# Patient Record
Sex: FEMALE | Race: WHITE | Employment: FULL TIME | URBAN - METROPOLITAN AREA
[De-identification: names, ages, dates, MRNs, and addresses within clinical notes are randomized per-mention and may not be internally consistent; named-entity substitution may affect disease eponyms.]

---

## 2017-07-07 ENCOUNTER — HOSPITAL ENCOUNTER (OUTPATIENT)
Dept: MAMMOGRAPHY | Age: 57
Discharge: HOME | End: 2017-07-07
Attending: OBSTETRICS & GYNECOLOGY | Admitting: OBSTETRICS & GYNECOLOGY

## 2017-07-07 DIAGNOSIS — Z12.31 VISIT FOR SCREENING MAMMOGRAM: ICD-10-CM

## 2018-07-10 ENCOUNTER — HOSPITAL ENCOUNTER (OUTPATIENT)
Dept: MAMMOGRAPHY | Age: 58
Discharge: HOME | End: 2018-07-10
Admitting: OBSTETRICS & GYNECOLOGY

## 2018-07-10 DIAGNOSIS — Z12.31 VISIT FOR SCREENING MAMMOGRAM: ICD-10-CM

## 2018-12-11 ENCOUNTER — HOSPITAL ENCOUNTER (OUTPATIENT)
Age: 58
Discharge: HOME | End: 2018-12-11
Payer: COMMERCIAL

## 2018-12-11 LAB — URIC ACID, SERUM: 7.9 MG/DL (ref 2.6–6)

## 2018-12-11 PROCEDURE — 36415 COLL VENOUS BLD VENIPUNCTURE: CPT

## 2018-12-11 PROCEDURE — 84550 ASSAY OF BLOOD/URIC ACID: CPT

## 2019-02-07 ENCOUNTER — HOSPITAL ENCOUNTER (OUTPATIENT)
Dept: MAMMOGRAPHY | Age: 59
Discharge: HOME | End: 2019-02-11
Payer: COMMERCIAL

## 2019-02-07 DIAGNOSIS — Z12.31 VISIT FOR SCREENING MAMMOGRAM: ICD-10-CM

## 2019-02-07 PROCEDURE — 77063 BREAST TOMOSYNTHESIS BI: CPT

## 2019-02-28 ENCOUNTER — HOSPITAL ENCOUNTER (OUTPATIENT)
Dept: PHYSICAL THERAPY | Age: 59
Setting detail: THERAPIES SERIES
Discharge: HOME | End: 2019-02-28
Payer: COMMERCIAL

## 2019-02-28 PROCEDURE — 97530 THERAPEUTIC ACTIVITIES: CPT

## 2019-02-28 PROCEDURE — 97012 MECHANICAL TRACTION THERAPY: CPT

## 2019-02-28 PROCEDURE — 97161 PT EVAL LOW COMPLEX 20 MIN: CPT

## 2019-02-28 ASSESSMENT — PAIN SCALES - GENERAL: PAINLEVEL_OUTOF10: 5

## 2019-02-28 ASSESSMENT — PAIN DESCRIPTION - FREQUENCY: FREQUENCY: INTERMITTENT

## 2019-02-28 ASSESSMENT — PAIN - FUNCTIONAL ASSESSMENT: PAIN_FUNCTIONAL_ASSESSMENT: PREVENTS OR INTERFERES SOME ACTIVE ACTIVITIES AND ADLS

## 2019-03-05 ENCOUNTER — HOSPITAL ENCOUNTER (OUTPATIENT)
Dept: PHYSICAL THERAPY | Age: 59
Setting detail: THERAPIES SERIES
Discharge: HOME | End: 2019-03-05
Payer: COMMERCIAL

## 2019-03-05 PROCEDURE — 97110 THERAPEUTIC EXERCISES: CPT

## 2019-03-05 PROCEDURE — 97012 MECHANICAL TRACTION THERAPY: CPT

## 2019-03-06 ENCOUNTER — HOSPITAL ENCOUNTER (OUTPATIENT)
Dept: PHYSICAL THERAPY | Age: 59
Setting detail: THERAPIES SERIES
Discharge: HOME | End: 2019-03-06
Payer: COMMERCIAL

## 2019-03-06 PROCEDURE — 97012 MECHANICAL TRACTION THERAPY: CPT

## 2019-03-06 PROCEDURE — 97110 THERAPEUTIC EXERCISES: CPT

## 2019-03-08 ENCOUNTER — HOSPITAL ENCOUNTER (OUTPATIENT)
Dept: PHYSICAL THERAPY | Age: 59
Setting detail: THERAPIES SERIES
Discharge: HOME | End: 2019-03-08
Payer: COMMERCIAL

## 2019-03-08 PROCEDURE — 97110 THERAPEUTIC EXERCISES: CPT

## 2019-03-08 PROCEDURE — 97012 MECHANICAL TRACTION THERAPY: CPT

## 2019-03-11 ENCOUNTER — HOSPITAL ENCOUNTER (OUTPATIENT)
Dept: PHYSICAL THERAPY | Age: 59
Setting detail: THERAPIES SERIES
Discharge: HOME | End: 2019-03-11
Payer: COMMERCIAL

## 2019-03-11 PROCEDURE — 97012 MECHANICAL TRACTION THERAPY: CPT

## 2019-03-11 PROCEDURE — 97110 THERAPEUTIC EXERCISES: CPT

## 2019-03-13 ENCOUNTER — APPOINTMENT (OUTPATIENT)
Dept: PHYSICAL THERAPY | Age: 59
End: 2019-03-13
Payer: COMMERCIAL

## 2019-03-14 ENCOUNTER — HOSPITAL ENCOUNTER (OUTPATIENT)
Dept: PHYSICAL THERAPY | Age: 59
Setting detail: THERAPIES SERIES
Discharge: HOME | End: 2019-03-14
Payer: COMMERCIAL

## 2019-03-14 PROCEDURE — 97110 THERAPEUTIC EXERCISES: CPT

## 2019-03-14 PROCEDURE — 97140 MANUAL THERAPY 1/> REGIONS: CPT

## 2019-03-14 PROCEDURE — 97012 MECHANICAL TRACTION THERAPY: CPT

## 2019-03-15 ENCOUNTER — HOSPITAL ENCOUNTER (OUTPATIENT)
Dept: PHYSICAL THERAPY | Age: 59
Setting detail: THERAPIES SERIES
Discharge: HOME | End: 2019-03-15
Payer: COMMERCIAL

## 2019-03-15 PROCEDURE — 97012 MECHANICAL TRACTION THERAPY: CPT

## 2019-03-15 PROCEDURE — 97110 THERAPEUTIC EXERCISES: CPT

## 2019-03-15 PROCEDURE — 97140 MANUAL THERAPY 1/> REGIONS: CPT

## 2019-03-18 ENCOUNTER — HOSPITAL ENCOUNTER (OUTPATIENT)
Dept: PHYSICAL THERAPY | Age: 59
Setting detail: THERAPIES SERIES
Discharge: HOME | End: 2019-03-18
Payer: COMMERCIAL

## 2019-03-18 PROCEDURE — 97012 MECHANICAL TRACTION THERAPY: CPT

## 2019-03-18 PROCEDURE — 97110 THERAPEUTIC EXERCISES: CPT

## 2019-03-20 ENCOUNTER — HOSPITAL ENCOUNTER (OUTPATIENT)
Dept: PHYSICAL THERAPY | Age: 59
Setting detail: THERAPIES SERIES
Discharge: HOME | End: 2019-03-20
Payer: COMMERCIAL

## 2019-03-20 PROCEDURE — 97012 MECHANICAL TRACTION THERAPY: CPT

## 2019-03-20 PROCEDURE — 97110 THERAPEUTIC EXERCISES: CPT

## 2019-03-22 ENCOUNTER — HOSPITAL ENCOUNTER (OUTPATIENT)
Dept: PHYSICAL THERAPY | Age: 59
Setting detail: THERAPIES SERIES
Discharge: HOME | End: 2019-03-22
Payer: COMMERCIAL

## 2019-03-22 PROCEDURE — 97012 MECHANICAL TRACTION THERAPY: CPT

## 2019-03-22 PROCEDURE — 97110 THERAPEUTIC EXERCISES: CPT

## 2019-03-25 ENCOUNTER — HOSPITAL ENCOUNTER (OUTPATIENT)
Dept: PHYSICAL THERAPY | Age: 59
Setting detail: THERAPIES SERIES
Discharge: HOME | End: 2019-03-25
Payer: COMMERCIAL

## 2019-03-25 PROCEDURE — 97012 MECHANICAL TRACTION THERAPY: CPT

## 2019-03-25 PROCEDURE — 97110 THERAPEUTIC EXERCISES: CPT

## 2019-03-27 ENCOUNTER — HOSPITAL ENCOUNTER (OUTPATIENT)
Dept: PHYSICAL THERAPY | Age: 59
Setting detail: THERAPIES SERIES
Discharge: HOME | End: 2019-03-27
Payer: COMMERCIAL

## 2019-03-27 PROCEDURE — 97110 THERAPEUTIC EXERCISES: CPT

## 2019-03-27 PROCEDURE — 97012 MECHANICAL TRACTION THERAPY: CPT

## 2019-03-29 ENCOUNTER — HOSPITAL ENCOUNTER (OUTPATIENT)
Dept: PHYSICAL THERAPY | Age: 59
Setting detail: THERAPIES SERIES
Discharge: HOME | End: 2019-03-29
Payer: COMMERCIAL

## 2019-03-29 PROCEDURE — 97012 MECHANICAL TRACTION THERAPY: CPT

## 2019-03-29 PROCEDURE — 97110 THERAPEUTIC EXERCISES: CPT

## 2019-04-10 ENCOUNTER — HOSPITAL ENCOUNTER (OUTPATIENT)
Dept: PHYSICAL THERAPY | Age: 59
Setting detail: THERAPIES SERIES
Discharge: HOME | End: 2019-04-10
Payer: COMMERCIAL

## 2019-04-10 PROCEDURE — 97110 THERAPEUTIC EXERCISES: CPT

## 2019-04-10 PROCEDURE — 97012 MECHANICAL TRACTION THERAPY: CPT

## 2019-04-10 NOTE — FLOWSHEET NOTE
Physical Therapy Daily Treatment Note  Date:  4/10/2019    Patient Name:  Antonia Jewell    :  1960  MRN: [id number]  Restrictions/Precautions:    Medical/Treatment Diagnosis Information:   · Diagnosis: Cervical Radiculopathy  · Treatment Diagnosis: R wrist extensor MM inflammation,  pain at origin and along muscle belly,  + R Spurlings compression test with R UE radicular symptoms into hand. Tracking Information:  Physician Information Referring Practitioner: Molly Campbell MD [facility]     Plan of Care Sent Date:  18 Signed Received:    Visit Count / Total Visits  ,     Insurance Approved Visits  /  Approved Dates:     Insurance Information PT Insurance Information: [employer]     Progress Note/G-codes   []  Yes  [x]  No Next Due: visit 22     Pain level: 3/10     Subjective: 4/10  Pt states that last week she fell asleep w/thout a pillow and her head leaning to the side,  Has had some increased /neck pain since, has had to take OTC meds to control       3/29  Stated was in meeting yesterday for about 6 hours and chair armrests were too high and table for laptop was too high so had more neck and upper R arm pain    3/27  Pt states not having pain today,    3/25  Pt states that she is noticing much improvement,  States that symptoms had reduced about 80%. Still has hand numbness, but thinks it is more related to CTS. Sharp pain is rare,  Frequency and intensity of pain is much less. 3/22:  Pt reports she was a little more sore after the increase in traction weight; states she would like to decrease it today because she was sore. 3/18-- states had not noticed much pain/discomfort in R Ue  Over the weekend. 3/15,  Pt states only having about two episodes a day now, with less overall pain    3/14: Pt with no new complaints; states the frequency and intensity of her pain have both decreased.   Still with numbness in B hands however states that is carpal tunnel and will not change. Objective:      Observation:   3/22:  Pt with thorcic kyphosis,  Forward head,  IR shoulders B; able to correct posture some when cued. Test measurements:  3/22: Formal measurements not taken this date     Exercises:  Exercise/Equipment Resistance/Repetitions Other comments   UBE 2 min ea fwd/retro Scifit (new)   Wrist stretches, strengthening R  HEP   Cervical Stabs Ball behind head:  Chin tuck x 15  CTwith B rotation x 15  CT with flex/ext x 15  CT with B UE Yellow tband / ER x 15    Tband-  -blue TB  Alternating punch out  B x 15     Push up w /a + X 15 Man cues for scapula retraction   D-shrugs X 125    HEP        [employer] FM High row 20# x 15  LPD  20# x 15  Mid row 20# x 15 3/29  incr wt next visit                               Other Therapeutic Activities:  3/22:  None this date  3/8  Recommended pt wear wrist splint, if still has them;, at night to minimize wrist flexion       Home Exercise Program: 3/22:  No new additions this date   2/28  Ice massage to R forearm wrist extensors 7 min daily; Wrist flex/extensor MM stretches throughout day  20 sec each    Manual Treatments:   3/22:  None this date  3/15   X 12 min same as 3/14    3/14:  Supine for DTM to B UT's and cervical parapspinals, UT stretch B (pt developed symptoms in RUE when stretching LUT with head in SB to R, stretch was stopped and symptoms resolved immediately), SOR and cervical traction    Modalities:4/10  ICT 19/10 # 30/10 sec x 16min,   w bolster under knees  3/29 same except 17 min     3/27,  3/25/  ICT  19/10 # 30/10 sec,  X 16 min   Supine with bolsters under knees. 3/22:  Intermittent cervical traction 16#/13# x 10 min with bolster under knees; bell and shut off switch provided. 3/20,    Intermit, Cervical traction:  20/10#  30/10 sec  X 15 min w bolster under knees. Provided bell for needs  3/18  3/15,   3/14,  3/11,  Supine cervical tractions 19/16#  30 on/10 off  X 15 min   3/6  16/7 lbs.   30/10 sec, x 12 min      3/5,   Same except 12 min. 2/28  Supine cervical intermittent traction 14/7 lbs 30/10 sec x 12 mn  w bolsters under knees. Educated pt on expectations and progression with traction. PT stayed with pt. Timed Code Treatment Minutes: 24  Total Treatment Minutes:42    Treatment/Activity Tolerance:  xx- Patient tolerated treatment well - Patient limited by fatigue  - Patient limited by pain  - Patient limited by other medical complications  - Other:    No incr pain after increase after therapy    Prognosis: - Good - Fair  - Poor    Patient Requires Follow-up: -x Yes  - No    Plan:   -xx Continue per plan of care - Alter current plan (see comments)  - Plan of care initiated - Hold pending MD visit - Discharge    Plan for Next Session:  Continue modalities to R extensor MM as needed,  Cervical traction, cervical stabilization ex. MOC as needed for pain.     Electronically signed by:  Candice Lino PT DPT

## 2019-04-18 ENCOUNTER — HOSPITAL ENCOUNTER (OUTPATIENT)
Dept: PHYSICAL THERAPY | Age: 59
Setting detail: THERAPIES SERIES
Discharge: HOME | End: 2019-04-18
Payer: COMMERCIAL

## 2019-04-18 PROCEDURE — 97110 THERAPEUTIC EXERCISES: CPT

## 2019-04-18 PROCEDURE — 97012 MECHANICAL TRACTION THERAPY: CPT

## 2019-04-18 NOTE — FLOWSHEET NOTE
Physical Therapy Daily Treatment Note  Date:  2019    Patient Name:  Ashly Bender    :  1960  MRN: [id number]  Restrictions/Precautions:    Medical/Treatment Diagnosis Information:   · Diagnosis: Cervical Radiculopathy  · Treatment Diagnosis: R wrist extensor MM inflammation,  pain at origin and along muscle belly,  + R Spurlings compression test with R UE radicular symptoms into hand. Tracking Information:  Physician Information Referring Practitioner: Génesis Elias MD [facility]     Plan of Care Sent Date:  18 Signed Received:    Visit Count / Total Visits  ,     Insurance Approved Visits  /  Approved Dates:   PN approved for add'l visits   Insurance Information PT Insurance Information: [employer]     Progress Note/G-codes   []  Yes  [x]  No Next Due: visit 22     Pain level: 4/10     Subjective:     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 4/10  Pt states that last week she fell asleep w/thout a pillow and her head leaning to the side,  Has had some increased /neck pain since, has had to take OTC meds to control       3/29  Stated was in meeting yesterday for about 6 hours and chair armrests were too high and table for laptop was too high so had more neck and upper R arm pain    3/27  Pt states not having pain today,    3/25  Pt states that she is noticing much improvement,  States that symptoms had reduced about 80%. Still has hand numbness, but thinks it is more related to CTS. Sharp pain is rare,  Frequency and intensity of pain is much less. 3/22:  Pt reports she was a little more sore after the increase in traction weight; states she would like to decrease it today because she was sore. 3/18-- states had not noticed much pain/discomfort in R Ue  Over the weekend.     3/15,  Pt states only having about two episodes a day now, with less overall pain    3/14: Pt with no new complaints; states the frequency and intensity of her pain have both decreased. Still with numbness in B hands however states that is carpal tunnel and will not change. Objective:      Observation:   3/22:  Pt with thorcic kyphosis,  Forward head,  IR shoulders B; able to correct posture some when cued. Test measurements:  3/22: Formal measurements not taken this date     Exercises:  Exercise/Equipment Resistance/Repetitions Other comments   UBE 2 min ea fwd/retro Scifit (new)   Wrist stretches, strengthening R  HEP   Cervical Stabs Ball behind head:  Chin tuck x 20  CTwith B rotation x 15  CT with flex/ext x 15  CT with B UE Yellow tband / ER x 15    Tband-  -blue TB  Alternating punch out  B x 15     Push up w /a + X 15 Man cues for scapula retraction   D-shrugs X 20    HEP        [employer] FM High row 20# x 2 x 10  LPD  20# x  2 x 10  Mid row 20# x  2 x 10 3/29  incr wt next visit                               Other Therapeutic Activities:  3/22:  None this date  3/8  Recommended pt wear wrist splint, if still has them;, at night to minimize wrist flexion       Home Exercise Program: 3/22:  No new additions this date   2/28  Ice massage to R forearm wrist extensors 7 min daily; Wrist flex/extensor MM stretches throughout day  20 sec each    Manual Treatments:   3/22:  None this date  3/15   X 12 min same as 3/14    3/14:  Supine for DTM to B UT's and cervical parapspinals, UT stretch B (pt developed symptoms in RUE when stretching LUT with head in SB to R, stretch was stopped and symptoms resolved immediately), SOR and cervical traction    Modalities:4/18   4/10  ICT 19/10 # 30/10 sec x 16min,   w bolster under knees  3/29 same except 17 min     3/27,  3/25/  ICT  19/10 # 30/10 sec,  X 16 min   Supine with bolsters under knees. 3/22:  Intermittent cervical traction 16#/13# x 10 min with bolster under knees; bell and shut off switch provided.     3/20,    Intermit, Cervical traction:  20/10#  30/10 sec  X 15 min w bolster under knees. Provided bell for needs  3/18  3/15,   3/14,  3/11,  Supine cervical tractions 19/16#  30 on/10 off  X 15 min   3/6  16/7 lbs. 30/10 sec, x 12 min      3/5,   Same except 12 min. 2/28  Supine cervical intermittent traction 14/7 lbs 30/10 sec x 12 mn  w bolsters under knees. Educated pt on expectations and progression with traction. PT stayed with pt. Timed Code Treatment Minutes: 15  Total Treatment Minutes:42    Treatment/Activity Tolerance:  xx- Patient tolerated treatment well - Patient limited by fatigue  - Patient limited by pain  - Patient limited by other medical complications  - Other:    No incr pain after increase after therapy    Prognosis: - Good - Fair  - Poor    Patient Requires Follow-up: -x Yes  - No    Plan:   -xx Continue per plan of care - Alter current plan (see comments)  - Plan of care initiated - Hold pending MD visit - Discharge    Plan for Next Session:  Continue modalities to R extensor MM as needed,  Cervical traction, cervical stabilization ex. MOC as needed for pain.     Electronically signed by:  Dinesh Muse, PT DPT

## 2019-04-19 ENCOUNTER — HOSPITAL ENCOUNTER (OUTPATIENT)
Dept: PHYSICAL THERAPY | Age: 59
Setting detail: THERAPIES SERIES
Discharge: HOME | End: 2019-04-19
Payer: COMMERCIAL

## 2019-04-19 PROCEDURE — 97110 THERAPEUTIC EXERCISES: CPT

## 2019-04-19 PROCEDURE — 97012 MECHANICAL TRACTION THERAPY: CPT

## 2019-04-19 NOTE — FLOWSHEET NOTE
Physical Therapy Daily Treatment Note  Date:  2019    Patient Name:  Susan Llaens    :  1960  MRN: [id number]  Restrictions/Precautions:    Medical/Treatment Diagnosis Information:   · Diagnosis: Cervical Radiculopathy  · Treatment Diagnosis: R wrist extensor MM inflammation,  pain at origin and along muscle belly,  + R Spurlings compression test with R UE radicular symptoms into hand. Tracking Information:  Physician Information Referring Practitioner: Roseanne Centeno MD [facility]     Plan of Care Sent Date:  18 Signed Received:    Visit Count / Total Visits  ,  3/8    Insurance Approved Visits  /  Approved Dates:   PN approved for add'l visits   Insurance Information PT Insurance Information: [employer]     Progress Note/G-codes   []  Yes  [x]  No Next Due: visit 22     Pain level: 3/10     Subjective:     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 4/10  Pt states that last week she fell asleep w/thout a pillow and her head leaning to the side,  Has had some increased /neck pain since, has had to take OTC meds to control       3/29  Stated was in meeting yesterday for about 6 hours and chair armrests were too high and table for laptop was too high so had more neck and upper R arm pain    3/27  Pt states not having pain today,    3/25  Pt states that she is noticing much improvement,  States that symptoms had reduced about 80%. Still has hand numbness, but thinks it is more related to CTS. Sharp pain is rare,  Frequency and intensity of pain is much less. 3/22:  Pt reports she was a little more sore after the increase in traction weight; states she would like to decrease it today because she was sore. 3/18-- states had not noticed much pain/discomfort in R Ue  Over the weekend.     3/15,  Pt states only having about two episodes a day now, with less overall pain    3/14: Pt with no new complaints; states the frequency and intensity of her pain have both decreased. Still with numbness in B hands however states that is carpal tunnel and will not change. Objective:      Observation:   3/22:  Pt with thorcic kyphosis,  Forward head,  IR shoulders B; able to correct posture some when cued. Test measurements:  3/22: Formal measurements not taken this date     Exercises:  Exercise/Equipment Resistance/Repetitions Other comments   UBE 2 min ea fwd/retro Scifit (new)   Wrist stretches, strengthening R  HEP   Cervical Stabs Ball behind head:  Chin tuck x 20  CTwith B rotation x 15  CT with flex/ext x 15  CT with B UE Yellow tband / ER x 15  CT with R sh scaption x 10      Tband-  -blue TB  Alternating punch out  B x 15     Push up w /a + X 15 Man cues for scapula retraction   D-shrugs X 20    HEP        [employer] FM High row 20# x 2 x 10  LPD  20# x  2 x 10  Mid row 20# x  2 x 10 3/29  incr wt next visit                               Other Therapeutic Activities:  3/22:  None this date  3/8  Recommended pt wear wrist splint, if still has them;, at night to minimize wrist flexion       Home Exercise Program: 3/22:  No new additions this date   2/28  Ice massage to R forearm wrist extensors 7 min daily; Wrist flex/extensor MM stretches throughout day  20 sec each    Manual Treatments:   3/22:  None this date  3/15   X 12 min same as 3/14    3/14:  Supine for DTM to B UT's and cervical parapspinals, UT stretch B (pt developed symptoms in RUE when stretching LUT with head in SB to R, stretch was stopped and symptoms resolved immediately), SOR and cervical traction    Modalities:4/18   4/10  ICT 19/10 # 30/10 sec x 16min,   w bolster under knees  3/29 same except 17 min     3/27,  3/25/  ICT  19/10 # 30/10 sec,  X 16 min   Supine with bolsters under knees.      3/22:  Intermittent cervical traction 16#/13# x 10 min with bolster under knees; bell and shut off switch provided. 3/20,    Intermit, Cervical traction:  20/10#  30/10 sec  X 15 min w bolster under knees. Provided bell for needs  3/18  3/15,   3/14,  3/11,  Supine cervical tractions 19/16#  30 on/10 off  X 15 min   3/6  16/7 lbs. 30/10 sec, x 12 min      3/5,   Same except 12 min. 2/28  Supine cervical intermittent traction 14/7 lbs 30/10 sec x 12 mn  w bolsters under knees. Educated pt on expectations and progression with traction. PT stayed with pt. Timed Code Treatment Minutes: 15  Total Treatment Minutes:32    Treatment/Activity Tolerance:  xx- Patient tolerated treatment well - Patient limited by fatigue  - Patient limited by pain  - Patient limited by other medical complications  - Other:    No incr pain after increase after therapy    Prognosis: - Good - Fair  - Poor    Patient Requires Follow-up: -x Yes  - No    Plan:   -xx Continue per plan of care - Alter current plan (see comments)  - Plan of care initiated - Hold pending MD visit - Discharge    Plan for Next Session:  Continue modalities to R extensor MM as needed,  Cervical traction, cervical stabilization ex. MOC as needed for pain.     Electronically signed by:  Dinesh Muse, [address] [facility] DPT [id number]

## 2019-04-22 ENCOUNTER — HOSPITAL ENCOUNTER (OUTPATIENT)
Dept: PHYSICAL THERAPY | Age: 59
Setting detail: THERAPIES SERIES
Discharge: HOME | End: 2019-04-22
Payer: COMMERCIAL

## 2019-04-22 PROCEDURE — 97110 THERAPEUTIC EXERCISES: CPT

## 2019-04-22 PROCEDURE — 97012 MECHANICAL TRACTION THERAPY: CPT

## 2019-04-22 NOTE — FLOWSHEET NOTE
Physical Therapy Daily Treatment Note  Date:  2019    Patient Name:  Laith Cortez    :  1960  MRN: [id number]  Restrictions/Precautions:    Medical/Treatment Diagnosis Information:   · Diagnosis: Cervical Radiculopathy  · Treatment Diagnosis: R wrist extensor MM inflammation,  pain at origin and along muscle belly,  + R Spurlings compression test with R UE radicular symptoms into hand. Tracking Information:  Physician Information Referring Practitioner: Quitman Ahumada, MD [facility]     Plan of Care Sent Date:  18 Signed Received:    Visit Count / Total Visits  ,      Insurance Approved Visits  /  Approved Dates:   PN approved for add'l visits   Insurance Information PT Insurance Information: [employer]     Progress Note/G-codes   []  Yes  [x]  No Next Due: visit 22     Pain level: 2/10     Subjective:    Stated neck/shoulder a little painful today, but is still trying to get her new workstation set up for her          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 4/10  Pt states that last week she fell asleep w/thout a pillow and her head leaning to the side,  Has had some increased /neck pain since, has had to take OTC meds to control       3/29  Stated was in meeting yesterday for about 6 hours and chair armrests were too high and table for laptop was too high so had more neck and upper R arm pain    3/27  Pt states not having pain today,    3/25  Pt states that she is noticing much improvement,  States that symptoms had reduced about 80%. Still has hand numbness, but thinks it is more related to CTS. Sharp pain is rare,  Frequency and intensity of pain is much less. 3/22:  Pt reports she was a little more sore after the increase in traction weight; states she would like to decrease it today because she was sore.      3/18-- states had not noticed much pain/discomfort in R Ue  Over the weekend. 3/15,  Pt states only having about two episodes a day now, with less overall pain    3/14: Pt with no new complaints; states the frequency and intensity of her pain have both decreased. Still with numbness in B hands however states that is carpal tunnel and will not change. Objective:      Observation:   3/22:  Pt with thorcic kyphosis,  Forward head,  IR shoulders B; able to correct posture some when cued. Test measurements:  3/22: Formal measurements not taken this date     Exercises:  Exercise/Equipment Resistance/Repetitions Other comments   UBE 2 min ea fwd/retro Scifit (new)   Wrist stretches, strengthening R  HEP   Cervical Stabs Ball behind head:  Chin tuck x 20  CTwith B rotation x 15  CT with flex/ext x 15    Green TBand  CT with B / ER x 20  CT with R sh scaption x 10      Tband-  -blue TB  Alternating punch out  B x 15     Push up w /a + X 15 Man cues for scapula retraction   D-shrugs X 20    HEP        [employer] FM High row 20# x 2 x 10  LPD  20# x  2 x 10  Mid row 20# x  2 x 10 3/29  incr wt next visit                               Other Therapeutic Activities:  3/22:  None this date  3/8  Recommended pt wear wrist splint, if still has them;, at night to minimize wrist flexion       Home Exercise Program: 3/22:  No new additions this date   2/28  Ice massage to R forearm wrist extensors 7 min daily;   Wrist flex/extensor MM stretches throughout day  20 sec each    Manual Treatments:   3/22:  None this date  3/15   X 12 min same as 3/14    3/14:  Supine for DTM to B UT's and cervical parapspinals, UT stretch B (pt developed symptoms in RUE when stretching LUT with head in SB to R, stretch was stopped and symptoms resolved immediately), SOR and cervical traction    Modalities:   4/22,  Same  4/18   4/10  ICT 19/10 # 30/10 sec x 16min,   w bolster under knees  3/29 same except 17 min     3/27,  3/25/  ICT  19/10 # 30/10 sec,  X 16 min   Supine with bolsters under knees. 3/22:  Intermittent cervical traction 16#/13# x 10 min with bolster under knees; bell and shut off switch provided. 3/20,    Intermit, Cervical traction:  20/10#  30/10 sec  X 15 min w bolster under knees. Provided bell for needs  3/18  3/15,   3/14,  3/11,  Supine cervical tractions 19/16#  30 on/10 off  X 15 min   3/6  16/7 lbs. 30/10 sec, x 12 min      3/5,   Same except 12 min. 2/28  Supine cervical intermittent traction 14/7 lbs 30/10 sec x 12 mn  w bolsters under knees. Educated pt on expectations and progression with traction. PT stayed with pt. Timed Code Treatment Minutes: 18  Total Treatment Minutes:34    Treatment/Activity Tolerance:  xx- Patient tolerated treatment well - Patient limited by fatigue  - Patient limited by pain  - Patient limited by other medical complications  - Other:    No incr pain after increase after therapy    Prognosis: - Good - Fair  - Poor    Patient Requires Follow-up: -x Yes  - No    Plan:   -xx Continue per plan of care - Alter current plan (see comments)  - Plan of care initiated - Hold pending MD visit - Discharge    Plan for Next Session:  Continue modalities to R extensor MM as needed,  Cervical traction, cervical stabilization ex. MOC as needed for pain.     Electronically signed by:  Lalitha Talamantes DPT [id number]

## 2019-04-24 ENCOUNTER — HOSPITAL ENCOUNTER (OUTPATIENT)
Dept: PHYSICAL THERAPY | Age: 59
Setting detail: THERAPIES SERIES
Discharge: HOME | End: 2019-04-24
Payer: COMMERCIAL

## 2019-04-24 PROCEDURE — 97110 THERAPEUTIC EXERCISES: CPT

## 2019-04-24 PROCEDURE — 97012 MECHANICAL TRACTION THERAPY: CPT

## 2019-04-24 NOTE — FLOWSHEET NOTE
Physical Therapy Daily Treatment Note  Date:  2019    Patient Name:  Taty Manuel    :  1960  MRN: [id number]  Restrictions/Precautions:    Medical/Treatment Diagnosis Information:   · Diagnosis: Cervical Radiculopathy  · Treatment Diagnosis: R wrist extensor MM inflammation,  pain at origin and along muscle belly,  + R Spurlings compression test with R UE radicular symptoms into hand. Tracking Information:  Physician Information Referring Practitioner: Jose Ramon Clemente MD [facility]     Plan of Care Sent Date:  18 Signed Received:    Visit Count / Total Visits  ,      Insurance Approved Visits  /  Approved Dates:   PN approved for add'l visits   Insurance Information PT Insurance Information: [employer]     Progress Note/G-codes   []  Yes  [x]  No Next Due: visit 22     Pain level: 1/10     Subjective:   Only having a little neck pain today,        Stated neck/shoulder a little painful today, but is still trying to get her new workstation set up for her          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 4/10  Pt states that last week she fell asleep w/thout a pillow and her head leaning to the side,  Has had some increased /neck pain since, has had to take OTC meds to control       3/29  Stated was in meeting yesterday for about 6 hours and chair armrests were too high and table for laptop was too high so had more neck and upper R arm pain    3/27  Pt states not having pain today,    3/25  Pt states that she is noticing much improvement,  States that symptoms had reduced about 80%. Still has hand numbness, but thinks it is more related to CTS. Sharp pain is rare,  Frequency and intensity of pain is much less. 3/22:  Pt reports she was a little more sore after the increase in traction weight; states she would like to decrease it today because she was sore. 3/18-- states had not noticed much pain/discomfort in R Ue  Over the weekend. 3/15,  Pt states only having about two episodes a day now, with less overall pain    3/14: Pt with no new complaints; states the frequency and intensity of her pain have both decreased. Still with numbness in B hands however states that is carpal tunnel and will not change. Objective:      Observation:  4/24  Improved posture,  Able to maitnain correct head posture thrughout therapy session  2:  Pt with thorcic kyphosis,  Forward head,  IR shoulders B; able to correct posture some when cued. Test measurements:  3/22: Formal measurements not taken this date     Exercises:  Exercise/Equipment Resistance/Repetitions Other comments   UBE 2 min ea fwd/retro Scifit (new)   Back to wall with 1/2 foam roll along spine head UE   w Gr tband:     B ER  X 15  B Shoulder press x 15 ea   Horiz ABd x 15    HEP   Cervical Stabs Ball behind head:  Chin tuck x 20  CTwith B rotation x 15  CT with flex/ext x 15          Tband-  -blue TB  Alternating punch out  B x 15     Push up w /a + X 15 Man cues for scapula retraction   D-shrugs X 20    HEP        [employer] FM High row 20# x 2 x 10  LPD  20# x  2 x 10  Mid row 20# x  2 x 10 3/29  incr wt next visit                               Other Therapeutic Activities:  3/22:  None this date  3/8  Recommended pt wear wrist splint, if still has them;, at night to minimize wrist flexion       Home Exercise Program: 3/22:  No new additions this date   2/28  Ice massage to R forearm wrist extensors 7 min daily;   Wrist flex/extensor MM stretches throughout day  20 sec each    Manual Treatments:   3/22:  None this date  3/15   X 12 min same as 3/14    3/14:  Supine for DTM to B UT's and cervical parapspinals, UT stretch B (pt developed symptoms in RUE when stretching LUT with head in SB to R, stretch was stopped and symptoms resolved immediately), SOR and cervical traction    Modalities:  4/24 4/22,  Same

## 2019-04-30 ENCOUNTER — HOSPITAL ENCOUNTER (OUTPATIENT)
Dept: PHYSICAL THERAPY | Age: 59
Setting detail: THERAPIES SERIES
Discharge: HOME | End: 2019-04-30
Payer: COMMERCIAL

## 2019-04-30 PROCEDURE — 97110 THERAPEUTIC EXERCISES: CPT

## 2019-04-30 PROCEDURE — 97012 MECHANICAL TRACTION THERAPY: CPT

## 2019-04-30 NOTE — FLOWSHEET NOTE
to decrease it today because she was sore. 3/18-- states had not noticed much pain/discomfort in R Ue  Over the weekend. 3/15,  Pt states only having about two episodes a day now, with less overall pain    3/14: Pt with no new complaints; states the frequency and intensity of her pain have both decreased. Still with numbness in B hands however states that is carpal tunnel and will not change. Objective:      Observation:  4/24  Improved posture,  Able to maitnain correct head posture thrughout therapy session  2:  Pt with thorcic kyphosis,  Forward head,  IR shoulders B; able to correct posture some when cued. Test measurements:  3/22: Formal measurements not taken this date     Exercises:  Exercise/Equipment Resistance/Repetitions Other comments   UBE 2 min ea fwd/retro Scifit (new)   Back to wall with 1/2 foam roll along spine head UE   w Gr tband:     B ER  X 15  B Shoulder press x 15 ea   Horiz ABd x 15    HEP   Cervical Stabs Ball behind head:  Chin tuck x 20  CTwith B rotation x  CT with flex/ext x 15          Tband-  -blue TB  Alternating punch out  B x 15     Push up w /a + X 15 Man cues for scapula retraction   D-shrugs X 20    HEP        [employer] FM High row 25# x  X 15  LPD  25# x   X 15  Mid row 25# x   X 15  Punch out B 10# x 15 3/29  incr wt next visit                               Other Therapeutic Activities:  3/22:  None this date  3/8  Recommended pt wear wrist splint, if still has them;, at night to minimize wrist flexion       Home Exercise Program: 3/22:  No new additions this date   2/28  Ice massage to R forearm wrist extensors 7 min daily;   Wrist flex/extensor MM stretches throughout day  20 sec each    Manual Treatments:   3/22:  None this date  3/15   X 12 min same as 3/14    3/14:  Supine for DTM to B UT's and cervical parapspinals, UT stretch B (pt developed symptoms in RUE when stretching LUT with head in SB to R, stretch was stopped and symptoms resolved immediately), SOR and cervical traction    Modalities:  4/30 19/12# 30/10 sec  X 6 4/24 4/22,  Same  4/18   4/10  ICT 19/10 # 30/10 sec x 16min,   w bolster under knees  3/29 same except 17 min     3/27,  3/25/  ICT  19/10 # 30/10 sec,  X 16 min   Supine with bolsters under knees. 3/22:  Intermittent cervical traction 16#/13# x 10 min with bolster under knees; bell and shut off switch provided. 3/20,    Intermit, Cervical traction:  20/10#  30/10 sec  X 15 min w bolster under knees. Provided bell for needs  3/18  3/15,   3/14,  3/11,  Supine cervical tractions 19/16#  30 on/10 off  X 15 min   3/6  16/7 lbs. 30/10 sec, x 12 min      3/5,   Same except 12 min. 2/28  Supine cervical intermittent traction 14/7 lbs 30/10 sec x 12 mn  w bolsters under knees. Educated pt on expectations and progression with traction. PT stayed with pt. Timed Code Treatment Minutes: 23  Total Treatment Minutes:42  Treatment/Activity Tolerance:  xx- Patient tolerated treatment well - Patient limited by fatigue  - Patient limited by pain  - Patient limited by other medical complications  - Other:    No incr pain after increase after therapy    Prognosis: - Good - Fair  - Poor    Patient Requires Follow-up: -x Yes  - No    Plan:   -xx Continue per plan of care - Alter current plan (see comments)  - Plan of care initiated - Hold pending MD visit - Discharge    Plan for Next Session:  Continue modalities to R extensor MM as needed,  Cervical traction, cervical stabilization ex. MOC as needed for pain.     Electronically signed by:  Thomas David, [facility] DPT [id number]

## 2019-05-03 ENCOUNTER — HOSPITAL ENCOUNTER (OUTPATIENT)
Dept: PHYSICAL THERAPY | Age: 59
Setting detail: THERAPIES SERIES
Discharge: HOME | End: 2019-05-03
Payer: COMMERCIAL

## 2019-05-03 PROCEDURE — 97012 MECHANICAL TRACTION THERAPY: CPT

## 2019-05-03 PROCEDURE — 97110 THERAPEUTIC EXERCISES: CPT

## 2019-05-03 NOTE — FLOWSHEET NOTE
Physical Therapy Daily Treatment Note  Date:  5/3/2019    Patient Name:  Tami Sidhu    :  1960  MRN: [id number]  Restrictions/Precautions:    Medical/Treatment Diagnosis Information:   · Diagnosis: Cervical Radiculopathy  · Treatment Diagnosis: R wrist extensor MM inflammation,  pain at origin and along muscle belly,  + R Spurlings compression test with R UE radicular symptoms into hand. Tracking Information:  Physician Information Referring Practitioner: Glenny Estevez MD [facility]     Plan of Care Sent Date:  18 Signed Received:    Visit Count / Total Visits  ,      Insurance Approved Visits  /  Approved Dates:   PN approved for add'l visits   Insurance Information PT Insurance Information: [employer]     Progress Note/G-codes   []  Yes  [x]  No Next Due: visit 20     Pain level: 3/10     Subjective: 5/3 - pain a little worse today because of the way she was positioned at the monitors today    Neck a little painful,  No arm pain      Only having a little neck pain today,        Stated neck/shoulder a little painful today, but is still trying to get her new workstation set up for her          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 4/10  Pt states that last week she fell asleep w/thout a pillow and her head leaning to the side,  Has had some increased /neck pain since, has had to take OTC meds to control       3/29  Stated was in meeting yesterday for about 6 hours and chair armrests were too high and table for laptop was too high so had more neck and upper R arm pain    3/27  Pt states not having pain today,    3/25  Pt states that she is noticing much improvement,  States that symptoms had reduced about 80%. Still has hand numbness, but thinks it is more related to CTS. Sharp pain is rare,  Frequency and intensity of pain is much less.      3/22:  Pt reports she was a little more sore after the increase in traction weight; states she would like to decrease it today because she was sore. 3/18-- states had not noticed much pain/discomfort in R Ue  Over the weekend. 3/15,  Pt states only having about two episodes a day now, with less overall pain    3/14: Pt with no new complaints; states the frequency and intensity of her pain have both decreased. Still with numbness in B hands however states that is carpal tunnel and will not change. Objective:    Observation:  5/3-Maintain correct head posture throughout therapy session  4/24  Improved posture,  Able to maitnain correct head posture thrughout therapy session  2:  Pt with thorcic kyphosis,  Forward head,  IR shoulders B; able to correct posture some when cued. Test measurements:  3/22: Formal measurements not taken this date     Exercises:  Exercise/Equipment Resistance/Repetitions Other comments   UBE 2 min ea fwd/retro Scifit (new)   Back to wall with 1/2 foam roll along spine head UE   w Gr tband:   B ER  2 x 20  Horiz ABd 2 x 10    HEP   Cervical Stabs Ball behind head:  Chin tuck x 20  CTwith B rotation x 20  CT with flex/ext x 20          Tband-  -blue TB  Alternating punch out  B x 10    Push up w /a + X 15 Man cues for scapula retraction      HEP        3/29  incr wt next visit    [employer] FM High row 20# 2 x 10  LPD 20# 2 x 10  GH ext (on triceps machine) 15# 2 x 10  Row 10# 2 x 10                          Other Therapeutic Activities:  3/22:  None this date  3/8  Recommended pt wear wrist splint, if still has them;, at night to minimize wrist flexion       Home Exercise Program: 3/22:  No new additions this date   2/28  Ice massage to R forearm wrist extensors 7 min daily;   Wrist flex/extensor MM stretches throughout day  20 sec each    Manual Treatments:   3/22:  None this date  3/15   X 12 min same as 3/14  3/14:  Supine for DTM to B UT's and cervical parapspinals, UT stretch B (pt developed symptoms in RUE when stretching LUT with head in SB to R, stretch was stopped and symptoms resolved immediately), SOR and cervical traction    Modalities:  5/3, 4/30 19/12# 30/10 sec  X 16 minutes   4/24 4/22,  Same  4/18   4/10  ICT 19/10 # 30/10 sec x 16min,   w bolster under knees  3/29 same except 17 min   3/27,  3/25/  ICT  19/10 # 30/10 sec,  X 16 min   Supine with bolsters under knees. 3/22:  Intermittent cervical traction 16#/13# x 10 min with bolster under knees; bell and shut off switch provided. 3/20,    Intermit, Cervical traction:  20/10#  30/10 sec  X 15 min w bolster under knees. Provided bell for needs  3/18  3/15,   3/14,  3/11,  Supine cervical tractions 19/16#  30 on/10 off  X 15 min   3/6  16/7 lbs. 30/10 sec, x 12 min    3/5,   Same except 12 min. 2/28  Supine cervical intermittent traction 14/7 lbs 30/10 sec x 12 mn  w bolsters under knees. Educated pt on expectations and progression with traction. PT stayed with pt.      Timed Code Treatment Minutes:  30  Total Treatment Minutes:  48    Treatment/Activity Tolerance:  xx- Patient tolerated treatment well - Patient limited by fatigue  - Patient limited by pain  - Patient limited by other medical complications  - Other:      Prognosis: - Good - Fair  - Poor    Patient Requires Follow-up: -x Yes  - No    Plan:   -xx Continue per plan of care - Alter current plan (see comments)  - Plan of care initiated - Hold pending MD visit - Discharge    Plan for Next Session:  One visit left then discharge  Electronically signed by:  Lalitha Fernandez DPT [id number]

## 2019-05-06 ENCOUNTER — HOSPITAL ENCOUNTER (OUTPATIENT)
Dept: PHYSICAL THERAPY | Age: 59
Setting detail: THERAPIES SERIES
Discharge: HOME | End: 2019-05-06
Payer: COMMERCIAL

## 2019-05-06 PROCEDURE — 97110 THERAPEUTIC EXERCISES: CPT

## 2019-05-06 PROCEDURE — 97012 MECHANICAL TRACTION THERAPY: CPT

## 2019-05-06 NOTE — PROGRESS NOTES
Outpatient Physical Therapy    Phone: [phone] Fax: [phone]    Physical Therapy Discharge  Note  Date: 2019        Patient Name:  Tessa Mcgowan    :  1960  MRN: [id number]  Restrictions/Precautions:    Medical/Treatment Diagnosis Information:   · Diagnosis: Cervical Radiculopathy  · Treatment Diagnosis: R wrist extensor MM inflammation,  pain at origin and along muscle belly,  + R Spurlings compression test with R UE radicular symptoms into hand.       Tracking Information:       Physician Information Referring Practitioner: Bryn Conn MD [facility]     Plan of Care Sent Date:  18 Signed Received:    Visit Count / Total Visits  ,      Insurance Approved Visits  /  Approved Dates:     Insurance Information PT Insurance Information: [employer]     Progress Note/G-codes       Pain Max   2/10      Functional Assessment:  NDI  6         Time Period for Report:  3/25/19-   Cancels/No-shows to date:    0    Plan of Care/Treatment to date:  [x] Therapeutic Exercise      [] Modalities:  [x] Therapeutic Activity       [] Ultrasound    [] Gait Training        [x] Cervical Traction   [] Neuromuscular Re-education      [] Cold/hotpack    [x] Instruction in HEP        [] Lumbar Traction  [] Manual Therapy        [] Electrical Stimulation            [] Aquatic Therapy        [] Iontophoresis        ? [] Lymphedema management  [] Women's Health     Other:  [] Vestibular Rehab        []              ? Significant Findings At Last Visit/Comments:    Subjective:  subject feels that is much better since starting PT. She is having no R UE symptoms,  But occasional neck pain around a 2-4/10.           Objective:  Observation:  Test measurements:   Spurlings - on R ,  Some suboccipital discomfort on R  ;  Cervical AROM WNL   Assessment:  Summary:   Patient's response to treatment  Improvement in sleep, and use of R UE for all ADL,  Only noticing numbness in hand related  To CTS.  but significantly better since initiation  of PT    Progress towards goals: All goals met    Short term goals  Time Frame for Short term goals: 2 wks  Short term goal 1: Pt to report 50% decrease in frequency of symptoms into R hand. -  Goal met  Short term goal 2: Pt to report no epiodes of dropping or sense of being unable to  objects-  Goal met  Long term goals  Time Frame for Long term goals : pain in R forearm decreases to 2/10 max with all activities--goal met  Long term goal 1: Pt able to perform all work activies and home activities as PLOF--   Goal met       Long term goal 2: Pt to have negative spurlings compression test on R cervical region to allow return to PLOF-  Met          Current Frequency/Duration:  # Days per week: [] 1 day # Weeks: [] 1 week [x] 4 weeks      [] 2 days? [] 2 weeks [] 5 weeks      [x] 3 days   [] 3 weeks [] 6 weeks     Rehab Potential: [] Excellent [x] Good [] Fair  [] Poor     Goal Status:  [x] Achieved [] Partially Achieved  [] Not Achieved     Patient Status: [] Continue per initial plan of Care     [x] Patient now discharged     [] Additional visits requested, Please re-certify for additional visits:      Requested frequency/duration: 2 X/week for 4 weeks    Electronically signed by: Bhupendra Carter, PT [id number]      If you have any questions or concerns, please don't hesitate to call.   Thank you for your referral.    Physician Signature:________________________________Date:__________________  By signing above, therapists plan is approved by physician

## 2019-05-06 NOTE — FLOWSHEET NOTE
Physical Therapy Daily Treatment Note  Date:  2019    Patient Name:  Laith Cortez    :  1960  MRN: [id number]  Restrictions/Precautions:    Medical/Treatment Diagnosis Information:   · Diagnosis: Cervical Radiculopathy  · Treatment Diagnosis: R wrist extensor MM inflammation,  pain at origin and along muscle belly,  + R Spurlings compression test with R UE radicular symptoms into hand. Tracking Information:  Physician Information Referring Practitioner: Quitman Ahumada, MD [facility]     Plan of Care Sent Date:  18 Signed Received:    Visit Count / Total Visits  ,      Insurance Approved Visits  /  Approved Dates:   PN approved for add'l visits   Insurance Information PT Insurance Information: [employer]     Progress Note/G-codes   [x]  Yes  []  No Next Due: v     Pain level: 2/10  R suboccipal and lateral neck       Subjective: ,  Pt states her neck of more painful but has had to sit with head in awkward position for 3 days at work and her neck is store. Not having any further R UE symptoms. 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       4/10  Pt states that last week she fell asleep w/thout a pillow and her head leaning to the side,  Has had some increased /neck pain since, has had to take OTC meds to control       3/29  Stated was in meeting yesterday for about 6 hours and chair armrests were too high and table for laptop was too high so had more neck and upper R arm pain    3/27  Pt states not having pain today,    3/25  Pt states that she is noticing much improvement,  States that symptoms had reduced about 80%. Still has hand numbness, but thinks it is more related to CTS. Sharp pain is rare,  Frequency and intensity of pain is much less. 3/22:  Pt reports she was a little more sore after the increase in traction weight; states she would like to decrease it today because she was sore. 3/18-- states had not noticed much pain/discomfort in R Ue  Over the weekend. 3/15,  Pt states only having about two episodes a day now, with less overall pain    3/14: Pt with no new complaints; states the frequency and intensity of her pain have both decreased. Still with numbness in B hands however states that is carpal tunnel and will not change. Objective:    Observation:  5/3-Maintain correct head posture throughout therapy session  4/24  Improved posture,  Able to maitnain correct head posture thrughout therapy session  2:  Pt with thorcic kyphosis,  Forward head,  IR shoulders B; able to correct posture some when cued. Test measurements:  3/22: Formal measurements not taken this date     Exercises:  Exercise/Equipment Resistance/Repetitions Other comments   UBE 2 min ea fwd/retro Scifit (new)   Back to wall with 1/2 foam roll along spine head UE   w Gr tband:   B ER  2 x 20  Horiz ABd 2 x 10    HEP   Cervical Stabs         Tband-  -blue TB  Alternating punch out  B x 10    Push up w /a +  Man cues for scapula retraction      HEP        High row 25# x  X 15  LPD  25# x   X 15  Mid row 25# x   X 15  Punch out B 10# x 153/29  incr wt next visit                              Other Therapeutic Activities: 5/6  Discussed f/u home program,  Added new home stretches for sub occipital MM . Reviewed ergonomic for work station.   X 8 min     3/22:  None this date  3/8  Recommended pt wear wrist splint, if still has them;, at night to minimize wrist flexion       Home Exercise Program:5/6  Add cervcial nod to HEP and R levator Scap stretch.       3/22:  No new additions this date   2/28  Ice massage to R forearm wrist extensors 7 min daily; Wrist flex/extensor MM stretches throughout day  20 sec each    Manual Treatments:   3/22:  None this date  3/15   X 12 min same as 3/14  3/14:  Supine for DTM to B UT's and cervical parapspinals, UT stretch B (pt developed symptoms in RUE when stretching LUT with head in SB to R, stretch was stopped and symptoms resolved immediately), SOR and cervical traction    Modalities:  5/6,   same as 5/3,  5/3, 4/30 19/12# 30/10 sec  X 16 minutes   4/24 4/22,  Same  4/18   4/10  ICT 19/10 # 30/10 sec x 16min,   w bolster under knees  3/29 same except 17 min   3/27,  3/25/  ICT  19/10 # 30/10 sec,  X 16 min   Supine with bolsters under knees. 3/22:  Intermittent cervical traction 16#/13# x 10 min with bolster under knees; bell and shut off switch provided. 3/20,    Intermit, Cervical traction:  20/10#  30/10 sec  X 15 min w bolster under knees. Provided bell for needs  3/18  3/15,   3/14,  3/11,  Supine cervical tractions 19/16#  30 on/10 off  X 15 min   3/6  16/7 lbs. 30/10 sec, x 12 min    3/5,   Same except 12 min. 2/28  Supine cervical intermittent traction 14/7 lbs 30/10 sec x 12 mn  w bolsters under knees. Educated pt on expectations and progression with traction. PT stayed with pt.      Timed Code Treatment Minutes:  30  Total Treatment Minutes:  48    Treatment/Activity Tolerance:  xx- Patient tolerated treatment well - Patient limited by fatigue  - Patient limited by pain  - Patient limited by other medical complications  - Other:      Prognosis: - Good - Fair  - Poor    Patient Requires Follow-up: -x Yes  - No    Plan:    Continue per plan of care - Alter current plan (see comments)  - Plan of care initiated - Hold pending MD visit xx- Discharge    Plan for Next Session:    Electronically signed by:  Noah Brannon, [id number] [facility] DPT [id number]

## 2019-10-18 ENCOUNTER — HOSPITAL ENCOUNTER (OUTPATIENT)
Dept: PHYSICAL THERAPY | Age: 59
Setting detail: THERAPIES SERIES
Discharge: HOME | End: 2019-10-18
Payer: COMMERCIAL

## 2019-10-18 PROCEDURE — G0283 ELEC STIM OTHER THAN WOUND: HCPCS

## 2019-10-18 PROCEDURE — 97162 PT EVAL MOD COMPLEX 30 MIN: CPT

## 2019-10-18 PROCEDURE — 97530 THERAPEUTIC ACTIVITIES: CPT

## 2019-10-22 ENCOUNTER — HOSPITAL ENCOUNTER (OUTPATIENT)
Dept: PHYSICAL THERAPY | Age: 59
Setting detail: THERAPIES SERIES
Discharge: HOME | End: 2019-10-22
Payer: COMMERCIAL

## 2019-10-22 PROCEDURE — 97110 THERAPEUTIC EXERCISES: CPT

## 2019-10-22 PROCEDURE — 97140 MANUAL THERAPY 1/> REGIONS: CPT

## 2019-10-24 ENCOUNTER — HOSPITAL ENCOUNTER (OUTPATIENT)
Dept: PHYSICAL THERAPY | Age: 59
Setting detail: THERAPIES SERIES
Discharge: HOME | End: 2019-10-24
Payer: COMMERCIAL

## 2019-10-24 PROCEDURE — 97140 MANUAL THERAPY 1/> REGIONS: CPT

## 2019-10-24 PROCEDURE — 97110 THERAPEUTIC EXERCISES: CPT

## 2019-10-28 ENCOUNTER — HOSPITAL ENCOUNTER (OUTPATIENT)
Dept: PHYSICAL THERAPY | Age: 59
Setting detail: THERAPIES SERIES
Discharge: HOME | End: 2019-10-28
Payer: COMMERCIAL

## 2019-10-28 PROCEDURE — 97110 THERAPEUTIC EXERCISES: CPT

## 2019-10-28 PROCEDURE — 97140 MANUAL THERAPY 1/> REGIONS: CPT

## 2019-10-29 ENCOUNTER — HOSPITAL ENCOUNTER (OUTPATIENT)
Dept: PHYSICAL THERAPY | Age: 59
Setting detail: THERAPIES SERIES
Discharge: HOME | End: 2019-10-29
Payer: COMMERCIAL

## 2019-10-29 PROCEDURE — 97140 MANUAL THERAPY 1/> REGIONS: CPT

## 2019-10-29 PROCEDURE — 97110 THERAPEUTIC EXERCISES: CPT

## 2019-10-31 ENCOUNTER — APPOINTMENT (OUTPATIENT)
Dept: PHYSICAL THERAPY | Age: 59
End: 2019-10-31
Payer: COMMERCIAL

## 2019-11-01 ENCOUNTER — APPOINTMENT (OUTPATIENT)
Dept: PHYSICAL THERAPY | Age: 59
End: 2019-11-01
Payer: COMMERCIAL

## 2019-11-04 ENCOUNTER — HOSPITAL ENCOUNTER (OUTPATIENT)
Dept: PHYSICAL THERAPY | Age: 59
Setting detail: THERAPIES SERIES
Discharge: HOME | End: 2019-11-04
Payer: COMMERCIAL

## 2019-11-04 PROCEDURE — 97112 NEUROMUSCULAR REEDUCATION: CPT

## 2019-11-04 PROCEDURE — 97110 THERAPEUTIC EXERCISES: CPT

## 2019-11-04 PROCEDURE — 97140 MANUAL THERAPY 1/> REGIONS: CPT

## 2019-11-06 ENCOUNTER — HOSPITAL ENCOUNTER (OUTPATIENT)
Dept: PHYSICAL THERAPY | Age: 59
Setting detail: THERAPIES SERIES
Discharge: HOME | End: 2019-11-06
Payer: COMMERCIAL

## 2019-11-06 PROCEDURE — 97110 THERAPEUTIC EXERCISES: CPT

## 2019-11-06 PROCEDURE — 97140 MANUAL THERAPY 1/> REGIONS: CPT

## 2019-11-12 ENCOUNTER — HOSPITAL ENCOUNTER (OUTPATIENT)
Dept: PHYSICAL THERAPY | Age: 59
Setting detail: THERAPIES SERIES
Discharge: HOME | End: 2019-11-12
Payer: COMMERCIAL

## 2019-11-12 PROCEDURE — 97110 THERAPEUTIC EXERCISES: CPT

## 2019-11-12 PROCEDURE — 97140 MANUAL THERAPY 1/> REGIONS: CPT

## 2019-11-14 ENCOUNTER — HOSPITAL ENCOUNTER (OUTPATIENT)
Dept: PHYSICAL THERAPY | Age: 59
Setting detail: THERAPIES SERIES
Discharge: HOME | End: 2019-11-14
Payer: COMMERCIAL

## 2019-11-14 PROCEDURE — 97110 THERAPEUTIC EXERCISES: CPT

## 2019-11-14 PROCEDURE — 97140 MANUAL THERAPY 1/> REGIONS: CPT

## 2019-11-18 ENCOUNTER — APPOINTMENT (OUTPATIENT)
Dept: PHYSICAL THERAPY | Age: 59
End: 2019-11-18
Payer: COMMERCIAL

## 2019-11-19 ENCOUNTER — HOSPITAL ENCOUNTER (OUTPATIENT)
Dept: PHYSICAL THERAPY | Age: 59
Setting detail: THERAPIES SERIES
Discharge: HOME | End: 2019-11-19
Payer: COMMERCIAL

## 2019-11-19 PROCEDURE — 97140 MANUAL THERAPY 1/> REGIONS: CPT

## 2019-11-19 PROCEDURE — 97530 THERAPEUTIC ACTIVITIES: CPT

## 2019-11-21 ENCOUNTER — HOSPITAL ENCOUNTER (OUTPATIENT)
Dept: PHYSICAL THERAPY | Age: 59
Setting detail: THERAPIES SERIES
Discharge: HOME | End: 2019-11-21
Payer: COMMERCIAL

## 2019-11-21 PROCEDURE — 97110 THERAPEUTIC EXERCISES: CPT

## 2019-11-21 PROCEDURE — 97112 NEUROMUSCULAR REEDUCATION: CPT

## 2019-11-25 ENCOUNTER — HOSPITAL ENCOUNTER (OUTPATIENT)
Dept: PHYSICAL THERAPY | Age: 59
Setting detail: THERAPIES SERIES
Discharge: HOME | End: 2019-11-25
Payer: COMMERCIAL

## 2019-11-25 PROCEDURE — 97112 NEUROMUSCULAR REEDUCATION: CPT

## 2019-11-25 PROCEDURE — 97110 THERAPEUTIC EXERCISES: CPT

## 2019-11-27 ENCOUNTER — HOSPITAL ENCOUNTER (OUTPATIENT)
Dept: PHYSICAL THERAPY | Age: 59
Setting detail: THERAPIES SERIES
Discharge: HOME | End: 2019-11-27
Payer: COMMERCIAL

## 2019-11-27 PROCEDURE — 97110 THERAPEUTIC EXERCISES: CPT

## 2019-11-27 PROCEDURE — 97112 NEUROMUSCULAR REEDUCATION: CPT

## 2019-12-03 ENCOUNTER — HOSPITAL ENCOUNTER (OUTPATIENT)
Dept: PHYSICAL THERAPY | Age: 59
Setting detail: THERAPIES SERIES
Discharge: HOME | End: 2019-12-03
Payer: COMMERCIAL

## 2019-12-03 PROCEDURE — 97110 THERAPEUTIC EXERCISES: CPT

## 2021-06-04 ENCOUNTER — APPOINTMENT (OUTPATIENT)
Dept: GENERAL RADIOLOGY | Age: 61
DRG: 871 | End: 2021-06-04
Payer: COMMERCIAL

## 2021-06-04 ENCOUNTER — APPOINTMENT (OUTPATIENT)
Dept: CT IMAGING | Age: 61
DRG: 871 | End: 2021-06-04
Payer: COMMERCIAL

## 2021-06-04 ENCOUNTER — HOSPITAL ENCOUNTER (INPATIENT)
Age: 61
LOS: 4 days | Discharge: HOME | DRG: 871 | End: 2021-06-08
Attending: EMERGENCY MEDICINE | Admitting: INTERNAL MEDICINE
Payer: COMMERCIAL

## 2021-06-04 DIAGNOSIS — E87.1 HYPONATREMIA: ICD-10-CM

## 2021-06-04 DIAGNOSIS — I50.21 ACUTE SYSTOLIC CHF (CONGESTIVE HEART FAILURE), NYHA CLASS 3 (HCC): ICD-10-CM

## 2021-06-04 DIAGNOSIS — I50.43 CHF (CONGESTIVE HEART FAILURE), NYHA CLASS I, ACUTE ON CHRONIC, COMBINED (HCC): ICD-10-CM

## 2021-06-04 DIAGNOSIS — I50.9 ACUTE ON CHRONIC CONGESTIVE HEART FAILURE, UNSPECIFIED HEART FAILURE TYPE (HCC): Primary | ICD-10-CM

## 2021-06-04 DIAGNOSIS — A41.9 SEPSIS, DUE TO UNSPECIFIED ORGANISM, UNSPECIFIED WHETHER ACUTE ORGAN DYSFUNCTION PRESENT (HCC): ICD-10-CM

## 2021-06-04 DIAGNOSIS — R94.31 T WAVE INVERSION IN EKG: ICD-10-CM

## 2021-06-04 DIAGNOSIS — R77.8 ELEVATED TROPONIN: ICD-10-CM

## 2021-06-04 DIAGNOSIS — J18.9 PNEUMONIA DUE TO INFECTIOUS ORGANISM, UNSPECIFIED LATERALITY, UNSPECIFIED PART OF LUNG: ICD-10-CM

## 2021-06-04 LAB
ANION GAP SERPL CALCULATED.3IONS-SCNC: 10 MMOL/L (ref 3–16)
ANION GAP SERPL CALCULATED.3IONS-SCNC: 17 MMOL/L (ref 3–16)
BASOPHILS ABSOLUTE: 0 K/UL (ref 0–0.2)
BASOPHILS RELATIVE PERCENT: 0 %
BILIRUBIN URINE: NEGATIVE
BLOOD, URINE: NEGATIVE
BUN BLDV-MCNC: 10 MG/DL (ref 7–20)
BUN BLDV-MCNC: 9 MG/DL (ref 7–20)
CALCIUM SERPL-MCNC: 7.7 MG/DL (ref 8.3–10.6)
CALCIUM SERPL-MCNC: 8.1 MG/DL (ref 8.3–10.6)
CHLORIDE BLD-SCNC: 82 MMOL/L (ref 99–110)
CHLORIDE BLD-SCNC: 84 MMOL/L (ref 99–110)
CLARITY: CLEAR
CO2: 21 MMOL/L (ref 21–32)
CO2: 23 MMOL/L (ref 21–32)
COLOR: YELLOW
CREAT SERPL-MCNC: 0.6 MG/DL (ref 0.6–1.1)
CREAT SERPL-MCNC: 0.6 MG/DL (ref 0.6–1.1)
EKG ATRIAL RATE: 130 BPM
EKG DIAGNOSIS: NORMAL
EKG P AXIS: 31 DEGREES
EKG P-R INTERVAL: 130 MS
EKG Q-T INTERVAL: 390 MS
EKG QRS DURATION: 60 MS
EKG QTC CALCULATION (BAZETT): 573 MS
EKG R AXIS: 10 DEGREES
EKG T AXIS: 91 DEGREES
EKG VENTRICULAR RATE: 130 BPM
EOSINOPHILS ABSOLUTE: 0 K/UL (ref 0–0.6)
EOSINOPHILS RELATIVE PERCENT: 0 %
EPITHELIAL CELLS, UA: 1 /HPF (ref 0–5)
GFR AFRICAN AMERICAN: >60
GFR AFRICAN AMERICAN: >60
GFR NON-AFRICAN AMERICAN: >60
GFR NON-AFRICAN AMERICAN: >60
GLUCOSE BLD-MCNC: 144 MG/DL (ref 70–99)
GLUCOSE BLD-MCNC: 84 MG/DL (ref 70–99)
GLUCOSE URINE: NEGATIVE MG/DL
HCT VFR BLD CALC: 32.4 % (ref 36–48)
HEMOGLOBIN: 11.2 G/DL (ref 12–16)
HYALINE CASTS: 0 /LPF (ref 0–8)
KETONES, URINE: NEGATIVE MG/DL
LACTIC ACID, SEPSIS: 1.8 MMOL/L (ref 0.4–1.9)
LEUKOCYTE ESTERASE, URINE: NEGATIVE
LYMPHOCYTES ABSOLUTE: 1.5 K/UL (ref 1–5.1)
LYMPHOCYTES RELATIVE PERCENT: 9 %
MACROCYTES: ABNORMAL
MCH RBC QN AUTO: 34.8 PG (ref 26–34)
MCHC RBC AUTO-ENTMCNC: 34.7 G/DL (ref 31–36)
MCV RBC AUTO: 100.3 FL (ref 80–100)
MICROSCOPIC EXAMINATION: NORMAL
MONOCYTES ABSOLUTE: 1.6 K/UL (ref 0–1.3)
MONOCYTES RELATIVE PERCENT: 10 %
NEUTROPHILS ABSOLUTE: 13.1 K/UL (ref 1.7–7.7)
NEUTROPHILS RELATIVE PERCENT: 81 %
NITRITE, URINE: NEGATIVE
OSMOLALITY URINE: 253 MOSM/KG (ref 390–1070)
OSMOLALITY: 259 MOSM/KG (ref 275–295)
PDW BLD-RTO: 13 % (ref 12.4–15.4)
PH UA: 6 (ref 5–8)
PLATELET # BLD: 288 K/UL (ref 135–450)
PLATELET SLIDE REVIEW: ADEQUATE
PMV BLD AUTO: 7.9 FL (ref 5–10.5)
POTASSIUM REFLEX MAGNESIUM: 4.3 MMOL/L (ref 3.5–5.1)
POTASSIUM SERPL-SCNC: 3.9 MMOL/L (ref 3.5–5.1)
PRO-BNP: ABNORMAL PG/ML (ref 0–124)
PROCALCITONIN: 8.42 NG/ML (ref 0–0.15)
PROTEIN UA: NEGATIVE MG/DL
RBC # BLD: 3.23 M/UL (ref 4–5.2)
RBC UA: 1 /HPF (ref 0–4)
REASON FOR REJECTION: NORMAL
REJECTED TEST: NORMAL
SARS-COV-2, NAAT: NOT DETECTED
SLIDE REVIEW: ABNORMAL
SODIUM BLD-SCNC: 117 MMOL/L (ref 136–145)
SODIUM BLD-SCNC: 120 MMOL/L (ref 136–145)
SODIUM URINE: 85 MMOL/L
SPECIFIC GRAVITY UA: 1.01 (ref 1–1.03)
TROPONIN: 0.02 NG/ML
TSH SERPL DL<=0.05 MIU/L-ACNC: 1.01 UIU/ML (ref 0.27–4.2)
URINE TYPE: NORMAL
UROBILINOGEN, URINE: 1 E.U./DL
VACUOLATED NEUTROPHILS: PRESENT
WBC # BLD: 16.2 K/UL (ref 4–11)
WBC UA: 1 /HPF (ref 0–5)

## 2021-06-04 PROCEDURE — 71046 X-RAY EXAM CHEST 2 VIEWS: CPT

## 2021-06-04 PROCEDURE — 2060000000 HC ICU INTERMEDIATE R&B

## 2021-06-04 PROCEDURE — 2580000003 HC RX 258: Performed by: PHYSICIAN ASSISTANT

## 2021-06-04 PROCEDURE — 6370000000 HC RX 637 (ALT 250 FOR IP): Performed by: PHYSICIAN ASSISTANT

## 2021-06-04 PROCEDURE — 82607 VITAMIN B-12: CPT

## 2021-06-04 PROCEDURE — 94761 N-INVAS EAR/PLS OXIMETRY MLT: CPT

## 2021-06-04 PROCEDURE — 6360000004 HC RX CONTRAST MEDICATION: Performed by: PHYSICIAN ASSISTANT

## 2021-06-04 PROCEDURE — 85025 COMPLETE CBC W/AUTO DIFF WBC: CPT

## 2021-06-04 PROCEDURE — 36415 COLL VENOUS BLD VENIPUNCTURE: CPT

## 2021-06-04 PROCEDURE — 6360000002 HC RX W HCPCS: Performed by: INTERNAL MEDICINE

## 2021-06-04 PROCEDURE — 6370000000 HC RX 637 (ALT 250 FOR IP): Performed by: INTERNAL MEDICINE

## 2021-06-04 PROCEDURE — 96361 HYDRATE IV INFUSION ADD-ON: CPT

## 2021-06-04 PROCEDURE — 87635 SARS-COV-2 COVID-19 AMP PRB: CPT

## 2021-06-04 PROCEDURE — 93005 ELECTROCARDIOGRAM TRACING: CPT | Performed by: EMERGENCY MEDICINE

## 2021-06-04 PROCEDURE — 83605 ASSAY OF LACTIC ACID: CPT

## 2021-06-04 PROCEDURE — 84443 ASSAY THYROID STIM HORMONE: CPT

## 2021-06-04 PROCEDURE — 83880 ASSAY OF NATRIURETIC PEPTIDE: CPT

## 2021-06-04 PROCEDURE — 83935 ASSAY OF URINE OSMOLALITY: CPT

## 2021-06-04 PROCEDURE — 84145 PROCALCITONIN (PCT): CPT

## 2021-06-04 PROCEDURE — 84300 ASSAY OF URINE SODIUM: CPT

## 2021-06-04 PROCEDURE — 2580000003 HC RX 258: Performed by: INTERNAL MEDICINE

## 2021-06-04 PROCEDURE — 94640 AIRWAY INHALATION TREATMENT: CPT

## 2021-06-04 PROCEDURE — 80048 BASIC METABOLIC PNL TOTAL CA: CPT

## 2021-06-04 PROCEDURE — 87040 BLOOD CULTURE FOR BACTERIA: CPT

## 2021-06-04 PROCEDURE — 96365 THER/PROPH/DIAG IV INF INIT: CPT

## 2021-06-04 PROCEDURE — 2700000000 HC OXYGEN THERAPY PER DAY

## 2021-06-04 PROCEDURE — 83930 ASSAY OF BLOOD OSMOLALITY: CPT

## 2021-06-04 PROCEDURE — 81001 URINALYSIS AUTO W/SCOPE: CPT

## 2021-06-04 PROCEDURE — 84484 ASSAY OF TROPONIN QUANT: CPT

## 2021-06-04 PROCEDURE — 84295 ASSAY OF SERUM SODIUM: CPT

## 2021-06-04 PROCEDURE — 99285 EMERGENCY DEPT VISIT HI MDM: CPT

## 2021-06-04 PROCEDURE — 96375 TX/PRO/DX INJ NEW DRUG ADDON: CPT

## 2021-06-04 PROCEDURE — 87641 MR-STAPH DNA AMP PROBE: CPT

## 2021-06-04 PROCEDURE — 87449 NOS EACH ORGANISM AG IA: CPT

## 2021-06-04 PROCEDURE — 6360000002 HC RX W HCPCS: Performed by: PHYSICIAN ASSISTANT

## 2021-06-04 PROCEDURE — 71260 CT THORAX DX C+: CPT

## 2021-06-04 PROCEDURE — 93010 ELECTROCARDIOGRAM REPORT: CPT | Performed by: INTERNAL MEDICINE

## 2021-06-04 PROCEDURE — 82746 ASSAY OF FOLIC ACID SERUM: CPT

## 2021-06-04 RX ORDER — ACETAMINOPHEN 650 MG/1
650 SUPPOSITORY RECTAL EVERY 6 HOURS PRN
Status: DISCONTINUED | OUTPATIENT
Start: 2021-06-04 | End: 2021-06-08 | Stop reason: HOSPADM

## 2021-06-04 RX ORDER — ALBUTEROL SULFATE 90 UG/1
2 AEROSOL, METERED RESPIRATORY (INHALATION) ONCE
Status: COMPLETED | OUTPATIENT
Start: 2021-06-04 | End: 2021-06-04

## 2021-06-04 RX ORDER — IBUPROFEN 400 MG/1
400 TABLET ORAL EVERY 8 HOURS PRN
Status: DISCONTINUED | OUTPATIENT
Start: 2021-06-04 | End: 2021-06-08 | Stop reason: HOSPADM

## 2021-06-04 RX ORDER — ONDANSETRON 2 MG/ML
4 INJECTION INTRAMUSCULAR; INTRAVENOUS EVERY 6 HOURS PRN
Status: DISCONTINUED | OUTPATIENT
Start: 2021-06-04 | End: 2021-06-08 | Stop reason: HOSPADM

## 2021-06-04 RX ORDER — FUROSEMIDE 10 MG/ML
40 INJECTION INTRAMUSCULAR; INTRAVENOUS 2 TIMES DAILY
Status: DISCONTINUED | OUTPATIENT
Start: 2021-06-04 | End: 2021-06-06

## 2021-06-04 RX ORDER — ALLOPURINOL 300 MG/1
300 TABLET ORAL DAILY
Status: DISCONTINUED | OUTPATIENT
Start: 2021-06-04 | End: 2021-06-08 | Stop reason: HOSPADM

## 2021-06-04 RX ORDER — ACETAMINOPHEN 325 MG/1
650 TABLET ORAL EVERY 6 HOURS PRN
Status: DISCONTINUED | OUTPATIENT
Start: 2021-06-04 | End: 2021-06-07 | Stop reason: SDUPTHER

## 2021-06-04 RX ORDER — ASPIRIN 81 MG/1
81 TABLET, CHEWABLE ORAL DAILY
Status: DISCONTINUED | OUTPATIENT
Start: 2021-06-04 | End: 2021-06-04 | Stop reason: SDUPTHER

## 2021-06-04 RX ORDER — SODIUM CHLORIDE 9 MG/ML
25 INJECTION, SOLUTION INTRAVENOUS PRN
Status: DISCONTINUED | OUTPATIENT
Start: 2021-06-04 | End: 2021-06-06

## 2021-06-04 RX ORDER — DILTIAZEM HYDROCHLORIDE 120 MG/1
120 CAPSULE, COATED, EXTENDED RELEASE ORAL DAILY
Status: DISCONTINUED | OUTPATIENT
Start: 2021-06-04 | End: 2021-06-05

## 2021-06-04 RX ORDER — 0.9 % SODIUM CHLORIDE 0.9 %
1000 INTRAVENOUS SOLUTION INTRAVENOUS ONCE
Status: COMPLETED | OUTPATIENT
Start: 2021-06-04 | End: 2021-06-04

## 2021-06-04 RX ORDER — POLYETHYLENE GLYCOL 3350 17 G/17G
17 POWDER, FOR SOLUTION ORAL DAILY PRN
Status: DISCONTINUED | OUTPATIENT
Start: 2021-06-04 | End: 2021-06-08 | Stop reason: HOSPADM

## 2021-06-04 RX ORDER — ALLOPURINOL 300 MG/1
300 TABLET ORAL DAILY
COMMUNITY
End: 2022-01-13

## 2021-06-04 RX ORDER — FLUTICASONE PROPIONATE 50 MCG
2 SPRAY, SUSPENSION (ML) NASAL DAILY
COMMUNITY

## 2021-06-04 RX ORDER — ALPRAZOLAM 0.5 MG/1
0.5 TABLET, EXTENDED RELEASE ORAL DAILY PRN
COMMUNITY
End: 2022-02-16

## 2021-06-04 RX ORDER — DEXAMETHASONE SODIUM PHOSPHATE 10 MG/ML
10 INJECTION, SOLUTION INTRAMUSCULAR; INTRAVENOUS ONCE
Status: COMPLETED | OUTPATIENT
Start: 2021-06-04 | End: 2021-06-04

## 2021-06-04 RX ORDER — FLUTICASONE PROPIONATE 50 MCG
2 SPRAY, SUSPENSION (ML) NASAL DAILY
Status: DISCONTINUED | OUTPATIENT
Start: 2021-06-04 | End: 2021-06-08 | Stop reason: HOSPADM

## 2021-06-04 RX ORDER — FUROSEMIDE 10 MG/ML
40 INJECTION INTRAMUSCULAR; INTRAVENOUS ONCE
Status: COMPLETED | OUTPATIENT
Start: 2021-06-04 | End: 2021-06-04

## 2021-06-04 RX ORDER — PANTOPRAZOLE SODIUM 40 MG/1
40 TABLET, DELAYED RELEASE ORAL
Status: DISCONTINUED | OUTPATIENT
Start: 2021-06-05 | End: 2021-06-08 | Stop reason: HOSPADM

## 2021-06-04 RX ORDER — SODIUM CHLORIDE 0.9 % (FLUSH) 0.9 %
5-40 SYRINGE (ML) INJECTION EVERY 12 HOURS SCHEDULED
Status: DISCONTINUED | OUTPATIENT
Start: 2021-06-04 | End: 2021-06-06

## 2021-06-04 RX ORDER — ONDANSETRON 4 MG/1
4 TABLET, ORALLY DISINTEGRATING ORAL EVERY 8 HOURS PRN
Status: DISCONTINUED | OUTPATIENT
Start: 2021-06-04 | End: 2021-06-08 | Stop reason: HOSPADM

## 2021-06-04 RX ORDER — SODIUM CHLORIDE 0.9 % (FLUSH) 0.9 %
5-40 SYRINGE (ML) INJECTION PRN
Status: DISCONTINUED | OUTPATIENT
Start: 2021-06-04 | End: 2021-06-06

## 2021-06-04 RX ORDER — ASPIRIN 81 MG/1
81 TABLET ORAL DAILY
Status: DISCONTINUED | OUTPATIENT
Start: 2021-06-04 | End: 2021-06-08 | Stop reason: HOSPADM

## 2021-06-04 RX ORDER — ALPRAZOLAM 0.5 MG/1
0.5 TABLET ORAL 2 TIMES DAILY PRN
Status: DISCONTINUED | OUTPATIENT
Start: 2021-06-04 | End: 2021-06-08 | Stop reason: HOSPADM

## 2021-06-04 RX ORDER — M-VIT,TX,IRON,MINS/CALC/FOLIC 27MG-0.4MG
1 TABLET ORAL DAILY
COMMUNITY

## 2021-06-04 RX ADMIN — APIXABAN 2.5 MG: 2.5 TABLET, FILM COATED ORAL at 20:47

## 2021-06-04 RX ADMIN — SODIUM CHLORIDE 1000 ML: 9 INJECTION, SOLUTION INTRAVENOUS at 11:23

## 2021-06-04 RX ADMIN — Medication 10 ML: at 20:47

## 2021-06-04 RX ADMIN — DILTIAZEM HYDROCHLORIDE 120 MG: 120 CAPSULE, COATED, EXTENDED RELEASE ORAL at 18:00

## 2021-06-04 RX ADMIN — ALLOPURINOL 300 MG: 300 TABLET ORAL at 18:00

## 2021-06-04 RX ADMIN — DEXAMETHASONE SODIUM PHOSPHATE 10 MG: 10 INJECTION, SOLUTION INTRAMUSCULAR; INTRAVENOUS at 11:23

## 2021-06-04 RX ADMIN — FUROSEMIDE 40 MG: 10 INJECTION, SOLUTION INTRAMUSCULAR; INTRAVENOUS at 18:00

## 2021-06-04 RX ADMIN — AZITHROMYCIN MONOHYDRATE 500 MG: 500 INJECTION, POWDER, LYOPHILIZED, FOR SOLUTION INTRAVENOUS at 15:03

## 2021-06-04 RX ADMIN — FLUTICASONE PROPIONATE 2 SPRAY: 50 SPRAY, METERED NASAL at 18:00

## 2021-06-04 RX ADMIN — CEFTRIAXONE 1000 MG: 1 INJECTION, POWDER, FOR SOLUTION INTRAMUSCULAR; INTRAVENOUS at 13:56

## 2021-06-04 RX ADMIN — FUROSEMIDE 40 MG: 10 INJECTION, SOLUTION INTRAMUSCULAR; INTRAVENOUS at 13:20

## 2021-06-04 RX ADMIN — Medication 2 PUFF: at 11:55

## 2021-06-04 RX ADMIN — IOPAMIDOL 75 ML: 755 INJECTION, SOLUTION INTRAVENOUS at 12:16

## 2021-06-04 ASSESSMENT — ENCOUNTER SYMPTOMS
ABDOMINAL PAIN: 0
DIARRHEA: 0
VOMITING: 0
NAUSEA: 0
SHORTNESS OF BREATH: 1
WHEEZING: 1
COUGH: 1

## 2021-06-04 ASSESSMENT — PAIN SCALES - GENERAL: PAINLEVEL_OUTOF10: 0

## 2021-06-04 NOTE — ED TRIAGE NOTES
Patient admitted to ED via ambulance with complaints of chest tightness. Patient states that starting yesterday, she felt like her lungs were in a \"vise . \" She feels congested and has had a productive cough since earlier this week. She went to urgent care this morning who brought her here because her HR. Denies chest pain, just tightness and shortness of breath. History of COPD, anxiety, GERD, HLD, and HTN. HR is 127. RR is 26. SpO2 is 93%. Other VS are WNL. Patient is alert and oriented x4.

## 2021-06-04 NOTE — ED PROVIDER NOTES
[id number] [address]      Pt Name: Radha Ferreira  MRN: [id number]  [place] 1960  Date of evaluation: 6/4/2021  Provider: Jillyn Brunner, PA    This patient was seen and evaluated by the attending physician Dr. Harriet Marroquin. CHIEF COMPLAINT       Chief Complaint   Patient presents with    Shortness of Breath     started yesterday     Tachycardia     heart rate is 140         HISTORY OF PRESENT ILLNESS  (Location/Symptom, Timing/Onset, Context/Setting, Quality, Duration, Modifying Factors, Severity.)   Radha Ferreira is a 62 y.o. female who presents to the emergency department shortness of breath and tachycardia. Patient reports she been short of breath since yesterday. Shortness of breath woke her up from sleep at 4 am yesterday. also reports her watch notified her that her heart rate was over 120 a few times yesterday. Reports that would only normally happen about once a week. Heart rate is normally little on the higher side around 101. She has had a cough productive of clear sputum for the past few days. No hemoptysis. She does report some wheezing. Reports has been told she has COPD but has been years ago and has not had any issues. She smokes a pack a day for the past 41 years. Has not used an inhaler in years. She reports she does feel like she cannot get a deep breath. She denies any chest pressure or chest pain. She denies palpitations. She denies nausea vomiting abdominal pain or diarrhea. Denies fevers or chills. Has had some congestion. Denies rhinorrhea. Denies sick contacts. Has not had Covid yet. Received the second Covid vaccination 5 to 6 weeks ago. She does have a history of hypertension, hyperlipidemia. Denies history of diabetes. Denies family history of MI. Denies personal history of MI, VTE, CHF. Denies long car trips or plane rides past 4 weeks, surgery past 4 weeks, hormone use, hemoptysis, history of VTE. tablet, Refills: 1      diltiazem (CARTIA XT) 240 MG ER capsule Take 240 mg by mouth daily. levocetirizine (XYZAL) 5 MG tablet Take 5 mg by mouth daily. lisinopril-hydrochlorothiazide (PRINZIDE;ZESTORETIC) 20-25 MG per tablet Take 1 tablet by mouth daily. GuaiFENesin (MUCINEX PO) Take 400 mg by mouth daily. aspirin 81 MG EC tablet Take 81 mg by mouth daily. montelukast (SINGULAIR) 10 MG tablet Take 10 mg by mouth daily       esomeprazole (NEXIUM) 20 MG capsule Take 20 mg by mouth every morning (before breakfast). ALLERGIES     Augmentin [amoxicillin-pot clavulanate] and Pork-derived products    FAMILY HISTORY           Problem Relation Age of Onset    Diabetes Mother     High Blood Pressure Mother     Heart Disease Maternal Grandmother      Family Status   Relation Name Status    Mother  Alive    Father  Alive    [place]  (Not Specified)        SOCIAL HISTORY      reports that she has been smoking cigarettes. She has a 35.00 pack-year smoking history. She has never used smokeless tobacco. She reports current alcohol use of about 14.0 standard drinks of alcohol per week. She reports that she does not use drugs. PHYSICAL EXAM    (up to 7 for level 4, 8 or more for level 5)     ED Triage Vitals [06/04/21 1006]   BP Temp Temp Source Pulse Resp SpO2 Height Weight   122/83 97.6 °F (36.4 °C) Oral 131 27 93 % 5' 2\" (1.575 m) 128 lb (58.1 kg)       Physical Exam  Constitutional:       General: She is not in acute distress. Appearance: Normal appearance. She is well-developed. She is not ill-appearing, toxic-appearing or diaphoretic. HENT:      Head: Normocephalic and atraumatic. Cardiovascular:      Rate and Rhythm: Regular rhythm. Tachycardia present. Heart sounds: Normal heart sounds. Pulmonary:      Effort: Pulmonary effort is normal. No respiratory distress.       Comments: Rhonchi noted bilaterally, diminished breath sounds bilaterally, tachypneic with a rate 30s-40  Abdominal:      General: There is no distension. Palpations: Abdomen is soft. There is no mass. Tenderness: There is no abdominal tenderness. There is no guarding or rebound. Hernia: No hernia is present. Musculoskeletal:         General: Normal range of motion. Cervical back: Normal range of motion and neck supple. Right lower leg: No edema. Left lower leg: No edema. Comments: No calf tenderness bilaterally   Skin:     General: Skin is warm. Neurological:      Mental Status: She is alert. Psychiatric:         Mood and Affect: Mood normal.         Behavior: Behavior normal.         Thought Content: Thought content normal.         Judgment: Judgment normal.         DIFFERENTIAL DIAGNOSIS   COPD/Asthma Exacerbation, Pneumothorax, CHF, Pneumonia, Pulmonary Embolism, Anemia  Sepsis , other    DIAGNOSTICRESULTS     EKG: All EKG's are interpreted by LILLY Garcia in the absence of a cardiologist.    EKG obtained. See Dr. Samuel Brown note for interpretation. RADIOLOGY:   Non-plain film images such as CT, Ultrasound and MRI are read by the radiologist. Plain radiographic images are visualized and preliminarily interpreted by LILLY Garcia with the below findings:      Interpretation per the Radiologist below, if available at the time of this note:    CT CHEST PULMONARY EMBOLISM W CONTRAST   Final Result   No central pulmonary embolus identified      Bilateral pleural effusions, with adjacent consolidation, at the lung bases   either due to atelectasis or pneumonia. Scattered opacities are seen   throughout the right lung. .  There is a background of pulmonary emphysema. Subtle septal thickening at the right lung base, suggest a component of fluid   overload-pulmonary edema      Moderate to severe coronary artery calcification. XR CHEST (2 VW)   Final Result   Small right pleural effusion.                LABS:  Results for orders placed or performed during the hospital encounter of 06/04/21   COVID-19, Rapid   Result Value Ref Range    SARS-CoV-2, NAAT Not Detected Not Detected   CBC Auto Differential   Result Value Ref Range    WBC 16.2 (H) 4.0 - 11.0 K/uL    RBC 3.23 (L) 4.00 - 5.20 M/uL    Hemoglobin 11.2 (L) 12.0 - 16.0 g/dL    Hematocrit 32.4 (L) 36.0 - 48.0 %    .3 (H) 80.0 - 100.0 fL    MCH 34.8 (H) 26.0 - 34.0 pg    MCHC 34.7 31.0 - 36.0 g/dL    RDW 13.0 12.4 - 15.4 %    Platelets 120 173 - 726 K/uL    MPV 7.9 5.0 - 10.5 fL    PLATELET SLIDE REVIEW Adequate     SLIDE REVIEW see below     Neutrophils % 81.0 %    Lymphocytes % 9.0 %    Monocytes % 10.0 %    Eosinophils % 0.0 %    Basophils % 0.0 %    Neutrophils Absolute 13.1 (H) 1.7 - 7.7 K/uL    Lymphocytes Absolute 1.5 1.0 - 5.1 K/uL    Monocytes Absolute 1.6 (H) 0.0 - 1.3 K/uL    Eosinophils Absolute 0.0 0.0 - 0.6 K/uL    Basophils Absolute 0.0 0.0 - 0.2 K/uL    Vacuolated Neutrophils Present (A)     Macrocytes 1+ (A)    Brain Natriuretic Peptide   Result Value Ref Range    Pro-BNP 32,553 (H) 0 - 124 pg/mL   Troponin   Result Value Ref Range    Troponin 0.02 (H) <0.01 ng/mL   Lactate, Sepsis   Result Value Ref Range    Lactic Acid, Sepsis 1.8 0.4 - 1.9 mmol/L   SPECIMEN REJECTION   Result Value Ref Range    Rejected Test BMPX     Reason for Rejection see below    Basic Metabolic Panel w/ Reflex to MG   Result Value Ref Range    Sodium 117 (LL) 136 - 145 mmol/L    Potassium reflex Magnesium 4.3 3.5 - 5.1 mmol/L    Chloride 84 (L) 99 - 110 mmol/L    CO2 23 21 - 32 mmol/L    Anion Gap 10 3 - 16    Glucose 84 70 - 99 mg/dL    BUN 9 7 - 20 mg/dL    CREATININE 0.6 0.6 - 1.1 mg/dL    GFR Non-African American >60 >60    GFR African American >60 >60    Calcium 8.1 (L) 8.3 - 10.6 mg/dL   Urinalysis with Microscopic   Result Value Ref Range    Color, UA YELLOW Straw/Yellow    Clarity, UA Clear Clear    Glucose, Ur Negative Negative mg/dL    Bilirubin Urine Negative Negative    Ketones, Urine inhaler. labs reveal leukocytosis 16.2. lactic blood cultures ordered. . CMP has sodium 117 so IVF stopped. COVID negative. Lactic acid is normal.  BNP 32,553. Trop 0.02. CT chest has no central pulmonary embolus. Bilateral pleural effusions with adjacent consolidation at the lung bases either due to atelectasis or pneumonia. Scattered opacities are seen throughout the right lung. There is a background of pulmonary emphysema. Subtle septal thickening at the right lung base suggesting component of fluid overload-pulmonary edema. sepsis identified at 13:15. Antibiotics ordered. Was also given Lasix for the pulmonary edema and BNP of 32,000. Medicine was consulted. Patient was accepted for admission. On reevaluation, she is sitting in the stretcher in no distress    CRITICAL CARE TIME   Total Critical Care time was 35 minutes, excluding separately reportable procedures. There was a high probability of clinically significant/life threatening deterioration in the patient's condition which required my urgent intervention. Time was spent managing patient's sepsis, pneumonia, pulmonary edema with antibiotics, diuretics, consultation. PROCEDURES:  None    FINAL IMPRESSION      1. Acute on chronic congestive heart failure, unspecified heart failure type ([address]    2. Hyponatremia    3. T wave inversion in EKG    4. Sepsis, due to unspecified organism, unspecified whether acute organ dysfunction present ([address]    5. Pneumonia due to infectious organism, unspecified laterality, unspecified part of lung    6. Elevated troponin          DISPOSITION/PLAN   DISPOSITION        PATIENT REFERRED TO:  MD [id number]  [facility] [id number]  [phone]    Go on 6/8/2021  Your hospital follow up is at 2:15pm with Dr Dane Aguillon.        DISCHARGE MEDICATIONS:  Current Discharge Medication List          (Please note that portions of this note werecompleted with a voice recognition program.

## 2021-06-04 NOTE — H&P
Hospital Medicine History & Physical      PCP: Jaylene Brown MD    Date of Admission: 6/4/2021    Date of Service: Pt seen/examined on 6/4/2021 and Admitted to Inpatient     Chief Complaint:  SOB      History Of Present Illness: The patient is a 62 y.o. female w Hx of life long smoker, HTN, allergies, who presents to [facility] with SOB. She reports she felt congested for the past several days. Pt reports fairly rapidly progressing severe dyspnea worsening last night and persisting since then. Pt reports she has been working from home and mostly sedentary at her desk \"since COVID started\". SHe noticed worsening LE edema (bilateral) that she attributed to decreased level of activity. She did not appreciate any sign resp problems until yesterday. She reports severe exertional dyspnea. Not much cough. She denies chest pain per say. Denies palpitations, though reports her \"apple watch\" was giving her alerts about elevated HR frequently in the past 24hrs. Past Medical History:        Diagnosis Date    Anxiety     GERD (gastroesophageal reflux disease)     Hyperlipidemia     Hypertension        Past Surgical History:        Procedure Laterality Date    BLADDER SUSPENSION      CARPAL TUNNEL RELEASE      right    CARPAL TUNNEL RELEASE  7-12-10    Left    CARPAL TUNNEL RELEASE  Left wrist    FOOT SURGERY  3/2013    EXCISION NEUROMA 3RD NERVE LEFT FOOT    JOINT REPLACEMENT      right    KNEE ARTHROSCOPY      left x1, right x 1    SINUS SURGERY      TONSILLECTOMY         Medications Prior to Admission:    Prior to Admission medications    Medication Sig Start Date End Date Taking? Authorizing Provider   ALPRAZolam (ALPRAZOLAM XR) 0.5 MG extended release tablet Take 0.5 mg by mouth daily as needed (anxiety).     Yes Historical Provider, MD   allopurinol (ZYLOPRIM) 300 MG tablet Take 300 mg by mouth daily   Yes Historical Provider, MD   fluticasone (FLONASE) 50 MCG/ACT nasal spray 2 sprays by Each Nostril route daily   Yes Historical Provider, MD   Multiple Vitamins-Minerals (THERAPEUTIC MULTIVITAMIN-MINERALS) tablet Take 1 tablet by mouth daily   Yes Historical Provider, MD   ibuprofen (ADVIL;MOTRIN) 800 MG tablet Take 1 tablet by mouth every 8 hours as needed for Pain 3/27/18  Yes FRANCISCO J Meneses CNP   diltiazem (CARTIA XT) 240 MG ER capsule Take 240 mg by mouth daily. Yes Historical Provider, MD   levocetirizine (XYZAL) 5 MG tablet Take 5 mg by mouth daily. 6/28/10  Yes Historical Provider, MD   lisinopril-hydrochlorothiazide (PRINZIDE;ZESTORETIC) 20-25 MG per tablet Take 1 tablet by mouth daily. Yes Historical Provider, MD   GuaiFENesin (MUCINEX PO) Take 400 mg by mouth daily. 6/28/10  Yes Historical Provider, MD   aspirin 81 MG EC tablet Take 81 mg by mouth daily. Yes Historical Provider, MD   montelukast (SINGULAIR) 10 MG tablet Take 10 mg by mouth daily    Yes Historical Provider, MD   esomeprazole (NEXIUM) 20 MG capsule Take 20 mg by mouth every morning (before breakfast). Yes Historical Provider, MD       Allergies:  Amoxicillin-pot clavulanate and Pork-derived products    Social History:  The patient currently lives at home. TOBACCO:   reports that she has been smoking cigarettes. She has a 35.00 pack-year smoking history. She has never used smokeless tobacco.  ETOH:   reports current alcohol use of about 14.0 standard drinks of alcohol per week. Family History:  Reviewed in detail and negative for DM, Early CAD, Cancer, CVA. Positive as follows:        Problem Relation Age of Onset    Diabetes Mother     High Blood Pressure Mother     Heart Disease Maternal Grandmother        REVIEW OF SYSTEMS:   As noted in the HPI. All other systems reviewed and negative.     PHYSICAL EXAM:    /83   Pulse 117 Temp 98.1 °F (36.7 °C) (Oral)   Resp 25   Ht 5' 2\" (1.575 m)   Wt 128 lb (58.1 kg)   SpO2 100%   BMI 23.41 kg/m²     General appearance: No apparent distress appears stated age and cooperative. HEENT Normal cephalic, atraumatic without obvious deformity. Pupils equal, round, and reactive to light. Extra ocular muscles intact. Conjunctivae/corneas clear. Neck: Supple, No jugular venous distention/bruits. Trachea midline without thyromegaly or adenopathy with full range of motion. Lungs: diminished breath sounds at bases with rales. No wheezing or rhonchi. Heart: Regular rate and rhythm with Normal S1/S2 without murmurs, rubs or gallops, point of maximum impulse non-displaced  Abdomen: Soft, non-tender or non-distended without rigidity or guarding and positive bowel sounds all four quadrants. Extremities: 2+ pitting edema - Left worse then right. Neurologic: Alert and oriented X 3, neurovascularly intact with sensory/motor intact upper extremities/lower extremities, bilaterally. Cranial nerves: II-XII intact, grossly non-focal.  Mental status: Alert, oriented, thought content appropriate. Capillary Refill: Acceptable  < 3 seconds  Peripheral Pulses: +3 Easily felt, not easily obliterated with pressure      CBC   Recent Labs     06/04/21  1041   WBC 16.2*   HGB 11.2*   HCT 32.4*         RENAL  Recent Labs     06/04/21  1141   *   K 4.3   CL 84*   CO2 23   BUN 9   CREATININE 0.6     LFT'S  No results for input(s): AST, ALT, ALB, BILIDIR, BILITOT, ALKPHOS in the last 72 hours. COAG  No results for input(s): INR in the last 72 hours.   CARDIAC ENZYMES  Recent Labs     06/04/21  1041   TROPONINI 0.02*       U/A:    Lab Results   Component Value Date    COLORU YELLOW 06/04/2021    WBCUA 1 06/04/2021    RBCUA 1 06/04/2021    CLARITYU Clear 06/04/2021    SPECGRAV 1.012 06/04/2021    LEUKOCYTESUR Negative 06/04/2021    BLOODU Negative 06/04/2021    GLUCOSEU Negative 06/04/2021       ABG  No results found for: GNA6AMQ, BEART, W6MNQLSQ, PHART, THGBART, XFI6IVS, PO2ART, ARM7QPO        Active Hospital Problems    Diagnosis Date Noted    CHF (congestive heart failure), NYHA class I, acute on chronic, combined ([facility] [I50.43] 06/04/2021         PHYSICIANS CERTIFICATION:    I certify that Rosy Blue is expected to be hospitalized for more than 2 midnights based on the following assessment and plan:      ASSESSMENT/PLAN:      Acute CHF - EF to be determined. No prior Hx. Clinical exam, labs and imaging concerning for fluid overload, acute pulm edema, pleural effusions. Plan:    - Lasix IV BID.    - ASA   - eliquis ppx - she is allergic to pig derived products    - ECHO. - serial trop. - cardiology eval.    - tele monitor. Hyponatremia - 117 on presentation. Hypervolemic on exam.   Lasix IV. Stopped HCTZ. Nephrology eval.   TSH  Osm plasma and urine, urine Na. Na level q6. Acute Pulm Edema as above. Bilateral Pleural Effusions - suspect due to above. Pending response to diuresis, may need thoracentesis, though less likely. Possible CAP   Empiric on Zithro and Rocephin. Check procal.   Check Strep Pneumo. MRSA probe. DVT Prophylaxis: eliquis, she is allergic to pig derived products (incl heparin). Diet: Diet NPO Effective Now  Code Status: No Order      Dispo - inpatient. Piper Heard MD    Thank you Breanna Rubio MD for the opportunity to be involved in this patient's care. If you have any questions or concerns please feel free to contact me at [phone].

## 2021-06-04 NOTE — ED NOTES
Bed: E-45  Expected date: 6/4/21  Expected time: 9:58 AM  Means of arrival: [facility]  Comments:  KARYNA Corbett RN  06/04/21 4402

## 2021-06-04 NOTE — CARE COORDINATION
INITIAL CASE MANAGEMENT ASSESSMENT    Reviewed chart, met with patient to assess possible discharge needs. Explained Case Management role/services. Living Situation: Lives in a house with 3 SHERRI, with her disabled  and mentally challenged 25 yr old grand daughter( she is the caregiver)    ADLs: Patient is Independent- works full time for [employer]- currently works from home, will have to return to the office in July at least 50% of her work time. DME:  has a cane. PT/OT Recs: TBD     Active Services: None     Transportation: Active , family will transport at discharge. Medications: [facility] on [address]. PCP: Umair Hutchison MD      HD/PD: N/A    PLAN/COMMENTS: Patient plans to return home with no needs-  Home Care and SNF list provided. The Plan for Transition of Care is related to the following treatment goals: return home. The Patient  was provided with a choice of provider and agrees   with the discharge plan. [x] Yes [] No    Freedom of choice list was provided with basic dialogue that supports the patient's individualized plan of care/goals, treatment preferences and shares the quality data associated with the providers. [x] Yes [] No      Advanced Care Planning completed. SW/CM provided contact information for patient or family to call with any questions. SW/CM will follow and assist as needed.

## 2021-06-04 NOTE — PROGRESS NOTES
4 Eyes Skin Assessment     NAME:  Kimberlee Molina  YOB: 1960  MEDICAL RECORD NUMBER:  [id number]    The patient is being assess for  Admission    I agree that 2 RN's have performed a thorough Head to Toe Skin Assessment on the patient. ALL assessment sites listed below have been assessed. Areas assessed by both nurses:    Head, Face, Ears, Shoulders, Back, Chest, Arms, Elbows, Hands, Sacrum. Buttock, Coccyx, Ischium and Legs. Feet and Heels        Does the Patient have a Wound?  No noted wound(s)       Dylon Prevention initiated:  NA   Wound Care Orders initiated:  NA    Pressure Injury (Stage 3,4, Unstageable, DTI, NWPT, and Complex wounds) if present place consult order under [de-identified] NA    New and Established Ostomies if present place consult order under : NA      Nurse 1 eSignature: Electronically signed by Teddy Seaman RN on 6/4/21 at 6:29 PM EDT    **SHARE this note so that the co-signing nurse is able to place an eSignature**    Nurse 2 eSignature: Electronically signed by Ladarius Dolan RN on 6/4/21 at 6:33 PM EDT

## 2021-06-04 NOTE — PROGRESS NOTES
Medication Reconciliation    List of medications for Linda Hutchinson is currently taking is complete.      Source of information:   Epic records  Conversation with patient, without prompting     Allergies  Amoxicillin-pot clavulanate and Pork-derived products     Notes regarding home medications:   Patient  took her alprazolam prior to arrival to the emergency department  Takes all medications in the morning  Added allopurinol, Flonase, and multivitamin  If patient is to be discharged she will need a new albuterol inhaler as hers is       Marlene Mariano PharmD   2021 12:45 PM

## 2021-06-04 NOTE — ACP (ADVANCE CARE PLANNING)
Advance Care Planning     Advance Care Planning Activator (Inpatient)  Conversation Note      Date of ACP Conversation: 6/4/2021     Conversation Conducted with: Patient with Decision Making Capacity    ACP Activator: [address] Yimi:     Current Designated Health Care Decision Maker:     Primary Decision Maker: Yanna Tillar - Child - [phone]    Today we documented Decision Maker(s) consistent with Legal Next of Kin hierarchy. Care Preferences    Ventilation: \"If you were in your present state of health and suddenly became very ill and were unable to breathe on your own, what would your preference be about the use of a ventilator (breathing machine) if it were available to you? \"      Would the patient desire the use of ventilator (breathing machine)?: yes    \"If your health worsens and it becomes clear that your chance of recovery is unlikely, what would your preference be about the use of a ventilator (breathing machine) if it were available to you? \"     Would the patient desire the use of ventilator (breathing machine)?: No      Resuscitation  \"CPR works best to restart the heart when there is a sudden event, like a heart attack, in someone who is otherwise healthy. Unfortunately, CPR does not typically restart the heart for people who have serious health conditions or who are very sick. \"    \"In the event your heart stopped as a result of an underlying serious health condition, would you want attempts to be made to restart your heart (answer \"yes\" for attempt to resuscitate) or would you prefer a natural death (answer \"no\" for do not attempt to resuscitate)? \" yes       [x] Yes   [] No   Educated Patient / Sharon Haney regarding differences between Advance Directives and portable DNR orders.     Length of ACP Conversation in minutes:  10    Conversation Outcomes:  [x] ACP discussion completed  [] Existing advance directive reviewed with patient; no changes to patient's previously recorded wishes  [] New Advance Directive completed  [] Portable Do Not Rescitate prepared for Provider review and signature  [] POLST/POST/MOLST/MOST prepared for Provider review and signature      Follow-up plan:    [] Schedule follow-up conversation to continue planning  [] Referred individual to Provider for additional questions/concerns   [] Advised patient/agent/surrogate to review completed ACP document and update if needed with changes in condition, patient preferences or care setting    [x] This note routed to one or more involved healthcare providers     Made referral to Spiritual Care to complete Advanced Directives with patient.

## 2021-06-04 NOTE — ED NOTES
ED SBAR report provider to [facility], [facility]. Patient to be transported to Room 4272 via stretcher by transport tech. Patient transported with bedside cardiac monitor and with IV medications infusing. IV site clean, dry, and intact. MEWS score and pain assessed as 0 and documented. Updated patient on plan of care.        Janna Sarah RN  06/04/21 5988

## 2021-06-04 NOTE — ED PROVIDER NOTES
not meet severe sepsis criteria, she is not hypotensive so I do not feel that 30 mL/kg would be appropriate for this patient.            Burton Moritz, MD  06/04/21 1150

## 2021-06-04 NOTE — LETTER
[facility]  [address] [address]  Phone: [phone]             June 8, 2021    Patient: Vera Richardson   YOB: 1960   Date of Visit: 6/4/2021       To Whom It May Concern:    Cheo Randall was seen and treated in our facility  beginning 6/4/2021 until 6/8/21. She may return to work on 6/15/21.       Sincerely,       Milagro Trevino RN         Signature:__________________________________

## 2021-06-04 NOTE — PROGRESS NOTES
Patient arrived to PCU from the 4th floor via wheelchair. Pt is alert and oriented able to make needs known. Pt is on telemetry monitor and verified with [facility]. Denies pain. Respirations easy even no distress. Pt oriented to room call light and safety measures.

## 2021-06-04 NOTE — PROGRESS NOTES
Dr. Trujillo Link called to get report on pt. She is the nephrologist on call. Updated her on pt's lab values, vital signs including heart rate. All questions answered. New orders received.

## 2021-06-05 LAB
ALBUMIN SERPL-MCNC: 2.7 G/DL (ref 3.4–5)
ALBUMIN SERPL-MCNC: 2.9 G/DL (ref 3.4–5)
ALP BLD-CCNC: 104 U/L (ref 40–129)
ALT SERPL-CCNC: 29 U/L (ref 10–40)
ANION GAP SERPL CALCULATED.3IONS-SCNC: 15 MMOL/L (ref 3–16)
AST SERPL-CCNC: 46 U/L (ref 15–37)
BASOPHILS ABSOLUTE: 0 K/UL (ref 0–0.2)
BASOPHILS RELATIVE PERCENT: 0.1 %
BILIRUB SERPL-MCNC: 1.1 MG/DL (ref 0–1)
BILIRUBIN DIRECT: 0.7 MG/DL (ref 0–0.3)
BILIRUBIN, INDIRECT: 0.4 MG/DL (ref 0–1)
BUN BLDV-MCNC: 12 MG/DL (ref 7–20)
CALCIUM SERPL-MCNC: 7.5 MG/DL (ref 8.3–10.6)
CHLORIDE BLD-SCNC: 87 MMOL/L (ref 99–110)
CO2: 20 MMOL/L (ref 21–32)
CREAT SERPL-MCNC: 0.6 MG/DL (ref 0.6–1.1)
EOSINOPHILS ABSOLUTE: 0 K/UL (ref 0–0.6)
EOSINOPHILS RELATIVE PERCENT: 0.1 %
FOLATE: 15.27 NG/ML (ref 4.78–24.2)
GFR AFRICAN AMERICAN: >60
GFR NON-AFRICAN AMERICAN: >60
GLUCOSE BLD-MCNC: 84 MG/DL (ref 70–99)
HCT VFR BLD CALC: 27 % (ref 36–48)
HEMOGLOBIN: 9.3 G/DL (ref 12–16)
LV EF: 40 %
LVEF MODALITY: NORMAL
LYMPHOCYTES ABSOLUTE: 1.3 K/UL (ref 1–5.1)
LYMPHOCYTES RELATIVE PERCENT: 13.8 %
MCH RBC QN AUTO: 34.6 PG (ref 26–34)
MCHC RBC AUTO-ENTMCNC: 34.2 G/DL (ref 31–36)
MCV RBC AUTO: 101 FL (ref 80–100)
MONOCYTES ABSOLUTE: 1.3 K/UL (ref 0–1.3)
MONOCYTES RELATIVE PERCENT: 13.8 %
MRSA SCREEN RT-PCR: NORMAL
NEUTROPHILS ABSOLUTE: 6.8 K/UL (ref 1.7–7.7)
NEUTROPHILS RELATIVE PERCENT: 72.2 %
PDW BLD-RTO: 13.3 % (ref 12.4–15.4)
PHOSPHORUS: 3.9 MG/DL (ref 2.5–4.9)
PLATELET # BLD: 232 K/UL (ref 135–450)
PMV BLD AUTO: 7.6 FL (ref 5–10.5)
POTASSIUM SERPL-SCNC: 3.6 MMOL/L (ref 3.5–5.1)
RBC # BLD: 2.68 M/UL (ref 4–5.2)
SODIUM BLD-SCNC: 120 MMOL/L (ref 136–145)
SODIUM BLD-SCNC: 120 MMOL/L (ref 136–145)
SODIUM BLD-SCNC: 122 MMOL/L (ref 136–145)
SODIUM BLD-SCNC: 122 MMOL/L (ref 136–145)
SODIUM BLD-SCNC: 123 MMOL/L (ref 136–145)
TOTAL PROTEIN: 5.2 G/DL (ref 6.4–8.2)
VITAMIN B-12: 911 PG/ML (ref 211–911)
WBC # BLD: 9.3 K/UL (ref 4–11)

## 2021-06-05 PROCEDURE — 2580000003 HC RX 258: Performed by: INTERNAL MEDICINE

## 2021-06-05 PROCEDURE — 2500000003 HC RX 250 WO HCPCS: Performed by: INTERNAL MEDICINE

## 2021-06-05 PROCEDURE — 6370000000 HC RX 637 (ALT 250 FOR IP): Performed by: INTERNAL MEDICINE

## 2021-06-05 PROCEDURE — 6360000002 HC RX W HCPCS: Performed by: INTERNAL MEDICINE

## 2021-06-05 PROCEDURE — 94760 N-INVAS EAR/PLS OXIMETRY 1: CPT

## 2021-06-05 PROCEDURE — 93306 TTE W/DOPPLER COMPLETE: CPT

## 2021-06-05 PROCEDURE — 2060000000 HC ICU INTERMEDIATE R&B

## 2021-06-05 PROCEDURE — 80076 HEPATIC FUNCTION PANEL: CPT

## 2021-06-05 PROCEDURE — 36415 COLL VENOUS BLD VENIPUNCTURE: CPT

## 2021-06-05 PROCEDURE — 99222 1ST HOSP IP/OBS MODERATE 55: CPT | Performed by: INTERNAL MEDICINE

## 2021-06-05 PROCEDURE — 2700000000 HC OXYGEN THERAPY PER DAY

## 2021-06-05 PROCEDURE — 84295 ASSAY OF SERUM SODIUM: CPT

## 2021-06-05 PROCEDURE — 80069 RENAL FUNCTION PANEL: CPT

## 2021-06-05 PROCEDURE — 85025 COMPLETE CBC W/AUTO DIFF WBC: CPT

## 2021-06-05 RX ORDER — SPIRONOLACTONE 25 MG/1
25 TABLET ORAL DAILY
Status: DISCONTINUED | OUTPATIENT
Start: 2021-06-05 | End: 2021-06-06

## 2021-06-05 RX ORDER — POTASSIUM CHLORIDE 20 MEQ/1
40 TABLET, EXTENDED RELEASE ORAL
Status: DISCONTINUED | OUTPATIENT
Start: 2021-06-05 | End: 2021-06-06

## 2021-06-05 RX ADMIN — Medication 10 ML: at 20:39

## 2021-06-05 RX ADMIN — DOXYCYCLINE 100 MG: 100 INJECTION, POWDER, LYOPHILIZED, FOR SOLUTION INTRAVENOUS at 14:07

## 2021-06-05 RX ADMIN — FUROSEMIDE 40 MG: 10 INJECTION, SOLUTION INTRAMUSCULAR; INTRAVENOUS at 18:31

## 2021-06-05 RX ADMIN — ALPRAZOLAM 0.5 MG: 0.5 TABLET ORAL at 20:38

## 2021-06-05 RX ADMIN — APIXABAN 2.5 MG: 2.5 TABLET, FILM COATED ORAL at 20:38

## 2021-06-05 RX ADMIN — DILTIAZEM HYDROCHLORIDE 120 MG: 120 CAPSULE, COATED, EXTENDED RELEASE ORAL at 10:05

## 2021-06-05 RX ADMIN — ALLOPURINOL 300 MG: 300 TABLET ORAL at 10:05

## 2021-06-05 RX ADMIN — SPIRONOLACTONE 25 MG: 25 TABLET ORAL at 18:31

## 2021-06-05 RX ADMIN — POTASSIUM CHLORIDE 40 MEQ: 1500 TABLET, EXTENDED RELEASE ORAL at 10:05

## 2021-06-05 RX ADMIN — ASPIRIN 81 MG: 81 TABLET, COATED ORAL at 10:05

## 2021-06-05 RX ADMIN — FUROSEMIDE 40 MG: 10 INJECTION, SOLUTION INTRAMUSCULAR; INTRAVENOUS at 10:05

## 2021-06-05 RX ADMIN — FLUTICASONE PROPIONATE 2 SPRAY: 50 SPRAY, METERED NASAL at 10:06

## 2021-06-05 RX ADMIN — CEFTRIAXONE 1000 MG: 1 INJECTION, POWDER, FOR SOLUTION INTRAMUSCULAR; INTRAVENOUS at 14:08

## 2021-06-05 RX ADMIN — PANTOPRAZOLE SODIUM 40 MG: 40 TABLET, DELAYED RELEASE ORAL at 06:47

## 2021-06-05 RX ADMIN — APIXABAN 2.5 MG: 2.5 TABLET, FILM COATED ORAL at 10:05

## 2021-06-05 RX ADMIN — ALPRAZOLAM 0.5 MG: 0.5 TABLET ORAL at 00:13

## 2021-06-05 ASSESSMENT — PAIN SCALES - GENERAL: PAINLEVEL_OUTOF10: 0

## 2021-06-05 NOTE — CONSULTS
[facility]  Consult Note           Reason for Consult:  Hyponatremia  Requesting Physician:  Dr. Juan Antonio Palomares    History Obtained From:  patient, electronic medical record    History of Present Ilness: 61 y/o WF with h/o HTN actove smoker presented to ER with SOB  She developed SOB on Thursday morning that woke her up from sleep Has had \"congestion\" for few days Due to persistent SOB she was seen in urgent care and sent to ER  Noted to have Na+ 117 meq on admission  Denies excessive fluid intake Has had LE swelling  Denies HA dizziness, recent falls or difficulty concentrating  Had been on Lisinopril HCTZ for many years Takes Ibuprofen as needed  No h/o Thyroid problems      Past Medical History:        Diagnosis Date    Anxiety     GERD (gastroesophageal reflux disease)     Gout     had once years ago    Hyperlipidemia     at last check it was normal    Hypertension        Past Surgical History:        Procedure Laterality Date    BLADDER SUSPENSION      CARPAL TUNNEL RELEASE      right    CARPAL TUNNEL RELEASE  7-12-10    Left    CARPAL TUNNEL RELEASE  Left wrist    FOOT SURGERY  3/2013    EXCISION NEUROMA 3RD NERVE LEFT FOOT    JOINT REPLACEMENT      right    KNEE ARTHROSCOPY      left x1, right x 1    SINUS SURGERY      TONSILLECTOMY         Home Medications:    No current facility-administered medications on file prior to encounter. Current Outpatient Medications on File Prior to Encounter   Medication Sig Dispense Refill    ALPRAZolam (ALPRAZOLAM XR) 0.5 MG extended release tablet Take 0.5 mg by mouth daily as needed (anxiety).        allopurinol (ZYLOPRIM) 300 MG tablet Take 300 mg by mouth daily      fluticasone (FLONASE) 50 MCG/ACT nasal spray 2 sprays by Each Nostril route daily      Multiple Vitamins-Minerals (THERAPEUTIC MULTIVITAMIN-MINERALS) tablet Take 1 tablet by mouth daily      ibuprofen (ADVIL;MOTRIN) 800 MG tablet Take 1 tablet by mouth every 8 hours as needed for Pain 25 tablet 1    diltiazem (CARTIA XT) 240 MG ER capsule Take 240 mg by mouth daily.  levocetirizine (XYZAL) 5 MG tablet Take 5 mg by mouth daily.  lisinopril-hydrochlorothiazide (PRINZIDE;ZESTORETIC) 20-25 MG per tablet Take 1 tablet by mouth daily.  GuaiFENesin (MUCINEX PO) Take 400 mg by mouth daily.  aspirin 81 MG EC tablet Take 81 mg by mouth daily.  montelukast (SINGULAIR) 10 MG tablet Take 10 mg by mouth daily       esomeprazole (NEXIUM) 20 MG capsule Take 20 mg by mouth every morning (before breakfast). Allergies:  Augmentin [amoxicillin-pot clavulanate] and Pork-derived products    Social History:    Social History     Socioeconomic History    Marital status:      Spouse name: Not on file    Number of children: Not on file    Years of education: Not on file    Highest education level: Not on file   Occupational History    Not on file   Tobacco Use    Smoking status: Current Every Day Smoker     Packs/day: 1.00     Years: 35.00     Pack years: 35.00     Types: Cigarettes    Smokeless tobacco: Never Used   Substance and Sexual Activity    Alcohol use: Yes     Alcohol/week: 14.0 standard drinks     Types: 14 Glasses of wine per week    Drug use: No    Sexual activity: Not on file   Other Topics Concern    Not on file   Social History Narrative    Not on file     Social Determinants of Health     Financial Resource Strain:     Difficulty of Paying Living Expenses:    Food Insecurity:     Worried About Running Out of Food in the Last Year:     [address] in the Last Year:    Transportation Needs:     Lack of Transportation (Medical):      Lack of Transportation (Non-Medical):    Physical Activity:     Days of Exercise per Week:     Minutes of Exercise per Session:    Stress:     Feeling of Stress :    Social Connections:     Frequency of Communication with Friends and Family:     Frequency of Social Gatherings with Friends and Family:     Attends Holiness Services:     Active Member of Clubs or Organizations:     Attends Club or Organization Meetings:     Marital Status:    Intimate Partner Violence:     Fear of Current or Ex-Partner:     Emotionally Abused:     Physically Abused:     Sexually Abused:        Family History:   Family History   Problem Relation Age of Onset    Diabetes Mother     High Blood Pressure Mother     Heart Disease Maternal Grandmother        Review of Systems:   Pertinent positives stated above in HPI. All other systems were reviewed and were negative.   Denies fever chills  No recent weight loss or loss of appetite  Has been working from home lately  No dysuria hematuria    Physical exam:   Constitutional:  VITALS:  /70   Pulse 94   Temp 97.9 °F (36.6 °C) (Oral)   Resp 16   Ht 5' 2\" (1.575 m)   Wt 131 lb 2.8 oz (59.5 kg)   SpO2 95%   BMI 23.99 kg/m²   Gen: alert, awake, nad  Skin: no rash, turgor wnl  Heent:  eomi, mmm  Neck: no bruits or jvd noted, thyroid normal  Cardiovascular:  S1, S2 without m/r/g  Respiratory: bilateral crackles at bases bilaterally   Abdomen:  +bs, soft, nt, nd, no hepatosplenomegaly  Ext: 1+ lower extremity edema  Psychiatric: mood and affect appropriate; judgement and insight intact  Musculoskeletal:  Rom, muscular strength intact; digits, nails normal    Data/  CBC:   Lab Results   Component Value Date    WBC 9.3 06/05/2021    RBC 2.68 06/05/2021    HGB 9.3 06/05/2021    HCT 27.0 06/05/2021    .0 06/05/2021    MCH 34.6 06/05/2021    MCHC 34.2 06/05/2021    RDW 13.3 06/05/2021     06/05/2021    MPV 7.6 06/05/2021     BMP:    Lab Results   Component Value Date     06/05/2021    K 3.6 06/05/2021    K 4.3 06/04/2021    CL 87 06/05/2021    CO2 20 06/05/2021    BUN 12 06/05/2021    LABALBU 2.7 06/05/2021    LABALBU 2.9 06/05/2021    CREATININE 0.6 06/05/2021    CALCIUM 7.5 06/05/2021    GFRAA >60 06/05/2021    GFRAA >60 04/23/2010    LABGLOM >60 06/05/2021    GLUCOSE 84 06/05/2021         Assessment/  1-Hyponatremia appears Hypervolemic with evidence of CHF, elevated BNP Also has been on HCTZ Urine Na+ 85 Urine osm 253 consistent with Hypervolemic Hyponatremia Na+ improving with diuresis TSH normal Uric Acid 7.9 MG  2-CHF   3-Active smoker    Plan/  1-Continue IV Lasix  2-Place on Low Na+ diet FR 1200 ml/d  3-Monitor serial Na+ levels  4 No indication for 3% saline  5 Monitor daily weights  6 ECHO pending  7 Keep off of HCTZ     Thank you for the consultation. Please do not hesitate to call with questions.     Yuliana Aranda MD, [facility]

## 2021-06-05 NOTE — PROGRESS NOTES
Comprehensive Nutrition Assessment    Type and Reason for Visit:  Positive Nutrition Screen    Nutrition Recommendations/Plan:   Continue General diet. Monitor need for Low Na diet, possible CHF. Start Ensure chocolate BID. Awaiting CHF confirmation, provided brief Low Na education. Nutrition Assessment:  Pt presented with SOB and leg swelling. Pt reports she has lost 30# in the past year. Unable to confirm d/t limited wt hx. Last wt was 133# in March. Pt reported she has had a good appetite but wt loss was from bordem of the same foods in the house during quarantine. Pt does report she has had a poor appetite more recently since Wed but currently improving and was eating breakfast during visit. Pt being worked up for possible CHF. Not confirmed at this time but did briefly discuss with pt about Low salt diet. Pt reports she is \"salt fanatic\" and puts salt on everything, even before trying it. She reports she has been trying to cut back and only using 1 salt packet instead of 3-4. Praise given. Did discuss rationale of low Na diet and provided handouts, \"sodium content of foods\" list. Pt willing to trial ONS given possible wt loss and recent poor appetite, even though improving. My need more indepth diet education if CHF confirmed, will continue to monitor. Malnutrition Assessment:  Malnutrition Status: At risk for malnutrition       Estimated Daily Nutrient Needs:  Energy (kcal):  2488-8847 kcal (25-30 kcal/kg CBW)  Protein (g):  59-77 gm (1-1.3gm/kg CBW)  Fluid (ml/day):  per provider, possible CHF? Nutrition Related Findings:  +2 BLE edema; BM 6/4      Wounds:  None       Current Nutrition Therapies:    ADULT DIET; Regular    Anthropometric Measures:  · Height: 5' 2\" (157.5 cm)  · Current Body Weight: 131 lb (59.4 kg)   · Admission Body Weight: 128 lb (58.1 kg)    · Ideal Body Weight: 110 lbs; % Ideal Body Weight 119.1 %   · BMI: 24  · BMI Categories: Normal Weight (BMI 18.5-24. 9)       Nutrition Diagnosis:   · Inadequate oral intake related to  (poor appetite) as evidenced by poor intake prior to admission, weight loss      Nutrition Interventions:   Food and/or Nutrient Delivery:  Continue Current Diet, Start Oral Nutrition Supplement  Nutrition Education/Counseling:  Education initiated   Coordination of Nutrition Care:  Continue to monitor while inpatient    Goals:  consume >50% of meals and ONS during admission       Nutrition Monitoring and Evaluation:   Food/Nutrient Intake Outcomes:  Food and Nutrient Intake, Supplement Intake  Physical Signs/Symptoms Outcomes:  Biochemical Data, Weight, Skin, Fluid Status or Edema     Discharge Planning:     Too soon to determine     Electronically signed by Dorene Garcia RD, LD on 6/5/21 at 9:28 AM EDT    Contact: [phone]

## 2021-06-05 NOTE — PROGRESS NOTES
Hospitalist Progress Note      PCP: Joleen Wild MD    Date of Admission: 6/4/2021    Chief Complaint: SOB. Leg edema. Subjective:     Doing much better with lasix - SOB improving and LE edema improving. Ongoing cough. Medications:  Reviewed    Infusion Medications    sodium chloride       Scheduled Medications    potassium chloride  40 mEq Oral Daily with breakfast    doxycycline (VIBRAMYCIN) IV  100 mg Intravenous Q12H    spironolactone  25 mg Oral Daily    cefTRIAXone (ROCEPHIN) IV  1,000 mg Intravenous Q24H    furosemide  40 mg Intravenous BID    allopurinol  300 mg Oral Daily    aspirin  81 mg Oral Daily    pantoprazole  40 mg Oral QAM AC    fluticasone  2 spray Each Nostril Daily    sodium chloride flush  5-40 mL Intravenous 2 times per day    apixaban  2.5 mg Oral BID     PRN Meds: ALPRAZolam, ibuprofen, sodium chloride flush, sodium chloride, ondansetron **OR** ondansetron, polyethylene glycol, acetaminophen **OR** acetaminophen      Intake/Output Summary (Last 24 hours) at 6/5/2021 1529  Last data filed at 6/5/2021 0911  Gross per 24 hour   Intake 590 ml   Output 100 ml   Net 490 ml       Physical Exam Performed:    /74   Pulse 108   Temp 97.6 °F (36.4 °C) (Oral)   Resp 18   Ht 5' 2\" (1.575 m)   Wt 131 lb 2.8 oz (59.5 kg)   SpO2 95%   BMI 23.99 kg/m²       General appearance: No apparent distress appears stated age and cooperative. HEENT Normal cephalic, atraumatic without obvious deformity. Pupils equal, round, and reactive to light. Extra ocular muscles intact. Conjunctivae/corneas clear. Neck: Supple, No jugular venous distention/bruits. Trachea midline without thyromegaly or adenopathy with full range of motion. Lungs: diminished breath sounds at bases with rales. No wheezing or rhonchi.    Heart: Regular rate and rhythm with Normal S1/S2 without murmurs, rubs or gallops, point of maximum impulse non-displaced  Abdomen: Soft, non-tender or non-distended without rigidity or guarding and positive bowel sounds all four quadrants. Extremities: 2+ pitting edema - Left worse then right. Neurologic: Alert and oriented X 3, neurovascularly intact with sensory/motor intact upper extremities/lower extremities, bilaterally. Cranial nerves: II-XII intact, grossly non-focal.  Mental status: Alert, oriented, thought content appropriate. Capillary Refill: Acceptable  < 3 seconds  Peripheral Pulses: +3 Easily felt, not easily obliterated with pressure     Labs:   Recent Labs     06/04/21  1041 06/05/21  0348   WBC 16.2* 9.3   HGB 11.2* 9.3*   HCT 32.4* 27.0*    232     Recent Labs     06/04/21  1141 06/04/21  1745 06/04/21  2212 06/05/21  0348 06/05/21  1030   * 120* 122* 122* 120*   K 4.3 3.9  --  3.6  --    CL 84* 82*  --  87*  --    CO2 23 21  --  20*  --    BUN 9 10  --  12  --    CREATININE 0.6 0.6  --  0.6  --    CALCIUM 8.1* 7.7*  --  7.5*  --    PHOS  --   --   --  3.9  --      Recent Labs     06/05/21  0348   AST 46*   ALT 29   BILIDIR 0.7*   BILITOT 1.1*   ALKPHOS 104     No results for input(s): INR in the last 72 hours. Recent Labs     06/04/21  1041 06/04/21  1628 06/04/21 2212   TROPONINI 0.02* 0.02* 0.02*       Urinalysis:      Lab Results   Component Value Date    NITRU Negative 06/04/2021    WBCUA 1 06/04/2021    RBCUA 1 06/04/2021    BLOODU Negative 06/04/2021    SPECGRAV 1.012 06/04/2021    GLUCOSEU Negative 06/04/2021       Radiology:  CT CHEST PULMONARY EMBOLISM W CONTRAST   Final Result   No central pulmonary embolus identified      Bilateral pleural effusions, with adjacent consolidation, at the lung bases   either due to atelectasis or pneumonia. Scattered opacities are seen   throughout the right lung. .  There is a background of pulmonary emphysema. Subtle septal thickening at the right lung base, suggest a component of fluid   overload-pulmonary edema      Moderate to severe coronary artery calcification.          XR CHEST (2 VW)   Final Result   Small right pleural effusion. Assessment/Plan:    Active Hospital Problems    Diagnosis     CHF (congestive heart failure), NYHA class I, acute on chronic, combined (HCC) [I50.43]      Acute systolic CHF - EF 60%    No prior Hx.   ECHO with anterolateral hypokinesis. Clinical exam, labs and imaging concerning for fluid overload, acute pulm edema, pleural effusions. Plan:               - Lasix IV BID to continue. - ASA              - eliquis ppx - she is allergic to pig derived products, so no heparin               - serial trop flat elevation - active ACS less likely. - cardiology eval - defer further ischemia work up to cardiology team.                - tele monitor.    - will need CHF nurse eval Monday.         Hyponatremia - 117 on presentation. Hypervolemic on exam.   Lasix IV. Stopped HCTZ. TSH normal  Osm plasma low  Urine Osm 253  Urine Na 85.   - these suggest possibly SIADH, though may need repeat testing once off HCTZ. Na level q6. Nephrology involved.         Acute Pulm Edema as above.         Bilateral Pleural Effusions - suspect due to above. Pending response to diuresis, may need thoracentesis, though less likely.            Probable CAP   Empiric on Zithro and Rocephin - off Zithro to Doxy with prolonged QT. Procal 8.4 - will repeat tomorrow. Check Strep Pneumo. MRSA probe negative.            DVT Prophylaxis: eliquis, she is allergic to pig derived products (incl heparin). Diet: Diet cardiac.    Code Status: full        Dispo - inpatient.          June Bassett MD

## 2021-06-05 NOTE — CONSULTS
[facility]    Juliet Gordillo  1960 June 5, 2021    Reason for Consult: CHF    CC: Shortness of breath    HPI:  The patient is 62 y.o. female with a past medical history significant for essential hypertension, GERD and hyperlipidemia who presented to [facility] with shortness of breath. Yefri Rodas states that she had congestion earlier in the week. She woke up two days ago and was short of breath. Her fitness watch told her that her heart rate was elevated. She went to an Urgent Care and was sent to the ED. She denies any chest pain or pressure She has had swelling in her legs \"for a while\". 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 She describes difficulty laying flat to sleep at night. She thinks that her weight has gone down over the last year. .     Review of Systems:  Constitutional: No fatigue, weakness, night sweats or fever. HEENT: No new vision difficulties or ringing in the ears. Respiratory: No new SOB, PND, orthopnea or cough. Cardiovascular: See HPI   GI: No n/v, diarrhea, constipation, abdominal pain or changes in bowel habits. No melena, no hematochezia  : No urinary frequency, urgency, incontinence, hematuria or dysuria. Skin: No cyanosis or skin lesions. Musculoskeletal: No new muscle or joint pain. Neurological: No syncope or TIA-like symptoms.   Psychiatric: No anxiety, insomnia or depression     Past Medical History:   Diagnosis Date    Anxiety     GERD (gastroesophageal reflux disease)     Gout     had once years ago    Hyperlipidemia     at last check it was normal    Hypertension      Past Surgical History:   Procedure Laterality Date    BLADDER SUSPENSION      CARPAL TUNNEL RELEASE      right    CARPAL TUNNEL RELEASE  7-12-10    Left    CARPAL TUNNEL RELEASE  Left wrist    FOOT SURGERY  3/2013    EXCISION NEUROMA 3RD NERVE LEFT FOOT    JOINT REPLACEMENT      right    KNEE ARTHROSCOPY      left x1, right x 1    SINUS SURGERY      TONSILLECTOMY       Family History   Problem Relation Age of Onset    Diabetes Mother     High Blood Pressure Mother     Heart Disease Maternal Grandmother      Social History     Tobacco Use    Smoking status: Current Every Day Smoker     Packs/day: 1.00     Years: 35.00     Pack years: 35.00     Types: Cigarettes    Smokeless tobacco: Never Used   Substance Use Topics    Alcohol use:  Yes     Alcohol/week: 14.0 standard drinks     Types: 14 Glasses of wine per week    Drug use: No       Allergies   Allergen Reactions    Augmentin [Amoxicillin-Pot Clavulanate] Anaphylaxis and Swelling     THROATSWELLS  Pt has taken amoxicillin with no reaction but cannot take augmentin    Pork-Derived Products Swelling     Current Facility-Administered Medications   Medication Dose Route Frequency Provider Last Rate Last Admin    potassium chloride (KLOR-CON M) extended release tablet 40 mEq  40 mEq Oral Daily with breakfast Ashley Palomares MD   40 mEq at 06/05/21 1005    azithromycin (ZITHROMAX) 250 mg in dextrose 5 % 250 mL IVPB  250 mg Intravenous Q24H Ashley Palomares MD        cefTRIAXone (ROCEPHIN) 1000 mg IVPB in 50 mL D5W minibag  1,000 mg Intravenous Q24H Ashley Palomares MD        furosemide (LASIX) injection 40 mg  40 mg Intravenous BID Ashley Palomares MD   40 mg at 06/05/21 1005    allopurinol (ZYLOPRIM) tablet 300 mg  300 mg Oral Daily Ashley Palomares MD   300 mg at 06/05/21 1005    ALPRAZolam (XANAX) tablet 0.5 mg  0.5 mg Oral BID PRN Ashley Palomares MD   0.5 mg at 06/05/21 0013    aspirin EC tablet 81 mg  81 mg Oral Daily Ashley Palomares MD   81 mg at 06/05/21 1005    dilTIAZem (CARDIZEM CD) extended release capsule 120 mg  120 mg Oral Daily Ashley Palomares MD   120 mg at 06/05/21 1005    pantoprazole (PROTONIX) tablet 40 mg  40 mg Oral QAM AC Ashley Palomares MD   40 mg at 06/05/21 0647    fluticasone (FLONASE) 50 MCG/ACT nasal spray 2 spray  2 spray Each Nostril Daily Niurka Carias MD   2 spray at 06/05/21 1006    ibuprofen (ADVIL;MOTRIN) tablet 400 mg  400 mg Oral Q8H PRN Niurka Carias MD        sodium chloride flush 0.9 % injection 5-40 mL  5-40 mL Intravenous 2 times per day Niurka Carias MD   10 mL at 06/04/21 2047    sodium chloride flush 0.9 % injection 5-40 mL  5-40 mL Intravenous PRN Niurka Carias MD        0.9 % sodium chloride infusion  25 mL Intravenous PRN Niurka Carias MD        ondansetron (ZOFRAN-ODT) disintegrating tablet 4 mg  4 mg Oral Q8H PRN Niurka Carias MD        Or    ondansetron (ZOFRAN) injection 4 mg  4 mg Intravenous Q6H PRN Niurka Carias MD        polyethylene glycol (GLYCOLAX) packet 17 g  17 g Oral Daily PRN Niurka Carias MD        acetaminophen (TYLENOL) tablet 650 mg  650 mg Oral Q6H PRN Niurka Carias MD        Or    acetaminophen (TYLENOL) suppository 650 mg  650 mg Rectal Q6H PRN Niurka Carias MD        apixaban (ELIQUIS) tablet 2.5 mg  2.5 mg Oral BID Niurka Carias MD   2.5 mg at 06/05/21 1005       Physical Exam:   /70   Pulse 94   Temp 97.9 °F (36.6 °C) (Oral)   Resp 16   Ht 5' 2\" (1.575 m)   Wt 131 lb 2.8 oz (59.5 kg)   SpO2 95%   BMI 23.99 kg/m²     Intake/Output Summary (Last 24 hours) at 6/5/2021 1127  Last data filed at 6/5/2021 0911  Gross per 24 hour   Intake 1440 ml   Output 100 ml   Net 1340 ml     Wt Readings from Last 2 Encounters:   06/05/21 131 lb 2.8 oz (59.5 kg)   03/27/18 153 lb 3.5 oz (69.5 kg)     Constitutional: She is oriented to person, place, and time. She appears well-developed and well-nourished. In no acute distress. Head: Normocephalic and atraumatic. Neck: Neck supple. No JVD present. Carotid bruit is not present. No mass and no thyromegaly present. No lymphadenopathy present.   Cardiovascular: Normal rate, regular rhythm, normal heart sounds and intact distal pulses. Exam reveals no gallop and no friction rub. No murmur heard. Pulmonary/Chest: Effort normal and breath sounds normal. No respiratory distress. She has no wheezes, rhonchi or rales. Abdominal: Soft, non-tender. Bowel sounds and aorta are normal. She exhibits no organomegaly, mass or bruit. Extremities: No edema, cyanosis, or clubbing. Pulses are 2+ radial/carotid/dorsalis pedis and posterior tibial bilaterally. Neurological: She is alert and oriented to person, place, and time. She has normal reflexes. No cranial nerve deficit. Coordination normal.   Skin: Skin is warm and dry. There is no rash or diaphoresis. Psychiatric: She has a normal mood and affect. Her speech is normal and behavior is normal.     EKG Interpretation: Sinus rhythm with anterolateral T wave changes suggestive of ischemia. Lab Review:   No results found for: TRIG, HDL, LDLCALC, LDLDIRECT, LABVLDL  Lab Results   Component Value Date     06/05/2021    K 3.6 06/05/2021    K 4.3 06/04/2021    BUN 12 06/05/2021    CREATININE 0.6 06/05/2021     Recent Labs     06/04/21  1041 06/05/21  0348   WBC 16.2* 9.3   HGB 11.2* 9.3*   HCT 32.4* 27.0*    232     Echo 6/5/21:  Overall left ventricular systolic function appears mildly reduced. Ejection fraction is visually estimated to be 40%. Severe andrew-lateral hypokinesis   Mid to apical akinesis   Grade II diastolic dysfunction with elevated LV filling pressures. Normal right ventricular size. Right ventricular systolic function is reduced . Assessment:  1. Acute Systolic CHF, class 3  2. Anemia, unspecified  3. Hyponatremia      Plan:  I spoke at length with Katja Sorenson about the results of her echocardiogram and her clinical presentation. She does have anterolateral ST-T wave changes suggestive of anterolateral ischemia. This does correspond to the findings on her echocardiogram of severe anterolateral hypokinesis.   I am concerned about ischemia in the distribution of her left anterior descending artery. She warrants a left heart catheterization for this. We can consider this for Monday after she is better compensated from a respiratory standpoint. I discussed the risks and benefits of cardiac catheterization with the patient. I also discussed the possible therapies including medical management, angioplasty and stenting or coronary bypass surgery. The patient is amenable to undergoing the procedure. We will have this scheduled for Monday. I would continue to diurese her. Her hyponatremia would seem to suggest volume overload. We can add Aldactone 25 mg daily now. Despite her LV dysfunction I would add Toprol XL 25 mg daily now as well. Stop NSAIDs now as well as Cardizem CD. We will continue to follow along with her. I am not sure if her drop in her hemoglobin is real.  I will defer work-up this to the primary team but she at least needs iron studies.

## 2021-06-05 NOTE — PLAN OF CARE
Problem: Falls - Risk of:  Goal: Will remain free from falls  Description: Will remain free from falls  Outcome: Ongoing  Goal: Absence of physical injury  Description: Absence of physical injury  Outcome: Ongoing     Problem: OXYGENATION/RESPIRATORY FUNCTION  Goal: Patient will maintain patent airway  Outcome: Ongoing  Goal: Patient will achieve/maintain normal respiratory rate/effort  Description: Respiratory rate and effort will be within normal limits for the patient  Outcome: Ongoing     Problem: HEMODYNAMIC STATUS  Goal: Patient has stable vital signs and fluid balance  Outcome: Ongoing     Problem: FLUID AND ELECTROLYTE IMBALANCE  Goal: Fluid and electrolyte balance are achieved/maintained  Outcome: Ongoing     Problem: ACTIVITY INTOLERANCE/IMPAIRED MOBILITY  Goal: Mobility/activity is maintained at optimum level for patient  Outcome: Ongoing     Problem: Nutrition  Goal: Optimal nutrition therapy  6/5/2021 1939 by Myrna Neri RN  Outcome: Ongoing  6/5/2021 0930 by Jeffrey Fernandes RD, LD  Outcome: Ongoing

## 2021-06-06 LAB
ALBUMIN SERPL-MCNC: 2.7 G/DL (ref 3.4–5)
ANION GAP SERPL CALCULATED.3IONS-SCNC: 14 MMOL/L (ref 3–16)
BASOPHILS ABSOLUTE: 0 K/UL (ref 0–0.2)
BASOPHILS RELATIVE PERCENT: 0.2 %
BUN BLDV-MCNC: 12 MG/DL (ref 7–20)
CALCIUM SERPL-MCNC: 7.8 MG/DL (ref 8.3–10.6)
CHLORIDE BLD-SCNC: 87 MMOL/L (ref 99–110)
CO2: 23 MMOL/L (ref 21–32)
CREAT SERPL-MCNC: 0.6 MG/DL (ref 0.6–1.1)
EOSINOPHILS ABSOLUTE: 0 K/UL (ref 0–0.6)
EOSINOPHILS RELATIVE PERCENT: 0.2 %
GFR AFRICAN AMERICAN: >60
GFR NON-AFRICAN AMERICAN: >60
GLUCOSE BLD-MCNC: 78 MG/DL (ref 70–99)
HCT VFR BLD CALC: 26.8 % (ref 36–48)
HEMOGLOBIN: 9.3 G/DL (ref 12–16)
IRON SATURATION: 19 % (ref 15–50)
IRON: 25 UG/DL (ref 37–145)
LYMPHOCYTES ABSOLUTE: 1.9 K/UL (ref 1–5.1)
LYMPHOCYTES RELATIVE PERCENT: 18.6 %
MCH RBC QN AUTO: 34.9 PG (ref 26–34)
MCHC RBC AUTO-ENTMCNC: 34.6 G/DL (ref 31–36)
MCV RBC AUTO: 100.8 FL (ref 80–100)
MONOCYTES ABSOLUTE: 1.4 K/UL (ref 0–1.3)
MONOCYTES RELATIVE PERCENT: 13.2 %
NEUTROPHILS ABSOLUTE: 7 K/UL (ref 1.7–7.7)
NEUTROPHILS RELATIVE PERCENT: 67.8 %
PDW BLD-RTO: 13.5 % (ref 12.4–15.4)
PHOSPHORUS: 2.9 MG/DL (ref 2.5–4.9)
PLATELET # BLD: 273 K/UL (ref 135–450)
PMV BLD AUTO: 7.3 FL (ref 5–10.5)
POTASSIUM SERPL-SCNC: 3.4 MMOL/L (ref 3.5–5.1)
PROCALCITONIN: 3.75 NG/ML (ref 0–0.15)
RBC # BLD: 2.66 M/UL (ref 4–5.2)
SODIUM BLD-SCNC: 123 MMOL/L (ref 136–145)
SODIUM BLD-SCNC: 123 MMOL/L (ref 136–145)
SODIUM BLD-SCNC: 124 MMOL/L (ref 136–145)
SODIUM BLD-SCNC: 124 MMOL/L (ref 136–145)
SODIUM BLD-SCNC: 125 MMOL/L (ref 136–145)
STREP PNEUMONIAE ANTIGEN, URINE: NORMAL
TOTAL IRON BINDING CAPACITY: 134 UG/DL (ref 260–445)
WBC # BLD: 10.3 K/UL (ref 4–11)

## 2021-06-06 PROCEDURE — 94760 N-INVAS EAR/PLS OXIMETRY 1: CPT

## 2021-06-06 PROCEDURE — 6370000000 HC RX 637 (ALT 250 FOR IP): Performed by: STUDENT IN AN ORGANIZED HEALTH CARE EDUCATION/TRAINING PROGRAM

## 2021-06-06 PROCEDURE — 83540 ASSAY OF IRON: CPT

## 2021-06-06 PROCEDURE — 2580000003 HC RX 258: Performed by: INTERNAL MEDICINE

## 2021-06-06 PROCEDURE — 6360000002 HC RX W HCPCS: Performed by: INTERNAL MEDICINE

## 2021-06-06 PROCEDURE — 36415 COLL VENOUS BLD VENIPUNCTURE: CPT

## 2021-06-06 PROCEDURE — 99233 SBSQ HOSP IP/OBS HIGH 50: CPT | Performed by: INTERNAL MEDICINE

## 2021-06-06 PROCEDURE — 2500000003 HC RX 250 WO HCPCS: Performed by: INTERNAL MEDICINE

## 2021-06-06 PROCEDURE — 83550 IRON BINDING TEST: CPT

## 2021-06-06 PROCEDURE — 84295 ASSAY OF SERUM SODIUM: CPT

## 2021-06-06 PROCEDURE — 85025 COMPLETE CBC W/AUTO DIFF WBC: CPT

## 2021-06-06 PROCEDURE — 6370000000 HC RX 637 (ALT 250 FOR IP): Performed by: INTERNAL MEDICINE

## 2021-06-06 PROCEDURE — 84145 PROCALCITONIN (PCT): CPT

## 2021-06-06 PROCEDURE — 2060000000 HC ICU INTERMEDIATE R&B

## 2021-06-06 PROCEDURE — 80069 RENAL FUNCTION PANEL: CPT

## 2021-06-06 RX ORDER — SPIRONOLACTONE 25 MG/1
25 TABLET ORAL DAILY
Status: DISCONTINUED | OUTPATIENT
Start: 2021-06-06 | End: 2021-06-06

## 2021-06-06 RX ORDER — FUROSEMIDE 10 MG/ML
60 INJECTION INTRAMUSCULAR; INTRAVENOUS 2 TIMES DAILY
Status: DISCONTINUED | OUTPATIENT
Start: 2021-06-06 | End: 2021-06-06

## 2021-06-06 RX ORDER — SODIUM CHLORIDE 0.9 % (FLUSH) 0.9 %
5-40 SYRINGE (ML) INJECTION PRN
Status: DISCONTINUED | OUTPATIENT
Start: 2021-06-06 | End: 2021-06-07 | Stop reason: SDUPTHER

## 2021-06-06 RX ORDER — SODIUM CHLORIDE 0.9 % (FLUSH) 0.9 %
5-40 SYRINGE (ML) INJECTION EVERY 12 HOURS SCHEDULED
Status: DISCONTINUED | OUTPATIENT
Start: 2021-06-06 | End: 2021-06-07 | Stop reason: SDUPTHER

## 2021-06-06 RX ORDER — FUROSEMIDE 10 MG/ML
80 INJECTION INTRAMUSCULAR; INTRAVENOUS 2 TIMES DAILY
Status: DISCONTINUED | OUTPATIENT
Start: 2021-06-06 | End: 2021-06-08 | Stop reason: HOSPADM

## 2021-06-06 RX ORDER — SODIUM CHLORIDE 9 MG/ML
25 INJECTION, SOLUTION INTRAVENOUS PRN
Status: DISCONTINUED | OUTPATIENT
Start: 2021-06-06 | End: 2021-06-07 | Stop reason: SDUPTHER

## 2021-06-06 RX ORDER — NICOTINE 21 MG/24HR
1 PATCH, TRANSDERMAL 24 HOURS TRANSDERMAL DAILY
Status: DISCONTINUED | OUTPATIENT
Start: 2021-06-06 | End: 2021-06-08 | Stop reason: HOSPADM

## 2021-06-06 RX ADMIN — Medication 10 ML: at 21:34

## 2021-06-06 RX ADMIN — ALLOPURINOL 300 MG: 300 TABLET ORAL at 08:48

## 2021-06-06 RX ADMIN — PANTOPRAZOLE SODIUM 40 MG: 40 TABLET, DELAYED RELEASE ORAL at 06:53

## 2021-06-06 RX ADMIN — APIXABAN 2.5 MG: 2.5 TABLET, FILM COATED ORAL at 08:48

## 2021-06-06 RX ADMIN — ASPIRIN 81 MG: 81 TABLET, COATED ORAL at 08:48

## 2021-06-06 RX ADMIN — DOXYCYCLINE 100 MG: 100 INJECTION, POWDER, LYOPHILIZED, FOR SOLUTION INTRAVENOUS at 01:42

## 2021-06-06 RX ADMIN — DOXYCYCLINE 100 MG: 100 INJECTION, POWDER, LYOPHILIZED, FOR SOLUTION INTRAVENOUS at 15:08

## 2021-06-06 RX ADMIN — POTASSIUM BICARBONATE 40 MEQ: 782 TABLET, EFFERVESCENT ORAL at 10:32

## 2021-06-06 RX ADMIN — ALPRAZOLAM 0.5 MG: 0.5 TABLET ORAL at 21:34

## 2021-06-06 RX ADMIN — FLUTICASONE PROPIONATE 2 SPRAY: 50 SPRAY, METERED NASAL at 08:49

## 2021-06-06 RX ADMIN — FUROSEMIDE 80 MG: 10 INJECTION, SOLUTION INTRAMUSCULAR; INTRAVENOUS at 18:20

## 2021-06-06 RX ADMIN — FUROSEMIDE 60 MG: 10 INJECTION, SOLUTION INTRAMUSCULAR; INTRAVENOUS at 08:49

## 2021-06-06 RX ADMIN — POTASSIUM CHLORIDE 40 MEQ: 1500 TABLET, EXTENDED RELEASE ORAL at 08:48

## 2021-06-06 RX ADMIN — SPIRONOLACTONE 25 MG: 25 TABLET ORAL at 10:27

## 2021-06-06 RX ADMIN — CEFTRIAXONE 1000 MG: 1 INJECTION, POWDER, FOR SOLUTION INTRAMUSCULAR; INTRAVENOUS at 15:08

## 2021-06-06 ASSESSMENT — PAIN SCALES - GENERAL: PAINLEVEL_OUTOF10: 0

## 2021-06-06 NOTE — PROGRESS NOTES
[facility]  Nephrology   Progress Note        We are following the patient for Hyponatremia  63 y/o WF with h/o HTN actove smoker presented to ER with SOB  She developed SOB on Thursday morning that woke her up from sleep Has had \"congestion\" for few days Due to persistent SOB she was seen in urgent care and sent to ER  Noted to have Na+ 117 meq on admission  Denies excessive fluid intake Has had LE swelling  Denies HA dizziness, recent falls or difficulty concentrating  Had been on Lisinopril HCTZ for many years Takes Ibuprofen as needed  SUBJECTIVE:  Feels better   Breathing improved   On RA now Wt not checked today Na+ up to 123 meq today  OBJECTIVE:     PHYSICAL:    TEMPERATURE:  Current - Temp: 97.8 °F (36.6 °C);  Max - Temp  Av °F (36.7 °C)  Min: 97.8 °F (36.6 °C)  Max: 98.1 °F (36.7 °C)  RESPIRATIONS RANGE: Resp  Av.5  Min: 17  Max: 18  PULSE RANGE: Pulse  Av.8  Min: 89  Max: 92  BLOOD PRESSURE RANGE:  Systolic (82IFO), TGW:287 , Min:97 , QGV:767   ; Diastolic (25ISW), YKU:37, Min:66, Max:72    PULSE OXIMETRY RANGE: SpO2  Av.2 %  Min: 94 %  Max: 95 %  24HR INTAKE/OUTPUT:      Intake/Output Summary (Last 24 hours) at 2021 1359  Last data filed at 2021 0845  Gross per 24 hour   Intake 970 ml   Output 1100 ml   Net -130 ml       CONSTITUTIONAL:  awake, alert, cooperative, no apparent distress, and appears stated age  HEENT:  Lids and lashes normal, pupils equal, round and reactive to light  NECK:  Supple, symmetrical, trachea midline, no adenopathy,  LUNGS:  Crackles at bases bilaterally   CARDIOVASCULAR:  Normal apical impulse, regular rate and rhythm, normal S1 and S2  ABDOMEN:  No scars, normal bowel sounds, soft, non-distended, non-tender, no masses palpated, no hepatosplenomegally  NEUROLOGIC:  alert, oriented, normal speech, no focal findings or movement disorder noted  SKIN: no bruising or bleeding  EXTREMITIES: trace to 1+ bilateral pedal edema    Medications     sodium chloride          Data      CBC:   Recent Labs     06/04/21  1041 06/05/21  0348 06/06/21  0325   WBC 16.2* 9.3 10.3   RBC 3.23* 2.68* 2.66*   HGB 11.2* 9.3* 9.3*   HCT 32.4* 27.0* 26.8*    232 273     BMP:    Recent Labs     06/04/21  1745 06/05/21  0348 06/05/21  2135 06/06/21  0325 06/06/21  0937   * 122* 123* 124*  124* 123*   K 3.9 3.6  --  3.4*  --    CL 82* 87*  --  87*  --    CO2 21 20*  --  23  --    BUN 10 12  --  12  --    CREATININE 0.6 0.6  --  0.6  --    CALCIUM 7.7* 7.5*  --  7.8*  --    GLUCOSE 144* 84  --  78  --      Phosphorus:    Recent Labs     06/05/21 0348 06/06/21  0325   PHOS 3.9 2.9     Magnesium:  No results for input(s): MG in the last 72 hours.       ASSESSMENT     Patient Active Problem List   Diagnosis    Neuroma of foot    HTN (hypertension)    Hyperlipidemia    Environmental allergies    Sleep apnea    GERD (gastroesophageal reflux disease)    Anxiety    Current smoker    CHF (congestive heart failure), NYHA class I, acute on chronic, combined (HCC)       PLAN    1-Hyponatremia appears Hypervolemic with evidence of CHF, elevated BNP Also has been on HCTZ Urine Na+ 85 Urine osm 253 consistent with Hypervolemic Hyponatremia Na+ improving with diuresis TSH normal Uric Acid 7.9 mg Continue diuresis and monitor Stressed FR   2-CHF Still appears to be in CHF ECHO noted EF 40 % Increase Lasix to 80 mg q12 HOURS [facility] contemplated  3-Active smoker  Mana Sim MD, [address]

## 2021-06-06 NOTE — PROGRESS NOTES
Physician Progress Note      Carmine Estevez  CSN #:                  [id number]  :                       1960  ADMIT DATE:       2021 9:58 AM  DISCH DATE:  RESPONDING  PROVIDER #:        Janet Olivas MD          QUERY TEXT:    Patient admitted with Acute on chronic HF. Documentation reflects Sepsis and   Pneumonia in ED note(s) dated 21 . If possible, please document in the   progress notes and discharge summary if Sepsis and Pneumonia was: The medical record reflects the following:  Risk Factors: Smoker COPD  Clinical Indicators: CT \"Bilateral pleural effusions, with adjacent   consolidation, at the lung bases either due to atelectasis or pneumonia. ? Scattered opacities are seen throughout the right lung. Elbert Couch ? There is a   background of pulmonary emphysema. Subtle septal thickening at the right lung base, suggest a component of fluid   overload-pulmonary edema\", per ED Provider \"Sepsis, Pneumonia\",  , RR    24  --  46,  88% RA  93% 2L NC,  procalcitonin 8.42 ,WBC 16.4  Treatment: IV Rocephin and IV Zithromax  Options provided:  -- Sepsis and Pneumonia POA confirmed after study  -- Sepsis and Pneumonia POA treated and resolved  -- Sepsis and Pneumonia ruled out after study  -- Other - I will add my own diagnosis  -- Disagree - Not applicable / Not valid  -- Disagree - Clinically unable to determine / Unknown  -- Refer to Clinical Documentation Reviewer    PROVIDER RESPONSE TEXT:    Sepsis and Pneumonia POA confirmed after study. Query created by: Kiet Amaya on 2021 12:46 PM      QUERY TEXT:    Pt admitted with Acute on chronic HF. Pt noted to have elevated RR, HR and   low pulse oxygen level. If possible, please document in the progress notes and   discharge summary if you are evaluating and/or treating any of the following:     The medical record reflects the following:  Risk Factors: COPD Smoker Acute on chronic HF  Clinical Indicators:  RR 24--46, , room air pulse oxygen level 88% , 93%   on 2L NC  Treatment: Oxygen therapy, HHN, IV Lasix  Options provided:  -- Acute respiratory failure with hypoxia  -- Acute respiratory failure with hypercapnia  -- Other - I will add my own diagnosis  -- Disagree - Not applicable / Not valid  -- Disagree - Clinically unable to determine / Unknown  -- Refer to Clinical Documentation Reviewer    PROVIDER RESPONSE TEXT:    This patient is in acute respiratory failure with hypoxia.     Query created by: Erika Payan on 6/5/2021 12:49 PM      Electronically signed by:  Angella Winston MD 6/6/2021 12:00 PM

## 2021-06-06 NOTE — PLAN OF CARE
Problem: Falls - Risk of:  Goal: Will remain free from falls  Description: Will remain free from falls  6/6/2021 0012 by Elise Garvey RN  Outcome: Ongoing  6/5/2021 1939 by Tigist Richardson RN  Outcome: Ongoing     Problem: OXYGENATION/RESPIRATORY FUNCTION  Goal: Patient will maintain patent airway  6/6/2021 0012 by Elise Garvey RN  Outcome: Ongoing  6/5/2021 1939 by Tigist Richardson RN  Outcome: Ongoing     Problem: HEMODYNAMIC STATUS  Goal: Patient has stable vital signs and fluid balance  6/6/2021 0012 by Elise Garvey RN  Outcome: Ongoing  6/5/2021 1939 by Tigist Richardson RN  Outcome: Ongoing     Problem: FLUID AND ELECTROLYTE IMBALANCE  Goal: Fluid and electrolyte balance are achieved/maintained  6/6/2021 0012 by Elise Garvey RN  Outcome: Ongoing  6/5/2021 1939 by Tigist Richardson RN  Outcome: Ongoing

## 2021-06-06 NOTE — PROGRESS NOTES
[address]   Daily Progress Note      Admit Date:  6/4/2021      Subjective:   Ms. Yoan Perez is a 64yo female with a past medical history significant for essential hypertension, GERD and hyperlipidemia who presented to [facility] with shortness of breath.      Renetta Skelton states that she had congestion earlier in the week. She woke up two days ago and was short of breath. Her fitness watch told her that her heart rate was elevated. She went to an Urgent Care and was sent to the ED.      She denies any chest pain or pressure She has had swelling in her legs \"for a while\". 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 Interval History:  Renetta Skelton reports that her breathing is better today. She denies any chets pain or tightness. No events overnight. No arrhythmias.       Objective:     /67   Pulse 92   Temp 97.8 °F (36.6 °C) (Oral)   Resp 18   Ht 5' 2\" (1.575 m)   Wt 131 lb 2.8 oz (59.5 kg)   SpO2 94%   BMI 23.99 kg/m²      Intake/Output Summary (Last 24 hours) at 6/6/2021 0926  Last data filed at 6/6/2021 0845  Gross per 24 hour   Intake 970 ml   Output 1100 ml   Net -130 ml       Physical Exam:  General:  Awake, alert, NAD  Skin:  Warm and dry  Neck:  JVD<8, no carotid bruits  Chest:  Clear to auscultation, no wheezes/rhonchi/rales  Cardiovascular:  RRR, normal S1/S2, no M/R/G  Abdomen:  Soft, nontender, +bowel sounds  Extremities:  Trace bilateral LE edema  Pulses: 2+ bilat carotid    2+ bilat radial    2+ bilat femoral        Medications:    nicotine  1 patch Transdermal Daily    furosemide  60 mg Intravenous BID    potassium chloride  40 mEq Oral Daily with breakfast    doxycycline (VIBRAMYCIN) IV  100 mg Intravenous Q12H    cefTRIAXone (ROCEPHIN) IV  1,000 mg Intravenous Q24H    allopurinol  300 mg Oral Daily    aspirin  81 mg Oral Daily    pantoprazole  40 mg Oral QAM AC    fluticasone  2 spray Each Nostril Daily    sodium chloride flush  5-40 mL Gem Mcgovern MD

## 2021-06-06 NOTE — PROGRESS NOTES
Hospitalist Progress Note      PCP: Bonnie Nagel MD    Date of Admission: 6/4/2021    Chief Complaint: SOB. Leg edema. Subjective:     SOB and leg edema much better. Bringing up fairly clear sputum. No chest pain. Medications:  Reviewed    Infusion Medications    sodium chloride       Scheduled Medications    nicotine  1 patch Transdermal Daily    potassium bicarb-citric acid  40 mEq Oral Daily    spironolactone  25 mg Oral Daily    sodium chloride flush  5-40 mL Intravenous 2 times per day    furosemide  80 mg Intravenous BID    doxycycline (VIBRAMYCIN) IV  100 mg Intravenous Q12H    cefTRIAXone (ROCEPHIN) IV  1,000 mg Intravenous Q24H    allopurinol  300 mg Oral Daily    aspirin  81 mg Oral Daily    pantoprazole  40 mg Oral QAM AC    fluticasone  2 spray Each Nostril Daily     PRN Meds: sodium chloride flush, sodium chloride, ALPRAZolam, ibuprofen, ondansetron **OR** ondansetron, polyethylene glycol, acetaminophen **OR** acetaminophen      Intake/Output Summary (Last 24 hours) at 6/6/2021 1411  Last data filed at 6/6/2021 0845  Gross per 24 hour   Intake 970 ml   Output 1100 ml   Net -130 ml       Physical Exam Performed:    /67   Pulse 92   Temp 97.8 °F (36.6 °C) (Oral)   Resp 18   Ht 5' 2\" (1.575 m)   Wt 131 lb 2.8 oz (59.5 kg)   SpO2 95%   BMI 23.99 kg/m²       General appearance: No apparent distress appears stated age and cooperative. HEENT Normal cephalic, atraumatic without obvious deformity. Pupils equal, round, and reactive to light. Extra ocular muscles intact. Conjunctivae/corneas clear. Neck: Supple, No jugular venous distention/bruits. Trachea midline without thyromegaly or adenopathy with full range of motion. Lungs: diminished breath sounds at bases with rales. No wheezing or rhonchi.    Heart: Regular rate and rhythm with Normal S1/S2 without murmurs, rubs or gallops, point of maximum impulse non-displaced  Abdomen: Soft, non-tender or non-distended without rigidity or guarding and positive bowel sounds all four quadrants. Extremities: 2+ pitting edema - Left worse then right. Neurologic: Alert and oriented X 3, neurovascularly intact with sensory/motor intact upper extremities/lower extremities, bilaterally. Cranial nerves: II-XII intact, grossly non-focal.  Mental status: Alert, oriented, thought content appropriate. Capillary Refill: Acceptable  < 3 seconds  Peripheral Pulses: +3 Easily felt, not easily obliterated with pressure     Labs:   Recent Labs     06/04/21  1041 06/05/21  0348 06/06/21  0325   WBC 16.2* 9.3 10.3   HGB 11.2* 9.3* 9.3*   HCT 32.4* 27.0* 26.8*    232 273     Recent Labs     06/04/21  1745 06/05/21  0348 06/05/21  2135 06/06/21  0325 06/06/21  0937   * 122* 123* 124*  124* 123*   K 3.9 3.6  --  3.4*  --    CL 82* 87*  --  87*  --    CO2 21 20*  --  23  --    BUN 10 12  --  12  --    CREATININE 0.6 0.6  --  0.6  --    CALCIUM 7.7* 7.5*  --  7.8*  --    PHOS  --  3.9  --  2.9  --      Recent Labs     06/05/21 0348   AST 46*   ALT 29   BILIDIR 0.7*   BILITOT 1.1*   ALKPHOS 104     No results for input(s): INR in the last 72 hours. Recent Labs     06/04/21  1041 06/04/21  1628 06/04/21  2212   TROPONINI 0.02* 0.02* 0.02*       Urinalysis:      Lab Results   Component Value Date    NITRU Negative 06/04/2021    WBCUA 1 06/04/2021    RBCUA 1 06/04/2021    BLOODU Negative 06/04/2021    SPECGRAV 1.012 06/04/2021    GLUCOSEU Negative 06/04/2021       Radiology:  CT CHEST PULMONARY EMBOLISM W CONTRAST   Final Result   No central pulmonary embolus identified      Bilateral pleural effusions, with adjacent consolidation, at the lung bases   either due to atelectasis or pneumonia. Scattered opacities are seen   throughout the right lung. .  There is a background of pulmonary emphysema.    Subtle septal thickening at the right lung base, suggest a component of fluid   overload-pulmonary edema      Moderate to severe coronary artery calcification. XR CHEST (2 VW)   Final Result   Small right pleural effusion. Assessment/Plan:    Active Hospital Problems    Diagnosis     CHF (congestive heart failure), NYHA class I, acute on chronic, combined (HCC) [I50.43]          Acute systolic CHF - EF 35%    No prior Hx.   ECHO with anterolateral hypokinesis. Clinical exam, labs and imaging concerning for fluid overload, acute pulm edema, pleural effusions. Plan:               - Lasix IV BID to continue. - I would hold off aldactone till hyponatremia improved and stable. - ASA               - eliquis ppx - she is allergic to pig-derived products, so no heparin               - serial trop flat elevation - active ACS less likely. - cardiology eval - defer further ischemia work up to cardiology team.                - tele monitor.    - will need CHF nurse eval Monday. - [facility] planned Monday.         Hyponatremia - 117 on presentation. Hypervolemic on exam.   Lasix IV. Stopped HCTZ. TSH normal  Osm plasma low  Urine Osm 253  Urine Na 85.   - these suggest possibly SIADH, though may need repeat testing once off HCTZ. Na level q6. Nephrology involved.         Acute Pulm Edema as above.         Bilateral Pleural Effusions - suspect due to above. Pending response to diuresis, may need thoracentesis, though less likely.            Probable CAP   Empiric on Zithro and Rocephin - off Zithro to Doxy with prolonged QT. Procal 8.4 - down trending with Abx    Check Strep Pneumo. MRSA probe negative.            DVT Prophylaxis: eliquis, she is allergic to pig derived products (incl heparin). Diet: Diet cardiac. Code Status: full        Dispo - inpatient.  Dorene Andino MD

## 2021-06-07 ENCOUNTER — APPOINTMENT (OUTPATIENT)
Dept: CARDIAC CATH/INVASIVE PROCEDURES | Age: 61
DRG: 871 | End: 2021-06-07
Payer: COMMERCIAL

## 2021-06-07 ENCOUNTER — APPOINTMENT (OUTPATIENT)
Dept: GENERAL RADIOLOGY | Age: 61
DRG: 871 | End: 2021-06-07
Payer: COMMERCIAL

## 2021-06-07 LAB
ALBUMIN SERPL-MCNC: 2.9 G/DL (ref 3.4–5)
ANION GAP SERPL CALCULATED.3IONS-SCNC: 10 MMOL/L (ref 3–16)
BASOPHILS ABSOLUTE: 0 K/UL (ref 0–0.2)
BASOPHILS RELATIVE PERCENT: 0.4 %
BUN BLDV-MCNC: 8 MG/DL (ref 7–20)
CALCIUM SERPL-MCNC: 8 MG/DL (ref 8.3–10.6)
CHLORIDE BLD-SCNC: 89 MMOL/L (ref 99–110)
CO2: 27 MMOL/L (ref 21–32)
CREAT SERPL-MCNC: 0.7 MG/DL (ref 0.6–1.1)
EOSINOPHILS ABSOLUTE: 0 K/UL (ref 0–0.6)
EOSINOPHILS RELATIVE PERCENT: 0.3 %
GFR AFRICAN AMERICAN: >60
GFR NON-AFRICAN AMERICAN: >60
GLUCOSE BLD-MCNC: 86 MG/DL (ref 70–99)
HCT VFR BLD CALC: 28 % (ref 36–48)
HEMOGLOBIN: 9.7 G/DL (ref 12–16)
LYMPHOCYTES ABSOLUTE: 2 K/UL (ref 1–5.1)
LYMPHOCYTES RELATIVE PERCENT: 25 %
MCH RBC QN AUTO: 34.7 PG (ref 26–34)
MCHC RBC AUTO-ENTMCNC: 34.6 G/DL (ref 31–36)
MCV RBC AUTO: 100.3 FL (ref 80–100)
MONOCYTES ABSOLUTE: 1.3 K/UL (ref 0–1.3)
MONOCYTES RELATIVE PERCENT: 16.2 %
NEUTROPHILS ABSOLUTE: 4.6 K/UL (ref 1.7–7.7)
NEUTROPHILS RELATIVE PERCENT: 58.1 %
PDW BLD-RTO: 13.9 % (ref 12.4–15.4)
PHOSPHORUS: 3.2 MG/DL (ref 2.5–4.9)
PLATELET # BLD: 275 K/UL (ref 135–450)
PMV BLD AUTO: 7.2 FL (ref 5–10.5)
POTASSIUM SERPL-SCNC: 3.7 MMOL/L (ref 3.5–5.1)
RBC # BLD: 2.79 M/UL (ref 4–5.2)
SODIUM BLD-SCNC: 126 MMOL/L (ref 136–145)
SODIUM BLD-SCNC: 127 MMOL/L (ref 136–145)
SODIUM BLD-SCNC: 129 MMOL/L (ref 136–145)
SODIUM BLD-SCNC: 129 MMOL/L (ref 136–145)
SODIUM BLD-SCNC: 131 MMOL/L (ref 136–145)
WBC # BLD: 7.9 K/UL (ref 4–11)

## 2021-06-07 PROCEDURE — 80069 RENAL FUNCTION PANEL: CPT

## 2021-06-07 PROCEDURE — 94761 N-INVAS EAR/PLS OXIMETRY MLT: CPT

## 2021-06-07 PROCEDURE — 6360000002 HC RX W HCPCS

## 2021-06-07 PROCEDURE — 2709999900 HC NON-CHARGEABLE SUPPLY

## 2021-06-07 PROCEDURE — C1769 GUIDE WIRE: HCPCS

## 2021-06-07 PROCEDURE — B2111ZZ FLUOROSCOPY OF MULTIPLE CORONARY ARTERIES USING LOW OSMOLAR CONTRAST: ICD-10-PCS | Performed by: INTERNAL MEDICINE

## 2021-06-07 PROCEDURE — 2580000003 HC RX 258

## 2021-06-07 PROCEDURE — 84295 ASSAY OF SERUM SODIUM: CPT

## 2021-06-07 PROCEDURE — 2500000003 HC RX 250 WO HCPCS

## 2021-06-07 PROCEDURE — 99153 MOD SED SAME PHYS/QHP EA: CPT

## 2021-06-07 PROCEDURE — 6360000002 HC RX W HCPCS: Performed by: INTERNAL MEDICINE

## 2021-06-07 PROCEDURE — 99152 MOD SED SAME PHYS/QHP 5/>YRS: CPT | Performed by: INTERNAL MEDICINE

## 2021-06-07 PROCEDURE — 6370000000 HC RX 637 (ALT 250 FOR IP): Performed by: STUDENT IN AN ORGANIZED HEALTH CARE EDUCATION/TRAINING PROGRAM

## 2021-06-07 PROCEDURE — 6370000000 HC RX 637 (ALT 250 FOR IP): Performed by: INTERNAL MEDICINE

## 2021-06-07 PROCEDURE — 2580000003 HC RX 258: Performed by: INTERNAL MEDICINE

## 2021-06-07 PROCEDURE — 93460 R&L HRT ART/VENTRICLE ANGIO: CPT

## 2021-06-07 PROCEDURE — 93460 R&L HRT ART/VENTRICLE ANGIO: CPT | Performed by: INTERNAL MEDICINE

## 2021-06-07 PROCEDURE — 99152 MOD SED SAME PHYS/QHP 5/>YRS: CPT

## 2021-06-07 PROCEDURE — 36415 COLL VENOUS BLD VENIPUNCTURE: CPT

## 2021-06-07 PROCEDURE — 71046 X-RAY EXAM CHEST 2 VIEWS: CPT

## 2021-06-07 PROCEDURE — 6360000004 HC RX CONTRAST MEDICATION: Performed by: INTERNAL MEDICINE

## 2021-06-07 PROCEDURE — B2151ZZ FLUOROSCOPY OF LEFT HEART USING LOW OSMOLAR CONTRAST: ICD-10-PCS | Performed by: INTERNAL MEDICINE

## 2021-06-07 PROCEDURE — C1894 INTRO/SHEATH, NON-LASER: HCPCS

## 2021-06-07 PROCEDURE — C1751 CATH, INF, PER/CENT/MIDLINE: HCPCS

## 2021-06-07 PROCEDURE — 2500000003 HC RX 250 WO HCPCS: Performed by: INTERNAL MEDICINE

## 2021-06-07 PROCEDURE — 2060000000 HC ICU INTERMEDIATE R&B

## 2021-06-07 PROCEDURE — 85025 COMPLETE CBC W/AUTO DIFF WBC: CPT

## 2021-06-07 PROCEDURE — 4A023N7 MEASUREMENT OF CARDIAC SAMPLING AND PRESSURE, LEFT HEART, PERCUTANEOUS APPROACH: ICD-10-PCS | Performed by: INTERNAL MEDICINE

## 2021-06-07 RX ORDER — SODIUM CHLORIDE 0.9 % (FLUSH) 0.9 %
5-40 SYRINGE (ML) INJECTION EVERY 12 HOURS SCHEDULED
Status: DISCONTINUED | OUTPATIENT
Start: 2021-06-07 | End: 2021-06-08 | Stop reason: HOSPADM

## 2021-06-07 RX ORDER — FERROUS SULFATE TAB EC 324 MG (65 MG FE EQUIVALENT) 324 (65 FE) MG
324 TABLET DELAYED RESPONSE ORAL 2 TIMES DAILY WITH MEALS
Status: DISCONTINUED | OUTPATIENT
Start: 2021-06-08 | End: 2021-06-08 | Stop reason: HOSPADM

## 2021-06-07 RX ORDER — DOXYCYCLINE HYCLATE 100 MG
100 TABLET ORAL EVERY 12 HOURS SCHEDULED
Status: DISCONTINUED | OUTPATIENT
Start: 2021-06-07 | End: 2021-06-08 | Stop reason: HOSPADM

## 2021-06-07 RX ORDER — SODIUM CHLORIDE 9 MG/ML
25 INJECTION, SOLUTION INTRAVENOUS PRN
Status: DISCONTINUED | OUTPATIENT
Start: 2021-06-07 | End: 2021-06-08 | Stop reason: HOSPADM

## 2021-06-07 RX ORDER — ACETAMINOPHEN 325 MG/1
650 TABLET ORAL EVERY 4 HOURS PRN
Status: DISCONTINUED | OUTPATIENT
Start: 2021-06-07 | End: 2021-06-08 | Stop reason: HOSPADM

## 2021-06-07 RX ORDER — SODIUM CHLORIDE 0.9 % (FLUSH) 0.9 %
5-40 SYRINGE (ML) INJECTION PRN
Status: DISCONTINUED | OUTPATIENT
Start: 2021-06-07 | End: 2021-06-08 | Stop reason: HOSPADM

## 2021-06-07 RX ADMIN — DOXYCYCLINE HYCLATE 100 MG: 100 TABLET, COATED ORAL at 20:10

## 2021-06-07 RX ADMIN — ASPIRIN 81 MG: 81 TABLET, COATED ORAL at 08:30

## 2021-06-07 RX ADMIN — Medication 10 ML: at 08:34

## 2021-06-07 RX ADMIN — IOPAMIDOL 60 ML: 755 INJECTION, SOLUTION INTRAVENOUS at 10:52

## 2021-06-07 RX ADMIN — Medication 10 ML: at 20:11

## 2021-06-07 RX ADMIN — CEFTRIAXONE 1000 MG: 1 INJECTION, POWDER, FOR SOLUTION INTRAMUSCULAR; INTRAVENOUS at 14:42

## 2021-06-07 RX ADMIN — FUROSEMIDE 80 MG: 10 INJECTION, SOLUTION INTRAMUSCULAR; INTRAVENOUS at 17:31

## 2021-06-07 RX ADMIN — DOXYCYCLINE 100 MG: 100 INJECTION, POWDER, LYOPHILIZED, FOR SOLUTION INTRAVENOUS at 02:01

## 2021-06-07 RX ADMIN — ALLOPURINOL 300 MG: 300 TABLET ORAL at 08:30

## 2021-06-07 ASSESSMENT — PAIN SCALES - GENERAL
PAINLEVEL_OUTOF10: 0

## 2021-06-07 NOTE — PLAN OF CARE
Problem: Falls - Risk of:  Goal: Will remain free from falls  Description: Will remain free from falls  6/7/2021 1008 by Pebbles Barlow RN  Outcome: Ongoing     Problem: OXYGENATION/RESPIRATORY FUNCTION  Goal: Patient will maintain patent airway  6/7/2021 1008 by Pebbles Barlow RN  Outcome: Ongoing     Problem: HEMODYNAMIC STATUS  Goal: Patient has stable vital signs and fluid balance  6/7/2021 1008 by Pebbles Barlow RN  Outcome: Ongoing     Problem: FLUID AND ELECTROLYTE IMBALANCE  Goal: Fluid and electrolyte balance are achieved/maintained  6/7/2021 1008 by Pebbles Barlow RN  Outcome: Ongoing     Problem: ACTIVITY INTOLERANCE/IMPAIRED MOBILITY  Goal: Mobility/activity is maintained at optimum level for patient  6/7/2021 1008 by Pebbles Barlow RN  Outcome: Ongoing     Problem: Nutrition  Goal: Optimal nutrition therapy  6/7/2021 1008 by Pebbles Barlow RN  Outcome: Ongoing

## 2021-06-07 NOTE — PROGRESS NOTES
3.4*  --   --  3.7   CL 87* 87*  --   --  89*   CO2 20* 23  --   --  27   BUN 12 12  --   --  8   CREATININE 0.6 0.6  --   --  0.7   CALCIUM 7.5* 7.8*  --   --  8.0*   GLUCOSE 84 78  --   --  86     Phosphorus:    Recent Labs     06/05/21  0348 06/06/21  0325 06/07/21  0342   PHOS 3.9 2.9 3.2     Magnesium:  No results for input(s): MG in the last 72 hours.       ASSESSMENT/PLAN    1-Hyponatremia appears Hypervolemic with evidence of CHF, elevated BNP Also has been on HCTZ Urine Na+ 85 Urine osm 253 consistent with Hypervolemic Hyponatremia Na+ improving with diuresis TSH normal Uric Acid 7.9 mg    - Continue diuresis and monitor   - FR 1.5 liters/day    2-CHF Still appears to be in CHF ECHO noted EF 40 % Increase Lasix to 80 mg q12 HOURS    - s/p [facility] 06/07/21    3-Active smoker   - needs to quit    4-anemia   - iron studies slightly low, so start FeSO4

## 2021-06-07 NOTE — PROGRESS NOTES
Post cath assessment delayed  Due to patient down for x-ray.  Electronically signed by Alvarado Richardson RN on 6/7/2021 at 11:46 AM

## 2021-06-07 NOTE — PLAN OF CARE
Problem: Falls - Risk of:  Goal: Will remain free from falls  Description: Will remain free from falls  Outcome: Ongoing     Problem: OXYGENATION/RESPIRATORY FUNCTION  Goal: Patient will maintain patent airway  Outcome: Ongoing     Problem: HEMODYNAMIC STATUS  Goal: Patient has stable vital signs and fluid balance  Outcome: Ongoing     Problem: FLUID AND ELECTROLYTE IMBALANCE  Goal: Fluid and electrolyte balance are achieved/maintained  Outcome: Ongoing

## 2021-06-07 NOTE — PROCEDURES
saturation off the right radial sheath port. Cardiac outputs by thermodilution were performed. We then removed the pulmonary artery catheter. Next, through the right  radial arterial sheath, we advanced the JL3.5, 5-Welsh diagnostic  catheter to the ostium. Angiography was performed to the left system. The catheter was removed over the wire. Next, the JR5, 5-Welsh  diagnostic catheter was advanced to the ostium of the right coronary  artery. Coronary angiography was performed to this system. The  catheter was removed over the wire. Lastly, the pigtail catheter was  advanced over the wire into the left ventricle. Left ventricular  end-diastolic pressure measurements were obtained and then a power  injection left ventriculogram was performed. Catheter was flushed and  placed back on pressure and then pulled back across the aortic valve to  assess for gradient. The catheter was removed over the wire. After review of the films, 250 mcg of nitroglycerin was given through  the right radial arterial sheath. This was removed and a nonocclusive  band applied for hemostasis. Next, the pulmonary artery and the right  brachiocephalic venous sheath were removed and manual pressure applied  for hemostasis here. There were no complications from the procedure and  estimated blood loss was less than 50 mL. The patient did receive moderate sedation for the procedure. She had an  ASA grade of III and a Mallampati score of II. She received a total of  1 mg of IV Versed and 50 mcg of fentanyl. Total duration of sedation  was 43 minutes. Vital signs were monitored throughout the sedation  period and remained stable. There were no complications from sedation. Sedation was administered by an independent agent under my direction and  supervision and I was present the entire time.     FINDINGS:  1.  Evidence of persistent right-sided volume overload with a right  atrial pressure that was low at 5 mmHg but a pulmonary capillary wedge  pressure elevated at 20 mmHg. 2.  Evidence of pulmonary hypertension with an instantaneous pressure of  38/17 for a mean pulmonary arterial pressure of 28.  3.  Mildly reduced cardiac output by thermodilution at 4.53 liters per  minute with a cardiac index of 2.89 liters per kilogram per minute. 4.  Pulmonary vascular resistance of 139 dynes per second. 5.  Normal mixed venous oxygen saturations at 66% in the right atrium  and 64% in the pulmonary artery. 6.  Left ventricular end-diastolic pressure of 23 mmHg. 7.  Right-dominant coronary arterial system with no obstructive coronary  lesions. The left main is free of disease. The circumflex is smaller. OM1 has 50% stenosis. The left anterior descending artery is calcified  in the proximal and midportion with a 50% stenosis at the first septal   and the first diagonal branch but no flow limitation. Further, there is 50% stenosis in the midportion. The right coronary  artery is large with 50% mid RCA stenosis. 8.  Mildly reduced LV systolic function. LV ejection fraction of 40%  and mild apical hypokinesis in a pattern likely consistent with a  stress-induced cardiomyopathy. 9.  No gradient across the aortic valve on pullback to suggest aortic  stenosis. Ailin Parkinson MD    D: 06/07/2021 11:08:20       T: 06/07/2021 11:12:05     [id number]  Job#: [id number]     Doc#: [id number]    CC:   Van Son MD

## 2021-06-07 NOTE — PROGRESS NOTES
Hospitalist Progress Note      PCP: Grisel Noland MD    Date of Admission: 6/4/2021    Chief Complaint: SOB. Leg edema. Subjective:     SOB and leg edema much better. Bringing up fairly clear sputum. No chest pain. Medications:  Reviewed    Infusion Medications    sodium chloride       Scheduled Medications    sodium chloride flush  5-40 mL Intravenous 2 times per day    [START ON 6/8/2021] ferrous sulfate  324 mg Oral BID WC    doxycycline hyclate  100 mg Oral 2 times per day    nicotine  1 patch Transdermal Daily    potassium bicarb-citric acid  40 mEq Oral Daily    furosemide  80 mg Intravenous BID    cefTRIAXone (ROCEPHIN) IV  1,000 mg Intravenous Q24H    allopurinol  300 mg Oral Daily    aspirin  81 mg Oral Daily    pantoprazole  40 mg Oral QAM AC    fluticasone  2 spray Each Nostril Daily     PRN Meds: sodium chloride flush, sodium chloride, acetaminophen, ALPRAZolam, ibuprofen, ondansetron **OR** ondansetron, polyethylene glycol, [DISCONTINUED] acetaminophen **OR** acetaminophen      Intake/Output Summary (Last 24 hours) at 6/7/2021 1415  Last data filed at 6/7/2021 1403  Gross per 24 hour   Intake 360 ml   Output 1200 ml   Net -840 ml       Physical Exam Performed:    /78   Pulse 82   Temp 97.8 °F (36.6 °C) (Oral)   Resp 16   Ht 5' 2\" (1.575 m)   Wt 125 lb 7.1 oz (56.9 kg)   SpO2 98%   BMI 22.94 kg/m²       General appearance: No apparent distress appears stated age and cooperative. HEENT Normal cephalic, atraumatic without obvious deformity. Pupils equal, round, and reactive to light. Extra ocular muscles intact. Conjunctivae/corneas clear. Neck: Supple, No jugular venous distention/bruits. Trachea midline without thyromegaly or adenopathy with full range of motion. Lungs: diminished breath sounds at bases with rales. No wheezing or rhonchi.    Heart: Regular rate and rhythm with Normal S1/S2 without murmurs, rubs or gallops, point of maximum impulse non-displaced  Abdomen: Soft, non-tender or non-distended without rigidity or guarding and positive bowel sounds all four quadrants. Extremities: 2+ pitting edema - Left worse then right. Neurologic: Alert and oriented X 3, neurovascularly intact with sensory/motor intact upper extremities/lower extremities, bilaterally. Cranial nerves: II-XII intact, grossly non-focal.  Mental status: Alert, oriented, thought content appropriate. Capillary Refill: Acceptable  < 3 seconds  Peripheral Pulses: +3 Easily felt, not easily obliterated with pressure     Labs:   Recent Labs     06/05/21 0348 06/06/21 0325 06/07/21  0342   WBC 9.3 10.3 7.9   HGB 9.3* 9.3* 9.7*   HCT 27.0* 26.8* 28.0*    273 275     Recent Labs     06/05/21  0348 06/06/21 0325 06/06/21  2154 06/07/21 0342 06/07/21  1147   * 124*  124* 125* 126*  127* 129*   K 3.6 3.4*  --  3.7  --    CL 87* 87*  --  89*  --    CO2 20* 23  --  27  --    BUN 12 12  --  8  --    CREATININE 0.6 0.6  --  0.7  --    CALCIUM 7.5* 7.8*  --  8.0*  --    PHOS 3.9 2.9  --  3.2  --      Recent Labs     06/05/21 0348   AST 46*   ALT 29   BILIDIR 0.7*   BILITOT 1.1*   ALKPHOS 104     No results for input(s): INR in the last 72 hours. Recent Labs     06/04/21  1628 06/04/21  2212   TROPONINI 0.02* 0.02*       Urinalysis:      Lab Results   Component Value Date    NITRU Negative 06/04/2021    WBCUA 1 06/04/2021    RBCUA 1 06/04/2021    BLOODU Negative 06/04/2021    SPECGRAV 1.012 06/04/2021    GLUCOSEU Negative 06/04/2021       Radiology:  XR CHEST (2 VW)   Final Result   Decreased right pleural effusion, with a small residual pleural effusion. Decreased pulmonary edema. Minimal trace left pleural effusion         CT CHEST PULMONARY EMBOLISM W CONTRAST   Final Result   No central pulmonary embolus identified      Bilateral pleural effusions, with adjacent consolidation, at the lung bases   either due to atelectasis or pneumonia.   Scattered opacities are seen   throughout the right lung. .  There is a background of pulmonary emphysema. Subtle septal thickening at the right lung base, suggest a component of fluid   overload-pulmonary edema      Moderate to severe coronary artery calcification. XR CHEST (2 VW)   Final Result   Small right pleural effusion. Assessment/Plan:    Active Hospital Problems    Diagnosis     Acute systolic CHF (congestive heart failure), NYHA class 3 ([facility]) [I50.21]     Anemia [D64.9]     Hyponatremia [E87.1]     Abnormal ECG [R94.31]     CHF (congestive heart failure), NYHA class I, acute on chronic, combined ([facility]) [I50.43]          Acute systolic CHF - EF 61%    No prior Hx.   ECHO with anterolateral hypokinesis. Clinical exam, labs and imaging concerning for fluid overload, acute pulm edema, pleural effusions. Plan:               - Lasix IV BID to continue. - I would hold off aldactone till hyponatremia improved and stable. - ASA               - eliquis ppx - she is allergic to pig-derived products, so no heparin               - serial trop flat elevation - active ACS less likely. - cardiology eval - defer further ischemia work up to cardiology team.                - tele monitor.    - will need CHF nurse eval Monday. - [facility] today. - will repeat CXR today to reassess status of pleural effusions.         Hyponatremia - 117 on presentation. Hypervolemic on exam.   Lasix IV. Stopped HCTZ. TSH normal  Osm plasma low  Urine Osm 253  Urine Na 85.   - these suggest possibly SIADH, though may need repeat testing once off HCTZ. Na level q6. Nephrology involved.           Acute Pulm Edema as above.           Bilateral Pleural Effusions - suspect due to above. Pending response to diuresis, may need thoracentesis, though less likely.            Probable CAP   Empiric on Zithro and Rocephin - off Zithro to Doxy with prolonged QT.    Procal 8.4 - down trending with Abx    Check Strep Pneumo. MRSA probe negative.            DVT Prophylaxis: eliquis, she is allergic to pig derived products (incl heparin). Diet: Diet cardiac. Code Status: full        Dispo - inpatient.  [facility] today.          Noble Bang MD

## 2021-06-07 NOTE — ANESTHESIA PRE-OP
Left wrist    FOOT SURGERY  3/2013    EXCISION NEUROMA 3RD NERVE LEFT FOOT    JOINT REPLACEMENT      right    KNEE ARTHROSCOPY      left x1, right x 1    SINUS SURGERY      TONSILLECTOMY           Medications:  Current Facility-Administered Medications   Medication Dose Route Frequency Provider Last Rate Last Admin    nicotine (NICODERM CQ) 21 MG/24HR 1 patch  1 patch Transdermal Daily Joel MARIA DEL ROSARIO Natacha,    1 patch at 06/07/21 0830    potassium bicarb-citric acid (EFFER-K) effervescent tablet 40 mEq  40 mEq Oral Daily Page Tierney MD   40 mEq at 06/06/21 1032    sodium chloride flush 0.9 % injection 5-40 mL  5-40 mL Intravenous 2 times per day Page Tierney MD   10 mL at 06/07/21 0834    sodium chloride flush 0.9 % injection 5-40 mL  5-40 mL Intravenous PRN Page Tierney MD        0.9 % sodium chloride infusion  25 mL Intravenous PRN Page Tierney MD        furosemide (LASIX) injection 80 mg  80 mg Intravenous BID Stormy Jimenez MD   80 mg at 06/06/21 1820    doxycycline (VIBRAMYCIN) 100 mg in dextrose 5 % 100 mL IVPB  100 mg Intravenous Q12H Ashley Palomares MD   Stopped at 06/07/21 0301    cefTRIAXone (ROCEPHIN) 1000 mg IVPB in 50 mL D5W minibag  1,000 mg Intravenous Q24H Ashley Palomares MD   Stopped at 06/06/21 1538    allopurinol (ZYLOPRIM) tablet 300 mg  300 mg Oral Daily Ashley Palomares MD   300 mg at 06/07/21 0830    ALPRAZolam (XANAX) tablet 0.5 mg  0.5 mg Oral BID PRN Ashley Palomares MD   0.5 mg at 06/06/21 2134    aspirin EC tablet 81 mg  81 mg Oral Daily Ashley Palomares MD   81 mg at 06/07/21 0830    pantoprazole (PROTONIX) tablet 40 mg  40 mg Oral QAM AC Ashley Palomares MD   40 mg at 06/06/21 0653    fluticasone (FLONASE) 50 MCG/ACT nasal spray 2 spray  2 spray Each Nostril Daily Ashley Palomares MD   2 spray at 06/06/21 0849    ibuprofen (ADVIL;MOTRIN) tablet 400 mg  400 mg Oral Q8H PRN Lion Gibson, MD        ondansetron (ZOFRAN-ODT) disintegrating tablet 4 mg  4 mg Oral Q8H PRN Eveline Tijerina MD        Or    ondansetron (ZOFRAN) injection 4 mg  4 mg Intravenous Q6H PRN Eveline Tijerina MD        polyethylene glycol (GLYCOLAX) packet 17 g  17 g Oral Daily PRN Eveline Tijerina MD        acetaminophen (TYLENOL) tablet 650 mg  650 mg Oral Q6H PRN Eveline Tijerina MD        Or    acetaminophen (TYLENOL) suppository 650 mg  650 mg Rectal Q6H PRN Eveline Tijerina MD               Pre-Sedation:    Pre-Sedation Documentation and Exam:  I have personally completed a history, physical exam & review of systems for this patient (see notes). Prior History of Anesthesia Complications:   none    Modified Mallampati:  II (soft palate, uvula, fauces visible)    ASA Classification:  Class 3 - A patient with severe systemic disease that limits activity but is not incapacitating      Heriberto Scale: Activity:  2 - Able to move 4 extremities voluntarily on command  Respiration:  2 - Able to breathe deeply and cough freely  Circulation:  2 - BP+/- 20mmHg of normal  Consciousness:  2 - Fully awake  Oxygen Saturation (color):  2 - Able to maintain oxygen saturation >92% on room air    Sedation/Anesthesia Plan:  Guard the patient's safety and welfare. Minimize physical discomfort and pain. Minimize negative psychological responses to treatment by providing sedation and analgesia and maximize the potential amnesia. Patient to meet pre-procedure discharge plan.     Medication Planned:  midazolam intravenously and fentanyl intravenously    Patient is an appropriate candidate for plan of sedation: yes      Electronically signed by Jonny Rivero MD on 6/7/2021 at 9:59 AM

## 2021-06-08 VITALS
SYSTOLIC BLOOD PRESSURE: 129 MMHG | OXYGEN SATURATION: 98 % | TEMPERATURE: 97.6 F | HEART RATE: 106 BPM | HEIGHT: 62 IN | DIASTOLIC BLOOD PRESSURE: 78 MMHG | WEIGHT: 121.03 LBS | BODY MASS INDEX: 22.27 KG/M2 | RESPIRATION RATE: 18 BRPM

## 2021-06-08 LAB
ALBUMIN SERPL-MCNC: 2.9 G/DL (ref 3.4–5)
ANION GAP SERPL CALCULATED.3IONS-SCNC: 10 MMOL/L (ref 3–16)
BASOPHILS ABSOLUTE: 0 K/UL (ref 0–0.2)
BASOPHILS RELATIVE PERCENT: 0.3 %
BLOOD CULTURE, ROUTINE: NORMAL
BUN BLDV-MCNC: 9 MG/DL (ref 7–20)
CALCIUM SERPL-MCNC: 8.7 MG/DL (ref 8.3–10.6)
CHLORIDE BLD-SCNC: 91 MMOL/L (ref 99–110)
CO2: 28 MMOL/L (ref 21–32)
CREAT SERPL-MCNC: 0.6 MG/DL (ref 0.6–1.1)
CULTURE, BLOOD 2: NORMAL
EOSINOPHILS ABSOLUTE: 0 K/UL (ref 0–0.6)
EOSINOPHILS RELATIVE PERCENT: 0.5 %
GFR AFRICAN AMERICAN: >60
GFR NON-AFRICAN AMERICAN: >60
GLUCOSE BLD-MCNC: 70 MG/DL (ref 70–99)
HCT VFR BLD CALC: 30.4 % (ref 36–48)
HEMOGLOBIN: 10.7 G/DL (ref 12–16)
LYMPHOCYTES ABSOLUTE: 1.8 K/UL (ref 1–5.1)
LYMPHOCYTES RELATIVE PERCENT: 24.2 %
MCH RBC QN AUTO: 35.6 PG (ref 26–34)
MCHC RBC AUTO-ENTMCNC: 35.3 G/DL (ref 31–36)
MCV RBC AUTO: 100.9 FL (ref 80–100)
MONOCYTES ABSOLUTE: 1.1 K/UL (ref 0–1.3)
MONOCYTES RELATIVE PERCENT: 15.4 %
NEUTROPHILS ABSOLUTE: 4.4 K/UL (ref 1.7–7.7)
NEUTROPHILS RELATIVE PERCENT: 59.6 %
PDW BLD-RTO: 14 % (ref 12.4–15.4)
PHOSPHORUS: 5 MG/DL (ref 2.5–4.9)
PLATELET # BLD: 322 K/UL (ref 135–450)
PMV BLD AUTO: 6.9 FL (ref 5–10.5)
POTASSIUM SERPL-SCNC: 3.3 MMOL/L (ref 3.5–5.1)
PRO-BNP: 7979 PG/ML (ref 0–124)
RBC # BLD: 3.02 M/UL (ref 4–5.2)
SODIUM BLD-SCNC: 129 MMOL/L (ref 136–145)
WBC # BLD: 7.4 K/UL (ref 4–11)

## 2021-06-08 PROCEDURE — 6370000000 HC RX 637 (ALT 250 FOR IP): Performed by: INTERNAL MEDICINE

## 2021-06-08 PROCEDURE — 99232 SBSQ HOSP IP/OBS MODERATE 35: CPT | Performed by: NURSE PRACTITIONER

## 2021-06-08 PROCEDURE — 82104 ALPHA-1-ANTITRYPSIN PHENO: CPT

## 2021-06-08 PROCEDURE — 82103 ALPHA-1-ANTITRYPSIN TOTAL: CPT

## 2021-06-08 PROCEDURE — 2580000003 HC RX 258: Performed by: INTERNAL MEDICINE

## 2021-06-08 PROCEDURE — 94640 AIRWAY INHALATION TREATMENT: CPT

## 2021-06-08 PROCEDURE — 83880 ASSAY OF NATRIURETIC PEPTIDE: CPT

## 2021-06-08 PROCEDURE — 94760 N-INVAS EAR/PLS OXIMETRY 1: CPT

## 2021-06-08 PROCEDURE — 85025 COMPLETE CBC W/AUTO DIFF WBC: CPT

## 2021-06-08 PROCEDURE — 6370000000 HC RX 637 (ALT 250 FOR IP): Performed by: STUDENT IN AN ORGANIZED HEALTH CARE EDUCATION/TRAINING PROGRAM

## 2021-06-08 PROCEDURE — 99223 1ST HOSP IP/OBS HIGH 75: CPT | Performed by: INTERNAL MEDICINE

## 2021-06-08 PROCEDURE — 6360000002 HC RX W HCPCS: Performed by: INTERNAL MEDICINE

## 2021-06-08 PROCEDURE — 36415 COLL VENOUS BLD VENIPUNCTURE: CPT

## 2021-06-08 PROCEDURE — 80069 RENAL FUNCTION PANEL: CPT

## 2021-06-08 PROCEDURE — 93971 EXTREMITY STUDY: CPT

## 2021-06-08 RX ORDER — DOXYCYCLINE HYCLATE 100 MG
100 TABLET ORAL EVERY 12 HOURS SCHEDULED
Qty: 20 TABLET | Refills: 0 | Status: SHIPPED | OUTPATIENT
Start: 2021-06-08 | End: 2021-06-18

## 2021-06-08 RX ORDER — TORSEMIDE 100 MG/1
50 TABLET ORAL DAILY
Qty: 30 TABLET | Refills: 1 | Status: SHIPPED | OUTPATIENT
Start: 2021-06-08 | End: 2021-08-11 | Stop reason: DRUGHIGH

## 2021-06-08 RX ORDER — BUDESONIDE AND FORMOTEROL FUMARATE DIHYDRATE 80; 4.5 UG/1; UG/1
1 AEROSOL RESPIRATORY (INHALATION) 2 TIMES DAILY
Status: DISCONTINUED | OUTPATIENT
Start: 2021-06-08 | End: 2021-06-08 | Stop reason: HOSPADM

## 2021-06-08 RX ORDER — FERROUS SULFATE TAB EC 324 MG (65 MG FE EQUIVALENT) 324 (65 FE) MG
324 TABLET DELAYED RESPONSE ORAL 2 TIMES DAILY WITH MEALS
Qty: 60 TABLET | Refills: 2 | Status: SHIPPED | OUTPATIENT
Start: 2021-06-08 | End: 2021-06-28

## 2021-06-08 RX ORDER — CARVEDILOL 3.12 MG/1
3.12 TABLET ORAL 2 TIMES DAILY
Qty: 60 TABLET | Refills: 3 | Status: SHIPPED | OUTPATIENT
Start: 2021-06-08 | End: 2021-09-20

## 2021-06-08 RX ORDER — ATORVASTATIN CALCIUM 20 MG/1
20 TABLET, FILM COATED ORAL DAILY
Qty: 30 TABLET | Refills: 3 | Status: SHIPPED | OUTPATIENT
Start: 2021-06-08 | End: 2021-09-21

## 2021-06-08 RX ORDER — BUDESONIDE AND FORMOTEROL FUMARATE DIHYDRATE 160; 4.5 UG/1; UG/1
2 AEROSOL RESPIRATORY (INHALATION) 2 TIMES DAILY
Qty: 1 INHALER | Refills: 3 | Status: SHIPPED | OUTPATIENT
Start: 2021-06-08 | End: 2021-09-20

## 2021-06-08 RX ORDER — NICOTINE 21 MG/24HR
1 PATCH, TRANSDERMAL 24 HOURS TRANSDERMAL DAILY
Qty: 30 PATCH | Refills: 3 | Status: SHIPPED | OUTPATIENT
Start: 2021-06-09 | End: 2022-02-16

## 2021-06-08 RX ORDER — IPRATROPIUM BROMIDE AND ALBUTEROL SULFATE 2.5; .5 MG/3ML; MG/3ML
1 SOLUTION RESPIRATORY (INHALATION)
Status: DISCONTINUED | OUTPATIENT
Start: 2021-06-08 | End: 2021-06-08 | Stop reason: HOSPADM

## 2021-06-08 RX ADMIN — CEFTRIAXONE 1000 MG: 1 INJECTION, POWDER, FOR SOLUTION INTRAMUSCULAR; INTRAVENOUS at 13:30

## 2021-06-08 RX ADMIN — FUROSEMIDE 80 MG: 10 INJECTION, SOLUTION INTRAMUSCULAR; INTRAVENOUS at 08:08

## 2021-06-08 RX ADMIN — DOXYCYCLINE HYCLATE 100 MG: 100 TABLET, COATED ORAL at 08:08

## 2021-06-08 RX ADMIN — IPRATROPIUM BROMIDE AND ALBUTEROL SULFATE 1 AMPULE: .5; 2.5 SOLUTION RESPIRATORY (INHALATION) at 12:49

## 2021-06-08 RX ADMIN — ALLOPURINOL 300 MG: 300 TABLET ORAL at 08:08

## 2021-06-08 RX ADMIN — BUDESONIDE AND FORMOTEROL FUMARATE DIHYDRATE 1 PUFF: 80; 4.5 AEROSOL RESPIRATORY (INHALATION) at 12:52

## 2021-06-08 RX ADMIN — ALPRAZOLAM 0.5 MG: 0.5 TABLET ORAL at 00:04

## 2021-06-08 RX ADMIN — ASPIRIN 81 MG: 81 TABLET, COATED ORAL at 08:08

## 2021-06-08 RX ADMIN — FERROUS SULFATE TAB EC 324 MG (65 MG FE EQUIVALENT) 324 MG: 324 (65 FE) TABLET DELAYED RESPONSE at 08:08

## 2021-06-08 RX ADMIN — Medication 10 ML: at 08:08

## 2021-06-08 RX ADMIN — PANTOPRAZOLE SODIUM 40 MG: 40 TABLET, DELAYED RELEASE ORAL at 06:24

## 2021-06-08 ASSESSMENT — PAIN SCALES - GENERAL: PAINLEVEL_OUTOF10: 0

## 2021-06-08 NOTE — PROGRESS NOTES
Data- discharge order received, pt verbalized agreement to discharge, disposition to previous residence, no needs for HHC/DME. Action- discharge instructions prepared and given to patient, pt verbalized understanding. Medication information packet given r/t NEW and/or CHANGED prescriptions emphasizing name/purpose/side effects, pt verbalized understanding. Discharge instruction summary: Diet- regular low sodium fluid restriction 1500, Activity- as tolerated, Primary Care Physician as follows: Kanwal Thomas [phone] f/u with [facility] in 7 days, immunizations reviewed and given to patient, prescription medications filled at [facility]. CHF Education reviewed. Pt/ Family has had a total of 60 minutes CHF education this admission encounter. Response- Pt belongings gathered, IV removed. Disposition is home (no HHC/DME needs), transported with staff, taken to [facility] ambulatory, patient refused wheelchair , no complications.  Electronically signed by Odessa Byrne RN on 6/8/2021 at 5:11 PM

## 2021-06-08 NOTE — PROGRESS NOTES
SpO2 95%   BMI 22.14 kg/m²           Intake/Output Summary (Last 24 hours) at 6/8/2021 1229  Last data filed at 6/8/2021 1203  Gross per 24 hour   Intake 1020 ml   Output 2750 ml   Net -1730 ml     I/O since adm: neg 2,120    WEIGHT:Admit Weight: 128 lb (58.1 kg)         Today  Weight: 121 lb 0.5 oz (54.9 kg)   DRY WEIGHT:  Wt Readings from Last 3 Encounters:   06/08/21 121 lb 0.5 oz (54.9 kg)   03/27/18 153 lb 3.5 oz (69.5 kg)   01/07/18 150 lb (68 kg)       Physical Exam:  GEN: Appears frail, no acute distress  SKIN: Pink, warm, dry. Nails without clubbing. HEENT: PERRLA. Normocephalic, atraumatic. Neck supple. No adenopathy. LUNG: AP diameter normal. Decreased BS right side. Crackles left base. Exp ronchi throughout. Respiratory effort increased. HEART: S1S2 A/R. No carotid bruit. + sys murmur, No rub or gallop. ABD: Soft, nontender. +BS X 4 quads. No hepatomegaly. EXT: Radial and pedal pulses 2+ and symmetric. Without varicosities. LLE edema. MUSCSKEL: Good ROM X4 extremities. No deformity. NEURO: A/O X3. Calm and cooperative. Telemetry: NSR - S Tach    Medications:    ipratropium-albuterol  1 ampule Inhalation Q4H WA    budesonide-formoterol  1 puff Inhalation BID    sodium chloride flush  5-40 mL Intravenous 2 times per day    ferrous sulfate  324 mg Oral BID WC    doxycycline hyclate  100 mg Oral 2 times per day    nicotine  1 patch Transdermal Daily    furosemide  80 mg Intravenous BID    cefTRIAXone (ROCEPHIN) IV  1,000 mg Intravenous Q24H    allopurinol  300 mg Oral Daily    aspirin  81 mg Oral Daily    pantoprazole  40 mg Oral QAM AC    fluticasone  2 spray Each Nostril Daily      sodium chloride       sodium chloride flush, sodium chloride, acetaminophen, ALPRAZolam, ibuprofen, ondansetron **OR** ondansetron, polyethylene glycol, [DISCONTINUED] acetaminophen **OR** acetaminophen    Lab Data: I have reviewed all labs below today.    CBC:   Recent Labs     06/06/21  8423 06/07/21 0342 06/08/21  0425   WBC 10.3 7.9 7.4   HGB 9.3* 9.7* 10.7*   HCT 26.8* 28.0* 30.4*   .8* 100.3* 100.9*    275 322     BMP:   Recent Labs     06/06/21  0325 06/07/21 0342 06/07/21  1546 06/07/21  2154 06/08/21  0425   *  124* 126*  127* 129* 131* 129*   K 3.4* 3.7  --   --  3.3*   CL 87* 89*  --   --  91*   CO2 23 27  --   --  28   PHOS 2.9 3.2  --   --  5.0*   BUN 12 8  --   --  9   CREATININE 0.6 0.7  --   --  0.6     GLUCOSE:   Recent Labs     06/06/21 0325 06/07/21 0342 06/08/21  0425   GLUCOSE 78 86 70     LIVER PROFILE:   Lab Results   Component Value Date    AST 46 06/05/2021    ALT 29 06/05/2021    LABALBU 2.9 06/08/2021    BILIDIR 0.7 06/05/2021    BILITOT 1.1 06/05/2021    ALKPHOS 104 06/05/2021     PT/INR: No results found for: PROTIME, INR  APTT: No results found for: APTT  Pro-BNP:    Lab Results   Component Value Date    PROBNP 32,553 06/04/2021     Parameters:   > 450 pg/mL under age 48  > 900 pg/mL ages 54-65  > 1800 pg/mL over age 76    ENZYMES:  Lab Results   Component Value Date    CKMB 2.18 04/23/2010    TROPONINI 0.02 06/04/2021   Results for Charissa Shepherd (MRN [id number]) as of 6/8/2021 12:39   Ref. Range 6/4/2021 10:41 6/4/2021 16:28 6/4/2021 22:12   Troponin Latest Ref Range: <0.01 ng/mL 0.02 (H) 0.02 (H) 0.02 (H)     FASTING LIPID PANEL:No results found for: CHOL, HDL, LDLCALC, TRIG    Diagnostics:    EKG: Sinus tachycardiaT wave abnormality, consider anterolateral ischemiaConfirmed by RYAN COVARRUBIAS, Abran Gaxiola ([id number]) on 6/4/2021 4:22:24 PM    ECHO:6/5/21:  Overall left ventricular systolic function appears mildly reduced.   Ejection fraction is visually estimated to be 40%.   Severe andrew-lateral hypokinesis   Mid to apical akinesis   Grade II diastolic dysfunction with elevated LV filling pressures.   Normal right ventricular size.   Right ventricular systolic function is reduced .       Cardiac Angiography: [facility] 6/7/2021  FINDINGS:  1.  Evidence of persistent right-sided volume overload with a right  atrial pressure that was low at 5 mmHg but a pulmonary capillary wedge  pressure elevated at 20 mmHg. 2.  Evidence of pulmonary hypertension with an instantaneous pressure of  38/17 for a mean pulmonary arterial pressure of 28.  3.  Mildly reduced cardiac output by thermodilution at 4.53 liters per  minute with a cardiac index of 2.89 liters per kilogram per minute. 4.  Pulmonary vascular resistance of 139 dynes per second. 5.  Normal mixed venous oxygen saturations at 66% in the right atrium  and 64% in the pulmonary artery. 6.  Left ventricular end-diastolic pressure of 23 mmHg. 7.  Right-dominant coronary arterial system with no obstructive coronary  lesions. The left main is free of disease. The circumflex is smaller. OM1 has 50% stenosis. The left anterior descending artery is calcified  in the proximal and midportion with a 50% stenosis at the first septal   and the first diagonal branch but no flow limitation. Further, there is 50% stenosis in the midportion. The right coronary  artery is large with 50% mid RCA stenosis. 8.  Mildly reduced LV systolic function. LV ejection fraction of 40%  and mild apical hypokinesis in a pattern likely consistent with a  stress-induced cardiomyopathy. 9.  No gradient across the aortic valve on pullback to suggest aortic  Stenosis. CXR 6/7/2021  Impression   Decreased right pleural effusion, with a small residual pleural effusion. Decreased pulmonary edema.       Minimal trace left pleural effusion     Chest CT 6/4/2021  Impression   No central pulmonary embolus identified       Bilateral pleural effusions, with adjacent consolidation, at the lung bases   either due to atelectasis or pneumonia.  Scattered opacities are seen   throughout the right lung. Ciara Fruits is a background of pulmonary emphysema.    Subtle septal thickening at the right lung base, suggest a component of fluid

## 2021-06-08 NOTE — DISCHARGE SUMMARY
Hospital Medicine Discharge Summary    Patient ID: Polo Miguel      Patient's PCP: Sandy Montes MD    Admit Date: 6/4/2021     Discharge Date:   6/8/2021    Admitting Physician: Jaye Wright MD     Discharge Physician: Jaye Wright MD     Discharge Diagnoses: Active Hospital Problems    Diagnosis     Acute systolic CHF (congestive heart failure), NYHA class 3 ([facility]) [I50.21]     Anemia [D64.9]     Hyponatremia [E87.1]     Abnormal ECG [R94.31]     CHF (congestive heart failure), NYHA class I, acute on chronic, combined ([facility] [address] [I50.43]        The patient was seen and examined on day of discharge and this discharge summary is in conjunction with any daily progress note from day of discharge. Hospital Course: The patient is a 62 y.o. female w Hx of life long smoker, HTN, allergies, who presents to [facility] with SOB.       She reports she felt congested for the past several days. Pt reports fairly rapidly progressing severe dyspnea worsening last night and persisting since then.      Pt reports she has been working from home and mostly sedentary at her desk \"since COVID started\". SHe noticed worsening LE edema (bilateral) that she attributed to decreased level of activity.      She did not appreciate any sign resp problems until yesterday. She reports severe exertional dyspnea. Not much cough. She denies chest pain per say. Denies palpitations, though reports her \"apple watch\" was giving her alerts about elevated HR frequently in the past 24hrs.            Acute systolic CHF - EF 39%    No prior Hx.   ECHO with anterolateral hypokinesis. Clinical exam, labs and imaging concerning for fluid overload, acute pulm edema, pleural effusions.    Plan:               - Lasix IV BID to torsemide on discharge              - I would hold off aldactone till hyponatremia improved and stable.               - ASA               - eliquis ppx - she is allergic to pig-derived with rales. No wheezing or rhonchi.   Heart: Regular rate and rhythm with Normal S1/S2 without murmurs, rubs or gallops, point of maximum impulse non-displaced  Abdomen: Soft, non-tender or non-distended without rigidity or guarding and positive bowel sounds all four quadrants. Extremities: 2+ pitting edema - Left worse then right.   Neurologic: Alert and oriented X 3, neurovascularly intact with sensory/motor intact upper extremities/lower extremities, bilaterally.  Cranial nerves: II-XII intact, grossly non-focal.  Mental status: Alert, oriented, thought content appropriate. Capillary Refill: Acceptable  < 3 seconds  Peripheral Pulses: +3 Easily felt, not easily obliterated with pressure         Labs: For convenience and continuity at follow-up the following most recent labs are provided:      CBC:    Lab Results   Component Value Date    WBC 7.4 06/08/2021    HGB 10.7 06/08/2021    HCT 30.4 06/08/2021     06/08/2021       Renal:    Lab Results   Component Value Date     06/08/2021    K 3.3 06/08/2021    K 4.3 06/04/2021    CL 91 06/08/2021    CO2 28 06/08/2021    BUN 9 06/08/2021    CREATININE 0.6 06/08/2021    CALCIUM 8.7 06/08/2021    PHOS 5.0 06/08/2021         Significant Diagnostic Studies    Radiology:   VL Extremity Venous Left         XR CHEST (2 VW)   Final Result   Decreased right pleural effusion, with a small residual pleural effusion. Decreased pulmonary edema. Minimal trace left pleural effusion         CT CHEST PULMONARY EMBOLISM W CONTRAST   Final Result   No central pulmonary embolus identified      Bilateral pleural effusions, with adjacent consolidation, at the lung bases   either due to atelectasis or pneumonia. Scattered opacities are seen   throughout the right lung. .  There is a background of pulmonary emphysema.    Subtle septal thickening at the right lung base, suggest a component of fluid   overload-pulmonary edema      Moderate to severe coronary artery calcification. XR CHEST (2 VW)   Final Result   Small right pleural effusion. Consults:     IP CONSULT TO CARDIOLOGY  IP CONSULT TO NEPHROLOGY  IP CONSULT TO SPIRITUAL SERVICES  IP CONSULT TO PULMONOLOGY  IP CONSULT TO HEART FAILURE NURSE/COORDINATOR    Disposition: home    Condition at Discharge: Stable    Discharge Instructions/Follow-up:  PCP 1 week. Code Status:  Full Code     Activity: activity as tolerated    Diet: cardiac diet      Discharge Medications:     Current Discharge Medication List           Details   budesonide-formoterol (SYMBICORT) 160-4.5 MCG/ACT AERO Inhale 2 puffs into the lungs 2 times daily  Qty: 1 Inhaler, Refills: 3      ferrous sulfate 324 (65 Fe) MG EC tablet Take 1 tablet by mouth 2 times daily (with meals)  Qty: 60 tablet, Refills: 2      nicotine (NICODERM CQ) 21 MG/24HR Place 1 patch onto the skin daily  Qty: 30 patch, Refills: 3      doxycycline hyclate (VIBRA-TABS) 100 MG tablet Take 1 tablet by mouth every 12 hours for 10 days  Qty: 20 tablet, Refills: 0      torsemide (DEMADEX) 100 MG tablet Take 0.5 tablets by mouth daily  Qty: 30 tablet, Refills: 1      carvedilol (COREG) 3.125 MG tablet Take 1 tablet by mouth 2 times daily  Qty: 60 tablet, Refills: 3      atorvastatin (LIPITOR) 20 MG tablet Take 1 tablet by mouth daily  Qty: 30 tablet, Refills: 3              Details   ALPRAZolam (ALPRAZOLAM XR) 0.5 MG extended release tablet Take 0.5 mg by mouth daily as needed (anxiety). allopurinol (ZYLOPRIM) 300 MG tablet Take 300 mg by mouth daily      fluticasone (FLONASE) 50 MCG/ACT nasal spray 2 sprays by Each Nostril route daily      Multiple Vitamins-Minerals (THERAPEUTIC MULTIVITAMIN-MINERALS) tablet Take 1 tablet by mouth daily      levocetirizine (XYZAL) 5 MG tablet Take 5 mg by mouth daily. GuaiFENesin (MUCINEX PO) Take 400 mg by mouth daily. aspirin 81 MG EC tablet Take 81 mg by mouth daily.       montelukast (SINGULAIR) 10 MG tablet Take 10 mg by mouth daily       esomeprazole (NEXIUM) 20 MG capsule Take 20 mg by mouth every morning (before breakfast). Time Spent on discharge is more than 30 minutes in the examination, evaluation, counseling and review of medications and discharge plan. Signed:    Katharine Andrew MD   6/8/2021      Thank you Shira Jalloh MD for the opportunity to be involved in this patient's care. If you have any questions or concerns please feel free to contact me at [phone].

## 2021-06-08 NOTE — CONSULTS
REASON FOR CONSULTATION/CC: copd      Consult at request of Mani Terrell MD for copd    PCP: Claudai Hebert MD  Established Pulmonologist:  None    HISTORY OF PRESENT ILLNESS: Eugenia Sparks is a 62y.o. year old female with a history of copd who presents with :     History  Patient has a history of mild COPD diagnosed in  on pulmonary function test, age 51, he was continue to smoke. Current history. Patient has inhaler at home but states they typically  prior to using completely. shortness of breath significant improved over the hospitalization. Assessment:        CHF, LVEDP 20 left heart catheterization   Right heart catheterization : PA pressure 38/17, mean 28, LVEDP 20, TPG 8 cardiac output 4.5. Pulmonary vascular resistance 142   Wood units 1.77  EF 41% grade 2 diastolic dysfunction  Bilateral pleural effusions  Hyponatremia with hypervolemia  Active tobacco abuse  COPD, mild  PFT FEV1 71% with bronchodilator response hyperinflation, air trapping, diffusion capacity 65%, emphysema CT chest   Anemia  Community-acquired pneumonia    Plan:      Hospital Day 4     Pulmonary hypertension  -With the above numbers, this is consistent with who group 2 pulmonary hypertension, secondary to left heart disease. Fluid is currently being managed by nephrology and cardiology.  -With low pulmonary vascular resistance, no clear involvement from lungs. Bilateral pleural effusions  - secondary to chf  - improving      COPD with emphysema  -The patient already had mild emphysema the age of 51 and has continued to smoke. Therefore, repeat pulmonary function test is indicated an outpatient  -Given this emphysema, test for alpha-1 antitrypsin deficiency  -Continue Symbicort  -DuoNebs      Hypernatremia  - improving  Per nephrology. Patient would like to d/c. Defer to Mani Terrell MD       This note was transcribed using [id number] [facility]. Please disregard any translational errors. Thank you for the consult    [facility]  [phone]             Data:     PAST MEDICAL HISTORY:  Past Medical History:   Diagnosis Date    Anxiety     GERD (gastroesophageal reflux disease)     Gout     had once years ago    Hyperlipidemia     at last check it was normal    Hypertension        PAST SURGICAL HISTORY:  Past Surgical History:   Procedure Laterality Date    BLADDER SUSPENSION      CARPAL TUNNEL RELEASE      right    CARPAL TUNNEL RELEASE  7-12-10    Left    CARPAL TUNNEL RELEASE  Left wrist    FOOT SURGERY  3/2013    EXCISION NEUROMA 3RD NERVE LEFT FOOT    JOINT REPLACEMENT      right    KNEE ARTHROSCOPY      left x1, right x 1    SINUS SURGERY      TONSILLECTOMY         FAMILY HISTORY:  family history includes Diabetes in her mother; Heart Disease in her maternal grandmother; High Blood Pressure in her mother. SOCIAL HISTORY:   reports that she has been smoking cigarettes. She has a 35.00 pack-year smoking history.  She has never used smokeless tobacco.    Scheduled Meds:   ipratropium-albuterol  1 ampule Inhalation Q4H WA    budesonide-formoterol  1 puff Inhalation BID    sodium chloride flush  5-40 mL Intravenous 2 times per day    ferrous sulfate  324 mg Oral BID WC    doxycycline hyclate  100 mg Oral 2 times per day    nicotine  1 patch Transdermal Daily    furosemide  80 mg Intravenous BID    cefTRIAXone (ROCEPHIN) IV  1,000 mg Intravenous Q24H    allopurinol  300 mg Oral Daily    aspirin  81 mg Oral Daily    pantoprazole  40 mg Oral QAM AC    fluticasone  2 spray Each Nostril Daily       Continuous Infusions:   sodium chloride         PRN Meds:  sodium chloride flush, sodium chloride, acetaminophen, ALPRAZolam, ibuprofen, ondansetron **OR** ondansetron, polyethylene glycol, [DISCONTINUED] acetaminophen **OR** acetaminophen    ALLERGIES:  Patient is allergic to augmentin [amoxicillin-pot clavulanate] and pork-derived products. REVIEW OF SYSTEMS:  Constitutional: Negative for fever    HENT: Negative for sore throat  Eyes: Negative for redness   Respiratory: Negative for dyspnea, cough (improving)   Cardiovascular: Negative for chest pain  Gastrointestinal: Negative for vomiting, diarrhea    Genitourinary: Negative for hematuria   Musculoskeletal: Negative for arthralgias   Skin: Negative for rash  Neurological: Negative for syncope  Hematological: Negative for adenopathy  Psychiatric/Behavorial: Negative for anxiety    Objective:   PHYSICAL EXAM:  Blood pressure 110/70, pulse 90, temperature 97.9 °F (36.6 °C), temperature source Oral, resp. rate 16, height 5' 2\" (1.575 m), weight 121 lb 0.5 oz (54.9 kg), SpO2 96 %, not currently breastfeeding.'  Gen: No distress. Eyes: PERRL. No sclera icterus. No conjunctival injection. ENT: No discharge. Pharynx clear. External appearance of ears and nose normal.  Neck: Trachea midline. No obvious mass. Resp: No accessory muscle use. No crackles. No wheezes. No rhonchi. CV: Regular rate. Regular rhythm. No murmur or rub. + edema. GI: Non-tender. Non-distended. No hernia. Skin: Warm, dry, normal texture and turgor. No nodule on exposed extremities. Lymph: No cervical LAD. No supraclavicular LAD. M/S: No cyanosis. No clubbing. No joint deformity. Neuro: Moves all four extremities. Psych: Oriented x 3. No anxiety. Awake. Alert. Intact judgement and insight.       Data Reviewed:   LABS:  CBC:   Recent Labs     06/06/21  0325 06/07/21  0342 06/08/21  0425   WBC 10.3 7.9 7.4   HGB 9.3* 9.7* 10.7*   HCT 26.8* 28.0* 30.4*   .8* 100.3* 100.9*    275 322     BMP:   Recent Labs     06/06/21  0325 06/07/21  0342 06/07/21  1147 06/07/21  1546 06/07/21  2154   *  124* 126*  127* 129* 129* 131*   K 3.4* 3.7  --   --   --    CL 87* 89*  --   --   --    CO2 23 27  --   --   --    PHOS 2.9 3.2  --   --   -- BUN 12 8  --   --   --    CREATININE 0.6 0.7  --   --   --      LIVER PROFILE: No results for input(s): AST, ALT, LIPASE, BILIDIR, BILITOT, ALKPHOS in the last 72 hours. Invalid input(s): AMYLASE,  ALB  PT/INR: No results for input(s): PROTIME, INR in the last 72 hours. APTT: No results for input(s): APTT in the last 72 hours. UA:No results for input(s): NITRITE, COLORU, PHUR, LABCAST, WBCUA, RBCUA, MUCUS, TRICHOMONAS, YEAST, BACTERIA, CLARITYU, SPECGRAV, LEUKOCYTESUR, UROBILINOGEN, BILIRUBINUR, BLOODU, GLUCOSEU, AMORPHOUS in the last 72 hours. Invalid input(s): KETONESU  No results for input(s): PHART, XVM9YMS, PO2ART in the last 72 hours. Vent Information  SpO2: 96 %    Radiology Review:  Pertinent images / reports were reviewed as a part of this visit. CT Chest w/ contrast: Results for orders placed during the hospital encounter of 11/18/10    CT chest with contrast    Narrative  Reason for exam-->weak lt vocal cord    CT CHEST WITH CONTRAST-    INDICATION-  Weak left vocal cord, possible recurrent laryngeal  nerve palsy. COMPARISON- None available. FINDINGS-    There is mild centrilobular emphysema within the upper lungs. Mild  dependent atelectasis is present bilaterally. No suspicious  lesions are detected within the lung parenchyma. The airways are  patent. No mediastinal masses are identified. No axillary lymphadenopathy. There is atherosclerotic calcification of the thoracic aorta. Coronary artery calcification is also present. These are somewhat  advanced for patient's age. Images of the upper abdomen are unremarkable. Mild spondylosis within the thoracic spine is seen. No suspicious  lesions detected within the bony structures. IMPRESSION-    1. No mediastinal masses identified to explain recurrent laryngeal  nerve palsy. 2. Coronary artery and aortic calcification, advanced for the  patient's age.     3. Mild centrilobular emphysema within the upper lungs.    Read By- Celena Greene  Released By- Celena Greene  Released Date Time- 11/18/10 1054    [facility]  ------------------------------------------------------------------------------      CT Chest w/o contrast: No results found for this or any previous visit. CTPA: Results for orders placed during the hospital encounter of 06/04/21    CT CHEST PULMONARY EMBOLISM W CONTRAST    Narrative  EXAMINATION:  CTA OF THE CHEST 6/4/2021 11:13 am    TECHNIQUE:  CTA of the chest was performed after the administration of intravenous  contrast.  Multiplanar reformatted images are provided for review. MIP  images are provided for review. Dose modulation, iterative reconstruction,  and/or weight based adjustment of the mA/kV was utilized to reduce the  radiation dose to as low as reasonably achievable. COMPARISON:  None. HISTORY:  ORDERING SYSTEM PROVIDED HISTORY: SOB, tachkendall  TECHNOLOGIST PROVIDED HISTORY:  Reason for exam:->SOB, tachy  Decision Support Exception - unselect if not a suspected or confirmed  emergency medical condition->Emergency Medical Condition (MA)  Reason for Exam: sob  Acuity: Acute  Type of Exam: Initial    FINDINGS:  Thyroid gland appears normal.  Small mediastinal and hilar nodes are noted. Moderate to severe coronary artery calcification is seen. Trace pericardial  fluid is seen. Small hiatal hernia seen. There is nonspecific thickening at  the GE junction. No embolus is seen in the central, right, or left main pulmonary artery. No  embolus is seen on the right or left, at least to the subsegmental level. Beam hardening artifact is seen from contrast bolus in the SVC, limiting  evaluation of the pulmonary arteries on the right. Mild underlying emphysema is seen. Small left-sided pleural effusion is  seen. There is adjacent left basilar consolidation.     On the right, hazy ground-glass opacity is seen in the right upper, right  middle and right lower lobe. Small right-sided pleural effusion is seen. There is adjacent right basilar consolidation. There is subtle septal  thickening in the right lung base. There is reflux of contrast into the IVC and hepatic veins. There is fatty  infiltration of the liver. Atherosclerotic change seen in aorta. 2 mm  calcifications seen in the right and left kidney. There is lobular contour  to the left kidney. Cyst is seen in the left kidney, measuring 9 mm. Spurring is seen in the spine. Spurring is seen in the shoulder joints. Impression  No central pulmonary embolus identified    Bilateral pleural effusions, with adjacent consolidation, at the lung bases  either due to atelectasis or pneumonia. Scattered opacities are seen  throughout the right lung. .  There is a background of pulmonary emphysema. Subtle septal thickening at the right lung base, suggest a component of fluid  overload-pulmonary edema    Moderate to severe coronary artery calcification. CXR PA/LAT: Results for orders placed during the hospital encounter of 06/04/21    XR CHEST (2 VW)    Narrative  EXAMINATION:  TWO XRAY VIEWS OF THE CHEST    6/7/2021 11:30 am    COMPARISON:  CT of the chest and chest x-ray June 4, 2021    HISTORY:  ORDERING SYSTEM PROVIDED HISTORY: Pulm edema, Pleural effusions, follow up  study. TECHNOLOGIST PROVIDED HISTORY:  Reason for exam:->Pulm edema, Pleural effusions, follow up study. Reason for Exam: no chest pain, SOB getting better, recent heart procedure  (cardiac ablation)  Acuity: Acute  Type of Exam: Initial    FINDINGS:  Slightly decreased right pleural effusion. Pulmonary edema appears  decreased. No pneumothorax. Impression  Decreased right pleural effusion, with a small residual pleural effusion. Decreased pulmonary edema. Minimal trace left pleural effusion      CXR portable: No results found for this or any previous visit.

## 2021-06-08 NOTE — PROGRESS NOTES
Pharmacy Heart Failure Medication Reconciliation Note    Pt discharged from [facility] today after admission for HF. Amarjit has a diagnosis of diatolic/systolic heart failure (last EF = 40% on 6/5/21). Pertinent Labs:  BMP:   Lab Results   Component Value Date     06/08/2021    K 3.3 06/08/2021    K 4.3 06/04/2021    CL 91 06/08/2021    CO2 28 06/08/2021    BUN 9 06/08/2021    CREATININE 0.6 06/08/2021     BNP:   Lab Results   Component Value Date    PROBNP 32,553 06/04/2021       Patient taking an ACEI / ARB / Entresto:  No: EF40%     Patient taking a BETA BLOCKER:  Yes  Patient taking a LOOP DIURETIC: Yes  Patient taking a ALDOSTERONE RECEPTOR ANTAGONIST: No: hyponatremia    Patient has a diagnosis of Atrial Fibrillation: No: . If yes, patient is on appropriate anticoagulation:     Patient has a diagnosis of type 2 diabetes:  No: .  If yes, patient prescribed the following anti-hyperglycemic medication at discharge:       Corrections to discharge medications include:  none    Discharge Medications:         Medication List      START taking these medications    atorvastatin 20 MG tablet  Commonly known as: Lipitor  Take 1 tablet by mouth daily     budesonide-formoterol 160-4.5 MCG/ACT Aero  Commonly known as: Symbicort  Inhale 2 puffs into the lungs 2 times daily     carvedilol 3.125 MG tablet  Commonly known as: Coreg  Take 1 tablet by mouth 2 times daily     doxycycline hyclate 100 MG tablet  Commonly known as: VIBRA-TABS  Take 1 tablet by mouth every 12 hours for 10 days     ferrous sulfate 324 (65 Fe) MG EC tablet  Take 1 tablet by mouth 2 times daily (with meals)     nicotine 21 MG/24HR  Commonly known as: NICODERM CQ  Place 1 patch onto the skin daily  Start taking on: June 9, 2021     torsemide 100 MG tablet  Commonly known as: DEMADEX  Take 0.5 tablets by mouth daily        CONTINUE taking these medications    allopurinol 300 MG tablet  Commonly known as: ZYLOPRIM     ALPRAZolam XR 0.5 MG extended release tablet  Generic drug: ALPRAZolam     aspirin 81 MG EC tablet     esomeprazole 20 MG delayed release capsule  Commonly known as: NEXIUM     fluticasone 50 MCG/ACT nasal spray  Commonly known as: FLONASE     montelukast 10 MG tablet  Commonly known as: SINGULAIR     MUCINEX PO     therapeutic multivitamin-minerals tablet     Xyzal 5 MG tablet  Generic drug: levocetirizine        STOP taking these medications    Cartia  MG extended release capsule  Generic drug: dilTIAZem     ibuprofen 800 MG tablet  Commonly known as: ADVIL;MOTRIN     lisinopril-hydroCHLOROthiazide 20-25 MG per tablet  Commonly known as: PRINZIDE;ZESTORETIC           Where to Get Your Medications      These medications were sent to [address], [address] [place] [address] [phone]  [facility] [address]    Hours: 24-hours Phone: [phone]   · atorvastatin 20 MG tablet  · budesonide-formoterol 160-4.5 MCG/ACT Aero  · carvedilol 3.125 MG tablet  · doxycycline hyclate 100 MG tablet  · ferrous sulfate 324 (65 Fe) MG EC tablet  · nicotine 21 MG/24HR  · torsemide 100 MG tablet         Jackelyn Amos [facility]  Heart Failure Discharge Medication Reconciliation Program  [phone]

## 2021-06-08 NOTE — PLAN OF CARE
Problem: Falls - Risk of:  Goal: Will remain free from falls  Description: Will remain free from falls  6/8/2021 0831 by Delgado Jarrett RN  Outcome: Ongoing     Problem: OXYGENATION/RESPIRATORY FUNCTION  Goal: Patient will maintain patent airway  6/8/2021 0831 by Delgado Jarrett RN  Outcome: Ongoing     Problem: HEMODYNAMIC STATUS  Goal: Patient has stable vital signs and fluid balance  6/8/2021 0831 by Delgado Jarrett RN  Outcome: Ongoing     Problem: FLUID AND ELECTROLYTE IMBALANCE  Goal: Fluid and electrolyte balance are achieved/maintained  6/8/2021 0831 by Delgado Jarrett RN  Outcome: Ongoing     Problem: ACTIVITY INTOLERANCE/IMPAIRED MOBILITY  Goal: Mobility/activity is maintained at optimum level for patient  6/8/2021 0831 by Delgado Jarrett RN  Outcome: Ongoing     Problem: Nutrition  Goal: Optimal nutrition therapy  6/8/2021 0831 by Delgado Jarrett RN  Outcome: Ongoing

## 2021-06-08 NOTE — CONSULTS
[facility]  HEART FAILURE PROGRAM      NAME:  Sonam Samayoa  MEDICAL RECORD NUMBER:  [id number]  AGE: 62 y.o. GENDER: female  : 1960  TODAY'S DATE:  2021    Subjective:     VISIT TYPE: evaluation     ADMITTING PHYSICIAN:  Chris Barbosa MD    PAST MEDICAL HISTORY        Diagnosis Date    Anxiety     GERD (gastroesophageal reflux disease)     Gout     had once years ago    Hyperlipidemia     at last check it was normal    Hypertension        SOCIAL HISTORY    Social History     Tobacco Use    Smoking status: Current Every Day Smoker     Packs/day: 1.00     Years: 35.00     Pack years: 35.00     Types: Cigarettes    Smokeless tobacco: Never Used   Substance Use Topics    Alcohol use:  Yes     Alcohol/week: 14.0 standard drinks     Types: 14 Glasses of wine per week    Drug use: No       ALLERGIES    Allergies   Allergen Reactions    Augmentin [Amoxicillin-Pot Clavulanate] Anaphylaxis and Swelling     THROATSWELLS  Pt has taken amoxicillin with no reaction but cannot take augmentin    Pork-Derived Products Swelling       MEDICATIONS  Scheduled Meds:   ipratropium-albuterol  1 ampule Inhalation Q4H WA    budesonide-formoterol  1 puff Inhalation BID    sodium chloride flush  5-40 mL Intravenous 2 times per day    ferrous sulfate  324 mg Oral BID WC    doxycycline hyclate  100 mg Oral 2 times per day    nicotine  1 patch Transdermal Daily    furosemide  80 mg Intravenous BID    cefTRIAXone (ROCEPHIN) IV  1,000 mg Intravenous Q24H    allopurinol  300 mg Oral Daily    aspirin  81 mg Oral Daily    pantoprazole  40 mg Oral QAM AC    fluticasone  2 spray Each Nostril Daily       ADMIT DATE: 2021      Objective:     ADMISSION DIAGNOSIS:   CHF (congestive heart failure), NYHA class I, acute on chronic, combined (HCC) [I50.43]     /77   Pulse 121   Temp 97.5 °F (36.4 °C) (Oral)   Resp 18   Ht 5' 2\" (1.575 m)   Wt 121 lb 0.5 oz (54.9 kg)   SpO2 96% Failure signs/symptoms. [x]  Provided instructions on daily medications. [x]  Provided instructions to monitor and record weight daily. [x]  Provided instructions to call if weight increases 3 lbs in one day or 5 lbs in one week. [x]  Received verbal acknowledgment/understanding of Heart Failure related causes. [x]  Provided instructions on how to maintain a low sodium diet. [x]  Provided recommendations for smoking cessation programs  [x]  Provided recommendations on activity and exercise    [x]  Other:HF RN consult received from Dr Nery Dasilva. Chart reviewed. Pt presented to ED via EMS with c/o SOB. She reports being \"congested\" x 1 week and had gone to Urgent Care who sent her to ED because of elevated HR.  with sat 93% on RA on arrival. Pt shares SOB woke her from sleep at 4am.  She also shares her watch has alerted her to \"high HR\" multiple times day before admission. She states \"it does that sometimes but this was over and over throughout the day\". Pt has no prior history of HF and no established cardiologist.  She has no prior echo on record for comparison. She is a 1 PPD smoker x 40+ years. She claims a history of COPD in the past but reports no recent flares. ProBNP on admission was 32,553 with Cr 0.6. CXR showed small right pleural effusion. Weight 128# (ED) and 132# (floor). Pt was treated with IV bolus Lasix. Echo done showed EF 40% with gr 2 DD. Cardiology was consulted. Pt underwent LHC on 6/7/21 per Dr Bozena Calzada revealing 50% CX and 50% LAD EF 40% stress induced CMO. Pt has diuresed 3.2 liters net to date. ProBNP has DOWNtrended to 5771 with Cr 0.6. I met with patient in her room. She is sitting in bedside chair, on room air, and appears comfortable at rest and with light conversation. I introduced myself and my role in her care. She is agreeable to spend time with me for HF education.   Pt shares she is aware of new diagnosis and is feeling \"overwhelmed with everything I am supposed to do\". I reassured her that was what we would cover and she would have written reference material as well. We reviewed her echo results and the general definition of systolic and diastolic HF. We discussed the term \"stress induced\" CMO. She denies anyone saying the term \"broken heart\" or [place]. She became tearful and states she has been under a tremendous amount of stress at work. 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 \"We are working on a big big project - just me and one other girl\". She is also upset she was supposed to be on vacation with family in [place] but is hospitalized instead. She states \"I want to take a shower here because there won't be anyone at home to watch over me\". She is very nervous about being discharged and being by herself. I offered emotional support and assured her she was stable or else she would not be being discharged. She agrees - just states \"I am just worried and aggravated I couldn't go\". She does state she is going to see if she can get her vacation days changed to sick days so she can take a vacation later. Pt voices desire to continue care with [facility]. Contact information was supplied along with [place] [facility]. Pt also voices desire to get a new PCP. Her current one, Dr Tee Arroyo, [place] about 45 mins away from me\" so she states she would like to consolidate care on [facility]. [facility] PCP brochure provided. We discussed role of weight monitoring for HF management. Pt has a scale at home but does not weigh consistently. She shares pre-pandemic, she weighed 160#. She states she has slowly lost weight - now down 30# -- because she \"stopped eating\". She denies abdominal fullness, etc - \"just didn't eat\". She alludes to stress associated with working from home. She fears stopping smoking and gaining weight.       We reviewed rationale of sodium and fluid restriction and the recommendations of each. Pt admits to being a \"saltaholic\". She lists two favorite foods as pickles and olives. We discussed importance limiting these types of food and to be especially mindful if in yellow zone. We discussed tips on how to season food and how to wean away from added salt per [facility] guide. Pt does not think she drinks excessive amounts of fluids. She drinks one Diet Coke and occasional water. She inquires if she is allowed to drink wine. We discussed how to offset thirst and I suggested taking meds with applesauce /  Yogurt to free up fluids for when thirsty. She is willing to try all. We reviewed importance of med compliance and follow up. She is accepting of follow up arranged for Mon Jun 14 with Kalpana Mcmullen. She is active  and voices no barriers to attending. Overall, pt was very engaged and offered excellent insight into her daily habits. She mentioned getting \"disability going\" and asked about return to work. I deferred to cardiology who had not yet rounded this date. 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 She is hopeful to get her vacation / sick day situation worked out as quickly as possible. HF RNs will continue to follow and assist with transition of care at discharge. CURRENT DIET: ADULT DIET; Regular; Low Sodium (2 gm); 1500 ml    EDUCATIONAL PACKETS PROVIDED- PRINTED FROM [facility]. Titles and material given:  Yes   [x]  What is Heart Failure?   [x]  Heart Failure: Warning Signs of a Flare-Up  [x]  Heart Failure: Making Changes to Your Diet  [x]  Heart Failure: Medications to Help Your Heart   [x]  Other: weight log, AHA HF zones sheet, [facility] PCP brochure, [address] [facility] brochure     PATIENT/CAREGIVER TEACHING:    Level of patient/caregiver understanding able to:   [x] Verbalize understanding   [] Demonstrate understanding       [x] Teach back        [x] Needs reinforcement     []  Other:      TEACHING TIME:  60 minutes       Plan:       DISCHARGE PLAN:  Placement for patient upon discharge: home with support   Hospice Care:  no  Code Status: Full Code  Discharge appointment scheduled: Yes     RECOMMENDATIONS:   [x]  Encourage to call Heart Failure Resource Line with any questions or concerns. [x]  Educate further Ms. Molina's on fluid restriction 48 oz- 64 oz during inpatient stay so she can understand how to measure intake at home. [x]  Continue to educate on S/S of Heart Failure. [x]  Emphasize daily weights, diet, and if changes, to call Heart Failure Resource Line  [x]  Other:  Cardiac Rehab referral : pt declines as not covered by insurance   [address] referral: pt declines -- \"too overwhelmed\". Encouraged to reconsider and inform NP at follow up if she wants to enroll. She is also aware she could use infusion services if needed.          Electronically signed by Jazzmine Dalal RN, BSN CHFN  on 6/8/2021 at 2:04 PM

## 2021-06-08 NOTE — PLAN OF CARE
Problem: Falls - Risk of:  Goal: Will remain free from falls  Description: Will remain free from falls  Outcome: Ongoing  Bed locked and in lowest position. Non-skid socks on pt. Call light within reach. Siderails x2. Problem: OXYGENATION/RESPIRATORY FUNCTION  Goal: Patient will achieve/maintain normal respiratory rate/effort  Description: Respiratory rate and effort will be within normal limits for the patient  Outcome: Ongoing  Respirations regular and unlabored. On room air. Problem: HEMODYNAMIC STATUS  Goal: Patient has stable vital signs and fluid balance  Outcome: Ongoing  VS stable. I&O being monitored. Problem: ACTIVITY INTOLERANCE/IMPAIRED MOBILITY  Goal: Mobility/activity is maintained at optimum level for patient  Outcome: Ongoing   Pt tolerating ambulating in room well.

## 2021-06-08 NOTE — PROGRESS NOTES
[facility]  Nephrology   Progress Note        We are following the patient for Hyponatremia  61 y/o WF with h/o HTN actove smoker presented to ER with SOB  She developed SOB on Thursday morning that woke her up from sleep Has had \"congestion\" for few days Due to persistent SOB she was seen in urgent care and sent to ER  Noted to have Na+ 117 meq on admission  Denies excessive fluid intake Has had LE swelling  Denies HA dizziness, recent falls or difficulty concentrating  Had been on Lisinopril HCTZ for many years Takes Ibuprofen as needed  SUBJECTIVE:    HPI:  Breathing improved. No CP. Serum sodium improving slowly. ROS:  In bed. [address]:  medications reviewed.     OBJECTIVE:     PHYSICAL:    /78   Pulse 106   Temp 97.6 °F (36.4 °C) (Axillary)   Resp 18   Ht 5' 2\" (1.575 m)   Wt 121 lb 0.5 oz (54.9 kg)   SpO2 98%   BMI 22.14 kg/m²   24HR INTAKE/OUTPUT:      Intake/Output Summary (Last 24 hours) at 6/8/2021 1534  Last data filed at 6/8/2021 1426  Gross per 24 hour   Intake 750 ml   Output 3750 ml   Net -3000 ml       CONSTITUTIONAL:  awake, alert, cooperative, no apparent distress, and appears stated age  LUNGS:  Crackles at bases bilaterally   CARDIOVASCULAR:  Normal apical impulse, regular rate and rhythm, normal S1 and S2  ABDOMEN:  No scars, normal bowel sounds, soft, non-distended, non-tender, no masses palpated, no hepatosplenomegally  NEUROLOGIC:  alert, oriented, normal speech, no focal findings or movement disorder noted  SKIN: no bruising or bleeding  EXTREMITIES: trace to 1+ bilateral pedal edema    Medications     sodium chloride          Data      CBC:   Recent Labs     06/06/21  0325 06/07/21  0342 06/08/21  0425   WBC 10.3 7.9 7.4   RBC 2.66* 2.79* 3.02*   HGB 9.3* 9.7* 10.7*   HCT 26.8* 28.0* 30.4*    275 322     BMP:    Recent Labs     06/06/21  0325 06/07/21  0342 06/07/21  1546 06/07/21  2154 06/08/21  0425   *  124* 126*  127* 129* 131* 129* K 3.4* 3.7  --   --  3.3*   CL 87* 89*  --   --  91*   CO2 23 27  --   --  28   BUN 12 8  --   --  9   CREATININE 0.6 0.7  --   --  0.6   CALCIUM 7.8* 8.0*  --   --  8.7   GLUCOSE 78 86  --   --  70     Phosphorus:    Recent Labs     06/06/21  0325 06/07/21  0342 06/08/21  0425   PHOS 2.9 3.2 5.0*     Magnesium:  No results for input(s): MG in the last 72 hours. ASSESSMENT/PLAN    1-Hyponatremia appears Hypervolemic with evidence of CHF, elevated BNP Also has been on HCTZ Urine Na+ 85 Urine osm 253 consistent with Hypervolemic Hyponatremia Na+ improving with diuresis TSH normal Uric Acid 7.9 mg    - Continue diuresis and monitor   - FR 1.5 liters/day   - goal Na >= 130    2-CHF Still appears to be in CHF ECHO noted EF 40 % Increase Lasix to 80 mg q12 HOURS    - s/p [facility] 06/07/21 that showed LVEF 40%; 50% stenosis of OM1, first septal , and RCA    3-Active smoker   - needs to quit    4-anemia   - iron studies slightly low, so start FeSO4    Patient can be discharged from renal standpoint. No renal follow-up necessary at this time.

## 2021-06-11 ENCOUNTER — FOLLOWUP TELEPHONE ENCOUNTER (OUTPATIENT)
Dept: INPATIENT UNIT | Age: 61
End: 2021-06-11

## 2021-06-11 NOTE — PROGRESS NOTES
Contacted patient for 72 hour HF hospital follow up. She answered phone. Recalls our conversation while in the hospital.  She reports she is feeling well - has \"been taking it easy\". She states she has been weighing daily and has noticed no significant change. She was able to speak to 3/5 rule and s/sx of worsening HF. Praise given. She reports she was able to  all of her medicine \"except the little red pill\". We reviewed her discharge med list and she confirmed she had Lipitor, Coreg, and Demadex (which were all prescribed new). She thinks the missing one is the iron supplement. She plans to pick it up tomorrow. Pt is aware of follow up on Mon 6/14 with Ellis Boyd NP. She voices no barriers to attending. I encouraged her to call with any concerns but reiterated all weight / flare / med issues should be directed to [facility]. She voices understanding.

## 2021-06-12 LAB
ALPHA-1 ANTITRYPSIN PHENOTYPE: NORMAL
ALPHA-1 ANTITRYPSIN: 184 MG/DL (ref 90–200)

## 2021-06-14 ENCOUNTER — OFFICE VISIT (OUTPATIENT)
Dept: CARDIOLOGY CLINIC | Age: 61
End: 2021-06-14
Payer: COMMERCIAL

## 2021-06-14 ENCOUNTER — TELEPHONE (OUTPATIENT)
Dept: PHARMACY | Age: 61
End: 2021-06-14

## 2021-06-14 ENCOUNTER — TELEPHONE (OUTPATIENT)
Dept: PULMONOLOGY | Age: 61
End: 2021-06-14

## 2021-06-14 VITALS
OXYGEN SATURATION: 97 % | SYSTOLIC BLOOD PRESSURE: 104 MMHG | HEART RATE: 92 BPM | BODY MASS INDEX: 21.9 KG/M2 | WEIGHT: 119 LBS | HEIGHT: 62 IN | DIASTOLIC BLOOD PRESSURE: 70 MMHG

## 2021-06-14 DIAGNOSIS — I27.20 PULMONARY HYPERTENSION (HCC): ICD-10-CM

## 2021-06-14 DIAGNOSIS — I50.21 ACUTE SYSTOLIC CHF (CONGESTIVE HEART FAILURE), NYHA CLASS 3 (HCC): ICD-10-CM

## 2021-06-14 DIAGNOSIS — I50.21 ACUTE SYSTOLIC CHF (CONGESTIVE HEART FAILURE), NYHA CLASS 3 (HCC): Primary | ICD-10-CM

## 2021-06-14 LAB
ANION GAP SERPL CALCULATED.3IONS-SCNC: 14 MMOL/L (ref 3–16)
BUN BLDV-MCNC: 10 MG/DL (ref 7–20)
CALCIUM SERPL-MCNC: 8.6 MG/DL (ref 8.3–10.6)
CHLORIDE BLD-SCNC: 89 MMOL/L (ref 99–110)
CO2: 32 MMOL/L (ref 21–32)
CREAT SERPL-MCNC: 0.8 MG/DL (ref 0.6–1.1)
GFR AFRICAN AMERICAN: >60
GFR NON-AFRICAN AMERICAN: >60
GLUCOSE BLD-MCNC: 70 MG/DL (ref 70–99)
POTASSIUM SERPL-SCNC: 3.1 MMOL/L (ref 3.5–5.1)
PRO-BNP: 769 PG/ML (ref 0–124)
SODIUM BLD-SCNC: 135 MMOL/L (ref 136–145)

## 2021-06-14 PROCEDURE — 99214 OFFICE O/P EST MOD 30 MIN: CPT | Performed by: NURSE PRACTITIONER

## 2021-06-14 NOTE — TELEPHONE ENCOUNTER
Zeynep called stating she was consulted by Dr John Cunningham at the hospital and would like to make a f/u appt for her COPD and she was told she needs to be evaluated for TANNER. There are no openings until first part of September. Is there a date/time you could see her sooner?

## 2021-06-14 NOTE — PATIENT INSTRUCTIONS
Instructions:   1. Medications: No changes  2. Labs: BNP,BMP  3. Follow up: 2 weeks  4. Daily weight: Call for increase 3 lbs/day or 5 lbs/week. 5. 2 gm sodium diet:  6. Fluid Restriction: 64 oz. 7.Referred to [email or web address] for Education:  Yes  8. Sleep Eval  9.  Pulm Function Test as ordered at discharge

## 2021-06-14 NOTE — PROGRESS NOTES
The [facility], [address] [place] [address] [id number] [facility] HospitalDonald manning [place] [address]  [phone]    PrimaryCare Doctor:  Claudia Hebert MD  Primary Cardiologist:     Chief Complaint   Patient presents with    Follow-up     7 day CHF f/u     History of Present Illness:  Eugenia Sparks is a 62 y.o. female with PMH HTN, seasonal allergies, TANNER (non compliant with CPAP), COPD, smoker, HLP. Patient was admitted to [facility] 6/4/2021 with sudden onset SOB. Woke up on 6/4 feeling SOB. Associated with cough, tachycardia, BLE edema, fatigue. Found new heart failure and hypervolemic hyponatremia. ECHO indicated severe anterolateral hypokinesis ischemia and EKG with anterolateral ST changes. Underwent R & LHC yesteday> non occlusive CAD. Started on ferrous sulfate for RISSA. Patient presents to [facility] cardiology for follow up for heart failure. SOB and activity tolerance improved. She does get SOB with activity such as walking into her appointment today. Improves with rest. Associated with generalized fatigue. Denies CP, LH, dizzy, syncope, palpitations. Home wt 117.6 today, fluctuating within a few lbs since discharge. Wt 121lbs at discharge. She is following 64 fl oz restriction and 2gm Na diet. Review of Systems:   General: Denies fever, chills  Skin: Denies skin changes, rash, itching, lesions.   HEENT: Denies headache, dizziness, vision changes, nosebleeds, sore throat, nasal drainage  RESP: Denies cough, sputum,  wheeze  CARD: Denies palpitations,  murmur  GI:Denies nausea, vomiting, heartburn, loss of appetite, change in bowels  : Denies frequency, pain, incontinence, polyuria  VASC: Denies claudication, leg cramps, clots  MUSC/SKEL: Denies pain, stiffness, arthritis  PSYCH: Denies anxiety, depression, stress  NEURO: Denies numbness, tingling, weakness,change in mood or memory  HEME: Denies abn bruising, bleeding, anemia  ENDO: Denies intolerance to heat, cold, excessive thirst or hunger, hx thyroid disease    /70   Pulse 92   Ht 5' 2\" (1.575 m)   Wt 119 lb (54 kg)   SpO2 97%   BMI 21.77 kg/m²   Wt Readings from Last 3 Encounters:   06/14/21 119 lb (54 kg)   06/08/21 121 lb 0.5 oz (54.9 kg)   03/27/18 153 lb 3.5 oz (69.5 kg)       Physical Exam:  GEN: Appears frail, no acute distress  SKIN: Pink, warm, dry. Nails without clubbing. HEENT: PERRLA. Normocephalic, atraumatic. Neck supple. No adenopathy. LUNG: AP diameter normal. Diminished BS bilateral. No wheeze, rales, or ronchi. Respiratory effort normal.  HEART: S1S2 A/R. No JVD. No carotid bruit. No murmur, rub or gallop. ABD: Soft, nontender. +BS X 4 quads. No hepatomegaly. EXT: Radial and pedal pulses 2+ and symmetric. Without varicosities. No edema. MUSCSKEL: Good ROM X4 extremities. No deformity. NEURO: A/O X3. Calm and cooperative. Past Medical History:   has a past medical history of Anxiety, GERD (gastroesophageal reflux disease), Gout, Hyperlipidemia, and Hypertension. Surgical History:   has a past surgical history that includes Knee arthroscopy; Carpal tunnel release; bladder suspension; Tonsillectomy; Carpal tunnel release (7-12-10); Carpal tunnel release (Left wrist); joint replacement; sinus surgery; and Foot surgery (3/2013). Social History:   reports that she has been smoking cigarettes. She has a 35.00 pack-year smoking history. She has never used smokeless tobacco. She reports current alcohol use of about 14.0 standard drinks of alcohol per week. She reports that she does not use drugs. Family History:   Family History   Problem Relation Age of Onset    Diabetes Mother     High Blood Pressure Mother     Heart Disease Maternal Grandmother        HomeMedications:  Prior to Admission medications    Medication Sig Start Date End Date Taking?  Authorizing Provider   budesonide-formoterol (SYMBICORT) 160-4.5 MCG/ACT AERO Inhale 2 puffs into the lungs 2 times daily 6/8/21  Yes Niurka Carias MD ferrous sulfate 324 (65 Fe) MG EC tablet Take 1 tablet by mouth 2 times daily (with meals) 6/8/21  Yes Saul Alvarado MD   nicotine (NICODERM CQ) 21 MG/24HR Place 1 patch onto the skin daily 6/9/21  Yes Saul Alvarado MD   doxycycline hyclate (VIBRA-TABS) 100 MG tablet Take 1 tablet by mouth every 12 hours for 10 days 6/8/21 6/18/21 Yes Saul Alvarado MD   torsemide (DEMADEX) 100 MG tablet Take 0.5 tablets by mouth daily 6/8/21  Yes Saul Alvarado MD   carvedilol (COREG) 3.125 MG tablet Take 1 tablet by mouth 2 times daily 6/8/21  Yes Saul Alvarado MD   atorvastatin (LIPITOR) 20 MG tablet Take 1 tablet by mouth daily 6/8/21  Yes Saul Alvarado MD   ALPRAZolam (ALPRAZOLAM XR) 0.5 MG extended release tablet Take 0.5 mg by mouth daily as needed (anxiety). Yes Historical Provider, MD   allopurinol (ZYLOPRIM) 300 MG tablet Take 300 mg by mouth daily   Yes Historical Provider, MD   fluticasone (FLONASE) 50 MCG/ACT nasal spray 2 sprays by Each Nostril route daily   Yes Historical Provider, MD   Multiple Vitamins-Minerals (THERAPEUTIC MULTIVITAMIN-MINERALS) tablet Take 1 tablet by mouth daily   Yes Historical Provider, MD   levocetirizine (XYZAL) 5 MG tablet Take 5 mg by mouth daily. 6/28/10  Yes Historical Provider, MD   GuaiFENesin (MUCINEX PO) Take 400 mg by mouth daily. 6/28/10  Yes Historical Provider, MD   aspirin 81 MG EC tablet Take 81 mg by mouth daily. Yes Historical Provider, MD   montelukast (SINGULAIR) 10 MG tablet Take 10 mg by mouth daily    Yes Historical Provider, MD   esomeprazole (NEXIUM) 20 MG capsule Take 20 mg by mouth every morning (before breakfast). Yes Historical Provider, MD        Allergies:  Augmentin [amoxicillin-pot clavulanate] and Pork-derived products       LABS: Results reviewed with patient today.     CBC:   Lab Results   Component Value Date    WBC 7.4 06/08/2021    WBC 7.9 06/07/2021    WBC 10.3 06/06/2021    RBC 3.02 06/08/2021    RBC 2.79 06/07/2021    RBC 2.66 06/06/2021    HGB 10.7 06/08/2021    HGB 9.7 06/07/2021    HGB 9.3 06/06/2021    HCT 30.4 06/08/2021    HCT 28.0 06/07/2021    HCT 26.8 06/06/2021    .9 06/08/2021    .3 06/07/2021    .8 06/06/2021    RDW 14.0 06/08/2021    RDW 13.9 06/07/2021    RDW 13.5 06/06/2021     06/08/2021     06/07/2021     06/06/2021     BMP:  Lab Results   Component Value Date     06/08/2021     06/07/2021     06/07/2021    K 3.3 06/08/2021    K 3.7 06/07/2021    K 3.4 06/06/2021    K 4.3 06/04/2021    CL 91 06/08/2021    CL 89 06/07/2021    CL 87 06/06/2021    CO2 28 06/08/2021    CO2 27 06/07/2021    CO2 23 06/06/2021    PHOS 5.0 06/08/2021    PHOS 3.2 06/07/2021    PHOS 2.9 06/06/2021    BUN 9 06/08/2021    BUN 8 06/07/2021    BUN 12 06/06/2021    CREATININE 0.6 06/08/2021    CREATININE 0.7 06/07/2021    CREATININE 0.6 06/06/2021     BNP:   Lab Results   Component Value Date    PROBNP 7,979 06/08/2021    PROBNP 32,553 06/04/2021       Parameters:   > 450 pg/mL under age 48  > 900 pg/mL ages 54-65  > 1800 pg/mL over age 76    Iron Studies:    Lab Results   Component Value Date    TIBC 134 06/06/2021     GLUCOSE: No results for input(s): GLUCOSE in the last 72 hours. LIVER PROFILE:   Lab Results   Component Value Date    AST 46 06/05/2021    ALT 29 06/05/2021    LABALBU 2.9 06/08/2021    BILIDIR 0.7 06/05/2021    BILITOT 1.1 06/05/2021    ALKPHOS 104 06/05/2021     PT/INR: No results found for: PROTIME, INR  Cardiac Enzymes:  Lab Results   Component Value Date    CKMB 2.18 04/23/2010    TROPONINI 0.02 06/04/2021     FASTING LIPID PANEL:No results found for: CHOL, HDL, LDLCALC, TRIG    Cardiac Imaging: Reports reviewed with patient today.      EKG:    ECHO: 6/5/21:  Overall left ventricular systolic function appears mildly reduced.   Ejection fraction is visually estimated to be 40%.   Severe andrew-lateral hypokinesis   Mid to apical akinesis   Grade II diastolic dysfunction with elevated LV filling pressures.   Normal right ventricular size.   Right ventricular systolic function is reduced .     CATH:  Cardiac Angiography: [facility] 6/7/2021  FINDINGS:  1.  Evidence of persistent right-sided volume overload with a right  atrial pressure that was low at 5 mmHg but a pulmonary capillary wedge  pressure elevated at 20 mmHg. 2.  Evidence of pulmonary hypertension with an instantaneous pressure of  38/17 for a mean pulmonary arterial pressure of 28.  3.  Mildly reduced cardiac output by thermodilution at 4.53 liters per  minute with a cardiac index of 2.89 liters per kilogram per minute. 4.  Pulmonary vascular resistance of 139 dynes per second. 5.  Normal mixed venous oxygen saturations at 66% in the right atrium  and 64% in the pulmonary artery. 6.  Left ventricular end-diastolic pressure of 23 mmHg. 7.  Right-dominant coronary arterial system with no obstructive coronary  lesions.  The left main is free of disease.  The circumflex is smaller. OM1 has 50% stenosis.  The left anterior descending artery is calcified  in the proximal and midportion with a 50% stenosis at the first septal   and the first diagonal branch but no flow limitation. Further, there is 50% stenosis in the midportion.  The right coronary  artery is large with 50% mid RCA stenosis. 8.  Mildly reduced LV systolic function.  LV ejection fraction of 40%  and mild apical hypokinesis in a pattern likely consistent with a  stress-induced cardiomyopathy. 9.  No gradient across the aortic valve on pullback to suggest aortic  Stenosis. Assessment/Plan:     1.) New onset combined systolic and diastolic heart failure, EF 40%: Improved, NICM. SOB multifactorial with COPD. Decreased BLE edema and fatigue. Continue medical management w/ GDMT as tolerates. Will check BNP, BMP today.    NYHA Class III              Stage C  Diuretic: torsemide 50mg Daily  Beta Blocker:coreg  ACEi/ARB/ARNI: start after reviewing Bmp/renal fx  Aldosterone Antagonist: start after reviewing Bmp/renal fx  SGLT2 Inhibitor: after GDMT oprimized  2gm Na diet, daily weight, 64 oz fluid restriction  Avoid NSAIDS and other nephrotoxic meds  Cardiac Rehab: Not indicated for EF>35%  ICD:Not indicated for EF>35%  Wellness Center Referral: yes - OP     2.) Non ischemic CAD:   DAPT: Continue asa  Beta Blocker: coreg  Lipid management/high intensity statin: lipitor  Risk factor management: high blood pressure, high cholesterol, Diabetes, smoking, obesity, family hx  Lifestyle modification: Heart healthy diet, regular exercise, weight loss, smoking cessation, stress reduction     3.) Hypertension:   Goal BP <130/80. Not met - adding ACEi/ARB/ARNI if renal fx OK  Non pharmacologic interventions include:   -weight loss  -heart healthy low sodium and low fat diet that consist of mostly fruits, vegetables and grains (Dash diet)  -limited amount of alcohol (no more than 1 drink/day for women, 2 drinks/day for men)  -regular physical activity  -no smoking  -stress reduction     4.) TANNER: Needs repeat sleep study. HF will benefit from treating TANNER with CPAP if needed. Initially referred to Sleep Medicine. Also needs to Follow up with Pulm and complete PFTs ordered at discharge. Will defer TANNER tx to Pulm.     5.) Anemia, Iron Deficient:   Cont ferrous sulfate    6.) Smoking Addiction: Trying to quit, wearing nicoderm patch  Smoking cessation counseling provided > 3 min. Benefits of smoking cessation include decrease risk for lung disease, heart disease, stroke, and peripheral vascular disease. Recommendations include medications such as Chantix, Zyban or nicotine replacement products, counseling and resources such as 1-800-QUIT-NOW. Instructions:   1. Medications: No changes  2. Labs: BNP,BMP  3. Follow up: 2 weeks  4. Daily weight: Call for increase 3 lbs/day or 5 lbs/week. 5. 2 gm sodium diet:  6. Fluid Restriction: 64 oz.   7.Referred to [facility] for Education:  Yes  8.  Pulm Referral - Needs PFTs and Sleep Eval    I appreciate the opportunity of cooperating in the care of this individual.    ADILIA Gavin, FRANCISCO J - CNP, CNP, 6/14/2021,9:00 AM

## 2021-06-15 ENCOUNTER — TELEPHONE (OUTPATIENT)
Dept: PHARMACY | Age: 61
End: 2021-06-15

## 2021-06-15 RX ORDER — POTASSIUM CHLORIDE 20 MEQ/1
20 TABLET, EXTENDED RELEASE ORAL DAILY
Qty: 90 TABLET | Refills: 3 | Status: SHIPPED | OUTPATIENT
Start: 2021-06-15 | End: 2022-02-16

## 2021-06-15 NOTE — TELEPHONE ENCOUNTER
Outbound call to patient to schedule new heart failure visit with the outpatient wellness center.   Virtual visit scheduled for Monday, 6/21/2021 at 1:00 PM

## 2021-06-16 ENCOUNTER — TELEPHONE (OUTPATIENT)
Dept: CARDIOLOGY CLINIC | Age: 61
End: 2021-06-16

## 2021-06-16 NOTE — TELEPHONE ENCOUNTER
Patient dropped off Disability paper work to the . I put them in the folder in the back.     Yefri Rodas would like a call back when the papers are ready for  [phone]

## 2021-06-17 ENCOUNTER — OFFICE VISIT (OUTPATIENT)
Dept: INTERNAL MEDICINE CLINIC | Age: 61
End: 2021-06-17
Payer: COMMERCIAL

## 2021-06-17 VITALS
BODY MASS INDEX: 22.31 KG/M2 | HEART RATE: 91 BPM | WEIGHT: 122 LBS | OXYGEN SATURATION: 96 % | SYSTOLIC BLOOD PRESSURE: 100 MMHG | DIASTOLIC BLOOD PRESSURE: 60 MMHG

## 2021-06-17 DIAGNOSIS — I50.43 CHF (CONGESTIVE HEART FAILURE), NYHA CLASS I, ACUTE ON CHRONIC, COMBINED (HCC): ICD-10-CM

## 2021-06-17 DIAGNOSIS — J41.0 SIMPLE CHRONIC BRONCHITIS (HCC): ICD-10-CM

## 2021-06-17 DIAGNOSIS — I10 ESSENTIAL HYPERTENSION: Chronic | ICD-10-CM

## 2021-06-17 PROCEDURE — 99495 TRANSJ CARE MGMT MOD F2F 14D: CPT | Performed by: NURSE PRACTITIONER

## 2021-06-17 PROCEDURE — 1111F DSCHRG MED/CURRENT MED MERGE: CPT | Performed by: NURSE PRACTITIONER

## 2021-06-17 SDOH — ECONOMIC STABILITY: FOOD INSECURITY: WITHIN THE PAST 12 MONTHS, YOU WORRIED THAT YOUR FOOD WOULD RUN OUT BEFORE YOU GOT MONEY TO BUY MORE.: NEVER TRUE

## 2021-06-17 SDOH — ECONOMIC STABILITY: FOOD INSECURITY: WITHIN THE PAST 12 MONTHS, THE FOOD YOU BOUGHT JUST DIDN'T LAST AND YOU DIDN'T HAVE MONEY TO GET MORE.: NEVER TRUE

## 2021-06-17 SDOH — ECONOMIC STABILITY: TRANSPORTATION INSECURITY
IN THE PAST 12 MONTHS, HAS LACK OF TRANSPORTATION KEPT YOU FROM MEETINGS, WORK, OR FROM GETTING THINGS NEEDED FOR DAILY LIVING?: NO

## 2021-06-17 SDOH — ECONOMIC STABILITY: TRANSPORTATION INSECURITY
IN THE PAST 12 MONTHS, HAS THE LACK OF TRANSPORTATION KEPT YOU FROM MEDICAL APPOINTMENTS OR FROM GETTING MEDICATIONS?: NO

## 2021-06-17 ASSESSMENT — ENCOUNTER SYMPTOMS
SINUS PRESSURE: 0
BACK PAIN: 0
ABDOMINAL PAIN: 0
SHORTNESS OF BREATH: 0
WHEEZING: 0
DIARRHEA: 0
COUGH: 0
SINUS PAIN: 0
CONSTIPATION: 0
COLOR CHANGE: 0

## 2021-06-17 ASSESSMENT — PATIENT HEALTH QUESTIONNAIRE - PHQ9
1. LITTLE INTEREST OR PLEASURE IN DOING THINGS: 0
SUM OF ALL RESPONSES TO PHQ QUESTIONS 1-9: 0
SUM OF ALL RESPONSES TO PHQ QUESTIONS 1-9: 0
SUM OF ALL RESPONSES TO PHQ9 QUESTIONS 1 & 2: 0
SUM OF ALL RESPONSES TO PHQ QUESTIONS 1-9: 0
2. FEELING DOWN, DEPRESSED OR HOPELESS: 0

## 2021-06-17 ASSESSMENT — SOCIAL DETERMINANTS OF HEALTH (SDOH): HOW HARD IS IT FOR YOU TO PAY FOR THE VERY BASICS LIKE FOOD, HOUSING, MEDICAL CARE, AND HEATING?: NOT HARD AT ALL

## 2021-06-17 NOTE — PROGRESS NOTES
Subjective:      Patient ID: Savannah Winn is a 62 y.o. y.o. female. HPI    Patient is here for a new patient visit. Chief Complaint   Patient presents with    Follow-Up from Hospital     CHF       Past Medical History:   Diagnosis Date    Anxiety     GERD (gastroesophageal reflux disease)     Gout     had once years ago    Hyperlipidemia     at last check it was normal    Hypertension        Past Surgical History:   Procedure Laterality Date    BLADDER SUSPENSION      CARPAL TUNNEL RELEASE      right    CARPAL TUNNEL RELEASE  7-12-10    Left    CARPAL TUNNEL RELEASE  Left wrist    FOOT SURGERY  3/2013    EXCISION NEUROMA 3RD NERVE LEFT FOOT    JOINT REPLACEMENT      right    KNEE ARTHROSCOPY      left x1, right x 1    SINUS SURGERY      TONSILLECTOMY         Social History     Socioeconomic History    Marital status:      Spouse name: Not on file    Number of children: Not on file    Years of education: Not on file    Highest education level: Not on file   Occupational History    Not on file   Tobacco Use    Smoking status: Current Every Day Smoker     Packs/day: 1.00     Years: 35.00     Pack years: 35.00     Types: Cigarettes    Smokeless tobacco: Never Used   Vaping Use    Vaping Use: Never assessed   Substance and Sexual Activity    Alcohol use: Yes     Alcohol/week: 14.0 standard drinks     Types: 14 Glasses of wine per week    Drug use: No    Sexual activity: Not on file   Other Topics Concern    Not on file   Social History Narrative    Not on file     Social Determinants of Health     Financial Resource Strain: Low Risk     Difficulty of Paying Living Expenses: Not hard at all   Food Insecurity: No Food Insecurity    Worried About Running Out of Food in the Last Year: Never true    [address] in the Last Year: Never true   Transportation Needs: No Transportation Needs    Lack of Transportation (Medical): No    Lack of Transportation (Non-Medical):  No

## 2021-06-17 NOTE — ASSESSMENT & PLAN NOTE
Following with cardiology   Doing well   Tolerating medication regimen   No swelling, SOB, orthopnea

## 2021-06-17 NOTE — PROGRESS NOTES
mouth daily             budesonide-formoterol (SYMBICORT) 160-4.5 MCG/ACT AERO  Inhale 2 puffs into the lungs 2 times daily             carvedilol (COREG) 3.125 MG tablet  Take 1 tablet by mouth 2 times daily             doxycycline hyclate (VIBRA-TABS) 100 MG tablet  Take 1 tablet by mouth every 12 hours for 10 days             esomeprazole (NEXIUM) 20 MG capsule  Take 20 mg by mouth every morning (before breakfast). ferrous sulfate 324 (65 Fe) MG EC tablet  Take 1 tablet by mouth 2 times daily (with meals)             fluticasone (FLONASE) 50 MCG/ACT nasal spray  2 sprays by Each Nostril route daily             GuaiFENesin (MUCINEX PO)  Take 400 mg by mouth daily. levocetirizine (XYZAL) 5 MG tablet  Take 5 mg by mouth daily.              montelukast (SINGULAIR) 10 MG tablet  Take 10 mg by mouth daily              Multiple Vitamins-Minerals (THERAPEUTIC MULTIVITAMIN-MINERALS) tablet  Take 1 tablet by mouth daily             nicotine (NICODERM CQ) 21 MG/24HR  Place 1 patch onto the skin daily             potassium chloride (KLOR-CON M) 20 MEQ extended release tablet  Take 1 tablet by mouth daily             torsemide (DEMADEX) 100 MG tablet  Take 0.5 tablets by mouth daily                   Medications marked \"taking\" at this time  Outpatient Medications Marked as Taking for the 6/17/21 encounter (Office Visit) with FRANCISCO J Hollingsworth - CNP   Medication Sig Dispense Refill    potassium chloride (KLOR-CON M) 20 MEQ extended release tablet Take 1 tablet by mouth daily 90 tablet 3    budesonide-formoterol (SYMBICORT) 160-4.5 MCG/ACT AERO Inhale 2 puffs into the lungs 2 times daily 1 Inhaler 3    ferrous sulfate 324 (65 Fe) MG EC tablet Take 1 tablet by mouth 2 times daily (with meals) 60 tablet 2    nicotine (NICODERM CQ) 21 MG/24HR Place 1 patch onto the skin daily 30 patch 3    doxycycline hyclate (VIBRA-TABS) 100 MG tablet Take 1 tablet by mouth every 12 hours for 10 days 20 tablet 0  torsemide (DEMADEX) 100 MG tablet Take 0.5 tablets by mouth daily 30 tablet 1    carvedilol (COREG) 3.125 MG tablet Take 1 tablet by mouth 2 times daily 60 tablet 3    atorvastatin (LIPITOR) 20 MG tablet Take 1 tablet by mouth daily 30 tablet 3    ALPRAZolam (ALPRAZOLAM XR) 0.5 MG extended release tablet Take 0.5 mg by mouth daily as needed (anxiety).  allopurinol (ZYLOPRIM) 300 MG tablet Take 300 mg by mouth daily      fluticasone (FLONASE) 50 MCG/ACT nasal spray 2 sprays by Each Nostril route daily      Multiple Vitamins-Minerals (THERAPEUTIC MULTIVITAMIN-MINERALS) tablet Take 1 tablet by mouth daily      levocetirizine (XYZAL) 5 MG tablet Take 5 mg by mouth daily.  GuaiFENesin (MUCINEX PO) Take 400 mg by mouth daily.  aspirin 81 MG EC tablet Take 81 mg by mouth daily.  montelukast (SINGULAIR) 10 MG tablet Take 10 mg by mouth daily       esomeprazole (NEXIUM) 20 MG capsule Take 20 mg by mouth every morning (before breakfast). Medications patient taking as of now reconciled against medications ordered at time of hospital discharge: Yes    Chief Complaint   Patient presents with    Follow-Up from Hospital     CHF       HPI    Inpatient course: Discharge summary reviewed- see chart. Interval history/Current status: She is doing well since her discharge. She had a follow-up appointment with cardiology last week. Shortness of breath is resolved. She is compliant with her Symbicort. Denies S OB, orthopnea, leg swelling. She is doing well with her medications. Tolerating without side effect. She does report one episode of dizziness upon standing that occurred a couple days ago. Lasted a few seconds and resolved. She has not had any dizziness since. No chest pain, palpitations. Patient has an appointment to establish with a PCP closer to her location in a few weeks. Review of Systems   Constitutional: Negative for chills, fatigue and fever.    HENT: Negative for congestion, sinus pressure and sinus pain. Respiratory: Negative for cough, shortness of breath and wheezing. Cardiovascular: Negative for chest pain and palpitations. Gastrointestinal: Negative for abdominal pain, constipation and diarrhea. Musculoskeletal: Negative for arthralgias, back pain and myalgias. Skin: Negative for color change, pallor and rash. Neurological: Negative for dizziness, syncope, weakness, light-headedness and headaches. Psychiatric/Behavioral: Negative for behavioral problems, confusion and sleep disturbance. The patient is not nervous/anxious. Vitals:    06/17/21 0958   BP: 100/60   Site: Left Upper Arm   Position: Sitting   Cuff Size: Medium Adult   Pulse: 91   SpO2: 96%   Weight: 122 lb (55.3 kg)     Body mass index is 22.31 kg/m². Wt Readings from Last 3 Encounters:   06/17/21 122 lb (55.3 kg)   06/14/21 119 lb (54 kg)   06/08/21 121 lb 0.5 oz (54.9 kg)     BP Readings from Last 3 Encounters:   06/17/21 100/60   06/14/21 104/70   06/08/21 129/78       Physical Exam  Constitutional:       Appearance: She is well-developed. HENT:      Head: Normocephalic and atraumatic. Eyes:      Conjunctiva/sclera: Conjunctivae normal.      Pupils: Pupils are equal, round, and reactive to light. Neck:      Thyroid: No thyromegaly. Vascular: No JVD. Cardiovascular:      Rate and Rhythm: Normal rate and regular rhythm. Heart sounds: Normal heart sounds. Pulmonary:      Effort: Pulmonary effort is normal. No respiratory distress. Breath sounds: Normal breath sounds. No wheezing. Abdominal:      General: Bowel sounds are normal.      Palpations: Abdomen is soft. Musculoskeletal:         General: No deformity. Normal range of motion. Cervical back: Normal range of motion and neck supple. Skin:     General: Skin is warm and dry. Capillary Refill: Capillary refill takes less than 2 seconds.    Neurological:      Mental Status: She is alert and oriented to person, place, and time.    Psychiatric:         Behavior: Behavior normal.          Assessment/Plan:  CHF (congestive heart failure), NYHA class I, acute on chronic, combined [facility])  Following with cardiology   Doing well   Tolerating medication regimen   No swelling, SOB, orthopnea     HTN (hypertension)  Stable, controlled   Continue current regimen    Simple chronic bronchitis (HCC)  No SOB or wheezing   Continue Symbicort   Follow up with pulmonology as planned       Medical Decision Making: moderate complexity

## 2021-06-21 ENCOUNTER — VIRTUAL VISIT (OUTPATIENT)
Dept: PHARMACY | Age: 61
End: 2021-06-21
Payer: COMMERCIAL

## 2021-06-21 DIAGNOSIS — G47.33 OBSTRUCTIVE SLEEP APNEA SYNDROME: Chronic | ICD-10-CM

## 2021-06-21 DIAGNOSIS — I50.43 CHF (CONGESTIVE HEART FAILURE), NYHA CLASS I, ACUTE ON CHRONIC, COMBINED (HCC): Primary | ICD-10-CM

## 2021-06-21 DIAGNOSIS — F17.200 CURRENT SMOKER: ICD-10-CM

## 2021-06-21 PROCEDURE — 99211 OFF/OP EST MAY X REQ PHY/QHP: CPT | Performed by: NURSE PRACTITIONER

## 2021-06-21 RX ORDER — ALBUTEROL SULFATE 90 UG/1
2 AEROSOL, METERED RESPIRATORY (INHALATION) EVERY 6 HOURS PRN
Qty: 1 INHALER | Refills: 3 | Status: SHIPPED | OUTPATIENT
Start: 2021-06-21

## 2021-06-21 NOTE — ASSESSMENT & PLAN NOTE
No symptoms of acute exacerbation. Continue daily weights, sodium and fluid restriction. Emergency action plan reviewed today with patient. Continue management follow-up with cardiology as scheduled.

## 2021-06-21 NOTE — TELEPHONE ENCOUNTER
Forms signed by Dr. Zuleyka Grider. Left detailed message for the patient stating these were completed and left at our  to be picked up. Instructed to call if she would like these faxed to her workplace. Copy made and scanned into patient's chart.

## 2021-06-21 NOTE — ASSESSMENT & PLAN NOTE
Actively working on smoking cessation. Plan is to quit nicotine altogether. Is down from 1 pack/day. Continue NicoDerm patch.

## 2021-06-21 NOTE — PROGRESS NOTES
[facility]  Heart Failure    [address], [address]  Phone: [phone]  Fax: [phone]      NAME: Radha Ferreira  MEDICAL RECORD NUMBER:  [id number]  AGE: 62 y.o. GENDER: female  : 1960  EPISODE DATE:  2021      Chief Complaint   Patient presents with    Congestive Heart Failure        Past Medical History:   Diagnosis Date    Anxiety     GERD (gastroesophageal reflux disease)     Gout     had once years ago    Hyperlipidemia     at last check it was normal    Hypertension       Past Surgical History:   Procedure Laterality Date    BLADDER SUSPENSION      CARPAL TUNNEL RELEASE      right    CARPAL TUNNEL RELEASE  7-12-10    Left    CARPAL TUNNEL RELEASE  Left wrist    FOOT SURGERY  3/2013    EXCISION NEUROMA 3RD NERVE LEFT FOOT    JOINT REPLACEMENT      right    KNEE ARTHROSCOPY      left x1, right x 1    SINUS SURGERY      TONSILLECTOMY       Family History   Problem Relation Age of Onset    Diabetes Mother     High Blood Pressure Mother     Heart Disease Maternal Grandmother      Social History     Tobacco Use    Smoking status: Current Every Day Smoker     Packs/day: 1.00     Years: 35.00     Pack years: 35.00     Types: Cigarettes    Smokeless tobacco: Never Used   Vaping Use    Vaping Use: Never assessed   Substance Use Topics    Alcohol use:  Yes     Alcohol/week: 14.0 standard drinks     Types: 14 Glasses of wine per week    Drug use: No     Ready to quit: Not Answered  Counseling given: Not Answered     Immunization History   Administered Date(s) Administered    Influenza A ([id number]) Vaccine IM 2009    Influenza Whole 10/01/2012     Allergies   Allergen Reactions    Augmentin [Amoxicillin-Pot Clavulanate] Anaphylaxis and Swelling     THROATSWELLS  Pt has taken amoxicillin with no reaction but cannot take augmentin    Pork-Derived Products Swelling        ECHO EF%: 40% Date: 2021 Last Hospitalization: Discharge 6/8/2021    History of TANNER: Yes has a history of sleep apnea does not currently have a CPAP machine but has had one in the past.  Scheduled for visit with sleep medicine on 7/1/2021.  []BIPAP/CPAP use:   []Education about proper cleaning completed today     Subjective   HPI: Ms. Juan Manuel Feliciano is a 62 y.o. female being evaluated by a virtual visit via telephone encounter from patient's home due to [facility] public health emergency. Due to the circumstances physical examination is very limited. This visit was conducted with the patient's consent for billing of her insurance. A telephone visit was necessary today to reduce the patient's exposure to COVID-19 and to provide necessary medical care. This patient has been advised to contact this office, her cardiologist, or primary care physician if symptoms develop or to call 911 for emergency medical treatment if deemed necessary. She has a medical history significant for HTN, TANNER, possible COPD, new diagnosis of combination heart failure. She is also a lifetime smoker of approximately 1 pack/day. She has been weighing herself daily as well as taking her blood pressure. Has a good understanding of checking sodium on package labels and has been doing so religiously she tells me. Limiting her sodium intake to 2000 mg daily and trying to stay under 1800. She is also consuming approximately 60 ounces of fluid per day but understands she can go up to 64 ounces if she would like. She has using NicoDerm patch and this is helped decrease her smoking from 1 pack/day. She is also supplementing with a vape pen which does contain nicotine but she hopes to switch to a nonnicotine system soon. She has pulmonary function scheduled and also follow-up with pulmonary to evaluate for possible COPD diagnosis. States that she does not have frequent exacerbations but I am sending her an albuterol inhaler to her pharmacy in case it is needed.   She does have a history of sleep apnea and did have a CPAP machine in the past but tells me that was years ago. She is scheduled for sleep medicine visit on 7/1/2021. Prior history of gout many years ago but is on allopurinol prophylactically. She denies shortness of breath but states she avoids exertion at all cost.  She has no edema, fatigue, chest pain, palpitations, orthopnea, or abdominal fullness. Has all of her medications and reports good compliance. Understands when to notify cardiology. Review of Systems:   Constitutional: Negative for fatigue, Negative for activity change, appetite change, chills, diaphoresis, fever and unexpected weight change. HENT: Negative for congestion, ear pain, postnasal drip, rhinorrhea, sinus pressure, sinus pain, sneezing, sore throat and trouble swallowing. Eyes: Negative for discharge and redness. Respiratory: + cough, no chest tightness, sputum, shortness of breath and wheezing. Cardiovascular: Negative for chest pain, palpitations and leg swelling. Gastrointestinal: Negative for abdominal distention, abdominal pain, constipation, diarrhea, nausea and vomiting. Genitourinary: Negative for decreased urine volume, difficulty urinating, dysuria and hematuria. Skin: Negative for pallor and rash. Neurological: Negative for dizziness, tremors, weakness, light-headedness and headaches. Psychiatric/Behavioral: Negative for confusion. The patient is not nervous/anxious. Sleep: Negative for restless sleep, +snoring, denies frequent waking       Functional Activity New York Heart Association Classification  Patient Symptoms   []   Class I No limitation of physical activity. Ordinary physical activity does not cause undue fatigue, palpitation, dyspnea (shortness of breath). [x]   Class II Slight limitation of physical activity. Comfortable at rest. Ordinary physical activity results in fatigue, palpitation, dyspnea (shortness of breath).   []   Class III  Mariano Graves limitation of physical activity. Comfortable at rest.  Less than ordinary activity causes fatigue, palpitation, dyspnea. []   Class IV Unable to carry on any physical activity without discomfort. Symptoms of heart failure at rest.  If any physical activity is undertaken, discomfort increases. Shortness of Breath:   []   None   [x]   Dyspnea on exertion   []   Dyspnea with normal activities  []   Dyspnea at rest  []   Dyspnea while sleeping    Patient Findings:   []  Missed doses  []  Diet changes  []  Sodium intake changes    []  Alcohol intake changes  []  Night time cough  []  Edema    []  Activity changes   []  Fatigue that limits activity []  Acute illness  []  Early saiety, abd fullness []  Chest pain   []  Other        Objective: There were no vitals taken for this visit. BP Readings from Last 3 Encounters:   06/17/21 100/60   06/14/21 104/70   06/08/21 129/78     Wt Readings from Last 6 Encounters:   06/17/21 122 lb (55.3 kg)   06/14/21 119 lb (54 kg)   06/08/21 121 lb 0.5 oz (54.9 kg)   03/27/18 153 lb 3.5 oz (69.5 kg)   01/07/18 150 lb (68 kg)   03/01/13 159 lb 8 oz (72.3 kg)     Physical Exam:   Constitutional: Oriented to person, place, and time. No distress detected. Psychiatric: Normal mood and affect.      BMP:   Lab Results   Component Value Date     06/14/2021    K 3.1 06/14/2021    K 4.3 06/04/2021    CL 89 06/14/2021    CO2 32 06/14/2021    BUN 10 06/14/2021    CREATININE 0.8 06/14/2021       CBC:    Lab Results   Component Value Date    WBC 7.4 06/08/2021    HGB 10.7 06/08/2021    HCT 30.4 06/08/2021     06/08/2021     HgA1C: No results found for: LABA1C  TSH:   Lab Results   Component Value Date    TSH 1.01 06/04/2021     Lipids: No results found for: CHOL, TRIG, HDL, LDLCALC, VLDL  ProBNP:   Lab Results   Component Value Date    PROBNP 769 06/14/2021    PROBNP 7,979 06/08/2021    PROBNP 32,553 06/04/2021       [x]Reviewed daily weight log and assessed self management skills including early recognition and notification of exacerbation   [x]Encouraged daily weights and to call with increase of 3 pounds in one day or 5 pounds in one week or weight increased above dry weight    [x]Discussed fluid restriction and sodium restriction as well as nutrition goals   []Weight loss counseling performed, including carbohydrate and calorie reduction  []Exercise Counseling performed      Medications reviewed:   [x] compared patient's bottles with EPIC list  [] patient did not bring medication bottles      Current medications:  Outpatient Medications Marked as Taking for the 6/21/21 encounter (Virtual Visit) with FRANCISCO J Bahena CNP   Medication Sig Dispense Refill    albuterol sulfate  (90 Base) MCG/ACT inhaler Inhale 2 puffs into the lungs every 6 hours as needed for Wheezing 1 Inhaler 3    potassium chloride (KLOR-CON M) 20 MEQ extended release tablet Take 1 tablet by mouth daily 90 tablet 3    budesonide-formoterol (SYMBICORT) 160-4.5 MCG/ACT AERO Inhale 2 puffs into the lungs 2 times daily 1 Inhaler 3    ferrous sulfate 324 (65 Fe) MG EC tablet Take 1 tablet by mouth 2 times daily (with meals) 60 tablet 2    nicotine (NICODERM CQ) 21 MG/24HR Place 1 patch onto the skin daily 30 patch 3    torsemide (DEMADEX) 100 MG tablet Take 0.5 tablets by mouth daily 30 tablet 1    carvedilol (COREG) 3.125 MG tablet Take 1 tablet by mouth 2 times daily 60 tablet 3    atorvastatin (LIPITOR) 20 MG tablet Take 1 tablet by mouth daily 30 tablet 3    ALPRAZolam (ALPRAZOLAM XR) 0.5 MG extended release tablet Take 0.5 mg by mouth daily as needed (anxiety).  allopurinol (ZYLOPRIM) 300 MG tablet Take 300 mg by mouth daily      fluticasone (FLONASE) 50 MCG/ACT nasal spray 2 sprays by Each Nostril route daily      Multiple Vitamins-Minerals (THERAPEUTIC MULTIVITAMIN-MINERALS) tablet Take 1 tablet by mouth daily      levocetirizine (XYZAL) 5 MG tablet Take 5 mg by mouth daily.       GuaiFENesin (MUCINEX PO) Take 400 mg by mouth daily.  aspirin 81 MG EC tablet Take 81 mg by mouth daily.  montelukast (SINGULAIR) 10 MG tablet Take 10 mg by mouth daily       esomeprazole (NEXIUM) 20 MG capsule Take 20 mg by mouth every morning (before breakfast). [x]Reviewed heart failure medications including how they work and potential side effects. [x]Advised patient to avoid NSAIDs. Get With The Guidelines  Ms. Zac Renteria is on a Beta-Blocker for (HFrEF) (systolic) EF </= 21%  Yes    Ms. Zac Renteria is on an Ace-Inhibitor / ARB / Entresto for (HFrEF) (systolic) EF </= 80% No      Ms. Molina is on a Aldosterone Receptor Antagonist for (HFrEF) (systolic) EF </= 80% or EF </= 40% with MI (Okay to use if SCr </= 2.5mg/dL in men, SCr </= 2mg/dL in women; Potassium < 5.0meq/L) No      Ms. Zac Renteria is on a Diuretic Yes    If the above guidelines are not met, reason documented above or recommendations made: Yes. [x] Advanced Directives completed and scanned at a prior encounter  [] Advanced Directives need to be addressed      Barriers to Adherence: Verbalizes interest and Active attentive participant    Environmental Concerns: not applicable    Sonam Samayoa is a 62 y.o. female evaluated via telephone on 6/21/2021. Consent:  She and/or health care decision maker is aware that that she may receive a bill for this telephone service, depending on her insurance coverage, and has provided verbal consent to proceed: Yes      Documentation:  I communicated with the patient and/or health care decision maker about her chronic health conditions. Details of this discussion including any medical advice provided: see visit note      I affirm this is a Patient Initiated Episode with a Patient who has not had a related appointment within my department in the past 7 days or scheduled within the next 24 hours.     Patient identification was verified at the start of the visit: Yes    Total Time: minutes: 21-30 minutes    Note: not billable if this call serves to triage the patient into an appointment for the relevant concern      [address], APRN - CNP      A heart failure binder has been provided prior to discharge in addition to extensive education including the information noted above. Assessment/Plan     Problem List Items Addressed This Visit     Sleep apnea (Chronic)     Past history of TANNER with CPAP. Not currently using CPAP. Has sleep medicine appointment scheduled for 7/1/2021. Current smoker     Actively working on smoking cessation. Plan is to quit nicotine altogether. Is down from 1 pack/day. Continue NicoDerm patch. CHF (congestive heart failure), NYHA class I, acute on chronic, combined ([facility] [address] - Primary     No symptoms of acute exacerbation. Continue daily weights, sodium and fluid restriction. Emergency action plan reviewed today with patient. Continue management follow-up with cardiology as scheduled. No orders of the defined types were placed in this encounter. Follow up with wellness center: No follow up needed at this time  Time spent in visit today including counseling and education: 35 minutes. *This note was transcribed using [employer]. Please disregard any translational errors.          Electronically signed by FRANCISCO J Hester CNP,  on 6/21/2021 at 2:38 PM

## 2021-06-21 NOTE — ASSESSMENT & PLAN NOTE
Past history of TANNER with CPAP. Not currently using CPAP. Has sleep medicine appointment scheduled for 7/1/2021.

## 2021-06-24 NOTE — PROGRESS NOTES
The [address] [facility], [address] [facility] [address] [address]  [phone]    PrimaryCare Doctor:  Wilberto Samuels MD  Primary Cardiologist: Dr. Sunitha Hoyos    Chief Complaint   Patient presents with    Follow-up     fatigue      History of Present Illness:  Beryle Oyster is a 62 y.o. female with PMH HTN, seasonal allergies, TANNER (non compliant with CPAP), COPD, smoker, HLP. Patient was admitted to [facility] 6/4/2021 with sudden onset SOB. Woke up on 6/4 feeling SOB. Associated with cough, tachycardia, BLE edema, fatigue. Found new heart failure and hypervolemic hyponatremia. ECHO indicated severe anterolateral hypokinesis ischemia and EKG with anterolateral ST changes. Underwent R & LHC > non occlusive CAD. Started on ferrous sulfate for RISSA. Patient presents to [facility] cardiology for follow up for heart failure. Continues with SOB and \"lack of energy. \" Denies SOB with rest. + SOB with minimal activity. This past Sat she felt \"awful\" and thought about going to the ED. She opted to go to bed and she felt better in the morning. She reports feeling very fatigued with generalized weakness. She has difficulty walking up steps mostly D/T fatigue. Improves with rest.  Home wt up last Thur up to 127lbs (up 10lbs). Down to 122lbs today. She thinks her wt increase may have been from constipation. She quit taking ferrous sulfate D/T constipation. She is taking multivitamin with iron. Following 64 fl oz restriction and 2gm Na restriction. Denies CP, LH, dizzy, syncope,palpitations. Review of Systems:   General: Denies fever, chills  Skin: Denies skin changes, rash, itching, lesions.   HEENT: Denies headache, dizziness, vision changes, nosebleeds, sore throat, nasal drainage  RESP: Denies cough, sputum,  wheeze  CARD: Denies palpitations,  murmur  GI:Denies nausea, vomiting, heartburn, loss of appetite, change in bowels  : Denies frequency, pain, incontinence, polyuria  VASC: Denies claudication, leg cramps, clots  MUSC/SKEL: Denies pain, stiffness, arthritis  PSYCH: Denies anxiety, depression, stress  NEURO: Denies numbness, tingling, weakness,change in mood or memory  HEME: Denies abn bruising, bleeding, anemia  ENDO: Denies intolerance to heat, cold, excessive thirst or hunger, hx thyroid disease    /78   Pulse 112   Ht 5' 2\" (1.575 m)   Wt 122 lb 3.2 oz (55.4 kg)   SpO2 99%   BMI 22.35 kg/m²   Wt Readings from Last 3 Encounters:   06/28/21 122 lb 3.2 oz (55.4 kg)   06/17/21 122 lb (55.3 kg)   06/14/21 119 lb (54 kg)       Physical Exam:  GEN: Appears frail, no acute distress  SKIN: Pink, warm, dry. Nails without clubbing. HEENT: PERRLA. Normocephalic, atraumatic. Neck supple. No adenopathy. LUNG: AP diameter normal. Diminished BS bilateral. No wheeze, rales, or ronchi. Respiratory effort normal.  HEART: S1S2 A/R. Mild JVD. No carotid bruit. No murmur, rub or gallop. ABD: Soft, nontender. +BS X 4 quads. No hepatomegaly. EXT: Radial and pedal pulses 2+ and symmetric. Without varicosities. Trace pedal LLE>RLE edema. MUSCSKEL: Good ROM X4 extremities. No deformity. NEURO: A/O X3. Calm and cooperative. Past Medical History:   has a past medical history of Anxiety, GERD (gastroesophageal reflux disease), Gout, Hyperlipidemia, and Hypertension. Surgical History:   has a past surgical history that includes Knee arthroscopy; Carpal tunnel release; bladder suspension; Tonsillectomy; Carpal tunnel release (7-12-10); Carpal tunnel release (Left wrist); joint replacement; sinus surgery; and Foot surgery (3/2013). Social History:   reports that she has been smoking cigarettes. She has a 35.00 pack-year smoking history. She has never used smokeless tobacco. She reports current alcohol use of about 14.0 standard drinks of alcohol per week. She reports that she does not use drugs.    Family History:   Family History   Problem Relation Age of Onset    MG capsule Take 20 mg by mouth every morning (before breakfast). Yes Historical Provider, MD        Allergies:  Augmentin [amoxicillin-pot clavulanate] and Pork-derived products       LABS: Results reviewed with patient today. CBC:   Lab Results   Component Value Date    WBC 7.4 06/08/2021    WBC 7.9 06/07/2021    WBC 10.3 06/06/2021    RBC 3.02 06/08/2021    RBC 2.79 06/07/2021    RBC 2.66 06/06/2021    HGB 10.7 06/08/2021    HGB 9.7 06/07/2021    HGB 9.3 06/06/2021    HCT 30.4 06/08/2021    HCT 28.0 06/07/2021    HCT 26.8 06/06/2021    .9 06/08/2021    .3 06/07/2021    .8 06/06/2021    RDW 14.0 06/08/2021    RDW 13.9 06/07/2021    RDW 13.5 06/06/2021     06/08/2021     06/07/2021     06/06/2021   Results for Renate Hwang (MRN [id number]) as of 6/28/2021 10:00   Ref. Range 6/6/2021 09:37   Iron Latest Ref Range: 37 - 145 ug/dL 25 (L)   Iron Saturation Latest Ref Range: 15 - 50 % 19   TIBC Latest Ref Range: 260 - 445 ug/dL 134 (L)     BMP:  Lab Results   Component Value Date     06/14/2021     06/08/2021     06/07/2021    K 3.1 06/14/2021    K 3.3 06/08/2021    K 3.7 06/07/2021    K 4.3 06/04/2021    CL 89 06/14/2021    CL 91 06/08/2021    CL 89 06/07/2021    CO2 32 06/14/2021    CO2 28 06/08/2021    CO2 27 06/07/2021    PHOS 5.0 06/08/2021    PHOS 3.2 06/07/2021    PHOS 2.9 06/06/2021    BUN 10 06/14/2021    BUN 9 06/08/2021    BUN 8 06/07/2021    CREATININE 0.8 06/14/2021    CREATININE 0.6 06/08/2021    CREATININE 0.7 06/07/2021     BNP:   Lab Results   Component Value Date    PROBNP 769 06/14/2021    PROBNP 7,979 06/08/2021    PROBNP 32,553 06/04/2021       Parameters:   > 450 pg/mL under age 48  > 900 pg/mL ages 54-65  > 1800 pg/mL over age 76    Iron Studies:    Lab Results   Component Value Date    TIBC 134 06/06/2021     GLUCOSE: No results for input(s): GLUCOSE in the last 72 hours.   LIVER PROFILE:   Lab Results   Component Value Date AST 46 06/05/2021    ALT 29 06/05/2021    LABALBU 2.9 06/08/2021    BILIDIR 0.7 06/05/2021    BILITOT 1.1 06/05/2021    ALKPHOS 104 06/05/2021     PT/INR: No results found for: PROTIME, INR  Cardiac Enzymes:  Lab Results   Component Value Date    CKMB 2.18 04/23/2010    TROPONINI 0.02 06/04/2021     FASTING LIPID PANEL:No results found for: CHOL, HDL, LDLCALC, TRIG    Cardiac Imaging: Reports reviewed with patient today. EKG: Today Sinus tach     ECHO: 6/5/21:  Overall left ventricular systolic function appears mildly reduced.   Ejection fraction is visually estimated to be 40%.   Severe andrew-lateral hypokinesis   Mid to apical akinesis   Grade II diastolic dysfunction with elevated LV filling pressures.   Normal right ventricular size.   Right ventricular systolic function is reduced .     CATH:  Cardiac Angiography: [facility] 6/7/2021  FINDINGS:  1.  Evidence of persistent right-sided volume overload with a right  atrial pressure that was low at 5 mmHg but a pulmonary capillary wedge  pressure elevated at 20 mmHg. 2.  Evidence of pulmonary hypertension with an instantaneous pressure of  38/17 for a mean pulmonary arterial pressure of 28.  3.  Mildly reduced cardiac output by thermodilution at 4.53 liters per  minute with a cardiac index of 2.89 liters per kilogram per minute. 4.  Pulmonary vascular resistance of 139 dynes per second. 5.  Normal mixed venous oxygen saturations at 66% in the right atrium  and 64% in the pulmonary artery. 6.  Left ventricular end-diastolic pressure of 23 mmHg. 7.  Right-dominant coronary arterial system with no obstructive coronary  lesions.  The left main is free of disease.  The circumflex is smaller. OM1 has 50% stenosis.  The left anterior descending artery is calcified  in the proximal and midportion with a 50% stenosis at the first septal   and the first diagonal branch but no flow limitation.    Further, there is 50% stenosis in the midportion.  The ferrous sulfate  Add colace for constipation    6.) Smoking Addiction: Trying to quit, wearing nicoderm patch  Smoking cessation counseling provided > 3 min. Benefits of smoking cessation include decrease risk for lung disease, heart disease, stroke, and peripheral vascular disease. Recommendations include medications such as Chantix, Zyban or nicotine replacement products, counseling and resources such as 1-800-QUIT-NOW.     7.) COPD: Follows with Dr. John Cunningham, scheduled 7/13/2021    Instructions:   1. Medications: No changes  2. Labs: BNP,BMP  3. Follow up: 6 weeks Dr. Benedict Todd  4. Daily weight: Call for increase 3 lbs/day or 5 lbs/week. 5. 2 gm sodium diet:  6. Fluid Restriction: 64 oz.   7.Referred to [facility] for Education:  Yes      I appreciate the opportunity of cooperating in the care of this individual.    ADILIA Mcgee, FRANCISCO J - CNP, CNP, 6/28/2021,9:49 AM

## 2021-06-28 ENCOUNTER — TELEPHONE (OUTPATIENT)
Dept: CARDIOLOGY CLINIC | Age: 61
End: 2021-06-28

## 2021-06-28 ENCOUNTER — OFFICE VISIT (OUTPATIENT)
Dept: CARDIOLOGY CLINIC | Age: 61
End: 2021-06-28
Payer: COMMERCIAL

## 2021-06-28 VITALS
WEIGHT: 122.2 LBS | HEART RATE: 112 BPM | HEIGHT: 62 IN | DIASTOLIC BLOOD PRESSURE: 78 MMHG | BODY MASS INDEX: 22.49 KG/M2 | OXYGEN SATURATION: 99 % | SYSTOLIC BLOOD PRESSURE: 114 MMHG

## 2021-06-28 DIAGNOSIS — I50.21 ACUTE SYSTOLIC CHF (CONGESTIVE HEART FAILURE), NYHA CLASS 3 (HCC): ICD-10-CM

## 2021-06-28 DIAGNOSIS — I50.21 ACUTE SYSTOLIC CHF (CONGESTIVE HEART FAILURE), NYHA CLASS 3 (HCC): Primary | ICD-10-CM

## 2021-06-28 LAB
ANION GAP SERPL CALCULATED.3IONS-SCNC: 15 MMOL/L (ref 3–16)
BUN BLDV-MCNC: 6 MG/DL (ref 7–20)
CALCIUM SERPL-MCNC: 8.5 MG/DL (ref 8.3–10.6)
CHLORIDE BLD-SCNC: 98 MMOL/L (ref 99–110)
CO2: 27 MMOL/L (ref 21–32)
CREAT SERPL-MCNC: 0.7 MG/DL (ref 0.6–1.1)
GFR AFRICAN AMERICAN: >60
GFR NON-AFRICAN AMERICAN: >60
GLUCOSE BLD-MCNC: 76 MG/DL (ref 70–99)
POTASSIUM SERPL-SCNC: 3.6 MMOL/L (ref 3.5–5.1)
PRO-BNP: 2173 PG/ML (ref 0–124)
REASON FOR REJECTION: NORMAL
REJECTED TEST: NORMAL
SODIUM BLD-SCNC: 140 MMOL/L (ref 136–145)

## 2021-06-28 PROCEDURE — 99214 OFFICE O/P EST MOD 30 MIN: CPT | Performed by: NURSE PRACTITIONER

## 2021-06-28 PROCEDURE — 93000 ELECTROCARDIOGRAM COMPLETE: CPT | Performed by: NURSE PRACTITIONER

## 2021-06-28 RX ORDER — DOCUSATE SODIUM 100 MG/1
100 CAPSULE, LIQUID FILLED ORAL 2 TIMES DAILY
Qty: 60 CAPSULE | Refills: 0 | Status: SHIPPED | OUTPATIENT
Start: 2021-06-28 | End: 2021-07-12

## 2021-06-28 RX ORDER — FERROUS SULFATE TAB EC 324 MG (65 MG FE EQUIVALENT) 324 (65 FE) MG
324 TABLET DELAYED RESPONSE ORAL 2 TIMES DAILY WITH MEALS
Qty: 60 TABLET | Refills: 2
Start: 2021-06-28 | End: 2021-09-20

## 2021-06-28 NOTE — TELEPHONE ENCOUNTER
She told me she filed for short term disability and Dr. Mckenzie Crehouston filled out the papers? And I think she is fine to got back to work. She sits at a desk and her work does not require physical activity.

## 2021-06-28 NOTE — TELEPHONE ENCOUNTER
Aby     Patient seen in office this morning. Are you okay with signing for return to work on 7/6/21?

## 2021-06-28 NOTE — TELEPHONE ENCOUNTER
Patient completed short term disability giving her intermittent leave. Please let her know Aby has stated she may return to work as of today.

## 2021-06-28 NOTE — TELEPHONE ENCOUNTER
Kimberlee called in this afternoon after seeing Aby this morning. She is  wondering if she can have a return to work note sent over to [employer]. She states she has tests to be completed late this week so if it can be written for next week (7/6) for A return date that would be great.     Fax number for [employer] is [phone] would also like a call back to let her know if that has been sent over  [phone]

## 2021-06-29 ENCOUNTER — TELEPHONE (OUTPATIENT)
Dept: CARDIOLOGY CLINIC | Age: 61
End: 2021-06-29

## 2021-06-29 DIAGNOSIS — I50.21 ACUTE SYSTOLIC CHF (CONGESTIVE HEART FAILURE), NYHA CLASS 3 (HCC): Primary | ICD-10-CM

## 2021-06-29 LAB — MAGNESIUM: 1.3 MG/DL (ref 1.8–2.4)

## 2021-06-29 RX ORDER — MAGNESIUM OXIDE 400 MG/1
400 TABLET ORAL DAILY
Qty: 30 TABLET | Refills: 1 | Status: SHIPPED | OUTPATIENT
Start: 2021-06-29 | End: 2021-08-23

## 2021-06-29 RX ORDER — SACUBITRIL AND VALSARTAN 24; 26 MG/1; MG/1
1 TABLET, FILM COATED ORAL 2 TIMES DAILY
Qty: 60 TABLET | Refills: 3 | Status: SHIPPED | OUTPATIENT
Start: 2021-06-29 | End: 2021-08-11 | Stop reason: DRUGHIGH

## 2021-06-29 NOTE — TELEPHONE ENCOUNTER
Lab called to inform Carlo Alex NP  That lab was unable to run ionized Ca because they needed another specimen. Carlo Alex made aware, states do not need to redraw blood at this time.   Colonel Beltran MSN, [address] To

## 2021-06-30 ENCOUNTER — HOSPITAL ENCOUNTER (OUTPATIENT)
Dept: PULMONOLOGY | Age: 61
Discharge: HOME | End: 2021-06-30
Payer: COMMERCIAL

## 2021-07-01 ENCOUNTER — OFFICE VISIT (OUTPATIENT)
Dept: SLEEP MEDICINE | Age: 61
End: 2021-07-01
Payer: COMMERCIAL

## 2021-07-01 VITALS
TEMPERATURE: 97.5 F | WEIGHT: 124.8 LBS | OXYGEN SATURATION: 96 % | DIASTOLIC BLOOD PRESSURE: 78 MMHG | SYSTOLIC BLOOD PRESSURE: 116 MMHG | HEART RATE: 110 BPM | HEIGHT: 62 IN | BODY MASS INDEX: 22.97 KG/M2 | RESPIRATION RATE: 18 BRPM

## 2021-07-01 DIAGNOSIS — Z86.79 H/O CHF: ICD-10-CM

## 2021-07-01 DIAGNOSIS — I10 ESSENTIAL HYPERTENSION: Chronic | ICD-10-CM

## 2021-07-01 DIAGNOSIS — G47.33 OBSTRUCTIVE SLEEP APNEA: Primary | ICD-10-CM

## 2021-07-01 PROCEDURE — 99204 OFFICE O/P NEW MOD 45 MIN: CPT | Performed by: PSYCHIATRY & NEUROLOGY

## 2021-07-01 ASSESSMENT — SLEEP AND FATIGUE QUESTIONNAIRES
HOW LIKELY ARE YOU TO NOD OFF OR FALL ASLEEP IN A CAR, WHILE STOPPED FOR A FEW MINUTES IN TRAFFIC: 0
HOW LIKELY ARE YOU TO NOD OFF OR FALL ASLEEP WHILE SITTING AND READING: 2
ESS TOTAL SCORE: 7
HOW LIKELY ARE YOU TO NOD OFF OR FALL ASLEEP WHILE LYING DOWN TO REST IN THE AFTERNOON WHEN CIRCUMSTANCES PERMIT: 1
HOW LIKELY ARE YOU TO NOD OFF OR FALL ASLEEP WHEN YOU ARE A PASSENGER IN A CAR FOR AN HOUR WITHOUT A BREAK: 1
HOW LIKELY ARE YOU TO NOD OFF OR FALL ASLEEP WHILE SITTING QUIETLY AFTER LUNCH WITHOUT ALCOHOL: 0
HOW LIKELY ARE YOU TO NOD OFF OR FALL ASLEEP WHILE WATCHING TV: 2
HOW LIKELY ARE YOU TO NOD OFF OR FALL ASLEEP WHILE SITTING AND TALKING TO SOMEONE: 1
HOW LIKELY ARE YOU TO NOD OFF OR FALL ASLEEP WHILE SITTING INACTIVE IN A PUBLIC PLACE: 0
NECK CIRCUMFERENCE (INCHES): 14

## 2021-07-01 ASSESSMENT — ENCOUNTER SYMPTOMS
CHOKING: 1
COUGH: 1
SHORTNESS OF BREATH: 1
EYES NEGATIVE: 1
APNEA: 1
ALLERGIC/IMMUNOLOGIC NEGATIVE: 1
GASTROINTESTINAL NEGATIVE: 1

## 2021-07-01 NOTE — PROGRESS NOTES
MD NINFA Guerrero Board Certified in Sleep Medicine  Certified [facility] Sleep Medicine  Board Certified in Neurology [address]  1000 [facility] [place] [facility] [place] [address], [address], [address]  326 [facility]2209 [facility]  [address], [address]           [address]  382 [facility], [place] [address] [phone]    Subjective:     Patient ID: Yuri Thrasher is a 62 y.o. female. Chief Complaint   Patient presents with    Sleep Apnea       HPI:        Yuri Thrasher is a 62 y.o. female referred by Dr. Jennifer Gordon for a sleep evaluation. She complains of snoring, snorting, choking, periods of not breathing, tossing and turning, excessive daytime sleepiness, feels sleepy during the day, take naps during the day but she denies knees buckling with laughing, completely or partially paralyzed while falling asleep or waking up, noisy environment, uncomfortable room temperature, uncomfortable bedding. Symptoms began several years ago, gradually worsening since that time. The patient's bed-partner confirmed the snoring and stopped breathing at night  SLEEP SCHEDULE: Goes to bed around 10-11 PM in the weekdays and 10-11 PM in the weekends. It usually takes the patient few minutes to fall asleep. The patient gets up 2-3 per night to go to the bathroom. The Patient finally gets up at 6:15 AM during the weekdays and 11 AM in the weekends. patient wakes up with dry mouth. The patient has restless sleep with frequent arousals in addition to the Patient has significant daytime sleepiness. The Patient scored Total score: 7 on [place] Sleepiness Scale ( more than 10 is indicative of daytime sleepiness)and 38 in fatigue scale ( more than 36 is indicative of daytime fatigue). The patient takes no naps. Previous evaluation and treatment has included- PSG and PSG with C PAP titration.  Last time she used the CPAP 10 years ago, was diagnosed with CHF few months ago. Had inconclusive HST 18 months ago. DOT/CDL - N/A  JES/'soledad - N/A  The patient HTN is stable. H/o CHF class 3 last Echocardiogram (06/02/2021) (Ejection fraction is visually estimated to be 40%. )      Previous Report(s) Reviewed: historical medical records       Social History     Socioeconomic History    Marital status:      Spouse name: Not on file    Number of children: Not on file    Years of education: Not on file    Highest education level: Not on file   Occupational History    Not on file   Tobacco Use    Smoking status: Current Every Day Smoker     Packs/day: 1.00     Years: 35.00     Pack years: 35.00     Types: Cigarettes    Smokeless tobacco: Never Used   Vaping Use    Vaping Use: Never assessed   Substance and Sexual Activity    Alcohol use: Yes     Alcohol/week: 14.0 standard drinks     Types: 14 Glasses of wine per week    Drug use: No    Sexual activity: Not on file   Other Topics Concern    Not on file   Social History Narrative    Not on file     Social Determinants of Health     Financial Resource Strain: Low Risk     Difficulty of Paying Living Expenses: Not hard at all   Food Insecurity: No Food Insecurity    Worried About Running Out of Food in the Last Year: Never true    [address] in the Last Year: Never true   Transportation Needs: No Transportation Needs    Lack of Transportation (Medical): No    Lack of Transportation (Non-Medical):  No   Physical Activity:     Days of Exercise per Week:     Minutes of Exercise per Session:    Stress:     Feeling of Stress :    Social Connections:     Frequency of Communication with Friends and Family:     Frequency of Social Gatherings with Friends and Family:     Attends Yarsanism Services:     Active Member of Clubs or Organizations:     Attends Club or Organization Meetings:     Marital Status:    Intimate Partner Violence:     Fear of Current or Ex-Partner:     Emotionally Abused:     Physically Abused:     Sexually Abused:        Prior to Admission medications    Medication Sig Start Date End Date Taking? Authorizing Provider   sacubitril-valsartan (ENTRESTO) 24-26 MG per tablet Take 1 tablet by mouth 2 times daily 6/29/21  Yes FRANCISCO J Lugo CNP   magnesium oxide (MAG-OX) 400 MG tablet Take 1 tablet by mouth daily 6/29/21  Yes FRANCISCO J Lugo CNP   ferrous sulfate 324 (65 Fe) MG EC tablet Take 1 tablet by mouth 2 times daily (with meals) 6/28/21  Yes FRANCISCO J Lugo CNP   docusate sodium (COLACE) 100 MG capsule Take 1 capsule by mouth 2 times daily 6/28/21 8/27/21 Yes FRANCISCO J Lugo CNP   albuterol sulfate  (90 Base) MCG/ACT inhaler Inhale 2 puffs into the lungs every 6 hours as needed for Wheezing 6/21/21  Yes FRANCISCO J Lucero CNP   potassium chloride (KLOR-CON M) 20 MEQ extended release tablet Take 1 tablet by mouth daily 6/15/21  Yes FRANCISCO J Medellin CNP   budesonide-formoterol (SYMBICORT) 160-4.5 MCG/ACT AERO Inhale 2 puffs into the lungs 2 times daily 6/8/21  Yes Kash King MD   nicotine (NICODERM CQ) 21 MG/24HR Place 1 patch onto the skin daily 6/9/21  Yes Kash King MD   torsemide (DEMADEX) 100 MG tablet Take 0.5 tablets by mouth daily 6/8/21  Yes Kash King MD   carvedilol (COREG) 3.125 MG tablet Take 1 tablet by mouth 2 times daily 6/8/21  Yes Kash King MD   atorvastatin (LIPITOR) 20 MG tablet Take 1 tablet by mouth daily 6/8/21  Yes Kash King MD   ALPRAZolam (ALPRAZOLAM XR) 0.5 MG extended release tablet Take 0.5 mg by mouth daily as needed (anxiety).     Yes Historical Provider, MD   allopurinol (ZYLOPRIM) 300 MG tablet Take 300 mg by mouth daily   Yes Historical Provider, MD   fluticasone (FLONASE) 50 MCG/ACT nasal spray 2 sprays by Each Nostril route daily   Yes Historical Provider, MD   Multiple Vitamins-Minerals Pressure Mother     Heart Disease Maternal Grandmother        Review of Systems   Constitutional: Positive for fatigue. HENT: Positive for congestion. Eyes: Negative. Respiratory: Positive for apnea, cough, choking and shortness of breath. Cardiovascular: Positive for leg swelling. Gastrointestinal: Negative. Endocrine: Negative. Genitourinary: Positive for frequency. Genital sores: 2-3. Musculoskeletal: Positive for arthralgias. Skin: Negative. Allergic/Immunologic: Negative. Neurological: Negative. Negative for headaches. Hematological: Negative. Psychiatric/Behavioral: The patient is nervous/anxious. Objective:     Vitals:  Weight BMI Neck circumference    Wt Readings from Last 3 Encounters:   07/01/21 124 lb 12.8 oz (56.6 kg)   06/28/21 122 lb 3.2 oz (55.4 kg)   06/17/21 122 lb (55.3 kg)    Body mass index is 22.83 kg/m². Neck circumference: 14     BP HR SaO2   BP Readings from Last 3 Encounters:   07/01/21 116/78   06/28/21 114/78   06/17/21 100/60    Pulse Readings from Last 3 Encounters:   07/01/21 110   06/28/21 112   06/17/21 91    SpO2 Readings from Last 3 Encounters:   07/01/21 96%   06/28/21 99%   06/17/21 96%        The mandibular molar Class :   [x]1 []2 []3      Mallampati I Airway Classification:   []1 []2 []3 [x]4        Physical Exam  Vitals and nursing note reviewed. Constitutional:       Appearance: Normal appearance. HENT:      Head: Atraumatic. Nose: Nose normal.      Mouth/Throat:      Comments: Mallampati class 4, no retrognathia or hypognathia , normal airflow in bilateral nostrils, no septum deviation , crowded oropharynx with low soft palate, high arched hard palate,no tonsils enlargement. Eyes:      Extraocular Movements: Extraocular movements intact. Cardiovascular:      Rate and Rhythm: Normal rate and regular rhythm. Heart sounds: Normal heart sounds.    Pulmonary:      Effort: Pulmonary effort is normal.      Breath sounds: Normal breath sounds. Musculoskeletal:         General: Normal range of motion. Cervical back: Normal range of motion and neck supple. Skin:     General: Skin is warm. Neurological:      General: No focal deficit present. Psychiatric:         Mood and Affect: Mood normal.         Assessment:   Obstructive Sleep Apnea/Hypopnea Syndrome with suspected complex sleep apnea given the diagnosis of CHFG     Diagnosis Orders   1. Obstructive sleep apnea  Baseline Diagnostic Sleep Study    Sleep Study with PAP Titration   2. Essential hypertension  Baseline Diagnostic Sleep Study    Sleep Study with PAP Titration   3. H/O CHF  Baseline Diagnostic Sleep Study    Sleep Study with PAP Titration     Plan:   interested in [place]. Patient was counseled about the pathophysiology of obstructive sleep apnea syndrome and the methods for evaluating its presence and severity. Patient was counseled to avoid driving and other potentially hazardous circumstances if the patient is experiencing excessive sleepiness. Treatment considerations include the use of nasal CPAP, oral dental appliance or a surgical intervention, which should be based on otolarygologic findings, In the meantime, the patient should be cautioned to avoid the use of alcohol or other depressant medications because of potential for increasing the duration and severity of apnea and cautioned regarding driving or operating and dangerous equipment if the patient is experiencing daytime sleepiness. .    Most likely treating the TANNER will have position impact on HTN and CHF control. .     Orders Placed This Encounter   Procedures    Baseline Diagnostic Sleep Study    Sleep Study with PAP Titration       Return in about 3 months (around 10/1/2021) for to review the PSG and CPAP usage, Reveiwing CPAP usage and compliance report and tro.     Lisa Andersen MD  Medical Director - [facility]

## 2021-07-01 NOTE — PATIENT INSTRUCTIONS
Orders Placed This Encounter   Procedures    Baseline Diagnostic Sleep Study     Standing Status:   Future     Standing Expiration Date:   7/1/2022     Order Specific Question:   Adult or Pediatric     Answer:   Adult Study (>7 Years)     Order Specific Question:   Location For Sleep Study     Answer:   [place]     Order Specific Question:   Select Sleep Lab Location     Answer:   [facility]    Sleep Study with PAP Titration     Standing Status:   Future     Standing Expiration Date:   7/1/2022     Order Specific Question:   Sleep Study Titration Type     Answer:   CPAP     Order Specific Question:   Location For Sleep Study     Answer:   [place]     Order Specific Question:   Select Sleep Lab Location     Answer:   [facility]        Patient Education        Sleep Apnea: Care Instructions  Overview     Sleep apnea means that you frequently stop breathing for 10 seconds or longer during sleep. It can be mild to severe, based on the number of times an hour that you stop breathing or have slowed breathing. Blocked or narrowed airways in your nose, mouth, or throat can cause sleep apnea. Your airway can become blocked when your throat muscles and tongue relax during sleep. You can help treat sleep apnea at home by making lifestyle changes. You also can use a CPAP breathing machine that keeps tissues in the throat from blocking your airway. Or your doctor may suggest that you use a breathing device while you sleep. It helps keep your airway open. This could be a device that you put in your mouth. In some cases, surgery may be needed to remove enlarged tissues in the throat. Follow-up care is a key part of your treatment and safety. Be sure to make and go to all appointments, and call your doctor if you are having problems. It's also a good idea to know your test results and keep a list of the medicines you take. How can you care for yourself at home? · Lose weight, if needed. · Sleep on your side. It may help mild apnea. · Avoid alcohol and medicines such as sleeping pills, opioids, or sedatives before bed. · Don't smoke. If you need help quitting, talk to your doctor. · Prop up the head of your bed. · Treat breathing problems, such as a stuffy nose, that are caused by a cold or allergies. · Try a continuous positive airway pressure (CPAP) breathing machine if your doctor recommends it. · If CPAP doesn't work for you, ask your doctor if you can try other masks, settings, or breathing machines. · Try oral breathing devices or other nasal devices. · Talk to your doctor if your nose feels dry or bleeds, or if it gets runny or stuffy when you use a breathing machine. · Tell your doctor if you're sleepy during the day and it affects your daily life. Don't drive or operate machinery when you're drowsy. When should you call for help? Watch closely for changes in your health, and be sure to contact your doctor if:    · You still have sleep apnea even though you have made lifestyle changes.     · You are thinking of trying a device such as CPAP.     · You are having problems using a CPAP or similar machine.     · You are still sleepy during the day, and it affects your daily life. Where can you learn more? Go to https://[employer].Curiously. org and sign in to your [email or web address] account. Enter P949 in the [facility] box to learn more about \"Sleep Apnea: Care Instructions. \"     If you do not have an account, please click on the \"Sign Up Now\" link. Current as of: October 26, 2020               Content Version: 12.9  © 1245-9571 Healthwise, Incorporated. Care instructions adapted under license by [facility] ([facility]). If you have questions about a medical condition or this instruction, always ask your healthcare professional. [place] [address] any warranty or liability for your use of this information.

## 2021-07-12 RX ORDER — DOCUSATE SODIUM 100 MG
CAPSULE ORAL
Qty: 60 CAPSULE | Refills: 0 | Status: SHIPPED | OUTPATIENT
Start: 2021-07-12 | End: 2021-11-17

## 2021-07-13 ENCOUNTER — OFFICE VISIT (OUTPATIENT)
Dept: PULMONOLOGY | Age: 61
End: 2021-07-13
Payer: COMMERCIAL

## 2021-07-13 VITALS
TEMPERATURE: 97.8 F | OXYGEN SATURATION: 95 % | WEIGHT: 123.6 LBS | BODY MASS INDEX: 22.74 KG/M2 | RESPIRATION RATE: 16 BRPM | HEART RATE: 118 BPM | SYSTOLIC BLOOD PRESSURE: 106 MMHG | DIASTOLIC BLOOD PRESSURE: 70 MMHG | HEIGHT: 62 IN

## 2021-07-13 DIAGNOSIS — J41.0 SIMPLE CHRONIC BRONCHITIS (HCC): ICD-10-CM

## 2021-07-13 DIAGNOSIS — F17.200 CURRENT SMOKER: ICD-10-CM

## 2021-07-13 PROCEDURE — 99214 OFFICE O/P EST MOD 30 MIN: CPT | Performed by: INTERNAL MEDICINE

## 2021-07-13 ASSESSMENT — COPD QUESTIONNAIRES
QUESTION6_LEAVINGHOUSE: 0
QUESTION4_WALKINCLINE: 3
CAT_TOTALSCORE: 19
QUESTION2_CHESTPHLEGM: 3
QUESTION8_ENERGYLEVEL: 3
QUESTION7_SLEEPQUALITY: 3
QUESTION5_HOMEACTIVITIES: 2
QUESTION3_CHESTTIGHTNESS: 2
QUESTION1_COUGHFREQUENCY: 3

## 2021-07-13 NOTE — PROGRESS NOTES
REASON FOR CONSULTATION/CC: copd  Chief Complaint   Patient presents with    Follow-Up from [address] [place] [address] COPD            PCP: Kesha Grace MD    HISTORY OF PRESENT ILLNESS: Suhail Manriquez is a 62y.o. year old female with a history of copd who presents       Patient recently discharge to the hospital with referral to sleep and follow up with pulmonary     Having some dyspnea on exertion but getting strong    Symbicort daily. Has albuterol prn but does not need it. Tobacco abuse  Trying to wean off. Using vap to wean. REVIEW OF SYSTEMS:  Constitutional: Negative for fever    HENT: Negative for sore throat  Eyes: Negative for redness   Respiratory: improving  dyspnea, cough  Cardiovascular: Negative for chest pain  Gastrointestinal: Negative for vomiting, diarrhea   Genitourinary: Negative for hematuria   Musculoskeletal: Negative for arthralgias   Skin: Negative for rash  Neurological: Negative for syncope  Hematological: Negative for adenopathy  Psychiatric/Behavorial: Negative for anxiety      SOCIAL HISTORY:   reports that she has been smoking cigarettes. She has a 17.50 pack-year smoking history.  She has never used smokeless tobacco.    PAST MEDICAL HISTORY:  Past Medical History:   Diagnosis Date    Anxiety     GERD (gastroesophageal reflux disease)     Gout     had once years ago    Hyperlipidemia     at last check it was normal    Hypertension        PAST SURGICAL HISTORY:  Past Surgical History:   Procedure Laterality Date    BLADDER SUSPENSION      CARPAL TUNNEL RELEASE      right    CARPAL TUNNEL RELEASE  7-12-10    Left    CARPAL TUNNEL RELEASE  Left wrist    FOOT SURGERY  3/2013    EXCISION NEUROMA 3RD NERVE LEFT FOOT    JOINT REPLACEMENT      right    KNEE ARTHROSCOPY      left x1, right x 1    SINUS SURGERY      TONSILLECTOMY         FAMILY HISTORY:  family history includes Diabetes in her mother; Heart Disease in her maternal grandmother; High Blood Pressure in her mother. Objective:   PHYSICAL EXAM:  Blood pressure 106/70, pulse 118, temperature 97.8 °F (36.6 °C), temperature source Infrared, resp. rate 16, height 5' 2\" (1.575 m), weight 123 lb 9.6 oz (56.1 kg), SpO2 95 %, not currently breastfeeding.'  Gen: No distress. Eyes: PERRL. No sclera icterus. No conjunctival injection. ENT: No discharge. Pharynx clear. External appearance of ears and nose normal.  Neck: Trachea midline. No obvious mass. Resp: No accessory muscle use. No crackles. No wheezes. No rhonchi. CV: Regular rate. Regular rhythm. No murmur or rub. No edema. GI:    Skin: Warm, dry, normal texture and turgor. No nodule on exposed extremities. Lymph: No cervical LAD. No supraclavicular LAD. M/S: No cyanosis. No clubbing. No joint deformity. Neuro: Moves all four extremities. Psych: Oriented x 3. No anxiety. Awake. Alert. Intact judgement and insight.     Current Outpatient Medications   Medication Sig Dispense Refill    STOOL SOFTENER 100 MG capsule TAKE 1 CAPSULE BY MOUTH TWICE DAILY 60 capsule 0    sacubitril-valsartan (ENTRESTO) 24-26 MG per tablet Take 1 tablet by mouth 2 times daily 60 tablet 3    magnesium oxide (MAG-OX) 400 MG tablet Take 1 tablet by mouth daily 30 tablet 1    ferrous sulfate 324 (65 Fe) MG EC tablet Take 1 tablet by mouth 2 times daily (with meals) 60 tablet 2    albuterol sulfate  (90 Base) MCG/ACT inhaler Inhale 2 puffs into the lungs every 6 hours as needed for Wheezing 1 Inhaler 3    potassium chloride (KLOR-CON M) 20 MEQ extended release tablet Take 1 tablet by mouth daily 90 tablet 3    budesonide-formoterol (SYMBICORT) 160-4.5 MCG/ACT AERO Inhale 2 puffs into the lungs 2 times daily (Patient taking differently: Inhale 2 puffs into the lungs 2 times daily Once daily) 1 Inhaler 3    nicotine (NICODERM CQ) 21 MG/24HR Place 1 patch onto the skin daily 30 patch 3    torsemide (DEMADEX) 100 MG tablet Take 0.5 tablets by mouth daily 30 tablet 1    carvedilol (COREG) 3.125 MG tablet Take 1 tablet by mouth 2 times daily 60 tablet 3    atorvastatin (LIPITOR) 20 MG tablet Take 1 tablet by mouth daily 30 tablet 3    ALPRAZolam (ALPRAZOLAM XR) 0.5 MG extended release tablet Take 0.5 mg by mouth daily as needed (anxiety).  allopurinol (ZYLOPRIM) 300 MG tablet Take 300 mg by mouth daily      fluticasone (FLONASE) 50 MCG/ACT nasal spray 2 sprays by Each Nostril route daily      Multiple Vitamins-Minerals (THERAPEUTIC MULTIVITAMIN-MINERALS) tablet Take 1 tablet by mouth daily      levocetirizine (XYZAL) 5 MG tablet Take 5 mg by mouth daily.  GuaiFENesin (MUCINEX PO) Take 400 mg by mouth daily.  aspirin 81 MG EC tablet Take 81 mg by mouth daily.  montelukast (SINGULAIR) 10 MG tablet Take 10 mg by mouth daily       esomeprazole (NEXIUM) 20 MG capsule Take 20 mg by mouth every morning (before breakfast). No current facility-administered medications for this visit. Data Reviewed:          Assessment:     COPD  Tobacco abuse  Hypersomnia        Plan:      Problem List Items Addressed This Visit     Simple chronic bronchitis ([address]     Controlled with albuterol as needed. Current smoker     Trying to wean off. Use Vap and weaning at the same time. lung cancer screening due June '22                      This note was transcribed using [address]. Please disregard any translational errors.     Daniel Landa Pulmonary, Sleep and [phone]

## 2021-07-14 ENCOUNTER — HOSPITAL ENCOUNTER (OUTPATIENT)
Dept: PULMONOLOGY | Age: 61
Discharge: HOME | End: 2021-07-14
Payer: COMMERCIAL

## 2021-07-14 DIAGNOSIS — I50.21 ACUTE SYSTOLIC CHF (CONGESTIVE HEART FAILURE), NYHA CLASS 3 (HCC): ICD-10-CM

## 2021-07-14 DIAGNOSIS — I50.43 CHF (CONGESTIVE HEART FAILURE), NYHA CLASS I, ACUTE ON CHRONIC, COMBINED (HCC): ICD-10-CM

## 2021-07-14 LAB
DLCO %PRED: 51 %
DLCO PRED: NORMAL
DLCO/VA %PRED: NORMAL
DLCO/VA PRED: NORMAL
DLCO/VA: NORMAL
DLCO: NORMAL
EXPIRATORY TIME-POST: NORMAL
EXPIRATORY TIME: NORMAL
FEF 25-75% %CHNG: NORMAL
FEF 25-75% %PRED-POST: NORMAL
FEF 25-75% %PRED-PRE: NORMAL
FEF 25-75% PRED: NORMAL
FEF 25-75%-POST: NORMAL
FEF 25-75%-PRE: NORMAL
FEV1 %PRED-POST: 86 %
FEV1 %PRED-PRE: 71 %
FEV1 PRED: NORMAL
FEV1-POST: NORMAL
FEV1-PRE: NORMAL
FEV1/FVC %PRED-POST: NORMAL
FEV1/FVC %PRED-PRE: NORMAL
FEV1/FVC PRED: NORMAL
FEV1/FVC-POST: 88 %
FEV1/FVC-PRE: 79 %
FVC %PRED-POST: NORMAL
FVC %PRED-PRE: NORMAL
FVC PRED: NORMAL
FVC-POST: NORMAL
FVC-PRE: NORMAL
GAW %PRED: NORMAL
GAW PRED: NORMAL
GAW: NORMAL
IC %PRED: NORMAL
IC PRED: NORMAL
IC: NORMAL
MEP: NORMAL
MIP: NORMAL
MVV %PRED-PRE: NORMAL
MVV PRED: NORMAL
MVV-PRE: NORMAL
PEF %PRED-POST: NORMAL
PEF %PRED-PRE: NORMAL
PEF PRED: NORMAL
PEF%CHNG: NORMAL
PEF-POST: NORMAL
PEF-PRE: NORMAL
PRO-BNP: 224 PG/ML (ref 0–124)
RAW %PRED: NORMAL
RAW PRED: NORMAL
RAW: NORMAL
RV %PRED: NORMAL
RV PRED: NORMAL
RV: NORMAL
SVC %PRED: NORMAL
SVC PRED: NORMAL
SVC: NORMAL
TLC %PRED: 101 %
TLC PRED: NORMAL
TLC: NORMAL
VA %PRED: NORMAL
VA PRED: NORMAL
VA: NORMAL
VTG %PRED: NORMAL
VTG PRED: NORMAL
VTG: NORMAL

## 2021-07-14 PROCEDURE — 6370000000 HC RX 637 (ALT 250 FOR IP): Performed by: INTERNAL MEDICINE

## 2021-07-14 PROCEDURE — 94060 EVALUATION OF WHEEZING: CPT | Performed by: INTERNAL MEDICINE

## 2021-07-14 PROCEDURE — 94640 AIRWAY INHALATION TREATMENT: CPT

## 2021-07-14 PROCEDURE — 94060 EVALUATION OF WHEEZING: CPT

## 2021-07-14 PROCEDURE — 94729 DIFFUSING CAPACITY: CPT | Performed by: INTERNAL MEDICINE

## 2021-07-14 PROCEDURE — 94726 PLETHYSMOGRAPHY LUNG VOLUMES: CPT

## 2021-07-14 PROCEDURE — 94729 DIFFUSING CAPACITY: CPT

## 2021-07-14 RX ORDER — ALBUTEROL SULFATE 90 UG/1
4 AEROSOL, METERED RESPIRATORY (INHALATION) ONCE
Status: COMPLETED | OUTPATIENT
Start: 2021-07-14 | End: 2021-07-14

## 2021-07-14 RX ADMIN — ALBUTEROL SULFATE 4 PUFF: 90 AEROSOL, METERED RESPIRATORY (INHALATION) at 11:21

## 2021-07-14 ASSESSMENT — PULMONARY FUNCTION TESTS
FEV1_PERCENT_PREDICTED_POST: 86
FEV1/FVC_POST: 88
FEV1/FVC_PRE: 79
FEV1_PERCENT_PREDICTED_PRE: 71

## 2021-07-14 NOTE — PROCEDURES
Spirometry was acceptable and reproducible by ATS standards    Spirometry  1. Spirometry shows mild obstructive defect. Bronchodilator  1. There is a response to bronchodilator. Lung Volumes  2. Lung volumes are normal.    Diffusing Capacity  3. Diffusing capacity is moderately decreased. [40-60%]        Overall Interpretation  PFT does  demonstrates obstruction with FEV1 of 71 % FVC of 89%  with bronchodilator response with associated decrease in diffusion capacity. Air trapping is not present. Hyperinflation is not present. This consistent with Mild COPD.   Clinical correlation needed     Pulmonary Function Testing Results:    FEV1 %Pred-Pre   Date Value Ref Range Status   07/14/2021 71 % Final     FEV1 %Pred-Post   Date Value Ref Range Status   07/14/2021 86 % Final     FEV1/FVC-Pre   Date Value Ref Range Status   07/14/2021 79 % Final     FEV1/FVC-Post   Date Value Ref Range Status   07/14/2021 88 % Final     TLC %Pred   Date Value Ref Range Status   07/14/2021 101 % Final     DLCO %Pred   Date Value Ref Range Status   07/14/2021 51 % Final             OBSTRUCTION % Predicted FEV1   MILD >70%   MODERATE 60-69%   MODERATELY-SEVERE 50-59%   SEVERE 35-49%   VERY SEVERE <35%         RESTRICTION % Predicted TLC   MILD Less than LLN but > than 70%   MODERATE 60-69%   MODERATELY-SEVERE <60%                 DIFFUSION CAPACITY DL,CO % Pred   MILD >60% AND < LLN   MODERATE 40-60%   SEVERE <40%       PFT data will be scanned into the procedure tab in epic under this encounter    Seth Peters MD  [facility] Pulmonology

## 2021-07-15 LAB
ANION GAP SERPL CALCULATED.3IONS-SCNC: 18 MMOL/L (ref 3–16)
BUN BLDV-MCNC: 8 MG/DL (ref 7–20)
CALCIUM SERPL-MCNC: 8.8 MG/DL (ref 8.3–10.6)
CHLORIDE BLD-SCNC: 94 MMOL/L (ref 99–110)
CO2: 22 MMOL/L (ref 21–32)
CREAT SERPL-MCNC: 0.7 MG/DL (ref 0.6–1.1)
GFR AFRICAN AMERICAN: >60
GFR NON-AFRICAN AMERICAN: >60
GLUCOSE BLD-MCNC: 77 MG/DL (ref 70–99)
POTASSIUM SERPL-SCNC: 4.4 MMOL/L (ref 3.5–5.1)
SODIUM BLD-SCNC: 134 MMOL/L (ref 136–145)

## 2021-07-31 ENCOUNTER — HOSPITAL ENCOUNTER (OUTPATIENT)
Dept: SLEEP CENTER | Age: 61
Discharge: HOME | End: 2021-07-31
Payer: COMMERCIAL

## 2021-07-31 DIAGNOSIS — I10 ESSENTIAL HYPERTENSION: Chronic | ICD-10-CM

## 2021-07-31 DIAGNOSIS — Z86.79 H/O CHF: ICD-10-CM

## 2021-07-31 DIAGNOSIS — G47.33 OBSTRUCTIVE SLEEP APNEA: ICD-10-CM

## 2021-07-31 PROCEDURE — 95810 POLYSOM 6/> YRS 4/> PARAM: CPT

## 2021-08-02 ENCOUNTER — TELEPHONE (OUTPATIENT)
Dept: PULMONOLOGY | Age: 61
End: 2021-08-02

## 2021-08-02 PROCEDURE — 95810 POLYSOM 6/> YRS 4/> PARAM: CPT | Performed by: PSYCHIATRY & NEUROLOGY

## 2021-08-02 NOTE — TELEPHONE ENCOUNTER
Sleep study showed mild TANNER. AHI was 9.9  per hr. And O2 Desaturations to 88%. Dr Neto Begumr titration. Patient verbalized understanding with no further questions at this time. Patient will call sleep lab tomorrow to get scheduled.

## 2021-08-11 ENCOUNTER — OFFICE VISIT (OUTPATIENT)
Dept: CARDIOLOGY CLINIC | Age: 61
End: 2021-08-11
Payer: COMMERCIAL

## 2021-08-11 VITALS
HEART RATE: 118 BPM | HEIGHT: 62 IN | SYSTOLIC BLOOD PRESSURE: 116 MMHG | BODY MASS INDEX: 22.82 KG/M2 | OXYGEN SATURATION: 97 % | WEIGHT: 124 LBS | DIASTOLIC BLOOD PRESSURE: 66 MMHG

## 2021-08-11 DIAGNOSIS — I50.42 CHRONIC COMBINED SYSTOLIC AND DIASTOLIC CHF, NYHA CLASS 2 (HCC): Primary | ICD-10-CM

## 2021-08-11 DIAGNOSIS — Z72.0 TOBACCO ABUSE: ICD-10-CM

## 2021-08-11 DIAGNOSIS — I25.10 CORONARY ARTERY DISEASE INVOLVING NATIVE CORONARY ARTERY OF NATIVE HEART WITHOUT ANGINA PECTORIS: ICD-10-CM

## 2021-08-11 DIAGNOSIS — I10 ESSENTIAL HYPERTENSION: ICD-10-CM

## 2021-08-11 DIAGNOSIS — E78.5 HYPERLIPIDEMIA LDL GOAL <70: ICD-10-CM

## 2021-08-11 PROCEDURE — 99214 OFFICE O/P EST MOD 30 MIN: CPT | Performed by: INTERNAL MEDICINE

## 2021-08-11 RX ORDER — SACUBITRIL AND VALSARTAN 49; 51 MG/1; MG/1
1 TABLET, FILM COATED ORAL 2 TIMES DAILY
Qty: 60 TABLET | Refills: 3 | Status: SHIPPED | OUTPATIENT
Start: 2021-08-11 | End: 2021-12-16

## 2021-08-11 RX ORDER — TORSEMIDE 20 MG/1
20 TABLET ORAL DAILY
Qty: 30 TABLET | Refills: 3 | Status: SHIPPED | OUTPATIENT
Start: 2021-08-11 | End: 2021-11-29

## 2021-08-11 NOTE — PROGRESS NOTES
[facility]    Georgia Parmar  1960 August 11, 2021      CC: \"I feel well\"     HPI:  The patient is 62 y.o. female with a past medical history significant for nonobstructive CAD, chronic combined systolic and diastolic CHF, hyperlipidemia and hypertension. She presented to [facility] with worsening shortness of breath. An inpatient echocardiogram revealed reduced LVEF of 40%. She underwent heart catheterization on 6/7/21 revealing nonobstructive coronary disease not requiring any intervention. She presents today for follow up. She reports feeling well overall. She denies chest pain with rest or exertion. Her breathing has been comfortable without shortness of breath. Her strength continues to improve. She admits to some swelling in her left leg. She is able to lay flat to sleep without shortness of breath. She admits to frequently forgetting her evening dose of Entresto. Review of Systems:  Constitutional: No fatigue, weakness, night sweats or fever. HEENT: No new vision difficulties or ringing in the ears. Respiratory: Positive for cough. No new SOB, PND, orthopnea. Cardiovascular: See HPI   GI: No n/v, diarrhea, constipation, abdominal pain or changes in bowel habits. No melena, no hematochezia  : No urinary frequency, urgency, incontinence, hematuria or dysuria. Skin: No cyanosis or skin lesions. Musculoskeletal: No new muscle or joint pain. Neurological: No syncope or TIA-like symptoms.   Psychiatric: No anxiety, insomnia or depression     Past Medical History:   Diagnosis Date    Anxiety     GERD (gastroesophageal reflux disease)     Gout     had once years ago    Hyperlipidemia     at last check it was normal    Hypertension      Past Surgical History:   Procedure Laterality Date    BLADDER SUSPENSION      CARPAL TUNNEL RELEASE      right    CARPAL TUNNEL RELEASE  7-12-10    Left    CARPAL TUNNEL RELEASE  Left wrist    FOOT SURGERY  3/2013 EXCISION NEUROMA 3RD NERVE LEFT FOOT    JOINT REPLACEMENT      right    KNEE ARTHROSCOPY      left x1, right x 1    SINUS SURGERY      TONSILLECTOMY       Family History   Problem Relation Age of Onset    Diabetes Mother     High Blood Pressure Mother     Heart Disease Maternal Grandmother      Social History     Tobacco Use    Smoking status: Current Every Day Smoker     Packs/day: 0.50     Years: 35.00     Pack years: 17.50     Types: Cigarettes    Smokeless tobacco: Never Used    Tobacco comment: trying to quit   Vaping Use    Vaping Use: Every day    Substances: Nicotine    Devices: Disposable   Substance Use Topics    Alcohol use:  Yes     Alcohol/week: 14.0 standard drinks     Types: 14 Glasses of wine per week     Comment: wine    Drug use: No       Allergies   Allergen Reactions    Augmentin [Amoxicillin-Pot Clavulanate] Anaphylaxis and Swelling     THROATSWELLS  Pt has taken amoxicillin with no reaction but cannot take augmentin    Pork-Derived Products Swelling     Current Outpatient Medications   Medication Sig Dispense Refill    STOOL SOFTENER 100 MG capsule TAKE 1 CAPSULE BY MOUTH TWICE DAILY 60 capsule 0    sacubitril-valsartan (ENTRESTO) 24-26 MG per tablet Take 1 tablet by mouth 2 times daily 60 tablet 3    magnesium oxide (MAG-OX) 400 MG tablet Take 1 tablet by mouth daily 30 tablet 1    ferrous sulfate 324 (65 Fe) MG EC tablet Take 1 tablet by mouth 2 times daily (with meals) 60 tablet 2    albuterol sulfate  (90 Base) MCG/ACT inhaler Inhale 2 puffs into the lungs every 6 hours as needed for Wheezing 1 Inhaler 3    potassium chloride (KLOR-CON M) 20 MEQ extended release tablet Take 1 tablet by mouth daily 90 tablet 3    budesonide-formoterol (SYMBICORT) 160-4.5 MCG/ACT AERO Inhale 2 puffs into the lungs 2 times daily (Patient taking differently: Inhale 2 puffs into the lungs 2 times daily Once daily) 1 Inhaler 3    nicotine (NICODERM CQ) 21 MG/24HR Place 1 patch onto the skin daily 30 patch 3    torsemide (DEMADEX) 100 MG tablet Take 0.5 tablets by mouth daily 30 tablet 1    carvedilol (COREG) 3.125 MG tablet Take 1 tablet by mouth 2 times daily 60 tablet 3    atorvastatin (LIPITOR) 20 MG tablet Take 1 tablet by mouth daily 30 tablet 3    ALPRAZolam (ALPRAZOLAM XR) 0.5 MG extended release tablet Take 0.5 mg by mouth daily as needed (anxiety).  allopurinol (ZYLOPRIM) 300 MG tablet Take 300 mg by mouth daily      fluticasone (FLONASE) 50 MCG/ACT nasal spray 2 sprays by Each Nostril route daily      Multiple Vitamins-Minerals (THERAPEUTIC MULTIVITAMIN-MINERALS) tablet Take 1 tablet by mouth daily      levocetirizine (XYZAL) 5 MG tablet Take 5 mg by mouth daily.  GuaiFENesin (MUCINEX PO) Take 400 mg by mouth daily.  aspirin 81 MG EC tablet Take 81 mg by mouth daily.  montelukast (SINGULAIR) 10 MG tablet Take 10 mg by mouth daily       esomeprazole (NEXIUM) 20 MG capsule Take 20 mg by mouth every morning (before breakfast). No current facility-administered medications for this visit. Physical Exam:   /66   Pulse 118   Ht 5' 2\" (1.575 m)   Wt 124 lb (56.2 kg)   SpO2 97%   BMI 22.68 kg/m²   No intake or output data in the 24 hours ending 08/11/21 1609  Wt Readings from Last 2 Encounters:   08/11/21 124 lb (56.2 kg)   07/13/21 123 lb 9.6 oz (56.1 kg)     Constitutional: She is oriented to person, place, and time. She appears well-developed and well-nourished. In no acute distress. Head: Normocephalic and atraumatic. Neck: Neck supple. No JVD present. Carotid bruit is not present. No mass and no thyromegaly present. No lymphadenopathy present. Cardiovascular: Normal rate, regular rhythm, normal heart sounds and intact distal pulses. Exam reveals no gallop and no friction rub. No murmur heard. Pulmonary/Chest: Effort normal and breath sounds normal. No respiratory distress. She has no wheezes, rhonchi or rales.    Abdominal: Soft, non-tender. Bowel sounds and aorta are normal. She exhibits no organomegaly, mass or bruit. Extremities: No edema, cyanosis, or clubbing. Pulses are 2+ radial/carotid/dorsalis pedis and posterior tibial bilaterally. Neurological: She is alert and oriented to person, place, and time. She has normal reflexes. No cranial nerve deficit. Coordination normal.   Skin: Skin is warm and dry. There is no rash or diaphoresis. Psychiatric: She has a normal mood and affect. Her speech is normal and behavior is normal.       Procedures:     /[facility] 6/7/21  1. Evidence of persistent right-sided volume overload with a right  atrial pressure that was low at 5 mmHg but a pulmonary capillary wedge  pressure elevated at 20 mmHg. 2.  Evidence of pulmonary hypertension with an instantaneous pressure of  38/17 for a mean pulmonary arterial pressure of 28.  3.  Mildly reduced cardiac output by thermodilution at 4.53 liters per  minute with a cardiac index of 2.89 liters per kilogram per minute. 4.  Pulmonary vascular resistance of 139 dynes per second. 5.  Normal mixed venous oxygen saturations at 66% in the right atrium  and 64% in the pulmonary artery. 6.  Left ventricular end-diastolic pressure of 23 mmHg. 7.  Right-dominant coronary arterial system with no obstructive coronary  lesions. The left main is free of disease. The circumflex is smaller. OM1 has 50% stenosis. The left anterior descending artery is calcified  in the proximal and midportion with a 50% stenosis at the first septal   and the first diagonal branch but no flow limitation. Further, there is 50% stenosis in the midportion. The right coronary  artery is large with 50% mid RCA stenosis. 8.  Mildly reduced LV systolic function. LV ejection fraction of 40%  and mild apical hypokinesis in a pattern likely consistent with a  stress-induced cardiomyopathy.   9.  No gradient across the aortic valve on pullback to suggest aortic  stenosis. Imaging:     Echo 6/5/21  Overall left ventricular systolic function appears mildly reduced. Ejection fraction is visually estimated to be 40%. Severe andrew-lateral hypokinesis  Mid to apical akinesis  Grade II diastolic dysfunction with elevated LV filling pressures. Normal right ventricular size. Right ventricular systolic function is reduced . Lab Review:   No results found for: TRIG, HDL, LDLCALC, LDLDIRECT, LABVLDL  Lab Results   Component Value Date     07/14/2021    K 4.4 07/14/2021    K 4.3 06/04/2021    BUN 8 07/14/2021    CREATININE 0.7 07/14/2021         Assessment:  1. Chronic combined systolic and diastolic CHF, class 2   2. CAD of native coronary arteries without angina  3. Hyperlipidemia with LDL goal <70 mg/dL   4. Essential hypertension  5. Tobacco Abuse    Plan:  She is not endorsing any symptoms representing angina and she is well compensated on exam today in the office. I will have her reduce her dose of torsemide to 20mg daily and increase her Entresto to 49-51mg twice daily. She is only taking it once a day right now because she \"forgets\". She needs a repeat BMP in one week. I encouraged her in complete smoking cessation. I will see her in the office for follow up in 3 months. This note was scribed in the presence of Benedetta Severs, MD by [employer], RN. Physician Attestation:  The scribes documentation has been prepared under my direction and personally reviewed by me in its entirety. I, Dr. Festus Campos personally performed the services described in this documentation as scribed by my RN in my presence, and I confirm that the note above accurately reflects all work, treatment, procedures, and medical decision making performed by me.

## 2021-09-17 ENCOUNTER — HOSPITAL ENCOUNTER (OUTPATIENT)
Dept: SLEEP CENTER | Age: 61
Discharge: HOME | End: 2021-09-17
Payer: COMMERCIAL

## 2021-09-17 DIAGNOSIS — Z86.79 H/O CHF: ICD-10-CM

## 2021-09-17 DIAGNOSIS — I10 ESSENTIAL HYPERTENSION: Chronic | ICD-10-CM

## 2021-09-17 DIAGNOSIS — G47.33 OBSTRUCTIVE SLEEP APNEA: ICD-10-CM

## 2021-09-17 PROCEDURE — 95811 POLYSOM 6/>YRS CPAP 4/> PARM: CPT

## 2021-09-20 PROCEDURE — 95811 POLYSOM 6/>YRS CPAP 4/> PARM: CPT | Performed by: PSYCHIATRY & NEUROLOGY

## 2021-09-20 NOTE — PROGRESS NOTES
Kimberlee Molina         : 1960  [] [address]     [] [address]      [] [facility]     [][facility]    [] [facility]  [] [facility]   [] Other:  Diagnosis: [x] TANNER (G47.33) [] CSA (G47.31) [] Apnea (G47.30)   Length of Need: [] 12 Months [x] 99 Months [] Other:    Machine (JAYLA!): [] Respironics Dream Station      Auto [x] ResMed AirSense     Auto [] Other:     [x]  CPAP () [] Bilevel ()   Mode: [x] Auto [] Spontaneous    Mode: [] Auto [] Spontaneous           Between 8 and 14 cm                 Comfort Settings:   - Ramp Pressure: 5 cmH2O                                        - Ramp time: 15 min                                     -  Flex/EPR - 3 full time                                    - For ResMed Bilevel (TiMax-4 sec   TiMin- 0.2 sec)     Humidifier: [x] Heated ()        [x] Water chamber replacement ()/ 1 per 6 months        Mask:  Please always start with the mask the patient used during the titraion   [x] Nasal () /1 per 3 months [x] Full Face () /1 per 3 months   [x] Patient choice -Size and fit mask [x] Patient Choice - Size and fit mask   [] Dispense:  [] Dispense:    [x] Headgear () / 1 per 3 months [x] Headgear () / 1 per 3 months   [x] Replacement Nasal Cushion ()/2 per month [x] Interface Replacement ()/1 per month   [x] Replacement Nasal Pillows ()/2 per month         Tubing: [x] Heated ()/1 per 3 months    [] Standard ()/1 per 3 months [] Other:           Filters: [x] Non-disposable ()/1 per 6 months     [x] Ultra-Fine, Disposable ()/2 per month        Miscellaneous: [x] Chin Strap ()/ 1 per 6 months [] O2 bleed-in:       LPM   [] Oximetry on CPAP/Bilevel []  Other:          Start Order Date: 21    MEDICAL JUSTIFICATION:  I, the undersigned, certify that the above prescribed supplies are medically necessary for this patients wellbeing.   In my opinion, the supplies are both reasonable and necessary in reference to accepted standards of medicalpractice in treatment of this patients condition.     Bagn Pearce MD      NPI: [id number]       Order Signed Date: 09/20/21    Electronically signed by Bang Pearce MD on 9/20/2021 at 12:18 PM

## 2021-09-20 NOTE — TELEPHONE ENCOUNTER
Last ov 8/11/21  Pending appt 11/17/21  Last refill ferrous sulfate 6/28/21 #60x2 other meds 6/8/21 1 month x 3  Last labs 7/14/21 and 12/3/20 for lipids in care everywhere

## 2021-09-21 RX ORDER — FERROUS SULFATE TAB EC 324 MG (65 MG FE EQUIVALENT) 324 (65 FE) MG
TABLET DELAYED RESPONSE ORAL
Qty: 180 TABLET | Refills: 0 | Status: SHIPPED | OUTPATIENT
Start: 2021-09-21 | End: 2021-11-12 | Stop reason: ALTCHOICE

## 2021-09-21 RX ORDER — ATORVASTATIN CALCIUM 20 MG/1
20 TABLET, FILM COATED ORAL DAILY
Qty: 30 TABLET | Refills: 3 | Status: SHIPPED | OUTPATIENT
Start: 2021-09-21 | End: 2022-05-10 | Stop reason: SDUPTHER

## 2021-09-21 RX ORDER — CARVEDILOL 3.12 MG/1
TABLET ORAL
Qty: 180 TABLET | Refills: 0 | Status: SHIPPED | OUTPATIENT
Start: 2021-09-21 | End: 2021-12-16

## 2021-09-22 ENCOUNTER — TELEPHONE (OUTPATIENT)
Dept: PULMONOLOGY | Age: 61
End: 2021-09-22

## 2021-09-22 NOTE — TELEPHONE ENCOUNTER
Sleep study showed mild TANNER. AHI was 9.9  per hr. And O2 Desaturations to 88%. Adequate control of the events at a CPAP pressure of 8 cm. Patient verbalized understanding with no further questions at this time. Patient will use [facility] for DME selection. Tere Gomes

## 2021-10-12 ENCOUNTER — OFFICE VISIT (OUTPATIENT)
Dept: FAMILY MEDICINE CLINIC | Age: 61
End: 2021-10-12
Payer: COMMERCIAL

## 2021-10-12 VITALS
HEIGHT: 62 IN | HEART RATE: 76 BPM | DIASTOLIC BLOOD PRESSURE: 72 MMHG | BODY MASS INDEX: 22.67 KG/M2 | RESPIRATION RATE: 16 BRPM | SYSTOLIC BLOOD PRESSURE: 107 MMHG | OXYGEN SATURATION: 99 % | WEIGHT: 123.2 LBS

## 2021-10-12 DIAGNOSIS — Z12.83 SCREENING EXAM FOR SKIN CANCER: ICD-10-CM

## 2021-10-12 DIAGNOSIS — Z23 NEED FOR IMMUNIZATION AGAINST INFLUENZA: ICD-10-CM

## 2021-10-12 DIAGNOSIS — Z13.1 SCREENING FOR DIABETES MELLITUS (DM): ICD-10-CM

## 2021-10-12 DIAGNOSIS — D64.9 ANEMIA, UNSPECIFIED TYPE: ICD-10-CM

## 2021-10-12 DIAGNOSIS — E78.5 HYPERLIPIDEMIA, UNSPECIFIED HYPERLIPIDEMIA TYPE: ICD-10-CM

## 2021-10-12 DIAGNOSIS — I10 PRIMARY HYPERTENSION: Primary | ICD-10-CM

## 2021-10-12 DIAGNOSIS — E04.9 ENLARGED THYROID: ICD-10-CM

## 2021-10-12 PROCEDURE — 90688 IIV4 VACCINE SPLT 0.5 ML IM: CPT | Performed by: INTERNAL MEDICINE

## 2021-10-12 PROCEDURE — 99204 OFFICE O/P NEW MOD 45 MIN: CPT | Performed by: INTERNAL MEDICINE

## 2021-10-12 PROCEDURE — 90471 IMMUNIZATION ADMIN: CPT | Performed by: INTERNAL MEDICINE

## 2021-10-12 RX ORDER — HYDROXYZINE HYDROCHLORIDE 25 MG/1
TABLET, FILM COATED ORAL
Qty: 30 TABLET | Refills: 0 | Status: SHIPPED | OUTPATIENT
Start: 2021-10-12 | End: 2022-01-14

## 2021-10-12 ASSESSMENT — ENCOUNTER SYMPTOMS
COLOR CHANGE: 0
WHEEZING: 0
DIARRHEA: 0
EYE PAIN: 0
VOMITING: 0
SHORTNESS OF BREATH: 0
NAUSEA: 0
CONSTIPATION: 0

## 2021-10-12 NOTE — PROGRESS NOTES
10/12/2021    Kimberlee Molina (:  1960) is a 62 y.o. female, here for evaluation of the following medical concerns:    HPI  New pt to get establish. Old pcp was  45 min away. In [place] [address] with acute systolic heart failure in        Admitted 21   And discharged on  21   Woke up having a hard time breathing. Also had cough , BLE edema , fatigue   Had an echo and ekg with anterolateral ST changes. Had R and LHC non occlusive CAD    Also had Iron def anemia. Also had anemia, hyponatremia    Overall doing ok   Still needs strength build up. Still waiting for a cpap to come in. 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 21 echo   Conclusions      Summary   Overall left ventricular systolic function appears mildly reduced. Ejection fraction is visually estimated to be 40%. Severe andrew-lateral hypokinesis   Mid to apical akinesis   Grade II diastolic dysfunction with elevated LV filling pressures. Normal right ventricular size. Right ventricular systolic function is reduced . 21 doppler of legs     Conclusions        Summary        No evidence of deep vein or superficial vein thrombosis involving the left    lower extremity and the contralateral proximal right common femoral vein.        Signature           Pfizer vaccine   X  2 in April        Currently   Breathing is fine  Can lie down flat  In July was walking all over [facility] hard  Probably walks slower   Says she never leaves her house. Goes up and down steps a lot at her house. Takes a xanax  Once every two weeks  Says it is her job that does it. Drinks 1-2 glasses Cheril Bonnet day. On  32-46 oz fluid a day      1 biological child  Has a granddaughter lives with her  3 step children     Review of Systems   Constitutional: Negative for fatigue and unexpected weight change.    HENT: Negative for hearing loss and tinnitus. Eyes: Negative for pain and visual disturbance. Respiratory: Negative for shortness of breath and wheezing. Cardiovascular: Positive for leg swelling. Negative for chest pain and palpitations. Gastrointestinal: Negative for constipation, diarrhea, nausea and vomiting. Endocrine: Negative for cold intolerance and heat intolerance. Genitourinary: Positive for frequency. Negative for dysuria. Musculoskeletal: Negative for gait problem and joint swelling. Skin: Negative for color change and rash. Neurological: Negative for dizziness and headaches. Psychiatric/Behavioral: Negative for dysphoric mood. The patient is nervous/anxious. Prior to Visit Medications    Medication Sig Taking?  Authorizing Provider   MAGNESIUM-OXIDE 400 (241.3 Mg) MG TABS tablet TAKE 1 TABLET BY MOUTH DAILY Yes Brandon Ba MD   atorvastatin (LIPITOR) 20 MG tablet TAKE 1 TABLET BY MOUTH DAILY Yes Brandon Ba MD   carvedilol (COREG) 3.125 MG tablet TAKE 1 TABLET BY MOUTH TWICE DAILY Yes Brandon Ba MD   ferrous sulfate 324 (65 Fe) MG EC tablet TAKE 1 TABLET BY MOUTH TWICE DAILY WITH MEALS Yes Brandon Ba MD   budesonide-formoterol (SYMBICORT) 160-4.5 MCG/ACT AERO Inhale 2 puffs into the lungs 2 times daily Yes Jace Johnson MD   torsemide (DEMADEX) 20 MG tablet Take 1 tablet by mouth daily Yes Brandon Ba MD   sacubitril-valsartan (ENTRESTO) 49-51 MG per tablet Take 1 tablet by mouth 2 times daily Yes Brandon Ba MD   STOOL SOFTENER 100 MG capsule TAKE 1 CAPSULE BY MOUTH TWICE DAILY Yes FRANCISCO J العلي CNP   albuterol sulfate  (90 Base) MCG/ACT inhaler Inhale 2 puffs into the lungs every 6 hours as needed for Wheezing Yes FRANCISCO J Diaz CNP   potassium chloride (KLOR-CON M) 20 MEQ extended release tablet Take 1 tablet by mouth daily Yes FRANCISCO J العلي CNP   ALPRAZolam (ALPRAZOLAM XR) 0.5 MG extended release tablet Take 0.5 mg by mouth daily as needed (anxiety). Yes Historical Provider, MD   allopurinol (ZYLOPRIM) 300 MG tablet Take 300 mg by mouth daily Yes Historical Provider, MD   fluticasone (FLONASE) 50 MCG/ACT nasal spray 2 sprays by Each Nostril route daily Yes Historical Provider, MD   Multiple Vitamins-Minerals (THERAPEUTIC MULTIVITAMIN-MINERALS) tablet Take 1 tablet by mouth daily Yes Historical Provider, MD   levocetirizine (XYZAL) 5 MG tablet Take 5 mg by mouth daily. Yes Historical Provider, MD   GuaiFENesin (MUCINEX PO) Take 400 mg by mouth daily. Yes Historical Provider, MD   aspirin 81 MG EC tablet Take 81 mg by mouth daily. Yes Historical Provider, MD   montelukast (SINGULAIR) 10 MG tablet Take 10 mg by mouth daily  Yes Historical Provider, MD   esomeprazole (NEXIUM) 20 MG capsule Take 20 mg by mouth every morning (before breakfast).  Yes Historical Provider, MD   nicotine (NICODERM CQ) 21 MG/24HR Place 1 patch onto the skin daily  Patient not taking: Reported on 10/12/2021  Shai Burgos MD        Allergies   Allergen Reactions    Augmentin [Amoxicillin-Pot Clavulanate] Anaphylaxis and Swelling     THROATSWELLS  Pt has taken amoxicillin with no reaction but cannot take augmentin    Pork-Derived Products Swelling       Past Medical History:   Diagnosis Date    Anxiety     GERD (gastroesophageal reflux disease)     Gout     had once years ago    Hyperlipidemia     at last check it was normal    Hypertension        Past Surgical History:   Procedure Laterality Date    BLADDER SUSPENSION      CARPAL TUNNEL RELEASE      right    CARPAL TUNNEL RELEASE  7-12-10    Left    CARPAL TUNNEL RELEASE  Left wrist    FOOT SURGERY  3/2013    EXCISION NEUROMA 3RD NERVE LEFT FOOT    JOINT REPLACEMENT      right    KNEE ARTHROSCOPY      left x1, right x 1    SINUS SURGERY      TONSILLECTOMY         Social History     Socioeconomic History    Marital status:  Spouse name: Not on file    Number of children: Not on file    Years of education: Not on file    Highest education level: Not on file   Occupational History    Not on file   Tobacco Use    Smoking status: Current Every Day Smoker     Packs/day: 0.50     Years: 35.00     Pack years: 17.50     Types: Cigarettes    Smokeless tobacco: Never Used    Tobacco comment: trying to quit   Vaping Use    Vaping Use: Every day    Substances: Nicotine    Devices: Disposable   Substance and Sexual Activity    Alcohol use: Yes     Alcohol/week: 14.0 standard drinks     Types: 14 Glasses of wine per week     Comment: wine    Drug use: No    Sexual activity: Not on file   Other Topics Concern    Not on file   Social History Narrative    Not on file     Social Determinants of Health     Financial Resource Strain: Low Risk     Difficulty of Paying Living Expenses: Not hard at all   Food Insecurity: No Food Insecurity    Worried About Running Out of Food in the Last Year: Never true    Tatianna of Food in the Last Year: Never true   Transportation Needs: No Transportation Needs    Lack of Transportation (Medical): No    Lack of Transportation (Non-Medical):  No   Physical Activity:     Days of Exercise per Week:     Minutes of Exercise per Session:    Stress:     Feeling of Stress :    Social Connections:     Frequency of Communication with Friends and Family:     Frequency of Social Gatherings with Friends and Family:     Attends Judaism Services:     Active Member of Clubs or Organizations:     Attends Club or Organization Meetings:     Marital Status:    Intimate Partner Violence:     Fear of Current or Ex-Partner:     Emotionally Abused:     Physically Abused:     Sexually Abused:         Family History   Problem Relation Age of Onset    Diabetes Mother     High Blood Pressure Mother     Heart Disease Maternal Grandmother        Vitals:    10/12/21 1032   BP: 107/72   Site: Right Upper Arm Position: Sitting   Cuff Size: Medium Adult   Pulse: 76   Resp: 16   SpO2: 99%   Weight: 123 lb 3.2 oz (55.9 kg)   Height: 5' 2\" (1.575 m)     Estimated body mass index is 22.53 kg/m² as calculated from the following:    Height as of this encounter: 5' 2\" (1.575 m). Weight as of this encounter: 123 lb 3.2 oz (55.9 kg). Physical Exam  Constitutional:       Appearance: Normal appearance. She is well-developed. HENT:      Head: Normocephalic and atraumatic. Right Ear: Tympanic membrane and ear canal normal.      Left Ear: Tympanic membrane and ear canal normal.   Eyes:      General: No scleral icterus. Conjunctiva/sclera: Conjunctivae normal.      Comments: Exophthalmos bilaterally   Neck:      Thyroid: No thyromegaly. Comments: Thyroid may be enlarged on the left  Cardiovascular:      Rate and Rhythm: Normal rate and regular rhythm. Heart sounds: Normal heart sounds. No murmur heard. Pulmonary:      Effort: Pulmonary effort is normal. No respiratory distress. Breath sounds: Normal breath sounds. No wheezing. Abdominal:      General: There is no distension. Palpations: There is no mass. Tenderness: There is no abdominal tenderness. Musculoskeletal:         General: No swelling, tenderness or deformity. Cervical back: Neck supple. No tenderness. Lymphadenopathy:      Cervical: No cervical adenopathy. Skin:     General: Skin is warm and dry. Findings: No rash. Neurological:      Mental Status: She is alert. Motor: No abnormal muscle tone. Comments: Motor 5/5 upper and lower extremities  Speech clear   Psychiatric:         Mood and Affect: Mood normal.         Behavior: Behavior normal.         Thought Content: Thought content normal.         Judgment: Judgment normal.       Andrés Spain was seen today for new patient. Diagnoses and all orders for this visit:    Primary hypertension  -     Comprehensive Metabolic Panel;  Future    Need for immunization against influenza  -     INFLUENZA, QUADV, 3 YRS AND OLDER, IM, MDV, 0.5ML (Sarah Acosta)    Hyperlipidemia, unspecified hyperlipidemia type  -     Lipid Panel; Future  -     Comprehensive Metabolic Panel; Future    Anemia, unspecified type  -     CBC Auto Differential; Future  -     Ferritin; Future  -     Iron and TIBC; Future    Screening for diabetes mellitus (DM)  -     Hemoglobin A1C; Future    Screening exam for skin cancer  -     Amb External Referral To Dermatology    Enlarged thyroid  -     US THYROID; Future    Other orders  -     sertraline (ZOLOFT) 50 MG tablet; Take 1 tablet by mouth daily  -     hydrOXYzine (ATARAX) 25 MG tablet; Take one daily as needed for severe anxiety  bp is low  Needs labs   Will ck us of thyroid . She may have a thyroid nodule on the left. His heart failure which is being treated by cardiology and she also has a pulmonary doctor. She had some exophthalmos on exam  Start her on Zoloft. And some Atarax.   We will have her follow-up in 1 month

## 2021-10-19 DIAGNOSIS — E78.5 HYPERLIPIDEMIA, UNSPECIFIED HYPERLIPIDEMIA TYPE: ICD-10-CM

## 2021-10-19 DIAGNOSIS — Z13.1 SCREENING FOR DIABETES MELLITUS (DM): ICD-10-CM

## 2021-10-19 DIAGNOSIS — D64.9 ANEMIA, UNSPECIFIED TYPE: ICD-10-CM

## 2021-10-19 DIAGNOSIS — I10 PRIMARY HYPERTENSION: ICD-10-CM

## 2021-10-19 LAB
A/G RATIO: 1.4 (ref 1.1–2.2)
ALBUMIN SERPL-MCNC: 3.8 G/DL (ref 3.4–5)
ALP BLD-CCNC: 184 U/L (ref 40–129)
ALT SERPL-CCNC: 16 U/L (ref 10–40)
ANION GAP SERPL CALCULATED.3IONS-SCNC: 17 MMOL/L (ref 3–16)
AST SERPL-CCNC: 70 U/L (ref 15–37)
BASOPHILS ABSOLUTE: 0.1 K/UL (ref 0–0.2)
BASOPHILS RELATIVE PERCENT: 0.7 %
BILIRUB SERPL-MCNC: 0.6 MG/DL (ref 0–1)
BUN BLDV-MCNC: 4 MG/DL (ref 7–20)
CALCIUM SERPL-MCNC: 8.7 MG/DL (ref 8.3–10.6)
CHLORIDE BLD-SCNC: 88 MMOL/L (ref 99–110)
CHOLESTEROL, TOTAL: 175 MG/DL (ref 0–199)
CO2: 24 MMOL/L (ref 21–32)
CREAT SERPL-MCNC: 0.6 MG/DL (ref 0.6–1.2)
EOSINOPHILS ABSOLUTE: 0 K/UL (ref 0–0.6)
EOSINOPHILS RELATIVE PERCENT: 0.3 %
FERRITIN: 1021 NG/ML (ref 15–150)
GFR AFRICAN AMERICAN: >60
GFR NON-AFRICAN AMERICAN: >60
GLOBULIN: 2.7 G/DL
GLUCOSE BLD-MCNC: 86 MG/DL (ref 70–99)
HCT VFR BLD CALC: 38.6 % (ref 36–48)
HDLC SERPL-MCNC: 91 MG/DL (ref 40–60)
HEMOGLOBIN: 12.9 G/DL (ref 12–16)
IRON SATURATION: 86 % (ref 15–50)
IRON: 195 UG/DL (ref 37–145)
LDL CHOLESTEROL CALCULATED: 63 MG/DL
LYMPHOCYTES ABSOLUTE: 1.5 K/UL (ref 1–5.1)
LYMPHOCYTES RELATIVE PERCENT: 21.8 %
MCH RBC QN AUTO: 33.8 PG (ref 26–34)
MCHC RBC AUTO-ENTMCNC: 33.5 G/DL (ref 31–36)
MCV RBC AUTO: 100.7 FL (ref 80–100)
MONOCYTES ABSOLUTE: 0.6 K/UL (ref 0–1.3)
MONOCYTES RELATIVE PERCENT: 9.2 %
NEUTROPHILS ABSOLUTE: 4.8 K/UL (ref 1.7–7.7)
NEUTROPHILS RELATIVE PERCENT: 68 %
PDW BLD-RTO: 14.9 % (ref 12.4–15.4)
PLATELET # BLD: 347 K/UL (ref 135–450)
PMV BLD AUTO: 7.6 FL (ref 5–10.5)
POTASSIUM SERPL-SCNC: 4.6 MMOL/L (ref 3.5–5.1)
RBC # BLD: 3.83 M/UL (ref 4–5.2)
SODIUM BLD-SCNC: 129 MMOL/L (ref 136–145)
TOTAL IRON BINDING CAPACITY: 226 UG/DL (ref 260–445)
TOTAL PROTEIN: 6.5 G/DL (ref 6.4–8.2)
TRIGL SERPL-MCNC: 104 MG/DL (ref 0–150)
VLDLC SERPL CALC-MCNC: 21 MG/DL
WBC # BLD: 7 K/UL (ref 4–11)

## 2021-10-20 LAB
ESTIMATED AVERAGE GLUCOSE: 93.9 MG/DL
HBA1C MFR BLD: 4.9 %

## 2021-10-21 DIAGNOSIS — R74.8 ELEVATED ALKALINE PHOSPHATASE LEVEL: Primary | ICD-10-CM

## 2021-11-02 ENCOUNTER — HOSPITAL ENCOUNTER (OUTPATIENT)
Dept: ULTRASOUND IMAGING | Age: 61
Discharge: HOME | End: 2021-11-02
Payer: COMMERCIAL

## 2021-11-02 DIAGNOSIS — E04.9 ENLARGED THYROID: ICD-10-CM

## 2021-11-02 PROCEDURE — 76536 US EXAM OF HEAD AND NECK: CPT

## 2021-11-12 ENCOUNTER — VIRTUAL VISIT (OUTPATIENT)
Dept: FAMILY MEDICINE CLINIC | Age: 61
End: 2021-11-12
Payer: COMMERCIAL

## 2021-11-12 DIAGNOSIS — I50.43 CHF (CONGESTIVE HEART FAILURE), NYHA CLASS I, ACUTE ON CHRONIC, COMBINED (HCC): ICD-10-CM

## 2021-11-12 DIAGNOSIS — F41.9 ANXIETY: Primary | ICD-10-CM

## 2021-11-12 PROCEDURE — 99213 OFFICE O/P EST LOW 20 MIN: CPT | Performed by: INTERNAL MEDICINE

## 2021-11-12 RX ORDER — FLUOXETINE 10 MG/1
10 TABLET, FILM COATED ORAL DAILY
Qty: 30 TABLET | Refills: 0 | Status: SHIPPED | OUTPATIENT
Start: 2021-11-12 | End: 2021-12-14

## 2021-11-12 RX ORDER — ONDANSETRON 4 MG/1
4 TABLET, ORALLY DISINTEGRATING ORAL 3 TIMES DAILY PRN
Qty: 12 TABLET | Refills: 0 | Status: SHIPPED | OUTPATIENT
Start: 2021-11-12 | End: 2022-04-08

## 2021-11-12 NOTE — PROGRESS NOTES
Kimberlee Molina (:  1960) is a 62 y.o. female,Established patient, here for evaluation of the following chief complaint(s): Anxiety (f/u Zoloft, making her nauseous so she dc taking)         ASSESSMENT/PLAN:    Amarjit was seen today for anxiety. Diagnoses and all orders for this visit:    Anxiety    CHF (congestive heart failure), NYHA class I, acute on chronic, combined ([address]    Other orders  -     FLUoxetine (PROZAC) 10 MG tablet; Take 1 tablet by mouth daily  -     ondansetron (ZOFRAN-ODT) 4 MG disintegrating tablet; Take 1 tablet by mouth 3 times daily as needed for Nausea or Vomiting    will chanage zoloft to prozac  chf is stable  zofran prn nausea    SUBJECTIVE/OBJECTIVE:  HPI  Started zoloft and it made her nauseated. Was nauseated for 2 days after  Eating did not help  May have taken 2-3 weeks  Takes xanax  One every couple of weeks. Sleeping ok    Weights herself every am  118-121  Will call cardiology if wt goes up      Review of Systems   Constitutional: Positive for appetite change (can't eat as much since hospitalization in ). Negative for unexpected weight change. Psychiatric/Behavioral: Negative for dysphoric mood. The patient is nervous/anxious. Patient-Reported Vitals 2021   Patient-Reported Weight 121.8lbs        Physical Exam  Constitutional:       Appearance: Normal appearance. Neurological:      Mental Status: She is alert. Kimberlee Molina, was evaluated through a synchronous (real-time) audio-video encounter. The patient (or guardian if applicable) is aware that this is a billable service. Verbal consent to proceed has been obtained within the past 12 months. The visit was conducted pursuant to the emergency declaration under the [facility], [address] [place] [address] authority and the [employer] and [employer] General Act.   Patient identification was verified, and a caregiver was present when appropriate. The patient was located in a state where the provider was credentialed to provide care. An electronic signature was used to authenticate this note.     --Leah Casarez MD

## 2021-11-15 ENCOUNTER — HOSPITAL ENCOUNTER (OUTPATIENT)
Dept: ULTRASOUND IMAGING | Age: 61
Discharge: HOME | End: 2021-11-15
Payer: COMMERCIAL

## 2021-11-15 DIAGNOSIS — R74.8 ELEVATED ALKALINE PHOSPHATASE LEVEL: ICD-10-CM

## 2021-11-15 PROCEDURE — 76705 ECHO EXAM OF ABDOMEN: CPT

## 2021-11-15 NOTE — PROGRESS NOTES
[facility], [place]    Saeed Jara  1960 November 17, 2021      CC: \"I feel well\"     HPI:  The patient is 62 y.o. female with a past medical history significant for nonobstructive CAD, chronic combined systolic and diastolic CHF, hyperlipidemia and hypertension. She presented to [facility] with worsening shortness of breath. An inpatient echocardiogram revealed reduced LVEF of 40%. She underwent heart catheterization on 6/7/21 revealing nonobstructive coronary disease not requiring any intervention. She presents today for follow up. Her breathing has been comfortable without shortness of breath. She denies chest pain with rest or exertion. She states her weight has come down some but does fluctuate. She reports medication compliance and is tolerating including Entresto therapy. She continues to smoke and attributes this to the stress of her job. Patient denies exertional chest pain/pressure, dyspnea at rest, BOONE, PND, orthopnea, palpitations, lightheadedness, weight changes, changes in LE edema, and syncope. Review of Systems:  Constitutional: No fatigue, weakness, night sweats or fever. HEENT: No new vision difficulties or ringing in the ears. Respiratory: Positive for cough. No new SOB, PND, orthopnea. Cardiovascular: See HPI   GI: No n/v, diarrhea, constipation, abdominal pain or changes in bowel habits. No melena, no hematochezia  : No urinary frequency, urgency, incontinence, hematuria or dysuria. Skin: No cyanosis or skin lesions. Musculoskeletal: No new muscle or joint pain. Neurological: No syncope or TIA-like symptoms.   Psychiatric: No anxiety, insomnia or depression     Past Medical History:   Diagnosis Date    Anxiety     GERD (gastroesophageal reflux disease)     Gout     had once years ago    Hyperlipidemia     at last check it was normal    Hypertension      Past Surgical History:   Procedure Laterality Date    BLADDER SUSPENSION      CARPAL TUNNEL RELEASE      right    CARPAL TUNNEL RELEASE  7-12-10    Left    CARPAL TUNNEL RELEASE  Left wrist    FOOT SURGERY  3/2013    EXCISION NEUROMA 3RD NERVE LEFT FOOT    JOINT REPLACEMENT      right    KNEE ARTHROSCOPY      left x1, right x 1    SINUS SURGERY      TONSILLECTOMY       Family History   Problem Relation Age of Onset    Cancer Mother     Heart Disease Mother     Diabetes Mother     High Blood Pressure Mother     Heart Disease Father     Heart Disease Maternal Grandmother      Social History     Tobacco Use    Smoking status: Current Every Day Smoker     Packs/day: 0.50     Years: 35.00     Pack years: 17.50     Types: Cigarettes    Smokeless tobacco: Never Used    Tobacco comment: trying to quit   Vaping Use    Vaping Use: Every day    Substances: Nicotine    Devices: Disposable   Substance Use Topics    Alcohol use:  Yes     Alcohol/week: 14.0 standard drinks     Types: 14 Glasses of wine per week     Comment: wine    Drug use: No       Allergies   Allergen Reactions    Augmentin [Amoxicillin-Pot Clavulanate] Anaphylaxis and Swelling     THROATSWELLS  Pt has taken amoxicillin with no reaction but cannot take augmentin    Pork-Derived Products Swelling     Current Outpatient Medications   Medication Sig Dispense Refill    FLUoxetine (PROZAC) 10 MG tablet Take 1 tablet by mouth daily 30 tablet 0    ondansetron (ZOFRAN-ODT) 4 MG disintegrating tablet Take 1 tablet by mouth 3 times daily as needed for Nausea or Vomiting 12 tablet 0    hydrOXYzine (ATARAX) 25 MG tablet Take one daily as needed for severe anxiety 30 tablet 0    MAGNESIUM-OXIDE 400 (241.3 Mg) MG TABS tablet TAKE 1 TABLET BY MOUTH DAILY 90 tablet 0    atorvastatin (LIPITOR) 20 MG tablet TAKE 1 TABLET BY MOUTH DAILY 30 tablet 3    carvedilol (COREG) 3.125 MG tablet TAKE 1 TABLET BY MOUTH TWICE DAILY 180 tablet 0    budesonide-formoterol (SYMBICORT) 160-4.5 MCG/ACT AERO Inhale 2 puffs into the lungs 2 times daily 1 each 11    torsemide (DEMADEX) 20 MG tablet Take 1 tablet by mouth daily 30 tablet 3    sacubitril-valsartan (ENTRESTO) 49-51 MG per tablet Take 1 tablet by mouth 2 times daily 60 tablet 3    albuterol sulfate  (90 Base) MCG/ACT inhaler Inhale 2 puffs into the lungs every 6 hours as needed for Wheezing 1 Inhaler 3    potassium chloride (KLOR-CON M) 20 MEQ extended release tablet Take 1 tablet by mouth daily 90 tablet 3    nicotine (NICODERM CQ) 21 MG/24HR Place 1 patch onto the skin daily 30 patch 3    ALPRAZolam (ALPRAZOLAM XR) 0.5 MG extended release tablet Take 0.5 mg by mouth daily as needed (anxiety).  allopurinol (ZYLOPRIM) 300 MG tablet Take 300 mg by mouth daily      fluticasone (FLONASE) 50 MCG/ACT nasal spray 2 sprays by Each Nostril route daily      Multiple Vitamins-Minerals (THERAPEUTIC MULTIVITAMIN-MINERALS) tablet Take 1 tablet by mouth daily      levocetirizine (XYZAL) 5 MG tablet Take 5 mg by mouth daily.  GuaiFENesin (MUCINEX PO) Take 400 mg by mouth daily.  aspirin 81 MG EC tablet Take 81 mg by mouth daily.  montelukast (SINGULAIR) 10 MG tablet Take 10 mg by mouth daily       esomeprazole (NEXIUM) 20 MG capsule Take 20 mg by mouth every morning (before breakfast). No current facility-administered medications for this visit. Physical Exam:   /70   Pulse 90   Ht 5' 2\" (1.575 m)   Wt 117 lb 12.8 oz (53.4 kg)   SpO2 98%   BMI 21.55 kg/m²   No intake or output data in the 24 hours ending 11/17/21 1558  Wt Readings from Last 2 Encounters:   11/17/21 117 lb 12.8 oz (53.4 kg)   10/12/21 123 lb 3.2 oz (55.9 kg)     Constitutional: She is oriented to person, place, and time. She appears well-developed and well-nourished. In no acute distress. Head: Normocephalic and atraumatic. Neck: Neck supple. No JVD present. Carotid bruit is not present. No mass and no thyromegaly present. No lymphadenopathy present.   Cardiovascular: Normal rate, regular Mildly reduced LV systolic function. LV ejection fraction of 40%  and mild apical hypokinesis in a pattern likely consistent with a  stress-induced cardiomyopathy. 9.  No gradient across the aortic valve on pullback to suggest aortic  stenosis. Imaging:     Echo 6/5/21  Overall left ventricular systolic function appears mildly reduced. Ejection fraction is visually estimated to be 40%. Severe andrew-lateral hypokinesis  Mid to apical akinesis  Grade II diastolic dysfunction with elevated LV filling pressures. Normal right ventricular size. Right ventricular systolic function is reduced . Lab Review:   Lab Results   Component Value Date    TRIG 104 10/19/2021    HDL 91 10/19/2021    LDLCALC 63 10/19/2021    LABVLDL 21 10/19/2021     Lab Results   Component Value Date     10/19/2021    K 4.6 10/19/2021    K 4.3 06/04/2021    BUN 4 10/19/2021    CREATININE 0.6 10/19/2021       Assessment:  1. Chronic combined systolic and diastolic CHF, class 2   2. CAD of native coronary arteries without angina  3. Hyperlipidemia with LDL goal <70 mg/dL   4. Essential hypertension  5. Tobacco Abuse    Plan:  She is not endorsing any symptoms representing angina and is overall well compensated on exam. I will have her complete a limited echocardiogram to assess her LVEF on medical therapy. Her blood pressure is well controlled today in the office and her recent lipid profile was favorable with her current medical therapy. I have encouraged her in smoking cessation. I have personally reviewed all previous testing for this visit today including imaging, lab results and EKG as detailed above. I will see her in the office for follow up in 6 months. This note was scribed in the presence of Solomon Hood MD by Reji Dias RN. Physician Attestation:  The scribes documentation has been prepared under my direction and personally reviewed by me in its entirety.      I, Dr. Elsi Avelar personally performed the services described in this documentation as scribed by my RN in my presence, and I confirm that the note above accurately reflects all work, treatment, procedures, and medical decision making performed by me.

## 2021-11-17 ENCOUNTER — OFFICE VISIT (OUTPATIENT)
Dept: CARDIOLOGY CLINIC | Age: 61
End: 2021-11-17
Payer: COMMERCIAL

## 2021-11-17 VITALS
OXYGEN SATURATION: 98 % | HEIGHT: 62 IN | WEIGHT: 117.8 LBS | HEART RATE: 90 BPM | BODY MASS INDEX: 21.68 KG/M2 | DIASTOLIC BLOOD PRESSURE: 70 MMHG | SYSTOLIC BLOOD PRESSURE: 118 MMHG

## 2021-11-17 DIAGNOSIS — I50.42 CHRONIC COMBINED SYSTOLIC AND DIASTOLIC CHF, NYHA CLASS 2 (HCC): Primary | ICD-10-CM

## 2021-11-17 DIAGNOSIS — I25.10 CORONARY ARTERY DISEASE INVOLVING NATIVE CORONARY ARTERY OF NATIVE HEART WITHOUT ANGINA PECTORIS: ICD-10-CM

## 2021-11-17 DIAGNOSIS — E78.5 HYPERLIPIDEMIA LDL GOAL <70: ICD-10-CM

## 2021-11-17 DIAGNOSIS — I10 ESSENTIAL HYPERTENSION: ICD-10-CM

## 2021-11-17 PROCEDURE — 99214 OFFICE O/P EST MOD 30 MIN: CPT | Performed by: INTERNAL MEDICINE

## 2021-11-29 RX ORDER — TORSEMIDE 20 MG/1
20 TABLET ORAL DAILY
Qty: 90 TABLET | Refills: 1 | Status: ON HOLD | OUTPATIENT
Start: 2021-11-29 | End: 2022-02-02 | Stop reason: HOSPADM

## 2021-12-14 RX ORDER — FLUOXETINE 10 MG/1
10 TABLET, FILM COATED ORAL DAILY
Qty: 30 TABLET | Refills: 0 | Status: SHIPPED | OUTPATIENT
Start: 2021-12-14 | End: 2022-01-11

## 2021-12-16 RX ORDER — CARVEDILOL 3.12 MG/1
TABLET ORAL
Qty: 180 TABLET | Refills: 1 | Status: SHIPPED | OUTPATIENT
Start: 2021-12-16 | End: 2022-05-16

## 2021-12-16 RX ORDER — SACUBITRIL AND VALSARTAN 49; 51 MG/1; MG/1
TABLET, FILM COATED ORAL
Qty: 180 TABLET | Refills: 1 | Status: ON HOLD | OUTPATIENT
Start: 2021-12-16 | End: 2022-02-02 | Stop reason: HOSPADM

## 2022-01-06 ENCOUNTER — HOSPITAL ENCOUNTER (OUTPATIENT)
Dept: NON INVASIVE DIAGNOSTICS | Age: 62
Discharge: HOME | End: 2022-01-06
Payer: COMMERCIAL

## 2022-01-06 DIAGNOSIS — I50.42 CHRONIC COMBINED SYSTOLIC AND DIASTOLIC CHF, NYHA CLASS 2 (HCC): ICD-10-CM

## 2022-01-06 PROCEDURE — 93308 TTE F-UP OR LMTD: CPT

## 2022-01-11 RX ORDER — FLUOXETINE 10 MG/1
10 TABLET, FILM COATED ORAL DAILY
Qty: 30 TABLET | Refills: 0 | Status: SHIPPED | OUTPATIENT
Start: 2022-01-11 | End: 2022-01-14

## 2022-01-14 ENCOUNTER — VIRTUAL VISIT (OUTPATIENT)
Dept: FAMILY MEDICINE CLINIC | Age: 62
End: 2022-01-14
Payer: COMMERCIAL

## 2022-01-14 DIAGNOSIS — F41.9 ANXIETY: Primary | ICD-10-CM

## 2022-01-14 PROCEDURE — 99213 OFFICE O/P EST LOW 20 MIN: CPT | Performed by: INTERNAL MEDICINE

## 2022-01-24 ENCOUNTER — TELEPHONE (OUTPATIENT)
Dept: FAMILY MEDICINE CLINIC | Age: 62
End: 2022-01-24

## 2022-01-24 ENCOUNTER — OFFICE VISIT (OUTPATIENT)
Dept: FAMILY MEDICINE CLINIC | Age: 62
End: 2022-01-24
Payer: COMMERCIAL

## 2022-01-24 ENCOUNTER — NURSE TRIAGE (OUTPATIENT)
Dept: OTHER | Facility: CLINIC | Age: 62
End: 2022-01-24

## 2022-01-24 VITALS
OXYGEN SATURATION: 93 % | BODY MASS INDEX: 21.16 KG/M2 | HEIGHT: 62 IN | WEIGHT: 115 LBS | SYSTOLIC BLOOD PRESSURE: 96 MMHG | RESPIRATION RATE: 16 BRPM | DIASTOLIC BLOOD PRESSURE: 64 MMHG | HEART RATE: 54 BPM

## 2022-01-24 DIAGNOSIS — R31.0 GROSS HEMATURIA: ICD-10-CM

## 2022-01-24 DIAGNOSIS — N30.01 ACUTE CYSTITIS WITH HEMATURIA: Primary | ICD-10-CM

## 2022-01-24 LAB
BILIRUBIN, POC: ABNORMAL
BLOOD URINE, POC: ABNORMAL
CLARITY, POC: ABNORMAL
COLOR, POC: ABNORMAL
GLUCOSE URINE, POC: NEGATIVE
KETONES, POC: NEGATIVE
LEUKOCYTE EST, POC: ABNORMAL
NITRITE, POC: POSITIVE
PH, POC: 6
PROTEIN, POC: NEGATIVE
SPECIFIC GRAVITY, POC: 1.01
UROBILINOGEN, POC: ABNORMAL

## 2022-01-24 PROCEDURE — 81002 URINALYSIS NONAUTO W/O SCOPE: CPT | Performed by: FAMILY MEDICINE

## 2022-01-24 PROCEDURE — 99213 OFFICE O/P EST LOW 20 MIN: CPT | Performed by: FAMILY MEDICINE

## 2022-01-24 RX ORDER — SULFAMETHOXAZOLE AND TRIMETHOPRIM 800; 160 MG/1; MG/1
1 TABLET ORAL 2 TIMES DAILY
Qty: 14 TABLET | Refills: 0 | Status: ON HOLD | OUTPATIENT
Start: 2022-01-24 | End: 2022-02-02 | Stop reason: HOSPADM

## 2022-01-24 ASSESSMENT — PATIENT HEALTH QUESTIONNAIRE - PHQ9
SUM OF ALL RESPONSES TO PHQ QUESTIONS 1-9: 0
1. LITTLE INTEREST OR PLEASURE IN DOING THINGS: 0
2. FEELING DOWN, DEPRESSED OR HOPELESS: 0
SUM OF ALL RESPONSES TO PHQ QUESTIONS 1-9: 0
SUM OF ALL RESPONSES TO PHQ9 QUESTIONS 1 & 2: 0
SUM OF ALL RESPONSES TO PHQ QUESTIONS 1-9: 0
SUM OF ALL RESPONSES TO PHQ QUESTIONS 1-9: 0

## 2022-01-24 NOTE — TELEPHONE ENCOUNTER
No contact call. No triage    Reason for Disposition   Information only question and nurse able to answer  Celia Cancer has already spoken to PCP or another triager  Celia Cancer has already spoken with the PCP and has no further questions. Answer Assessment - Initial Assessment Questions  1. REASON FOR CALL or QUESTION: \"What is your reason for calling today? \" or \"How can I best help you? \" or \"What question do you have that I can help answer? \"      Has dizziness and possible UTI. Caller spoke with [facility] office already and has an appointment.     Protocols used: INFORMATION ONLY CALL - NO TRIAGE-ADULT-OH, INFORMATION ONLY CALL - NO TRIAGE-ADULT-AH, NO CONTACT OR DUPLICATE CONTACT CALL-ADULT-AH

## 2022-01-24 NOTE — TELEPHONE ENCOUNTER
Called pt    Stated that she has had blood in urine, light headed, and trouble urinating. Stated that she sometimes feels like she will pass out. Stated that she has had symptoms for about 2 weeks now. Stated that it started with cloudy urine and then she noticed blood in her urine a week and a half ago. Stated that the dizzy spells started last week and gotten worse since Friday. Stated that she felt like she would pass out a half an hour ago that lasted a few minutes. Triaged dizziness. Advised by Kade Hahn to schedule with any provider today. Scheduled appt with Dr. Elsa Cleary today.

## 2022-01-24 NOTE — TELEPHONE ENCOUNTER
----- Message from Vazquez Isaacs sent at 1/24/2022  9:23 AM EST -----  Subject: Appointment Request    Reason for Call: Urgent Adult Urinary Problem    QUESTIONS  Type of Appointment? Established Patient  Reason for appointment request? No appointments available during search  Additional Information for Provider? Patient is having UTI issues for the   last few days. She would like to be seen as soon as possible. She will   take anytime you have ready. ---------------------------------------------------------------------------  --------------  Crystal HERNANDEZ  What is the best way for the office to contact you? OK to leave message on   voicemail  Preferred Call Back Phone Number?  [phone]  ---------------------------------------------------------------------------  --------------  SCRIPT ANSWERS

## 2022-01-26 LAB
ORGANISM: ABNORMAL
URINE CULTURE, ROUTINE: ABNORMAL

## 2022-01-28 ENCOUNTER — APPOINTMENT (OUTPATIENT)
Dept: ULTRASOUND IMAGING | Age: 62
DRG: 640 | End: 2022-01-28
Payer: COMMERCIAL

## 2022-01-28 ENCOUNTER — APPOINTMENT (OUTPATIENT)
Dept: CT IMAGING | Age: 62
DRG: 640 | End: 2022-01-28
Payer: COMMERCIAL

## 2022-01-28 ENCOUNTER — HOSPITAL ENCOUNTER (INPATIENT)
Age: 62
LOS: 5 days | Discharge: HOME | DRG: 640 | End: 2022-02-02
Attending: EMERGENCY MEDICINE | Admitting: STUDENT IN AN ORGANIZED HEALTH CARE EDUCATION/TRAINING PROGRAM
Payer: COMMERCIAL

## 2022-01-28 ENCOUNTER — TELEPHONE (OUTPATIENT)
Dept: FAMILY MEDICINE CLINIC | Age: 62
End: 2022-01-28

## 2022-01-28 ENCOUNTER — APPOINTMENT (OUTPATIENT)
Dept: GENERAL RADIOLOGY | Age: 62
DRG: 640 | End: 2022-01-28
Payer: COMMERCIAL

## 2022-01-28 DIAGNOSIS — R57.9 SHOCK (HCC): ICD-10-CM

## 2022-01-28 DIAGNOSIS — E87.1 HYPONATREMIA: Primary | ICD-10-CM

## 2022-01-28 PROBLEM — I50.43 CHF (CONGESTIVE HEART FAILURE), NYHA CLASS I, ACUTE ON CHRONIC, COMBINED (HCC): Status: RESOLVED | Noted: 2021-06-04 | Resolved: 2022-01-28

## 2022-01-28 PROBLEM — Z86.79 HX OF CONGESTIVE HEART FAILURE: Status: ACTIVE | Noted: 2022-01-28

## 2022-01-28 PROBLEM — R53.1 GENERALIZED WEAKNESS: Status: ACTIVE | Noted: 2022-01-28

## 2022-01-28 PROBLEM — I95.9 HYPOTENSION: Status: ACTIVE | Noted: 2022-01-28

## 2022-01-28 PROBLEM — Z78.9 ALCOHOL USE: Status: ACTIVE | Noted: 2022-01-28

## 2022-01-28 LAB
A/G RATIO: 1 (ref 1.1–2.2)
ALBUMIN SERPL-MCNC: 2.9 G/DL (ref 3.4–5)
ALP BLD-CCNC: 173 U/L (ref 40–129)
ALT SERPL-CCNC: 26 U/L (ref 10–40)
AMPHETAMINE SCREEN, URINE: NORMAL
ANION GAP SERPL CALCULATED.3IONS-SCNC: 12 MMOL/L (ref 3–16)
ANION GAP SERPL CALCULATED.3IONS-SCNC: 12 MMOL/L (ref 3–16)
ANION GAP SERPL CALCULATED.3IONS-SCNC: 16 MMOL/L (ref 3–16)
AST SERPL-CCNC: 71 U/L (ref 15–37)
BARBITURATE SCREEN URINE: NORMAL
BASE EXCESS VENOUS: 5 MMOL/L
BASE EXCESS VENOUS: 8.5 MMOL/L
BASOPHILS ABSOLUTE: 0 K/UL (ref 0–0.2)
BASOPHILS RELATIVE PERCENT: 0.4 %
BENZODIAZEPINE SCREEN, URINE: NORMAL
BILIRUB SERPL-MCNC: 0.8 MG/DL (ref 0–1)
BILIRUBIN URINE: ABNORMAL
BLOOD, URINE: NEGATIVE
BUN BLDV-MCNC: 10 MG/DL (ref 7–20)
BUN BLDV-MCNC: 10 MG/DL (ref 7–20)
BUN BLDV-MCNC: 15 MG/DL (ref 7–20)
CALCIUM SERPL-MCNC: 7.1 MG/DL (ref 8.3–10.6)
CALCIUM SERPL-MCNC: 7.2 MG/DL (ref 8.3–10.6)
CALCIUM SERPL-MCNC: 8.2 MG/DL (ref 8.3–10.6)
CANNABINOID SCREEN URINE: NORMAL
CARBOXYHEMOGLOBIN: 2.2 %
CARBOXYHEMOGLOBIN: 3.2 %
CHLORIDE BLD-SCNC: 74 MMOL/L (ref 99–110)
CHLORIDE BLD-SCNC: 82 MMOL/L (ref 99–110)
CHLORIDE BLD-SCNC: 84 MMOL/L (ref 99–110)
CLARITY: ABNORMAL
CO2: 22 MMOL/L (ref 21–32)
CO2: 22 MMOL/L (ref 21–32)
CO2: 26 MMOL/L (ref 21–32)
COCAINE METABOLITE SCREEN URINE: NORMAL
COLOR: YELLOW
CORTISOL - AM: 11.2 UG/DL (ref 4.3–22.4)
CORTISOL TOTAL: 12 UG/DL
CREAT SERPL-MCNC: 0.9 MG/DL (ref 0.6–1.2)
CREAT SERPL-MCNC: 0.9 MG/DL (ref 0.6–1.2)
CREAT SERPL-MCNC: 1.7 MG/DL (ref 0.6–1.2)
CREATININE URINE: 107 MG/DL (ref 28–259)
EOSINOPHILS ABSOLUTE: 0 K/UL (ref 0–0.6)
EOSINOPHILS RELATIVE PERCENT: 0.2 %
EPITHELIAL CELLS, UA: 3 /HPF (ref 0–5)
ETHANOL: NORMAL MG/DL (ref 0–0.08)
GFR AFRICAN AMERICAN: 37
GFR AFRICAN AMERICAN: >60
GFR AFRICAN AMERICAN: >60
GFR NON-AFRICAN AMERICAN: 31
GFR NON-AFRICAN AMERICAN: >60
GFR NON-AFRICAN AMERICAN: >60
GLUCOSE BLD-MCNC: 144 MG/DL (ref 70–99)
GLUCOSE BLD-MCNC: 89 MG/DL (ref 70–99)
GLUCOSE BLD-MCNC: 94 MG/DL (ref 70–99)
GLUCOSE URINE: NEGATIVE MG/DL
HCO3 VENOUS: 29 MMOL/L (ref 23–29)
HCO3 VENOUS: 32 MMOL/L (ref 23–29)
HCT VFR BLD CALC: 31.9 % (ref 36–48)
HEMOGLOBIN: 10.9 G/DL (ref 12–16)
HYALINE CASTS: 5 /LPF (ref 0–8)
KETONES, URINE: NEGATIVE MG/DL
LACTIC ACID: 1.4 MMOL/L (ref 0.4–2)
LACTIC ACID: 2.2 MMOL/L (ref 0.4–2)
LEUKOCYTE ESTERASE, URINE: NEGATIVE
LIPASE: 25 U/L (ref 13–60)
LYMPHOCYTES ABSOLUTE: 1.3 K/UL (ref 1–5.1)
LYMPHOCYTES RELATIVE PERCENT: 16 %
Lab: NORMAL
MCH RBC QN AUTO: 36.1 PG (ref 26–34)
MCHC RBC AUTO-ENTMCNC: 34.1 G/DL (ref 31–36)
MCV RBC AUTO: 105.9 FL (ref 80–100)
METHADONE SCREEN, URINE: NORMAL
METHEMOGLOBIN VENOUS: 0.8 %
METHEMOGLOBIN VENOUS: 0.8 %
MICROSCOPIC EXAMINATION: YES
MONOCYTES ABSOLUTE: 1 K/UL (ref 0–1.3)
MONOCYTES RELATIVE PERCENT: 12.2 %
NEUTROPHILS ABSOLUTE: 6 K/UL (ref 1.7–7.7)
NEUTROPHILS RELATIVE PERCENT: 71.2 %
NITRITE, URINE: NEGATIVE
O2 SAT, VEN: 43 %
O2 SAT, VEN: 53 %
O2 THERAPY: ABNORMAL
O2 THERAPY: ABNORMAL
OPIATE SCREEN URINE: NORMAL
OXYCODONE URINE: NORMAL
PCO2, VEN: 40.1 MMHG (ref 40–50)
PCO2, VEN: 41.4 MMHG (ref 40–50)
PDW BLD-RTO: 15.4 % (ref 12.4–15.4)
PH UA: 5.5
PH UA: 6 (ref 5–8)
PH VENOUS: 7.46 (ref 7.35–7.45)
PH VENOUS: 7.51 (ref 7.35–7.45)
PHENCYCLIDINE SCREEN URINE: NORMAL
PLATELET # BLD: 289 K/UL (ref 135–450)
PMV BLD AUTO: 7.4 FL (ref 5–10.5)
PO2, VEN: 31 MMHG
PO2, VEN: <30 MMHG
POTASSIUM SERPL-SCNC: 3.3 MMOL/L (ref 3.5–5.1)
POTASSIUM SERPL-SCNC: 3.4 MMOL/L (ref 3.5–5.1)
POTASSIUM SERPL-SCNC: 3.6 MMOL/L (ref 3.5–5.1)
PRO-BNP: 863 PG/ML (ref 0–124)
PROPOXYPHENE SCREEN: NORMAL
PROTEIN UA: NEGATIVE MG/DL
RBC # BLD: 3.01 M/UL (ref 4–5.2)
RBC UA: 7 /HPF (ref 0–4)
SODIUM BLD-SCNC: 116 MMOL/L (ref 136–145)
SODIUM BLD-SCNC: 116 MMOL/L (ref 136–145)
SODIUM BLD-SCNC: 118 MMOL/L (ref 136–145)
SODIUM BLD-SCNC: 119 MMOL/L (ref 136–145)
SODIUM BLD-SCNC: 120 MMOL/L (ref 136–145)
SODIUM URINE: 29 MMOL/L
SPECIFIC GRAVITY UA: 1.01 (ref 1–1.03)
TCO2 CALC VENOUS: 31 MMOL/L
TCO2 CALC VENOUS: 33 MMOL/L
TOTAL PROTEIN: 5.8 G/DL (ref 6.4–8.2)
TROPONIN: <0.01 NG/ML
TSH REFLEX: 1.22 UIU/ML (ref 0.27–4.2)
URIC ACID, SERUM: 6.1 MG/DL (ref 2.6–6)
URINE REFLEX TO CULTURE: ABNORMAL
URINE TYPE: ABNORMAL
UROBILINOGEN, URINE: 1 E.U./DL
WBC # BLD: 8.4 K/UL (ref 4–11)
WBC UA: 6 /HPF (ref 0–5)

## 2022-01-28 PROCEDURE — 84550 ASSAY OF BLOOD/URIC ACID: CPT

## 2022-01-28 PROCEDURE — 6370000000 HC RX 637 (ALT 250 FOR IP): Performed by: STUDENT IN AN ORGANIZED HEALTH CARE EDUCATION/TRAINING PROGRAM

## 2022-01-28 PROCEDURE — 6360000002 HC RX W HCPCS: Performed by: NURSE PRACTITIONER

## 2022-01-28 PROCEDURE — 84443 ASSAY THYROID STIM HORMONE: CPT

## 2022-01-28 PROCEDURE — 81001 URINALYSIS AUTO W/SCOPE: CPT

## 2022-01-28 PROCEDURE — 83690 ASSAY OF LIPASE: CPT

## 2022-01-28 PROCEDURE — 94760 N-INVAS EAR/PLS OXIMETRY 1: CPT

## 2022-01-28 PROCEDURE — 84300 ASSAY OF URINE SODIUM: CPT

## 2022-01-28 PROCEDURE — 82803 BLOOD GASES ANY COMBINATION: CPT

## 2022-01-28 PROCEDURE — 80053 COMPREHEN METABOLIC PANEL: CPT

## 2022-01-28 PROCEDURE — 94640 AIRWAY INHALATION TREATMENT: CPT

## 2022-01-28 PROCEDURE — 6370000000 HC RX 637 (ALT 250 FOR IP): Performed by: INTERNAL MEDICINE

## 2022-01-28 PROCEDURE — 87040 BLOOD CULTURE FOR BACTERIA: CPT

## 2022-01-28 PROCEDURE — 2500000003 HC RX 250 WO HCPCS: Performed by: STUDENT IN AN ORGANIZED HEALTH CARE EDUCATION/TRAINING PROGRAM

## 2022-01-28 PROCEDURE — 82570 ASSAY OF URINE CREATININE: CPT

## 2022-01-28 PROCEDURE — 99284 EMERGENCY DEPT VISIT MOD MDM: CPT

## 2022-01-28 PROCEDURE — 2580000003 HC RX 258: Performed by: STUDENT IN AN ORGANIZED HEALTH CARE EDUCATION/TRAINING PROGRAM

## 2022-01-28 PROCEDURE — 2580000003 HC RX 258: Performed by: EMERGENCY MEDICINE

## 2022-01-28 PROCEDURE — 85025 COMPLETE CBC W/AUTO DIFF WBC: CPT

## 2022-01-28 PROCEDURE — 37799 UNLISTED PX VASCULAR SURGERY: CPT

## 2022-01-28 PROCEDURE — 96365 THER/PROPH/DIAG IV INF INIT: CPT

## 2022-01-28 PROCEDURE — 82077 ASSAY SPEC XCP UR&BREATH IA: CPT

## 2022-01-28 PROCEDURE — 71045 X-RAY EXAM CHEST 1 VIEW: CPT

## 2022-01-28 PROCEDURE — 83605 ASSAY OF LACTIC ACID: CPT

## 2022-01-28 PROCEDURE — 74176 CT ABD & PELVIS W/O CONTRAST: CPT

## 2022-01-28 PROCEDURE — 70450 CT HEAD/BRAIN W/O DYE: CPT

## 2022-01-28 PROCEDURE — 2580000003 HC RX 258: Performed by: INTERNAL MEDICINE

## 2022-01-28 PROCEDURE — 6370000000 HC RX 637 (ALT 250 FOR IP): Performed by: NURSE PRACTITIONER

## 2022-01-28 PROCEDURE — 2700000000 HC OXYGEN THERAPY PER DAY

## 2022-01-28 PROCEDURE — 36415 COLL VENOUS BLD VENIPUNCTURE: CPT

## 2022-01-28 PROCEDURE — 84484 ASSAY OF TROPONIN QUANT: CPT

## 2022-01-28 PROCEDURE — 84295 ASSAY OF SERUM SODIUM: CPT

## 2022-01-28 PROCEDURE — 82533 TOTAL CORTISOL: CPT

## 2022-01-28 PROCEDURE — 2580000003 HC RX 258: Performed by: NURSE PRACTITIONER

## 2022-01-28 PROCEDURE — 87205 SMEAR GRAM STAIN: CPT

## 2022-01-28 PROCEDURE — 99233 SBSQ HOSP IP/OBS HIGH 50: CPT | Performed by: INTERNAL MEDICINE

## 2022-01-28 PROCEDURE — 80307 DRUG TEST PRSMV CHEM ANLYZR: CPT

## 2022-01-28 PROCEDURE — 76770 US EXAM ABDO BACK WALL COMP: CPT

## 2022-01-28 PROCEDURE — 2000000000 HC ICU R&B

## 2022-01-28 PROCEDURE — 96361 HYDRATE IV INFUSION ADD-ON: CPT

## 2022-01-28 PROCEDURE — 83880 ASSAY OF NATRIURETIC PEPTIDE: CPT

## 2022-01-28 PROCEDURE — 02HV33Z INSERTION OF INFUSION DEVICE INTO SUPERIOR VENA CAVA, PERCUTANEOUS APPROACH: ICD-10-PCS | Performed by: EMERGENCY MEDICINE

## 2022-01-28 RX ORDER — FLUTICASONE PROPIONATE 50 MCG
2 SPRAY, SUSPENSION (ML) NASAL DAILY
Status: DISCONTINUED | OUTPATIENT
Start: 2022-01-28 | End: 2022-02-02 | Stop reason: HOSPADM

## 2022-01-28 RX ORDER — NICOTINE 21 MG/24HR
1 PATCH, TRANSDERMAL 24 HOURS TRANSDERMAL DAILY
Status: DISCONTINUED | OUTPATIENT
Start: 2022-01-28 | End: 2022-02-02 | Stop reason: HOSPADM

## 2022-01-28 RX ORDER — ATORVASTATIN CALCIUM 20 MG/1
20 TABLET, FILM COATED ORAL DAILY
Status: DISCONTINUED | OUTPATIENT
Start: 2022-01-28 | End: 2022-02-02 | Stop reason: HOSPADM

## 2022-01-28 RX ORDER — ONDANSETRON 2 MG/ML
4 INJECTION INTRAMUSCULAR; INTRAVENOUS EVERY 6 HOURS PRN
Status: DISCONTINUED | OUTPATIENT
Start: 2022-01-28 | End: 2022-02-02 | Stop reason: HOSPADM

## 2022-01-28 RX ORDER — SODIUM CHLORIDE 9 MG/ML
25 INJECTION, SOLUTION INTRAVENOUS PRN
Status: DISCONTINUED | OUTPATIENT
Start: 2022-01-28 | End: 2022-02-02 | Stop reason: HOSPADM

## 2022-01-28 RX ORDER — SODIUM CHLORIDE 9 MG/ML
INJECTION, SOLUTION INTRAVENOUS CONTINUOUS
Status: DISCONTINUED | OUTPATIENT
Start: 2022-01-28 | End: 2022-01-29

## 2022-01-28 RX ORDER — MONTELUKAST SODIUM 10 MG/1
10 TABLET ORAL DAILY
Status: DISCONTINUED | OUTPATIENT
Start: 2022-01-28 | End: 2022-02-02 | Stop reason: HOSPADM

## 2022-01-28 RX ORDER — ALLOPURINOL 300 MG/1
300 TABLET ORAL DAILY
Status: DISCONTINUED | OUTPATIENT
Start: 2022-01-28 | End: 2022-02-02 | Stop reason: HOSPADM

## 2022-01-28 RX ORDER — IPRATROPIUM BROMIDE AND ALBUTEROL SULFATE 2.5; .5 MG/3ML; MG/3ML
1 SOLUTION RESPIRATORY (INHALATION) 4 TIMES DAILY
Status: DISCONTINUED | OUTPATIENT
Start: 2022-01-28 | End: 2022-01-30

## 2022-01-28 RX ORDER — IPRATROPIUM BROMIDE AND ALBUTEROL SULFATE 2.5; .5 MG/3ML; MG/3ML
1 SOLUTION RESPIRATORY (INHALATION) ONCE
Status: COMPLETED | OUTPATIENT
Start: 2022-01-28 | End: 2022-01-28

## 2022-01-28 RX ORDER — 0.9 % SODIUM CHLORIDE 0.9 %
1000 INTRAVENOUS SOLUTION INTRAVENOUS ONCE
Status: COMPLETED | OUTPATIENT
Start: 2022-01-28 | End: 2022-01-28

## 2022-01-28 RX ORDER — ACETAMINOPHEN 650 MG/1
650 SUPPOSITORY RECTAL EVERY 6 HOURS PRN
Status: DISCONTINUED | OUTPATIENT
Start: 2022-01-28 | End: 2022-02-02 | Stop reason: HOSPADM

## 2022-01-28 RX ORDER — BUDESONIDE AND FORMOTEROL FUMARATE DIHYDRATE 160; 4.5 UG/1; UG/1
2 AEROSOL RESPIRATORY (INHALATION) 2 TIMES DAILY
Status: DISCONTINUED | OUTPATIENT
Start: 2022-01-28 | End: 2022-02-02 | Stop reason: HOSPADM

## 2022-01-28 RX ORDER — PANTOPRAZOLE SODIUM 40 MG/1
40 TABLET, DELAYED RELEASE ORAL
Status: DISCONTINUED | OUTPATIENT
Start: 2022-01-28 | End: 2022-02-02 | Stop reason: HOSPADM

## 2022-01-28 RX ORDER — SODIUM CHLORIDE 0.9 % (FLUSH) 0.9 %
5-40 SYRINGE (ML) INJECTION PRN
Status: DISCONTINUED | OUTPATIENT
Start: 2022-01-28 | End: 2022-02-02 | Stop reason: HOSPADM

## 2022-01-28 RX ORDER — SODIUM CHLORIDE 9 MG/ML
INJECTION, SOLUTION INTRAVENOUS CONTINUOUS
Status: DISCONTINUED | OUTPATIENT
Start: 2022-01-28 | End: 2022-01-28

## 2022-01-28 RX ORDER — ASPIRIN 81 MG/1
81 TABLET ORAL DAILY
Status: DISCONTINUED | OUTPATIENT
Start: 2022-01-28 | End: 2022-02-02 | Stop reason: HOSPADM

## 2022-01-28 RX ORDER — SODIUM CHLORIDE 0.9 % (FLUSH) 0.9 %
5-40 SYRINGE (ML) INJECTION EVERY 12 HOURS SCHEDULED
Status: DISCONTINUED | OUTPATIENT
Start: 2022-01-28 | End: 2022-02-02 | Stop reason: HOSPADM

## 2022-01-28 RX ORDER — ACETAMINOPHEN 325 MG/1
650 TABLET ORAL EVERY 6 HOURS PRN
Status: DISCONTINUED | OUTPATIENT
Start: 2022-01-28 | End: 2022-02-02 | Stop reason: HOSPADM

## 2022-01-28 RX ADMIN — FLUTICASONE PROPIONATE 2 SPRAY: 50 SPRAY, METERED NASAL at 12:09

## 2022-01-28 RX ADMIN — SODIUM CHLORIDE, PRESERVATIVE FREE 10 ML: 5 INJECTION INTRAVENOUS at 12:10

## 2022-01-28 RX ADMIN — IPRATROPIUM BROMIDE AND ALBUTEROL SULFATE 1 AMPULE: .5; 3 SOLUTION RESPIRATORY (INHALATION) at 02:14

## 2022-01-28 RX ADMIN — PANTOPRAZOLE SODIUM 40 MG: 40 TABLET, DELAYED RELEASE ORAL at 07:44

## 2022-01-28 RX ADMIN — Medication 4 MCG/MIN: at 05:29

## 2022-01-28 RX ADMIN — SODIUM CHLORIDE, PRESERVATIVE FREE 10 ML: 5 INJECTION INTRAVENOUS at 20:39

## 2022-01-28 RX ADMIN — ALLOPURINOL 300 MG: 300 TABLET ORAL at 12:09

## 2022-01-28 RX ADMIN — IPRATROPIUM BROMIDE AND ALBUTEROL SULFATE 1 AMPULE: 2.5; .5 SOLUTION RESPIRATORY (INHALATION) at 16:30

## 2022-01-28 RX ADMIN — CEFTRIAXONE 1000 MG: 1 INJECTION, POWDER, FOR SOLUTION INTRAMUSCULAR; INTRAVENOUS at 02:25

## 2022-01-28 RX ADMIN — SODIUM CHLORIDE 1000 ML: 9 INJECTION, SOLUTION INTRAVENOUS at 02:25

## 2022-01-28 RX ADMIN — SODIUM CHLORIDE 1000 ML: 9 INJECTION, SOLUTION INTRAVENOUS at 03:13

## 2022-01-28 RX ADMIN — ASPIRIN 81 MG: 81 TABLET, COATED ORAL at 12:08

## 2022-01-28 RX ADMIN — SODIUM CHLORIDE: 9 INJECTION, SOLUTION INTRAVENOUS at 13:39

## 2022-01-28 RX ADMIN — IPRATROPIUM BROMIDE AND ALBUTEROL SULFATE 1 AMPULE: 2.5; .5 SOLUTION RESPIRATORY (INHALATION) at 21:08

## 2022-01-28 RX ADMIN — Medication 4 MCG/MIN: at 11:26

## 2022-01-28 RX ADMIN — ACETAMINOPHEN 650 MG: 325 TABLET ORAL at 23:49

## 2022-01-28 RX ADMIN — SODIUM CHLORIDE: 9 INJECTION, SOLUTION INTRAVENOUS at 05:08

## 2022-01-28 RX ADMIN — MONTELUKAST 10 MG: 10 TABLET, FILM COATED ORAL at 12:08

## 2022-01-28 RX ADMIN — Medication 2 PUFF: at 21:09

## 2022-01-28 RX ADMIN — ATORVASTATIN CALCIUM 20 MG: 20 TABLET, FILM COATED ORAL at 12:09

## 2022-01-28 RX ADMIN — Medication 2 MCG/MIN: at 05:07

## 2022-01-28 RX ADMIN — Medication 2 PUFF: at 07:51

## 2022-01-28 ASSESSMENT — ENCOUNTER SYMPTOMS
SHORTNESS OF BREATH: 0
COUGH: 0
COLOR CHANGE: 0
VOMITING: 0
ABDOMINAL PAIN: 0
EYE ITCHING: 0
CONSTIPATION: 0
EYE DISCHARGE: 0

## 2022-01-28 ASSESSMENT — PAIN DESCRIPTION - PAIN TYPE: TYPE: ACUTE PAIN

## 2022-01-28 ASSESSMENT — PAIN DESCRIPTION - LOCATION: LOCATION: RIB CAGE

## 2022-01-28 ASSESSMENT — PAIN DESCRIPTION - DESCRIPTORS: DESCRIPTORS: SHARP

## 2022-01-28 ASSESSMENT — PAIN SCALES - GENERAL
PAINLEVEL_OUTOF10: 0
PAINLEVEL_OUTOF10: 0
PAINLEVEL_OUTOF10: 8
PAINLEVEL_OUTOF10: 0

## 2022-01-28 ASSESSMENT — PAIN - FUNCTIONAL ASSESSMENT: PAIN_FUNCTIONAL_ASSESSMENT: ACTIVITIES ARE NOT PREVENTED

## 2022-01-28 ASSESSMENT — PAIN DESCRIPTION - ORIENTATION: ORIENTATION: RIGHT

## 2022-01-28 ASSESSMENT — PAIN DESCRIPTION - FREQUENCY: FREQUENCY: OTHER (COMMENT)

## 2022-01-28 ASSESSMENT — PAIN DESCRIPTION - ONSET: ONSET: ON-GOING

## 2022-01-28 ASSESSMENT — PAIN DESCRIPTION - PROGRESSION: CLINICAL_PROGRESSION: NOT CHANGED

## 2022-01-28 NOTE — ED NOTES
Patient placed in scrub pants. Denies further needs at this time.      Chris Heart RN  01/28/22 0601

## 2022-01-28 NOTE — CONSULTS
.    Nephrology Consult Note  [phone]   [facility]        Reason for Consult:  CLAUDIA,Hyponatremia     HISTORY OF PRESENT ILLNESS:                This is a patient with significant past medical history of Hyponatremia we last saw her in June, 2021 had Na of 120 ,had CHF at the time and now on  who presents with weakness hypotension, she has been on demadex, entresto and is noted to have Na of 116 on arrival, Cr elevated to 1.7, she is felt to be volume depleted. Her U Na is 29 and after gettg IV lfuids her serum Na imporved to 120, we would aim to correct her by no more than  8 meq/l in first 24 hrs so no more than 124     She feels better, no dyspnea or pain ,no dysuria     Her family is at bedside     Past Medical History:        Diagnosis Date    Anxiety     GERD (gastroesophageal reflux disease)     Gout     had once years ago    Hyperlipidemia     at last check it was normal    Hypertension        Past Surgical History:        Procedure Laterality Date    BLADDER SUSPENSION      CARPAL TUNNEL RELEASE      right    CARPAL TUNNEL RELEASE  7-12-10    Left    CARPAL TUNNEL RELEASE  Left wrist    FOOT SURGERY  3/2013    EXCISION NEUROMA 3RD NERVE LEFT FOOT    JOINT REPLACEMENT      right    KNEE ARTHROSCOPY      left x1, right x 1    SINUS SURGERY      TONSILLECTOMY         Current Medications:    No current facility-administered medications on file prior to encounter.      Current Outpatient Medications on File Prior to Encounter   Medication Sig Dispense Refill    sulfamethoxazole-trimethoprim (BACTRIM DS) 800-160 MG per tablet Take 1 tablet by mouth 2 times daily for 7 days 14 tablet 0    allopurinol (ZYLOPRIM) 300 MG tablet TAKE 1 TABLET BY MOUTH DAILY (Patient taking differently: Take 300 mg by mouth daily ) 90 tablet 2    carvedilol (COREG) 3.125 MG tablet TAKE 1 TABLET BY MOUTH TWICE DAILY (Patient taking differently: Take 3.125 mg by mouth 2 times daily ) 180 tablet 1    ENTRESTO 49-51 MG per tablet TAKE 1 TABLET BY MOUTH TWICE DAILY (Patient taking differently: Take 1 tablet by mouth 2 times daily ) 180 tablet 1    torsemide (DEMADEX) 20 MG tablet TAKE 1 TABLET BY MOUTH DAILY 90 tablet 1    ondansetron (ZOFRAN-ODT) 4 MG disintegrating tablet Take 1 tablet by mouth 3 times daily as needed for Nausea or Vomiting 12 tablet 0    MAGNESIUM-OXIDE 400 (241.3 Mg) MG TABS tablet TAKE 1 TABLET BY MOUTH DAILY (Patient taking differently: Take 400 mg by mouth daily ) 90 tablet 0    atorvastatin (LIPITOR) 20 MG tablet TAKE 1 TABLET BY MOUTH DAILY 30 tablet 3    budesonide-formoterol (SYMBICORT) 160-4.5 MCG/ACT AERO Inhale 2 puffs into the lungs 2 times daily 1 each 11    albuterol sulfate  (90 Base) MCG/ACT inhaler Inhale 2 puffs into the lungs every 6 hours as needed for Wheezing 1 Inhaler 3    potassium chloride (KLOR-CON M) 20 MEQ extended release tablet Take 1 tablet by mouth daily (Patient taking differently: Take 10 mEq by mouth daily ) 90 tablet 3    nicotine (NICODERM CQ) 21 MG/24HR Place 1 patch onto the skin daily 30 patch 3    ALPRAZolam (ALPRAZOLAM XR) 0.5 MG extended release tablet Take 0.5 mg by mouth daily as needed (anxiety).  fluticasone (FLONASE) 50 MCG/ACT nasal spray 2 sprays by Each Nostril route daily      Multiple Vitamins-Minerals (THERAPEUTIC MULTIVITAMIN-MINERALS) tablet Take 1 tablet by mouth daily      levocetirizine (XYZAL) 5 MG tablet Take 5 mg by mouth daily.  GuaiFENesin (MUCINEX PO) Take 400 mg by mouth daily.  aspirin 81 MG EC tablet Take 81 mg by mouth daily.       montelukast (SINGULAIR) 10 MG tablet Take 10 mg by mouth daily       esomeprazole (NEXIUM) 40 MG delayed release capsule Take 40 mg by mouth every morning (before breakfast)          Allergies:  Augmentin [amoxicillin-pot clavulanate] and Pork-derived products    Social History:    Social History     Socioeconomic History    Marital status:      Spouse name: Not on file    Number of children: Not on file    Years of education: Not on file    Highest education level: Not on file   Occupational History    Not on file   Tobacco Use    Smoking status: Current Every Day Smoker     Packs/day: 0.50     Years: 35.00     Pack years: 17.50     Types: Cigarettes    Smokeless tobacco: Never Used    Tobacco comment: trying to quit   Vaping Use    Vaping Use: Every day    Substances: Nicotine    Devices: Disposable   Substance and Sexual Activity    Alcohol use: Yes     Alcohol/week: 14.0 standard drinks     Types: 14 Glasses of wine per week     Comment: wine    Drug use: No    Sexual activity: Not on file   Other Topics Concern    Not on file   Social History Narrative    Not on file     Social Determinants of Health     Financial Resource Strain: Low Risk     Difficulty of Paying Living Expenses: Not hard at all   Food Insecurity: No Food Insecurity    Worried About Running Out of Food in the Last Year: Never true    Tatianna of Food in the Last Year: Never true   Transportation Needs: No Transportation Needs    Lack of Transportation (Medical): No    Lack of Transportation (Non-Medical):  No   Physical Activity:     Days of Exercise per Week: Not on file    Minutes of Exercise per Session: Not on file   Stress:     Feeling of Stress : Not on file   Social Connections:     Frequency of Communication with Friends and Family: Not on file    Frequency of Social Gatherings with Friends and Family: Not on file    Attends Evangelical Services: Not on file    Active Member of Clubs or Organizations: Not on file    Attends Club or Organization Meetings: Not on file    Marital Status: Not on file   Intimate Partner Violence:     Fear of Current or Ex-Partner: Not on file    Emotionally Abused: Not on file    Physically Abused: Not on file    Sexually Abused: Not on file   Housing Stability:     Unable to Pay for Housing in the Last Year: Not on file    Number of Places Lived in the Last Year: Not on file    Unstable Housing in the Last Year: Not on file       Family History:       Problem Relation Age of Onset    Cancer Mother     Heart Disease Mother     Diabetes Mother     High Blood Pressure Mother     Heart Disease Father     Heart Disease Maternal Grandmother          Review of Systems:  a comprehensive Review of systems was negative except as noted in HPI     PHYSICAL EXAM:    Vitals:    /80   Pulse 89   Temp 98.1 °F (36.7 °C) (Oral)   Resp 18   Ht 5' 1\" (1.549 m)   Wt 121 lb 4.1 oz (55 kg)   SpO2 100%   BMI 22.91 kg/m²   No intake/output data recorded. I/O this shift: In: 200 [P.O.:400; I.V.:30]  Out: 200 [Urine:200]    Physical Exam:  Gen: Resting in bed, NAD. HEENT: MMM, OP clear. CV: RRR no m/r/g. No S3.  Lungs: Good respiratory effort, clear air entry   Abd: S/NT +BS  Ext: No edema, no cyanosis  Skin: Warm. No rashes appreciated. : No TTP over bladder, nondistended. Neuro: Alert and oriented x 3, nonfocal.  Joints: No erythema noted over joints. DATA:    CBC:   Lab Results   Component Value Date    WBC 8.4 01/28/2022    RBC 3.01 01/28/2022    HGB 10.9 01/28/2022    HCT 31.9 01/28/2022    .9 01/28/2022    MCH 36.1 01/28/2022    MCHC 34.1 01/28/2022    RDW 15.4 01/28/2022     01/28/2022    MPV 7.4 01/28/2022     BMP:    Lab Results   Component Value Date     01/28/2022    K 3.6 01/28/2022    K 4.3 06/04/2021    CL 74 01/28/2022    CO2 26 01/28/2022    BUN 15 01/28/2022    LABALBU 2.9 01/28/2022    CREATININE 1.7 01/28/2022    CALCIUM 8.2 01/28/2022    GFRAA 37 01/28/2022    GFRAA >60 04/23/2010    LABGLOM 31 01/28/2022    GLUCOSE 89 01/28/2022       IMPRESSION/RECOMMENDATIONS:      1. Hyponatremia has been chronic issue, Na 130 at baseline and was 120 last year, suspect this time it is related to volume depletion ,use of diuretics and poor po intake  2.  Na up to 120 would stop  the IV fluids for now and continue to check renal panels q 4 for now   3. hypotension try to wean off the levophed   4. CLAUDIA may be related to volume depletion ,use of Entresto nd hypotension ,HOLD Entresto   5. Renal U/S    6. recent UTI did take BACTRIM which may also result in CLAUDIA     Thank you for allowing me to participate in the care of this patient. I will continue to follow along. Please call with questions.     Rodo Falk MD, MD

## 2022-01-28 NOTE — ED NOTES
Bed: 16  Expected date: 1/28/22  Expected time: 1:34 AM  Means of arrival: Ambulance  Comments:  61F SOB/Hypotensive     Augusto Calderón RN  01/28/22 0831

## 2022-01-28 NOTE — PROGRESS NOTES
Hospitalist Progress Note      PCP: Phuong Petty MD    Date of Admission: 1/28/2022    Chief Complaint: Generalized weakness, lightheadedness, poor p.o. intake    Hospital Course:  62 y.o. female with past medical history of GERD, smoking abuse, alcohol use with 1 to 2 glasses of wine nightly, hypertension, previous history of congestive heart failure had an echo normal EF January 2022, gout, and hyperlipidemia   presents to [facility] with progressive worsening of generalized weakness and fatigue as well as poor p.o. intake for at least the last 6 to 7 days that initially started this past weekend but has progressively worsened. Patient notes that it was very mild initially and that initially she had some component of dysuria so that when she saw her PCP on Monday urine was examined and patient was noted to have a UTI and so she was started on Bactrim on that date. However, patient had progressively continued to become more generally weak and also demonstrated some levels of lightheadedness and dyspnea on exertion. She notes she has continued to have poor p.o. intake although she was still taking her antihypertensive medications and her diuretic. She did not have any labs drawn to examine her metabolic panel on that date when she had her urine examined. She notes that she took the Bactrim and did not have any change in her symptoms but that they were continuing to get worse. She got worse to the point where tonight she called EMS because her weakness was becoming so much progressively worse. She was noted to have systolic blood pressures in the 70s to 80s when she was brought to the ED.   Apart from the above symptoms of generalized weakness, lightheadedness, poor p.o. intake, patient denies any other recent symptoms of fever, chills, syncope, chest pain, blood in urine/stool/sputum, leg swelling, rashes, abdominal pain/nausea/vomiting/diarrhea    Subjective: in ICU on levophed on cellphone Medications:  Reviewed    Infusion Medications    norepinephrine 4 mcg/min (01/28/22 1126)    sodium chloride      sodium chloride 75 mL/hr at 01/28/22 1340     Scheduled Medications    allopurinol  300 mg Oral Daily    aspirin  81 mg Oral Daily    atorvastatin  20 mg Oral Daily    budesonide-formoterol  2 puff Inhalation BID    pantoprazole  40 mg Oral QAM AC    fluticasone  2 spray Each Nostril Daily    montelukast  10 mg Oral Daily    nicotine  1 patch TransDERmal Daily    sodium chloride flush  5-40 mL IntraVENous 2 times per day    ipratropium-albuterol  1 ampule Inhalation 4x daily     PRN Meds: sodium chloride flush, sodium chloride, acetaminophen **OR** acetaminophen, ondansetron      Intake/Output Summary (Last 24 hours) at 1/28/2022 1658  Last data filed at 1/28/2022 1340  Gross per 24 hour   Intake 621.13 ml   Output 200 ml   Net 421.13 ml       Physical Exam Performed:    BP (!) 101/52   Pulse 97   Temp 97.4 °F (36.3 °C) (Oral)   Resp 20   Ht 5' 1\" (1.549 m)   Wt 121 lb 4.1 oz (55 kg)   SpO2 99%   BMI 22.91 kg/m²     General appearance: No distress,   HEENT: Pupils equal, round, and reactive to light. Neck: Supple, with full range of motion. No jugular venous distention. Respiratory:  Normal  effort. Clear to auscultation O2NC  Cardiovascular: Regular rate and rhythm with normal S1/S2 without murmurs, rubs or gallops. Abdomen: Soft, non-tender, non-distended with normal bowel sounds. Musculoskeletal: No clubbing, cyanosis or edema   Skin: Skin color, texture, turgor normal.  No rashes or lesions.   Neurologic:  Neurovascularly intact   Psychiatric: Alert and oriented  Peripheral Pulses: +2 palpable, equal bilaterally       Labs:   Recent Labs     01/28/22  0159   WBC 8.4   HGB 10.9*   HCT 31.9*        Recent Labs     01/28/22  0159 01/28/22  0518 01/28/22  1224   * 120* 119*   K 3.6  --   --    CL 74*  --   --    CO2 26  --   --    BUN 15  --   --    CREATININE 1.7*  --   --    CALCIUM 8.2*  --   --      Recent Labs     01/28/22  0159   AST 71*   ALT 26   BILITOT 0.8   ALKPHOS 173*     No results for input(s): INR in the last 72 hours. Recent Labs     01/28/22 0159   TROPONINI <0.01       Urinalysis:      Lab Results   Component Value Date    NITRU Negative 01/28/2022    WBCUA 6 01/28/2022    RBCUA 7 01/28/2022    BLOODU Negative 01/28/2022    SPECGRAV 1.014 01/28/2022    GLUCOSEU Negative 01/28/2022       Radiology:  US RENAL COMPLETE   Final Result   No hydronephrosis. CT CHEST ABDOMEN PELVIS WO CONTRAST   Final Result   No spiculated lung mass or lymphadenopathy in the chest.      Mild emphysematous changes, with minimal dependent change though no acute   parenchymal infiltrate or reactive effusion. Resolved effusions. Acute to subacute appearing right lateral rib fractures involving the 6th and   7th rib. No pneumothorax. No acute inflammatory process seen within the abdomen or pelvis. No ileus or   obstruction. No obstructive urinary tract calculi. Tiny nonobstructive left   renal calculus. CT HEAD WO CONTRAST   Final Result   No acute intracranial hemorrhage. No wedge-shaped area of acute ischemia is   identified. There are a few patchy focal areas of white matter low attenuation which is   nonspecific, though can be seen in the setting of chronic microvascular   ischemia or demyelination. No significant cortical atrophy or intracranial mass. XR CHEST PORTABLE   Final Result   COPD, though no acute cardiopulmonary process.                  Assessment/Plan:    Active Hospital Problems    Diagnosis     Alcohol use [Z72.89]     Hypotension [I95.9]     Hx of congestive heart failure [Z86.79]     Generalized weakness [R53.1]     Hyponatremia [E87.1]     HTN (hypertension) [I10]    hypovolemic shock  ARF  Rt rib fractures  Hyponatremia  COPD--no home O2      On levophed   Serial Na 116->199->120  Continue iV hydration 75cc/hr  duoneb QID  Urine sodium 29    DVT Prophylaxis: lovenox  Diet: ADULT DIET; Regular; 4 carb choices (60 gm/meal);  Low Sodium (2 gm); 1500 ml  Code Status: Full Code    PT/OT Eval Status: diasbled  and 19yo granddaughter   Outside stairs uses cane bc no handrail but otherwise independent     Dispo - home    Christian De La Garza MD

## 2022-01-28 NOTE — CONSULTS
Pulmonary Critical Care Consult Note     Patient's name:  [address] [place] [address] Record Number: [id number]  Patient's account/billing number: [de-identified]  Patient's YOB: 1960  Age: 62 y.o. Date of Admission: 1/28/2022  1:38 AM  Date of Consult: 1/28/2022      Primary Care Physician: Quin Flores MD      Code Status: Full Code    Reason for consult: hypovolemic shock     Assessment and Plan     1. Hypovolemic shock  2. ARF prerenal vs bactrim   3. Right 6th and 7th rib fracture  4. Hyponatremia   5. COPD/emphysema       Plan:  I believe hypotension related to poor oral intake and her Bp meds and diuretics   Hold Bp meds  Pressors to keep MAP > 60  Gentle hydration   Check urine eos  Check Am cortisol   Pain control   IS  Bronchodilators. HISTORY OF PRESENT ILLNESS:   Mr./Ms. Gila Brandt is a 62 y.o. lady with PMH stated below significant for copd, HTN, h/o systolic, diastolic CHF, on diuretics and BB, has been c/o poor oral intake and fatigue over the last couple of months, treated recently for UTI with Bactrim   She has been coughing and has Right chest pain  Work up showed: CLAUDIA, hypotension,   CT chest with right 6 &7 rib fractures. Normal TSH    Past Medical History:        Diagnosis Date    Anxiety     GERD (gastroesophageal reflux disease)     Gout     had once years ago    Hyperlipidemia     at last check it was normal    Hypertension        Past Surgical History:        Procedure Laterality Date    BLADDER SUSPENSION      CARPAL TUNNEL RELEASE      right    CARPAL TUNNEL RELEASE  7-12-10    Left    CARPAL TUNNEL RELEASE  Left wrist    FOOT SURGERY  3/2013    EXCISION NEUROMA 3RD NERVE LEFT FOOT    JOINT REPLACEMENT      right    KNEE ARTHROSCOPY      left x1, right x 1    SINUS SURGERY      TONSILLECTOMY         Allergies:     Allergies   Allergen Reactions    Augmentin [Amoxicillin-Pot Clavulanate] Anaphylaxis and Swelling     THROATSWELLS  Pt has taken amoxicillin with no reaction but cannot take augmentin    Pork-Derived Products Swelling         Home Meds:   Prior to Admission medications    Medication Sig Start Date End Date Taking?  Authorizing Provider   sulfamethoxazole-trimethoprim (BACTRIM DS) 800-160 MG per tablet Take 1 tablet by mouth 2 times daily for 7 days 1/24/22 1/31/22 Yes Lio Helm MD   allopurinol (ZYLOPRIM) 300 MG tablet TAKE 1 TABLET BY MOUTH DAILY  Patient taking differently: Take 300 mg by mouth daily  1/13/22  Yes Veronica Beck MD   carvedilol (COREG) 3.125 MG tablet TAKE 1 TABLET BY MOUTH TWICE DAILY  Patient taking differently: Take 3.125 mg by mouth 2 times daily  12/16/21  Yes Marlene Lane MD   ENTRESTO 49-51 MG per tablet TAKE 1 TABLET BY MOUTH TWICE DAILY  Patient taking differently: Take 1 tablet by mouth 2 times daily  12/16/21  Yes Marlene Lane MD   torsemide (DEMADEX) 20 MG tablet TAKE 1 TABLET BY MOUTH DAILY 11/29/21  Yes [facility]   ondansetron (ZOFRAN-ODT) 4 MG disintegrating tablet Take 1 tablet by mouth 3 times daily as needed for Nausea or Vomiting 11/12/21  Yes Veronica Beck MD   MAGNESIUM-OXIDE 400 (241.3 Mg) MG TABS tablet TAKE 1 TABLET BY MOUTH DAILY  Patient taking differently: Take 400 mg by mouth daily  9/28/21  Yes Marlene Lane MD   atorvastatin (LIPITOR) 20 MG tablet TAKE 1 TABLET BY MOUTH DAILY 9/21/21  Yes Marlene Lane MD   budesonide-formoterol [facility]) 160-4.5 MCG/ACT AERO Inhale 2 puffs into the lungs 2 times daily 9/20/21  Yes Kelsey Villatoro MD   albuterol sulfate  (90 Base) MCG/ACT inhaler Inhale 2 puffs into the lungs every 6 hours as needed for Wheezing 6/21/21  Yes FRANCISCO J Arce CNP   potassium chloride (KLOR-CON M) 20 MEQ extended release tablet Take 1 tablet by mouth daily  Patient taking differently: Take 10 mEq by mouth daily  6/15/21  Yes FRANCISCO J Mcknight - CNP nicotine (NICODERM CQ) 21 MG/24HR Place 1 patch onto the skin daily 6/9/21  Yes Juan Shrestha MD   ALPRAZolam (ALPRAZOLAM XR) 0.5 MG extended release tablet Take 0.5 mg by mouth daily as needed (anxiety). Yes Historical Provider, MD   fluticasone (FLONASE) 50 MCG/ACT nasal spray 2 sprays by Each Nostril route daily   Yes Historical Provider, MD   Multiple Vitamins-Minerals (THERAPEUTIC MULTIVITAMIN-MINERALS) tablet Take 1 tablet by mouth daily   Yes Historical Provider, MD   levocetirizine (XYZAL) 5 MG tablet Take 5 mg by mouth daily. 6/28/10  Yes Historical Provider, MD   GuaiFENesin (MUCINEX PO) Take 400 mg by mouth daily. 6/28/10  Yes Historical Provider, MD   aspirin 81 MG EC tablet Take 81 mg by mouth daily. Yes Historical Provider, MD   montelukast (SINGULAIR) 10 MG tablet Take 10 mg by mouth daily    Yes Historical Provider, MD   esomeprazole (NEXIUM) 40 MG delayed release capsule Take 40 mg by mouth every morning (before breakfast)    Yes Historical Provider, MD       Family History:       Problem Relation Age of Onset    Cancer Mother     Heart Disease Mother     Diabetes Mother     High Blood Pressure Mother     Heart Disease Father     Heart Disease Maternal Grandmother          Social History:   TOBACCO:   reports that she has been smoking cigarettes. She has a 17.50 pack-year smoking history. She has never used smokeless tobacco.  ETOH:   reports current alcohol use of about 14.0 standard drinks of alcohol per week. DRUGS:  reports no history of drug use.           REVIEW OF SYSTEMS:  Review of Systems -   General ROS: fatigue   Psychological ROS: negative  Ophthalmic ROS: negative  ENT ROS: negative  Allergy and Immunology ROS: negative  Hematological and Lymphatic ROS: negative  Endocrine ROS: negative  Breast ROS: negative  Respiratory ROS: chronic cough, sputum, sob,   Cardiovascular ROS: chest pain   Gastrointestinal ROS: lose stool   Genito-Urinary ROS: negative  Musculoskeletal ROS: negative  Neurological ROS: negative  Dermatological ROS: negative        Physical Exam:    Vitals: BP (!) 95/55   Pulse 100   Temp 98.1 °F (36.7 °C) (Oral)   Resp 22   Ht 5' 1\" (1.549 m)   Wt 121 lb 4.1 oz (55 kg)   SpO2 100%   BMI 22.91 kg/m²     Last Body weight:   Wt Readings from Last 3 Encounters:   01/28/22 121 lb 4.1 oz (55 kg)   01/24/22 115 lb (52.2 kg)   11/17/21 117 lb 12.8 oz (53.4 kg)       Body Mass Index : Body mass index is 22.91 kg/m². Intake and Output summary:     Intake/Output Summary (Last 24 hours) at 1/28/2022 1424  Last data filed at 1/28/2022 1340  Gross per 24 hour   Intake 621.13 ml   Output 200 ml   Net 421.13 ml       Physical Examination:     PHYSICAL EXAM:    Gen:  Mild acute distress   Eyes: PERRL. Anicteric sclera. No conjunctival injection. ENT: No discharge. Posterior oropharynx clear. External appearance of ears and nose normal.  Neck: Trachea midline. No mass, no lymphadenopathy    Resp:  Diminished with rhonchi, right chest pain   CV: Regular rate. Regular rhythm. No murmur or rub. No edema. GI: Soft, Non-tender. Non-distended. +BS  Skin: Warm, dry, w/o erythema. Lymph: No cervical or supraclavicular LAD. M/S: No cyanosis. No clubbing. Neuro:  CN 2-12 tested, no focal neurologic deficit. Moves all extremities  Psych: Awake and alert, Oriented x 3. Judgement and insight appropriate. Mood stable. Laboratory findings:-    CBC:   Recent Labs     01/28/22 0159   WBC 8.4   HGB 10.9*        BMP:    Recent Labs     01/28/22  0159 01/28/22  0518 01/28/22  1224   *   < > 119*   K 3.6  --   --    CL 74*  --   --    CO2 26  --   --    BUN 15  --   --    CREATININE 1.7*  --   --    GLUCOSE 89  --   --     < > = values in this interval not displayed. S. Calcium:  Recent Labs     01/28/22  0159   CALCIUM 8.2*     S. Ionized Calcium:No results for input(s): IONCA in the last 72 hours.   S. Magnesium:No results for input(s): MG in the last 72 hours. S. Phosphorus:No results for input(s): PHOS in the last 72 hours. S. Glucose:No results for input(s): POCGLU in the last 72 hours. Glycosylated hemoglobin A1C: No results for input(s): LABA1C in the last 72 hours. INR: No results for input(s): INR in the last 72 hours. Hepatic functions:   Recent Labs     01/28/22 0159   ALKPHOS 173*   ALT 26   AST 71*   PROT 5.8*   BILITOT 0.8   LABALBU 2.9*     Pancreatic functions:  Recent Labs     01/28/22 0518   LACTA 1.4     S. Lactic Acid:   Recent Labs     01/28/22 0518   LACTA 1.4     Cardiac enzymes:  Recent Labs     01/28/22 0159   TROPONINI <0.01     BNP:No results for input(s): BNP in the last 72 hours. Lipid profile: No results for input(s): CHOL, TRIG, HDL, LDL, LDLCALC in the last 72 hours. Blood Gases: No results found for: PH, PCO2, PO2, HCO3, O2SAT  Thyroid functions:   Lab Results   Component Value Date    TSH 1.01 06/04/2021          Radiology Review:  Pertinent images / reports were reviewed as a part of this visit. CT Chest w/ contrast: Results for orders placed during the hospital encounter of 11/18/10    CT chest with contrast    Narrative  Reason for exam-->weak lt vocal cord    CT CHEST WITH CONTRAST-    INDICATION-  Weak left vocal cord, possible recurrent laryngeal  nerve palsy. COMPARISON- None available. FINDINGS-    There is mild centrilobular emphysema within the upper lungs. Mild  dependent atelectasis is present bilaterally. No suspicious  lesions are detected within the lung parenchyma. The airways are  patent. No mediastinal masses are identified. No axillary lymphadenopathy. There is atherosclerotic calcification of the thoracic aorta. Coronary artery calcification is also present. These are somewhat  advanced for patient's age. Images of the upper abdomen are unremarkable. Mild spondylosis within the thoracic spine is seen.  No suspicious  lesions detected within the bony structures. IMPRESSION-    1. No mediastinal masses identified to explain recurrent laryngeal  nerve palsy. 2. Coronary artery and aortic calcification, advanced for the  patient's age. 3. Mild centrilobular emphysema within the upper lungs. Read By- Deidra Fuel  Released By- Deidra [employer]  Released Date Time- 11/18/10 1054    [facility]  ------------------------------------------------------------------------------      CT Chest w/o contrast: No results found for this or any previous visit. CTPA: Results for orders placed during the hospital encounter of 06/04/21    CT CHEST PULMONARY EMBOLISM W CONTRAST    Narrative  EXAMINATION:  CTA OF THE CHEST 6/4/2021 11:13 am    TECHNIQUE:  CTA of the chest was performed after the administration of intravenous  contrast.  Multiplanar reformatted images are provided for review. MIP  images are provided for review. Dose modulation, iterative reconstruction,  and/or weight based adjustment of the mA/kV was utilized to reduce the  radiation dose to as low as reasonably achievable. COMPARISON:  None. HISTORY:  ORDERING SYSTEM PROVIDED HISTORY: wesley TRONCOSO  TECHNOLOGIST PROVIDED HISTORY:  Reason for exam:->wesley TRONCOSO  Decision Support Exception - unselect if not a suspected or confirmed  emergency medical condition->Emergency Medical Condition (MA)  Reason for Exam: sob  Acuity: Acute  Type of Exam: Initial    FINDINGS:  Thyroid gland appears normal.  Small mediastinal and hilar nodes are noted. Moderate to severe coronary artery calcification is seen. Trace pericardial  fluid is seen. Small hiatal hernia seen. There is nonspecific thickening at  the GE junction. No embolus is seen in the central, right, or left main pulmonary artery. No  embolus is seen on the right or left, at least to the subsegmental level.   Beam hardening artifact is seen from contrast bolus in the SVC, limiting  evaluation of the pulmonary arteries on the right. Mild underlying emphysema is seen. Small left-sided pleural effusion is  seen. There is adjacent left basilar consolidation. On the right, hazy ground-glass opacity is seen in the right upper, right  middle and right lower lobe. Small right-sided pleural effusion is seen. There is adjacent right basilar consolidation. There is subtle septal  thickening in the right lung base. There is reflux of contrast into the IVC and hepatic veins. There is fatty  infiltration of the liver. Atherosclerotic change seen in aorta. 2 mm  calcifications seen in the right and left kidney. There is lobular contour  to the left kidney. Cyst is seen in the left kidney, measuring 9 mm. Spurring is seen in the spine. Spurring is seen in the shoulder joints. Impression  No central pulmonary embolus identified    Bilateral pleural effusions, with adjacent consolidation, at the lung bases  either due to atelectasis or pneumonia. Scattered opacities are seen  throughout the right lung. .  There is a background of pulmonary emphysema. Subtle septal thickening at the right lung base, suggest a component of fluid  overload-pulmonary edema    Moderate to severe coronary artery calcification. CXR PA/LAT: Results for orders placed during the hospital encounter of 06/04/21    XR CHEST (2 VW)    Narrative  EXAMINATION:  TWO XRAY VIEWS OF THE CHEST    6/7/2021 11:30 am    COMPARISON:  CT of the chest and chest x-ray June 4, 2021    HISTORY:  ORDERING SYSTEM PROVIDED HISTORY: Pulm edema, Pleural effusions, follow up  study. TECHNOLOGIST PROVIDED HISTORY:  Reason for exam:->Pulm edema, Pleural effusions, follow up study. Reason for Exam: no chest pain, SOB getting better, recent heart procedure  (cardiac ablation)  Acuity: Acute  Type of Exam: Initial    FINDINGS:  Slightly decreased right pleural effusion. Pulmonary edema appears  decreased. No pneumothorax.     Impression  Decreased right pleural effusion, with a small residual pleural effusion. Decreased pulmonary edema. Minimal trace left pleural effusion      CXR portable: Results for orders placed during the hospital encounter of 01/28/22    XR CHEST PORTABLE    Narrative  EXAMINATION:  ONE XRAY VIEW OF THE CHEST    1/28/2022 2:15 am    COMPARISON:  06/07/2021    HISTORY:  ORDERING SYSTEM PROVIDED HISTORY: SOB  TECHNOLOGIST PROVIDED HISTORY:  Reason for exam:->SOB  Reason for Exam: SOB    FINDINGS:  Cardiac and mediastinal silhouettes appear within normal limits for size. Pulmonary vascularity is normal.  No parenchymal consolidation or pleural  effusion is identified. No pneumothorax. Chronic interstitial change  related to underlying COPD. Atherosclerotic calcifications are identified. Osteopenia. Degenerative changes in the spine and shoulders. Impression  COPD, though no acute cardiopulmonary process.           Charles Her MD, M.D.            1/28/2022, 2:24 PM

## 2022-01-28 NOTE — PROGRESS NOTES
Call to patients PCP office on patients behalf. Dr. Layne [place] office called spoke with representative. ICU unit number provided.

## 2022-01-28 NOTE — ED NOTES
Handoff given to [facility] RN to assume care, all questions answered.      Mary Lou Cesar, YANNI  01/28/22 0262

## 2022-01-28 NOTE — ED TRIAGE NOTES
Pt in via EMS from home with dizziness and hypotension. EMS reports pt had \"blood pressure in the 80's en route\". PT states that she was recently diagnosed with a \"UTI\" and reports \"more blood in urine lately\". Pt presents to ED alert and oriented at this time with no signs of distress noted. Pt has past history of COPD.

## 2022-01-28 NOTE — ED NOTES
Ordered meal tray for patient. Given ice chips. Patient denies further needs at this time.      Dennie Alu, RN  01/28/22 8038

## 2022-01-28 NOTE — PROGRESS NOTES
This RN messaged atempted to message Dr. Kalani Pedraza with nephrology, but defaulted to Dr. Enrique Stephens. Dr. Scar Wells then messaged via perfect serve d/t critical sodium level of 119. Trended down from 120. Instructed to call Dr. Lukasz Miller.

## 2022-01-28 NOTE — ED PROVIDER NOTES
[address] [facility]      Pt Name: Mj Broussard  MRN: [id number]  [place] 1960  Date of evaluation: 1/28/2022  Provider: Pawel Joesph MD    CHIEF COMPLAINT       Chief Complaint   Patient presents with    Hypotension       HISTORY OF PRESENT ILLNESS    Mj Broussard is a 62 y.o. female who presents to the emergency department with hypotension. Patient presents with evidence of low blood pressure. Also endorses just not feeling well the last few days. Has been treated for UTI with Bactrim. States she does not think it is helping. States she has no chest pain. Has had some intermittent shortness of breath. All her symptoms have been constant in nature. Not getting any better. She is concerned her UTIs progressed into sepsis. Not eating or drinking. Started a few days ago. Constant in nature. Never happened before. No other associated symptoms. Nursing Notes were reviewed. Including nursing noted for FM, Surgical History, Past Medical History, Social History, vitals, and allergies; agree with all. REVIEW OF SYSTEMS       Review of Systems   Constitutional: Positive for fatigue. Negative for diaphoresis and unexpected weight change. HENT: Negative for congestion and dental problem. Eyes: Negative for discharge and itching. Respiratory: Negative for cough and shortness of breath. Cardiovascular: Negative for chest pain and leg swelling. Gastrointestinal: Negative for abdominal pain, constipation and vomiting. Endocrine: Negative for cold intolerance and heat intolerance. Genitourinary: Negative for vaginal bleeding, vaginal discharge and vaginal pain. Musculoskeletal: Negative for neck pain and neck stiffness. Skin: Negative for color change and pallor. Neurological: Positive for light-headedness. Negative for tremors and weakness. Psychiatric/Behavioral: Negative for agitation and behavioral problems. Except as noted above the remainder of the review of systems was reviewed and negative. PAST MEDICAL HISTORY     Past Medical History:   Diagnosis Date    Anxiety     GERD (gastroesophageal reflux disease)     Gout     had once years ago    Hyperlipidemia     at last check it was normal    Hypertension        SURGICAL HISTORY       Past Surgical History:   Procedure Laterality Date    BLADDER SUSPENSION      CARPAL TUNNEL RELEASE      right    CARPAL TUNNEL RELEASE  7-12-10    Left    CARPAL TUNNEL RELEASE  Left wrist    FOOT SURGERY  3/2013    EXCISION NEUROMA 3RD NERVE LEFT FOOT    JOINT REPLACEMENT      right    KNEE ARTHROSCOPY      left x1, right x 1    SINUS SURGERY      TONSILLECTOMY         CURRENT MEDICATIONS       Previous Medications    ALBUTEROL SULFATE  (90 BASE) MCG/ACT INHALER    Inhale 2 puffs into the lungs every 6 hours as needed for Wheezing    ALLOPURINOL (ZYLOPRIM) 300 MG TABLET    TAKE 1 TABLET BY MOUTH DAILY    ALPRAZOLAM (ALPRAZOLAM XR) 0.5 MG EXTENDED RELEASE TABLET    Take 0.5 mg by mouth daily as needed (anxiety). ASPIRIN 81 MG EC TABLET    Take 81 mg by mouth daily. ATORVASTATIN (LIPITOR) 20 MG TABLET    TAKE 1 TABLET BY MOUTH DAILY    BUDESONIDE-FORMOTEROL (SYMBICORT) 160-4.5 MCG/ACT AERO    Inhale 2 puffs into the lungs 2 times daily    CARVEDILOL (COREG) 3.125 MG TABLET    TAKE 1 TABLET BY MOUTH TWICE DAILY    ENTRESTO 49-51 MG PER TABLET    TAKE 1 TABLET BY MOUTH TWICE DAILY    ESOMEPRAZOLE (NEXIUM) 20 MG CAPSULE    Take 20 mg by mouth every morning (before breakfast). FLUTICASONE (FLONASE) 50 MCG/ACT NASAL SPRAY    2 sprays by Each Nostril route daily    GUAIFENESIN (MUCINEX PO)    Take 400 mg by mouth daily. LEVOCETIRIZINE (XYZAL) 5 MG TABLET    Take 5 mg by mouth daily.     MAGNESIUM-OXIDE 400 (241.3 MG) MG TABS TABLET    TAKE 1 TABLET BY MOUTH DAILY    MONTELUKAST (SINGULAIR) 10 MG TABLET    Take 10 mg by mouth daily     MULTIPLE VITAMINS-MINERALS (THERAPEUTIC MULTIVITAMIN-MINERALS) TABLET    Take 1 tablet by mouth daily    NICOTINE (NICODERM CQ) 21 MG/24HR    Place 1 patch onto the skin daily    ONDANSETRON (ZOFRAN-ODT) 4 MG DISINTEGRATING TABLET    Take 1 tablet by mouth 3 times daily as needed for Nausea or Vomiting    POTASSIUM CHLORIDE (KLOR-CON M) 20 MEQ EXTENDED RELEASE TABLET    Take 1 tablet by mouth daily    SULFAMETHOXAZOLE-TRIMETHOPRIM (BACTRIM DS) 800-160 MG PER TABLET    Take 1 tablet by mouth 2 times daily for 7 days    TORSEMIDE (DEMADEX) 20 MG TABLET    TAKE 1 TABLET BY MOUTH DAILY       ALLERGIES     Augmentin [amoxicillin-pot clavulanate] and Pork-derived products    FAMILY HISTORY        Family History   Problem Relation Age of Onset    Cancer Mother     Heart Disease Mother     Diabetes Mother     High Blood Pressure Mother     Heart Disease Father     Heart Disease Maternal Grandmother        SOCIAL HISTORY       Social History     Socioeconomic History    Marital status:      Spouse name: None    Number of children: None    Years of education: None    Highest education level: None   Occupational History    None   Tobacco Use    Smoking status: Current Every Day Smoker     Packs/day: 0.50     Years: 35.00     Pack years: 17.50     Types: Cigarettes    Smokeless tobacco: Never Used    Tobacco comment: trying to quit   Vaping Use    Vaping Use: Every day    Substances: Nicotine    Devices: Disposable   Substance and Sexual Activity    Alcohol use:  Yes     Alcohol/week: 14.0 standard drinks     Types: 14 Glasses of wine per week     Comment: wine    Drug use: No    Sexual activity: None   Other Topics Concern    None   Social History Narrative    None     Social Determinants of Health     Financial Resource Strain: Low Risk     Difficulty of Paying Living Expenses: Not hard at all   Food Insecurity: No Food Insecurity    Worried About Running Out of Food in the Last Year: Never true    Ran Out of Food in the Last Year: Never true   Transportation Needs: No Transportation Needs    Lack of Transportation (Medical): No    Lack of Transportation (Non-Medical): No   Physical Activity:     Days of Exercise per Week: Not on file    Minutes of Exercise per Session: Not on file   Stress:     Feeling of Stress : Not on file   Social Connections:     Frequency of Communication with Friends and Family: Not on file    Frequency of Social Gatherings with Friends and Family: Not on file    Attends Confucianist Services: Not on file    Active Member of [address] [place] [address] [place] or Organizations: Not on file    Attends Club or Organization Meetings: Not on file    Marital Status: Not on file   Intimate Partner Violence:     Fear of Current or Ex-Partner: Not on file    Emotionally Abused: Not on file    Physically Abused: Not on file    Sexually Abused: Not on file   Housing Stability:     Unable to Pay for Housing in the Last Year: Not on file    Number of [place] in the Last Year: Not on file    Unstable Housing in the Last Year: Not on file       PHYSICAL EXAM       ED Triage Vitals [01/28/22 0145]   BP Temp Temp Source Pulse Resp SpO2 Height Weight   (!) 75/52 97.5 °F (36.4 °C) Oral 95 21 98 % -- --       Physical Exam  Vitals and nursing note reviewed. Constitutional:       Appearance: She is well-developed. She is ill-appearing. She is not toxic-appearing or diaphoretic. HENT:      Head: Normocephalic and atraumatic. Right Ear: External ear normal.      Left Ear: External ear normal.   Eyes:      General:         Right eye: No discharge. Left eye: No discharge. Conjunctiva/sclera: Conjunctivae normal.      Pupils: Pupils are equal, round, and reactive to light. Cardiovascular:      Rate and Rhythm: Normal rate and regular rhythm. Heart sounds: No murmur heard. Pulmonary:      Effort: Pulmonary effort is normal. No respiratory distress. Breath sounds: Normal breath sounds. No wheezing or rales. Abdominal:      General: Bowel sounds are normal. There is no distension. Palpations: Abdomen is soft. There is no mass. Tenderness: There is no abdominal tenderness. There is no guarding or rebound. Genitourinary:     Comments: Deferred  Musculoskeletal:         General: No deformity. Normal range of motion. Cervical back: Normal range of motion and neck supple. Skin:     General: Skin is warm. Findings: No erythema or rash. Neurological:      Mental Status: She is alert and oriented to person, place, and time. She is not disoriented. Cranial Nerves: No cranial nerve deficit. Motor: No atrophy or abnormal muscle tone. Coordination: Coordination normal.   Psychiatric:         Behavior: Behavior normal.         Thought Content: Thought content normal.         DIAGNOSTIC RESULTS     RADIOLOGY:   Non-plain film images such as CT, Ultrasoundand MRI are read by the radiologist. Jackquline Theo radiographic images are visualized and preliminarily interpreted by the emergency physician with the below findings:    Impression   No spiculated lung mass or lymphadenopathy in the chest.       Mild emphysematous changes, with minimal dependent change though no acute   parenchymal infiltrate or reactive effusion.  Resolved effusions.       Acute to subacute appearing right lateral rib fractures involving the 6th and   7th rib.  No pneumothorax.       No acute inflammatory process seen within the abdomen or pelvis.  No ileus or   obstruction.  No obstructive urinary tract calculi.  Tiny nonobstructive left   renal calculus.      ED BEDSIDE ULTRASOUND:   Performed by ED Physician - none    LABS:  Labs Reviewed   CBC WITH AUTO DIFFERENTIAL - Abnormal; Notable for the following components:       Result Value    RBC 3.01 (*)     Hemoglobin 10.9 (*)     Hematocrit 31.9 (*)     .9 (*)     MCH 36.1 (*)     All other components within normal limits    Narrative:     Performed CULTURE, BLOOD 1   CULTURE, BLOOD 2   LIPASE    Narrative:     Judson Spotted tel. [phone],  Chemistry results called to and read back by Yoshi Lance, 01/28/2022 02:39, by  [facility]  Performed at:  [facility]  [address] [facility] [facility] [address]   Phone [phone]   TROPONIN    Narrative:     Performed at:  [facility]  [address] [facility] [address]   Phone [phone]   LACTIC ACID, PLASMA    Narrative:     Performed at:  [facility]  [address] [facility] [place] [address]   Phone [phone]   CREATININE, RANDOM URINE    Narrative:     Performed at:  [facility]  [address] [facility] [address]   Phone [phone]   URINE RT REFLEX TO CULTURE   SODIUM   SODIUM   TSH WITH REFLEX   URIC ACID   SODIUM, URINE, RANDOM   URINE DRUG SCREEN   ETHANOL   SODIUM   CORTISOL TOTAL       All other labs were withinnormal range or not returned as of this dictation. EMERGENCY DEPARTMENT COURSE and DIFFERENTIAL DIAGNOSIS/MDM:     PMH, Surgical Hx, FH, Social Hx reviewed by myself (ETOH usage, Tobacco usage, Drug usage reviewed by myself, no pertinent Hx)- No Pertinent Hx     Old records were reviewed by me     70-year-old presents with evidence of shock. Fluids given. Still hypotensive. Levophed started with central line placement. Antibiotics were initiated. Hyponatremia noted. Nephrology was consulted Dr. Blayne Bledsoe. She recommended continuing with normal saline. Some urine studies were sent. Discussed with hospitalist will admit. Admission for further inpatient evaluation for hyponatremia and shock. I PERSONALLY SAW THE PATIENT AND PERFORMED A SUBSTANTIVE PORTION OF THE VISIT INCLUDING ALL ASPECTS OF THE MEDICAL DECISION MAKING PROCESS.     CRITICAL CARE TIME   Total Critical Caretime was 99 minutes, excluding separately reportable procedures. There was a high probability of clinically significant/life threatening deterioration in the patient's condition which required my urgent intervention. PROCEDURES:  Central Line    Date/Time: 1/28/2022 5:44 AM  Performed by: Moisés Bernard MD  Authorized by: Moisés Bernard MD     Consent:     Consent obtained:  Verbal    Consent given by:  Patient    Risks discussed:  Arterial puncture, bleeding, infection, incorrect placement, pneumothorax and nerve damage    Alternatives discussed:  No treatment  Pre-procedure details:     Hand hygiene: Hand hygiene performed prior to insertion      Sterile barrier technique: All elements of maximal sterile technique followed      Skin preparation:  2% chlorhexidine    Skin preparation agent: Skin preparation agent completely dried prior to procedure    Anesthesia (see MAR for exact dosages): Anesthesia method:  Local infiltration    Local anesthetic:  Lidocaine 1% w/o epi  Procedure details:     Location:  R femoral    Patient position:  Flat    Catheter size:  7 Fr    Landmarks identified: yes      Ultrasound guidance: yes      Sterile ultrasound techniques: Sterile gel and sterile probe covers were used      Number of attempts:  1    Successful placement: yes    Post-procedure details:     Post-procedure:  Dressing applied    Assessment:  Blood return through all ports and free fluid flow    Patient tolerance of procedure: Tolerated well, no immediate complications  Comments:      EBL 10 cc        FINAL IMPRESSION      1. Hyponatremia    2.  [facility])          [address] Admitted 01/28/2022 04:44:45 AM    ICU    (Please note that portions ofthis note were completed with a voice recognition program.  Efforts were made to edit the dictations but occasionally words are mis-transcribed.)    Moisés Bernard MD(electronically signed)  Attending Emergency Physician        Moisés Bernard MD  01/28/22 9825

## 2022-01-28 NOTE — ACP (ADVANCE CARE PLANNING)
Advance Care Planning     Advance Care Planning Activator (Inpatient)  Conversation Note      Date of ACP Conversation: 1/28/2022     Conversation Conducted with: Patient with Decision Making Capacity    ACP Activator: Stanley Miller RN    Health Care Decision Maker:     Current Designated Health Care Decision Maker:     Primary Decision Maker: Maris Segura - Child - [phone]    Today we discussed advanced directives, living will, HCPOA, and code status. Pt stated she completed advanced directive paperwork last admission. Hher daughter, Tamara Escalante, is her HCPOA. She is to be a FULL CODE ut does NOT want long-term life support    Care Preferences    Ventilation: \"If you were in your present state of health and suddenly became very ill and were unable to breathe on your own, what would your preference be about the use of a ventilator (breathing machine) if it were available to you? \"      Would the patient desire the use of ventilator (breathing machine)?: yes    \"If your health worsens and it becomes clear that your chance of recovery is unlikely, what would your preference be about the use of a ventilator (breathing machine) if it were available to you? \"     Would the patient desire the use of ventilator (breathing machine)?: No      Resuscitation  \"CPR works best to restart the heart when there is a sudden event, like a heart attack, in someone who is otherwise healthy. Unfortunately, CPR does not typically restart the heart for people who have serious health conditions or who are very sick. \"    \"In the event your heart stopped as a result of an underlying serious health condition, would you want attempts to be made to restart your heart (answer \"yes\" for attempt to resuscitate) or would you prefer a natural death (answer \"no\" for do not attempt to resuscitate)? \" yes       [x] Yes   [] No   Educated Patient / Jack Blanco regarding differences between Advance Directives and portable DNR orders.     Length of ACP Conversation in minutes:  5    Conversation Outcomes:  [x] ACP discussion completed  [] Existing advance directive reviewed with patient; no changes to patient's previously recorded wishes  [] New Advance Directive completed  [] Portable Do Not Rescitate prepared for Provider review and signature  [] POLST/POST/MOLST/MOST prepared for Provider review and signature      Follow-up plan:    [] Schedule follow-up conversation to continue planning  [] Referred individual to Provider for additional questions/concerns   [] Advised patient/agent/surrogate to review completed ACP document and update if needed with changes in condition, patient preferences or care setting    [x] This note routed to one or more involved healthcare providers    Electronically signed by Dinesh Brady, MSN RN-BC [facility]

## 2022-01-28 NOTE — PROGRESS NOTES
Medication Reconciliation    List of medications for Tatianna Ly is currently taking is complete. Source of Information:   Epic records  Conversation with patient at bedside     Allergies  Allergy list not thoroughly reviewed with patient at this time  Allergies listed in Epic as follows:  Augmentin [amoxicillin-pot clavulanate] and Pork-derived products     Current Medications:    Current Facility-Administered Medications:     0.9 % sodium chloride infusion, , IntraVENous, Continuous, Joel M Natacha, DO, Last Rate: 125 mL/hr at 01/28/22 0508, New Bag at 01/28/22 0508    norepinephrine (LEVOPHED) 16 mg in dextrose 5% 250 mL infusion, 2-100 mcg/min, IntraVENous, Continuous, Joel  Natacha, DO, Last Rate: 5.6 mL/hr at 01/28/22 1032, 6 mcg/min at 01/28/22 1032    allopurinol (ZYLOPRIM) tablet 300 mg, 300 mg, Oral, Daily, Joel  Natacha, DO    aspirin EC tablet 81 mg, 81 mg, Oral, Daily, Joel  Natacha, DO    atorvastatin (LIPITOR) tablet 20 mg, 20 mg, Oral, Daily, Joel  Natacha, DO    budesonide-formoterol (SYMBICORT) 160-4.5 MCG/ACT inhaler 2 puff, 2 puff, Inhalation, BID, Joel  Natacha, DO, 2 puff at 01/28/22 0751    pantoprazole (PROTONIX) tablet 40 mg, 40 mg, Oral, QAM AC, Joel  Natacha, DO, 40 mg at 01/28/22 0744    fluticasone (FLONASE) 50 MCG/ACT nasal spray 2 spray, 2 spray, Each Nostril, Daily, Joel  Natacha, DO    montelukast (SINGULAIR) tablet 10 mg, 10 mg, Oral, Daily, Joel  Natacha, DO    nicotine (NICODERM CQ) 21 MG/24HR 1 patch, 1 patch, TransDERmal, Daily, Joel  Natacha, DO    sodium chloride flush 0.9 % injection 5-40 mL, 5-40 mL, IntraVENous, 2 times per day, Joel  Natacha, DO    sodium chloride flush 0.9 % injection 5-40 mL, 5-40 mL, IntraVENous, PRN, Joel Manzano, DO    0.9 % sodium chloride infusion, 25 mL, IntraVENous, PRN, Joel Manzano, DO    acetaminophen (TYLENOL) tablet 650 mg, 650 mg, Oral, Q6H PRN **OR** acetaminophen (TYLENOL) suppository 650 mg, 650 mg, Rectal, Q6H PRN, Joel Manzano DO    ondansetron (ZOFRAN) injection 4 mg, 4 mg, IntraVENous, Q6H PRN, Joel Casarez DO    Current Outpatient Medications:     sulfamethoxazole-trimethoprim (BACTRIM DS) 800-160 MG per tablet, Take 1 tablet by mouth 2 times daily for 7 days, Disp: 14 tablet, Rfl: 0    allopurinol (ZYLOPRIM) 300 MG tablet, TAKE 1 TABLET BY MOUTH DAILY (Patient taking differently: Take 300 mg by mouth daily ), Disp: 90 tablet, Rfl: 2    carvedilol (COREG) 3.125 MG tablet, TAKE 1 TABLET BY MOUTH TWICE DAILY (Patient taking differently: Take 3.125 mg by mouth 2 times daily ), Disp: 180 tablet, Rfl: 1    ENTRESTO 49-51 MG per tablet, TAKE 1 TABLET BY MOUTH TWICE DAILY (Patient taking differently: Take 1 tablet by mouth 2 times daily ), Disp: 180 tablet, Rfl: 1    torsemide (DEMADEX) 20 MG tablet, TAKE 1 TABLET BY MOUTH DAILY, Disp: 90 tablet, Rfl: 1    ondansetron (ZOFRAN-ODT) 4 MG disintegrating tablet, Take 1 tablet by mouth 3 times daily as needed for Nausea or Vomiting, Disp: 12 tablet, Rfl: 0    MAGNESIUM-OXIDE 400 (241.3 Mg) MG TABS tablet, TAKE 1 TABLET BY MOUTH DAILY (Patient taking differently: Take 400 mg by mouth daily ), Disp: 90 tablet, Rfl: 0    atorvastatin (LIPITOR) 20 MG tablet, TAKE 1 TABLET BY MOUTH DAILY, Disp: 30 tablet, Rfl: 3    budesonide-formoterol (SYMBICORT) 160-4.5 MCG/ACT AERO, Inhale 2 puffs into the lungs 2 times daily, Disp: 1 each, Rfl: 11    albuterol sulfate  (90 Base) MCG/ACT inhaler, Inhale 2 puffs into the lungs every 6 hours as needed for Wheezing, Disp: 1 Inhaler, Rfl: 3    potassium chloride (KLOR-CON M) 20 MEQ extended release tablet, Take 1 tablet by mouth daily (Patient taking differently: Take 10 mEq by mouth daily ), Disp: 90 tablet, Rfl: 3    nicotine (NICODERM CQ) 21 MG/24HR, Place 1 patch onto the skin daily, Disp: 30 patch, Rfl: 3    ALPRAZolam (ALPRAZOLAM XR) 0.5 MG extended release tablet, Take 0.5 mg by mouth daily as needed (anxiety). , Disp: , Rfl:     fluticasone (FLONASE) 50 MCG/ACT nasal spray, 2 sprays by Each Nostril route daily, Disp: , Rfl:     Multiple Vitamins-Minerals (THERAPEUTIC MULTIVITAMIN-MINERALS) tablet, Take 1 tablet by mouth daily, Disp: , Rfl:     levocetirizine (XYZAL) 5 MG tablet, Take 5 mg by mouth daily. , Disp: , Rfl:     GuaiFENesin (MUCINEX PO), Take 400 mg by mouth daily. , Disp: , Rfl:     aspirin 81 MG EC tablet, Take 81 mg by mouth daily. , Disp: , Rfl:     montelukast (SINGULAIR) 10 MG tablet, Take 10 mg by mouth daily , Disp: , Rfl:     esomeprazole (NEXIUM) 40 MG delayed release capsule, Take 40 mg by mouth every morning (before breakfast) , Disp: , Rfl:     Notes Regarding Home Medications:   Patient did not receive any of their home medications prior to arrival to the emergency department  Patient started on Bactrim on 1/24/22, believes course is set to finish on Sunday 1/30  Has only been taking 1/2 of a potassium chloride 20 mEq tablet daily because the tablets are too large for her to swallow  Current Symbicort inhaler broke into pieces and she has not had one for about a month  Has not been routinely using nicotine patches, but did use one last week      Wendy Friday, [facility], PharmD   1/28/2022 10:45 AM

## 2022-01-28 NOTE — H&P
Hospital Medicine History & Physical      PCP: Marta Aden MD    Date of Admission: 1/28/2022    Date of Service: Pt seen/examined on 1/28/2022 and Admitted to Inpatient     Chief Complaint: Generalized weakness, lightheadedness, poor p.o. intake      History Of Present Illness: The patient is a 62 y.o. female with past medical history of GERD, smoking abuse, alcohol use with 1 to 2 glasses of wine nightly, hypertension, previous history of congestive heart failure but most recently had an echo showing normal EF in January 2022, gout, and hyperlipidemia who presents to [facility] with progressive worsening of generalized weakness and fatigue as well as poor p.o. intake for at least the last 6 to 7 days that initially started this past weekend but has progressively worsened. Patient notes that it was very mild initially and that initially she had some component of dysuria so that when she saw her PCP on Monday urine was examined and patient was noted to have a UTI and so she was started on Bactrim on that date. However, patient had progressively continued to become more generally weak and also demonstrated some levels of lightheadedness and dyspnea on exertion. She notes she has continued to have poor p.o. intake although she was still taking her antihypertensive medications and her diuretic. She did not have any labs drawn to examine her metabolic panel on that date when she had her urine examined. She notes that she took the Bactrim and did not have any change in her symptoms but that they were continuing to get worse. She got worse to the point where tonight she called EMS because her weakness was becoming so much progressively worse. She was noted to have systolic blood pressures in the 70s to 80s when she was brought to the ED.   Apart from the above symptoms of generalized weakness, lightheadedness, poor p.o. intake, patient denies any other recent symptoms of fever, chills, syncope, chest pain, blood in urine/stool/sputum, leg swelling, rashes, abdominal pain/nausea/vomiting/diarrhea. Past Medical History:        Diagnosis Date    Anxiety     GERD (gastroesophageal reflux disease)     Gout     had once years ago    Hyperlipidemia     at last check it was normal    Hypertension        Past Surgical History:        Procedure Laterality Date    BLADDER SUSPENSION      CARPAL TUNNEL RELEASE      right    CARPAL TUNNEL RELEASE  7-12-10    Left    CARPAL TUNNEL RELEASE  Left wrist    FOOT SURGERY  3/2013    EXCISION NEUROMA 3RD NERVE LEFT FOOT    JOINT REPLACEMENT      right    KNEE ARTHROSCOPY      left x1, right x 1    SINUS SURGERY      TONSILLECTOMY         Medications Prior to Admission:    Prior to Admission medications    Medication Sig Start Date End Date Taking?  Authorizing Provider   sulfamethoxazole-trimethoprim (BACTRIM DS) 800-160 MG per tablet Take 1 tablet by mouth 2 times daily for 7 days 1/24/22 1/31/22  Ilia Helm MD   allopurinol (ZYLOPRIM) 300 MG tablet TAKE 1 TABLET BY MOUTH DAILY 1/13/22   Antonieta Mobley MD   carvedilol (COREG) 3.125 MG tablet TAKE 1 TABLET BY MOUTH TWICE DAILY 12/16/21   Leia Nolasco MD   ENTRESTO 49-51 MG per tablet TAKE 1 TABLET BY MOUTH TWICE DAILY 12/16/21   Leia Nolasco MD   torsemide (DEMADEX) 20 MG tablet TAKE 1 TABLET BY MOUTH DAILY 11/29/21   FRANCISCO J Hendrix CNP   ondansetron (ZOFRAN-ODT) 4 MG disintegrating tablet Take 1 tablet by mouth 3 times daily as needed for Nausea or Vomiting 11/12/21   Antonieta Mobley MD   MAGNESIUM-OXIDE 400 (241.3 Mg) MG TABS tablet TAKE 1 TABLET BY MOUTH DAILY 9/28/21   Leia Nolasco MD   atorvastatin (LIPITOR) 20 MG tablet TAKE 1 TABLET BY MOUTH DAILY 9/21/21   Leia Nolasco MD   budesonide-formoterol [facility]) 160-4.5 MCG/ACT AERO Inhale 2 puffs into the lungs 2 times daily 9/20/21   Pebbles He MD   albuterol sulfate  (90 Base) MCG/ACT inhaler Inhale 2 puffs into the lungs every 6 hours as needed for Wheezing 6/21/21   Dominique Downs APRN - CRISTIAN   potassium chloride (KLOR-CON M) 20 MEQ extended release tablet Take 1 tablet by mouth daily 6/15/21   FRANCISCO J Roy - CNP   nicotine (NICODERM CQ) 21 MG/24HR Place 1 patch onto the skin daily 6/9/21   Milvia Mcdonald MD   ALPRAZolam (ALPRAZOLAM XR) 0.5 MG extended release tablet Take 0.5 mg by mouth daily as needed (anxiety). Historical Provider, MD   fluticasone (FLONASE) 50 MCG/ACT nasal spray 2 sprays by Each Nostril route daily    Historical Provider, MD   Multiple Vitamins-Minerals (THERAPEUTIC MULTIVITAMIN-MINERALS) tablet Take 1 tablet by mouth daily    Historical Provider, MD   levocetirizine (XYZAL) 5 MG tablet Take 5 mg by mouth daily. 6/28/10   Historical Provider, MD   GuaiFENesin (MUCINEX PO) Take 400 mg by mouth daily. 6/28/10   Historical Provider, MD   aspirin 81 MG EC tablet Take 81 mg by mouth daily. Historical Provider, MD   montelukast (SINGULAIR) 10 MG tablet Take 10 mg by mouth daily     Historical Provider, MD   esomeprazole (NEXIUM) 20 MG capsule Take 20 mg by mouth every morning (before breakfast). Historical Provider, MD       Allergies:  Augmentin [amoxicillin-pot clavulanate] and Pork-derived products    Social History:  The patient currently lives home    TOBACCO:   reports that she has been smoking cigarettes. She has a 17.50 pack-year smoking history. She has never used smokeless tobacco.  ETOH:   reports current alcohol use of about 14.0 standard drinks of alcohol per week. Family History:  Reviewed in detail and negative for DM, Early CAD, Cancer, CVA.  Positive as follows:        Problem Relation Age of Onset   Allison Cancer Mother     Heart Disease Mother     Diabetes Mother     High Blood Pressure Mother     Heart Disease Father     Heart Disease Maternal Grandmother        REVIEW OF SYSTEMS:    as noted in the HPI. All other systems reviewed and negative. PHYSICAL EXAM:    BP (!) 83/52   Pulse 89   Temp 97.5 °F (36.4 °C) (Oral)   Resp 16   SpO2 97%     General appearance: Fatigued appearing, nontoxic appearing, alert and oriented x4, no acute respiratory distress  HEENT Normal cephalic, atraumatic without obvious deformity. Pupils equal, round, and reactive to light. Extra ocular muscles intact. Conjunctivae/corneas clear. Dry mucous membranes  Neck: Supple, no JVD  Lungs: Clear to auscultation, bilaterally without Rales/Wheezes/Rhonchi with good respiratory effort. Heart: Regular rate and rhythm with Normal S1/S2 without murmurs, rubs or gallops, point of maximum impulse non-displaced  Abdomen: Soft, nontender, nondistended, active bowel sounds  Extremities: No edema  Skin: No rashes  Neurologic: Grossly intact neurologically, strength is 5-5 to all extremities  Mental status: Alert, oriented, thought content appropriate. Capillary Refill: Acceptable  < 3 seconds  Peripheral Pulses: +3 Easily felt, not easily obliterated with pressure      Chest x-ray: COPD though no acute cardiopulmonary process  CT head without contrast:  No acute intracranial hemorrhage.  No wedge-shaped area of acute ischemia is   identified.       There are a few patchy focal areas of white matter low attenuation which is   nonspecific, though can be seen in the setting of chronic microvascular   ischemia or demyelination.       No significant cortical atrophy or intracranial mass.      CT chest abdomen pelvis without contrast:  No spiculated lung mass or lymphadenopathy in the chest.       Mild emphysematous changes, with minimal dependent change though no acute   parenchymal infiltrate or reactive effusion.  Resolved effusions.       Acute to subacute appearing right lateral rib fractures involving the 6th and   7th rib.  No pneumothorax.       No acute inflammatory process seen within the abdomen or pelvis.  No ileus or   obstruction.  No obstructive urinary tract calculi.  Tiny nonobstructive left   renal calculus. CBC   Recent Labs     01/28/22 0159   WBC 8.4   HGB 10.9*   HCT 31.9*         RENAL  Recent Labs     01/28/22 0159   *   K 3.6   CL 74*   CO2 26   BUN 15   CREATININE 1.7*     LFT'S  Recent Labs     01/28/22 0159   AST 71*   ALT 26   BILITOT 0.8   ALKPHOS 173*     COAG  No results for input(s): INR in the last 72 hours.   CARDIAC ENZYMES  Recent Labs     01/28/22 0159   TROPONINI <0.01       U/A:    Lab Results   Component Value Date    NITRITE POSITIVE 01/24/2022    COLORU DARK YELLOW 01/24/2022    COLORU YELLOW 06/04/2021    WBCUA 1 06/04/2021    RBCUA 1 06/04/2021    CLARITYU CLOUDY 01/24/2022    CLARITYU Clear 06/04/2021    SPECGRAV 1.015 01/24/2022    SPECGRAV 1.012 06/04/2021    LEUKOCYTESUR LARGE 01/24/2022    LEUKOCYTESUR Negative 06/04/2021    BLOODU SMALL 01/24/2022    BLOODU Negative 06/04/2021    GLUCOSEU NEGATIVE 01/24/2022    GLUCOSEU Negative 06/04/2021       ABG  No results found for: GZB6MIY, BEART, E6ADUYIE, PHART, THGBART, ZAH3HLN, PO2ART, EBP1DCT        Active Hospital Problems    Diagnosis Date Noted    Alcohol use [Z72.89] 01/28/2022    Hypotension [I95.9] 01/28/2022    Hx of congestive heart failure [Z86.79] 01/28/2022    Generalized weakness [R53.1] 01/28/2022    Hyponatremia [E87.1]     HTN (hypertension) [I10] 03/01/2013         PHYSICIANS CERTIFICATION:    I certify that Tiffanie Beaulieu is expected to be hospitalized for greater than 2 midnights based on the following assessment and plan:      ASSESSMENT/PLAN:  · Hyponatremia  · Hypotension  · Generalized weakness  · History of congestive heart failure  · Alcohol use    Plan:  · Notably patient is hypotensive but also significantly hyponatremic which seems to be new for her since the last sodium level that was checked per record was back October last year but she was at 129 at that time. I have no labs to confirm how chronic the hyponatremia has become but patient symptoms do seem convincing for weakness and hypotension secondary due to dehydration and hyponatremia. Uncertain if the hyponatremia is related to Bactrim usage for recent UTI but do not feel patient needs any further antibiotic prophylaxis as she finished 3 days of antibiotics and do not feel the Bactrim is responsible for the patient's low sodium. · Patient given 2 L of normal saline boluses in the ED and then started on normal saline at 125/h per nephrology recommendations  · Nephrology consulted and will evaluate patient in the morning  · Started patient on Levophed for hypotension with pressures ranging in the 90H to 58X systolic. · Checking further lab work for hyponatremia work-up with TSH/cortisol/urine lites and serum lites and osmolality  · Checking sodium every 4 hours per nephrology recommendation  · Ordering renal ultrasound in the morning  · Holding all of patient's home antihypertensives and heart failure medications  · Other than the above medicines, restarting patient's home medications  · Repeating other daily labs including CBC/CMP/magnesium daily at 0600 starting tomorrow    DVT Prophylaxis: SCDs  Diet: ADULT DIET; Regular; 4 carb choices (60 gm/meal); Low Sodium (2 gm); 1500 ml  Code Status: Full Code  PT/OT Eval Status: Ambulatory    Dispo -pending clinical course       Joel Rosario DO    Thank you Lahoma Frankel, MD for the opportunity to be involved in this patient's care. If you have any questions or concerns please feel free to contact me at [phone].

## 2022-01-28 NOTE — ED NOTES
ED SBAR report provider to [facility]. Patient to be transported to Room 2111 via stretcher by RN. Patient transported with bedside cardiac monitor and with IV medications infusing. IV site clean, dry, and intact. MEWS score and pain assessed as 0 and documented. Updated patient on plan of care.        Alec Mc RN  01/28/22 9992

## 2022-01-28 NOTE — CARE COORDINATION
Case Management Assessment           Initial Evaluation                Date / Time of Evaluation: 1/28/2022 2:16 PM                 Assessment Completed by: Danielle Omalley RN     Pt's chart reviewed. Met with her at the bedside in the ICU to complete initial assessment.  is alert and oriented x 4, pleasant, and easy to engage in conversation. She was sitting up in the bed watching television. Oxygen 2L/ NC on which is a new requirement. She stated she lives at home with her disabled spouse and granddaughter. The home is a multi-story house with 4 steps to enter. There are no railings and she needs to utilize her 's old cane to navigate the steps. She can traverse between levels in the home by holding onto the banister. The bedroom and bathroom are upstairs. She is independent with all aspects of her own personal care. If she needs assistance, she has her daughter and granddaughter for support. She is open to working with therapy while at the hospital. She would like her own cane and o be shown proper use of it. She does not want home care. At discharge, her daughter will transport home via private car.       Patient Name: Mj Broussard     YOB: 1960  Diagnosis: Hyponatremia [E87.1]  Shock ([address] [R57.9]     Date / Time: 1/28/2022  1:38 AM    Patient Admission Status: Inpatient    Current PCP: Marta Aden MD    Chart Reviewed: Yes  Patient/ Family Interviewed: Yes    Emergency Contacts:  Extended Emergency Contact Information  Primary Emergency Contact: [address] [facility] Phone: [phone]  Relation: Child    Advance Directives:   Code Status: Full Code    Financial  Payor: Braxton Renee / Plan: Brxaton Renee / Product Type: *No Product type* /     [address] [place] [address] [phone] Dudley Mcnamaraer [phone] [address] [facility] [employer] [id number]  Phone: [phone] Fax: [phone] [address] [facility] [address] & [facility]  [address] [place] [address]  Phone: [phone] Fax: [phone]      ADLS Independent and active   Support Systems:  Family    HOUSING  Home Environment: multiple level house w spouse and granddaughter  Steps: 4 STEw/out rails. One flight up to the boardroom/bathroom with rails (10 steps)    Plans to RETURN to current housing: Yes    [address]  Currently ACTIVE with [address]: No    Durable Medical Equipment  Equipment: uses 's old cane at times    Home Oxygen and Respiratory Equipment  Has HOME OXYGEN prior to admission: No    DISCHARGE PLAN:  Disposition: Home- No Services Needed    Transportation PLAN for discharge: family     The Patient and/or patient representative Katja Sorenson and her family were provided with a choice of provider and agrees with the discharge plan Yes    Freedom of choice list was provided with basic dialogue that supports the patient's individualized plan of care/goals and shares the quality data associated with the providers.  Yes      Di Tamez RN  Case Management  [phone]

## 2022-01-29 LAB
ANION GAP SERPL CALCULATED.3IONS-SCNC: 10 MMOL/L (ref 3–16)
ANION GAP SERPL CALCULATED.3IONS-SCNC: 11 MMOL/L (ref 3–16)
ANION GAP SERPL CALCULATED.3IONS-SCNC: 12 MMOL/L (ref 3–16)
ANION GAP SERPL CALCULATED.3IONS-SCNC: 12 MMOL/L (ref 3–16)
ANION GAP SERPL CALCULATED.3IONS-SCNC: 13 MMOL/L (ref 3–16)
ANION GAP SERPL CALCULATED.3IONS-SCNC: 14 MMOL/L (ref 3–16)
BASOPHILS ABSOLUTE: 0 K/UL (ref 0–0.2)
BASOPHILS RELATIVE PERCENT: 0.2 %
BUN BLDV-MCNC: 7 MG/DL (ref 7–20)
BUN BLDV-MCNC: 7 MG/DL (ref 7–20)
BUN BLDV-MCNC: 8 MG/DL (ref 7–20)
BUN BLDV-MCNC: 9 MG/DL (ref 7–20)
CALCIUM SERPL-MCNC: 7.2 MG/DL (ref 8.3–10.6)
CALCIUM SERPL-MCNC: 7.4 MG/DL (ref 8.3–10.6)
CALCIUM SERPL-MCNC: 7.5 MG/DL (ref 8.3–10.6)
CALCIUM SERPL-MCNC: 7.5 MG/DL (ref 8.3–10.6)
CHLORIDE BLD-SCNC: 82 MMOL/L (ref 99–110)
CHLORIDE BLD-SCNC: 85 MMOL/L (ref 99–110)
CHLORIDE BLD-SCNC: 85 MMOL/L (ref 99–110)
CHLORIDE BLD-SCNC: 86 MMOL/L (ref 99–110)
CO2: 20 MMOL/L (ref 21–32)
CO2: 22 MMOL/L (ref 21–32)
CO2: 23 MMOL/L (ref 21–32)
CO2: 23 MMOL/L (ref 21–32)
CORTISOL TOTAL: 63.4 UG/DL
CREAT SERPL-MCNC: 0.6 MG/DL (ref 0.6–1.2)
CREAT SERPL-MCNC: 0.7 MG/DL (ref 0.6–1.2)
CREAT SERPL-MCNC: 0.7 MG/DL (ref 0.6–1.2)
CREAT SERPL-MCNC: 0.8 MG/DL (ref 0.6–1.2)
EOSINOPHIL,URINE: NORMAL
EOSINOPHILS ABSOLUTE: 0 K/UL (ref 0–0.6)
EOSINOPHILS RELATIVE PERCENT: 0.1 %
GFR AFRICAN AMERICAN: >60
GFR NON-AFRICAN AMERICAN: >60
GLUCOSE BLD-MCNC: 105 MG/DL (ref 70–99)
GLUCOSE BLD-MCNC: 127 MG/DL (ref 70–99)
GLUCOSE BLD-MCNC: 155 MG/DL (ref 70–99)
GLUCOSE BLD-MCNC: 84 MG/DL (ref 70–99)
GLUCOSE BLD-MCNC: 95 MG/DL (ref 70–99)
GLUCOSE BLD-MCNC: 99 MG/DL (ref 70–99)
HCT VFR BLD CALC: 23.5 % (ref 36–48)
HEMOGLOBIN: 8.2 G/DL (ref 12–16)
LYMPHOCYTES ABSOLUTE: 1.2 K/UL (ref 1–5.1)
LYMPHOCYTES RELATIVE PERCENT: 15.1 %
MCH RBC QN AUTO: 36.8 PG (ref 26–34)
MCHC RBC AUTO-ENTMCNC: 34.8 G/DL (ref 31–36)
MCV RBC AUTO: 105.8 FL (ref 80–100)
MONOCYTES ABSOLUTE: 1.2 K/UL (ref 0–1.3)
MONOCYTES RELATIVE PERCENT: 14.3 %
NEUTROPHILS ABSOLUTE: 5.7 K/UL (ref 1.7–7.7)
NEUTROPHILS RELATIVE PERCENT: 70.3 %
OSMOLALITY URINE: 263 MOSM/KG (ref 390–1070)
PDW BLD-RTO: 15.5 % (ref 12.4–15.4)
PLATELET # BLD: 257 K/UL (ref 135–450)
PMV BLD AUTO: 7.1 FL (ref 5–10.5)
POTASSIUM SERPL-SCNC: 3.4 MMOL/L (ref 3.5–5.1)
POTASSIUM SERPL-SCNC: 3.5 MMOL/L (ref 3.5–5.1)
POTASSIUM SERPL-SCNC: 3.5 MMOL/L (ref 3.5–5.1)
POTASSIUM SERPL-SCNC: 3.6 MMOL/L (ref 3.5–5.1)
POTASSIUM SERPL-SCNC: 3.6 MMOL/L (ref 3.5–5.1)
POTASSIUM SERPL-SCNC: 3.7 MMOL/L (ref 3.5–5.1)
RBC # BLD: 2.22 M/UL (ref 4–5.2)
SODIUM BLD-SCNC: 115 MMOL/L (ref 136–145)
SODIUM BLD-SCNC: 118 MMOL/L (ref 136–145)
SODIUM BLD-SCNC: 119 MMOL/L (ref 136–145)
SODIUM BLD-SCNC: 120 MMOL/L (ref 136–145)
SODIUM BLD-SCNC: 120 MMOL/L (ref 136–145)
SODIUM BLD-SCNC: 122 MMOL/L (ref 136–145)
SODIUM URINE: 37 MMOL/L
WBC # BLD: 8.1 K/UL (ref 4–11)

## 2022-01-29 PROCEDURE — 99291 CRITICAL CARE FIRST HOUR: CPT | Performed by: INTERNAL MEDICINE

## 2022-01-29 PROCEDURE — 37799 UNLISTED PX VASCULAR SURGERY: CPT

## 2022-01-29 PROCEDURE — 36415 COLL VENOUS BLD VENIPUNCTURE: CPT

## 2022-01-29 PROCEDURE — 83935 ASSAY OF URINE OSMOLALITY: CPT

## 2022-01-29 PROCEDURE — 84300 ASSAY OF URINE SODIUM: CPT

## 2022-01-29 PROCEDURE — 2580000003 HC RX 258: Performed by: STUDENT IN AN ORGANIZED HEALTH CARE EDUCATION/TRAINING PROGRAM

## 2022-01-29 PROCEDURE — 2580000003 HC RX 258: Performed by: INTERNAL MEDICINE

## 2022-01-29 PROCEDURE — 80048 BASIC METABOLIC PNL TOTAL CA: CPT

## 2022-01-29 PROCEDURE — 1200000000 HC SEMI PRIVATE

## 2022-01-29 PROCEDURE — 6360000002 HC RX W HCPCS: Performed by: INTERNAL MEDICINE

## 2022-01-29 PROCEDURE — 82533 TOTAL CORTISOL: CPT

## 2022-01-29 PROCEDURE — 6370000000 HC RX 637 (ALT 250 FOR IP): Performed by: STUDENT IN AN ORGANIZED HEALTH CARE EDUCATION/TRAINING PROGRAM

## 2022-01-29 PROCEDURE — 85025 COMPLETE CBC W/AUTO DIFF WBC: CPT

## 2022-01-29 PROCEDURE — 94761 N-INVAS EAR/PLS OXIMETRY MLT: CPT

## 2022-01-29 PROCEDURE — 94640 AIRWAY INHALATION TREATMENT: CPT

## 2022-01-29 PROCEDURE — 6370000000 HC RX 637 (ALT 250 FOR IP): Performed by: INTERNAL MEDICINE

## 2022-01-29 RX ORDER — SODIUM CHLORIDE 9 MG/ML
INJECTION, SOLUTION INTRAVENOUS CONTINUOUS
Status: DISCONTINUED | OUTPATIENT
Start: 2022-01-29 | End: 2022-01-30

## 2022-01-29 RX ORDER — COSYNTROPIN 0.25 MG/ML
250 INJECTION, POWDER, FOR SOLUTION INTRAMUSCULAR; INTRAVENOUS ONCE
Status: COMPLETED | OUTPATIENT
Start: 2022-01-29 | End: 2022-01-29

## 2022-01-29 RX ORDER — COSYNTROPIN 0.25 MG/ML
250 INJECTION, POWDER, FOR SOLUTION INTRAMUSCULAR; INTRAVENOUS ONCE
Status: DISCONTINUED | OUTPATIENT
Start: 2022-01-29 | End: 2022-01-29

## 2022-01-29 RX ORDER — COSYNTROPIN 0.25 MG/ML
250 INJECTION, POWDER, FOR SOLUTION INTRAMUSCULAR; INTRAVENOUS ONCE
Status: DISCONTINUED | OUTPATIENT
Start: 2022-01-30 | End: 2022-01-29

## 2022-01-29 RX ORDER — ALPRAZOLAM 0.25 MG/1
0.25 TABLET ORAL 2 TIMES DAILY PRN
Status: DISCONTINUED | OUTPATIENT
Start: 2022-01-29 | End: 2022-02-02 | Stop reason: HOSPADM

## 2022-01-29 RX ADMIN — ASPIRIN 81 MG: 81 TABLET, COATED ORAL at 09:41

## 2022-01-29 RX ADMIN — ATORVASTATIN CALCIUM 20 MG: 20 TABLET, FILM COATED ORAL at 09:41

## 2022-01-29 RX ADMIN — SODIUM CHLORIDE, PRESERVATIVE FREE 20 ML: 5 INJECTION INTRAVENOUS at 09:40

## 2022-01-29 RX ADMIN — SODIUM CHLORIDE, PRESERVATIVE FREE 10 ML: 5 INJECTION INTRAVENOUS at 20:15

## 2022-01-29 RX ADMIN — SODIUM CHLORIDE: 9 INJECTION, SOLUTION INTRAVENOUS at 13:44

## 2022-01-29 RX ADMIN — IPRATROPIUM BROMIDE AND ALBUTEROL SULFATE 1 AMPULE: 2.5; .5 SOLUTION RESPIRATORY (INHALATION) at 08:17

## 2022-01-29 RX ADMIN — MONTELUKAST 10 MG: 10 TABLET, FILM COATED ORAL at 09:41

## 2022-01-29 RX ADMIN — COSYNTROPIN 250 MCG: 0.25 INJECTION, POWDER, LYOPHILIZED, FOR SOLUTION INTRAMUSCULAR; INTRAVENOUS at 15:16

## 2022-01-29 RX ADMIN — IPRATROPIUM BROMIDE AND ALBUTEROL SULFATE 1 AMPULE: 2.5; .5 SOLUTION RESPIRATORY (INHALATION) at 15:48

## 2022-01-29 RX ADMIN — Medication 2 PUFF: at 19:25

## 2022-01-29 RX ADMIN — IPRATROPIUM BROMIDE AND ALBUTEROL SULFATE 1 AMPULE: 2.5; .5 SOLUTION RESPIRATORY (INHALATION) at 11:31

## 2022-01-29 RX ADMIN — ALLOPURINOL 300 MG: 300 TABLET ORAL at 09:41

## 2022-01-29 RX ADMIN — SODIUM CHLORIDE: 9 INJECTION, SOLUTION INTRAVENOUS at 09:28

## 2022-01-29 RX ADMIN — IPRATROPIUM BROMIDE AND ALBUTEROL SULFATE 1 AMPULE: 2.5; .5 SOLUTION RESPIRATORY (INHALATION) at 19:22

## 2022-01-29 RX ADMIN — FLUTICASONE PROPIONATE 2 SPRAY: 50 SPRAY, METERED NASAL at 09:47

## 2022-01-29 RX ADMIN — HYDROCORTISONE SODIUM SUCCINATE 50 MG: 100 INJECTION, POWDER, FOR SOLUTION INTRAMUSCULAR; INTRAVENOUS at 09:41

## 2022-01-29 RX ADMIN — PANTOPRAZOLE SODIUM 40 MG: 40 TABLET, DELAYED RELEASE ORAL at 05:33

## 2022-01-29 RX ADMIN — ACETAMINOPHEN 650 MG: 325 TABLET ORAL at 05:33

## 2022-01-29 RX ADMIN — Medication 2 PUFF: at 08:17

## 2022-01-29 RX ADMIN — HYDROCORTISONE SODIUM SUCCINATE 50 MG: 100 INJECTION, POWDER, FOR SOLUTION INTRAMUSCULAR; INTRAVENOUS at 18:28

## 2022-01-29 ASSESSMENT — PAIN SCALES - GENERAL
PAINLEVEL_OUTOF10: 6
PAINLEVEL_OUTOF10: 0
PAINLEVEL_OUTOF10: 0
PAINLEVEL_OUTOF10: 7

## 2022-01-29 ASSESSMENT — PAIN DESCRIPTION - FREQUENCY
FREQUENCY: OTHER (COMMENT)
FREQUENCY: OTHER (COMMENT)

## 2022-01-29 ASSESSMENT — PAIN DESCRIPTION - PAIN TYPE
TYPE: ACUTE PAIN
TYPE: ACUTE PAIN

## 2022-01-29 ASSESSMENT — PAIN DESCRIPTION - DESCRIPTORS
DESCRIPTORS: SHARP
DESCRIPTORS: SHARP

## 2022-01-29 ASSESSMENT — PAIN - FUNCTIONAL ASSESSMENT
PAIN_FUNCTIONAL_ASSESSMENT: ACTIVITIES ARE NOT PREVENTED
PAIN_FUNCTIONAL_ASSESSMENT: ACTIVITIES ARE NOT PREVENTED

## 2022-01-29 ASSESSMENT — PAIN DESCRIPTION - LOCATION
LOCATION: RIB CAGE
LOCATION: RIB CAGE

## 2022-01-29 ASSESSMENT — PAIN DESCRIPTION - PROGRESSION
CLINICAL_PROGRESSION: NOT CHANGED
CLINICAL_PROGRESSION: NOT CHANGED

## 2022-01-29 ASSESSMENT — PAIN DESCRIPTION - ONSET
ONSET: ON-GOING
ONSET: ON-GOING

## 2022-01-29 ASSESSMENT — PAIN DESCRIPTION - ORIENTATION
ORIENTATION: RIGHT
ORIENTATION: RIGHT

## 2022-01-29 NOTE — PROGRESS NOTES
Hospitalist Progress Note      PCP: Sandrita Beckham MD    Date of Admission: 1/28/2022    Chief Complaint: Generalized weakness, lightheadedness, poor p.o. intake    Hospital Course:  62 y.o. female PMH GERD, smoking abuse, alcohol use with 1 to 2 glasses of wine nightly, hypertension, previous history of congestive heart failure had an echo normal EF January 2022, gout, and hyperlipidemia   presents to [facility] with progressive generalized weakness and fatigue, poor p.o. intake x 6 to 7 days. c/o dysuria saw her PCP on Monday urine was examined and noted to have a UTI. started on Bactrim. progressively continued to become more generally weak and had lightheadedness and BOONE. continued to have poor p.o. intake although she was still taking her antihypertensive medications and her diuretic. She got worse to the point where tonight she called EMS because her weakness was so bad. SBP  70s to 80s was brought to the ED. symptoms of generalized weakness, lightheadedness, poor p.o. intake, patient denies any other recent symptoms of fever, chills, syncope, chest pain, blood in urine/stool/sputum, leg swelling, rashes, abdominal pain/nausea/vomiting/diarrhea  , BUN 15, CR 1.7 lactic acid 2.2,  troponin 0 0.01. EtOH negative U tox negative TSH 1.22. WBC 8.4.  UA cloudy 6 WBC.,  No urine culture warranted. She just had E. coli in the urine January 24, 2022 which was sensitive to the Bactrim. Required Levophed.   seen by nephrology    Subjective: in ICU on levophed this am alert oriented nonfocal states eating well      Medications:  Reviewed    Infusion Medications    norepinephrine 1 mcg/min (01/29/22 0529)    sodium chloride       Scheduled Medications    allopurinol  300 mg Oral Daily    aspirin  81 mg Oral Daily    atorvastatin  20 mg Oral Daily    budesonide-formoterol  2 puff Inhalation BID    pantoprazole  40 mg Oral QAM AC    fluticasone  2 spray Each Nostril Daily    montelukast  10 mg Oral Daily    nicotine  1 patch TransDERmal Daily    sodium chloride flush  5-40 mL IntraVENous 2 times per day    ipratropium-albuterol  1 ampule Inhalation 4x daily     PRN Meds: sodium chloride flush, sodium chloride, acetaminophen **OR** acetaminophen, ondansetron      Intake/Output Summary (Last 24 hours) at 1/29/2022 0711  Last data filed at 1/29/2022 0552  Gross per 24 hour   Intake 1551.84 ml   Output 1000 ml   Net 551.84 ml       Physical Exam Performed:    /61   Pulse 104   Temp 98 °F (36.7 °C) (Oral)   Resp 21   Ht 5' 1\" (1.549 m)   Wt 125 lb 10.6 oz (57 kg)   SpO2 97%   BMI 23.74 kg/m²     General appearance: No distress,   HEENT: Pupils equal, round, and reactive to light. glasses  Neck: Supple, wfull range of motion. No jugular venous distention. Respiratory:  Normal  effort. Clear to auscultation O2NC not at home   Cardiovascular: Regular rate and rhythm with normal S1/S2 without murmurs, rubs or gallops. Abdomen: Soft, non-tender, non-distended with normal bowel sounds. Musculoskeletal: No clubbing, cyanosis or edema   Skin: Skin color, texture, turgor normal.  No rashes or lesions. Neurologic:  Neurovascularly intact no tremors   Psychiatric: Alert and oriented  Peripheral Pulses: +2 palpable, equal bilaterally       Labs:   Recent Labs     01/28/22  0159 01/29/22  0409   WBC 8.4 8.1   HGB 10.9* 8.2*   HCT 31.9* 23.5*    257     Recent Labs     01/28/22  2045 01/28/22  2348 01/29/22  0409   * 115* 120*   K 3.3* 3.4* 3.5   CL 84* 82* 86*   CO2 22 23 22   BUN 10 9 7   CREATININE 0.9 0.8 0.8   CALCIUM 7.1* 7.2* 7.4*     Recent Labs     01/28/22  0159   AST 71*   ALT 26   BILITOT 0.8   ALKPHOS 173*     No results for input(s): INR in the last 72 hours.   Recent Labs     01/28/22  0159   TROPONINI <0.01       Urinalysis:      Lab Results   Component Value Date    NITRU Negative 01/28/2022    WBCUA 6 01/28/2022    RBCUA 7 01/28/2022    BLOODU Negative 01/28/2022    SPECGRAV 1.014 01/28/2022    GLUCOSEU Negative 01/28/2022       Radiology:  US RENAL COMPLETE   Final Result   No hydronephrosis. CT CHEST ABDOMEN PELVIS WO CONTRAST   Final Result   No spiculated lung mass or lymphadenopathy in the chest.      Mild emphysematous changes, with minimal dependent change though no acute   parenchymal infiltrate or reactive effusion. Resolved effusions. Acute to subacute appearing right lateral rib fractures involving the 6th and   7th rib. No pneumothorax. No acute inflammatory process seen within the abdomen or pelvis. No ileus or   obstruction. No obstructive urinary tract calculi. Tiny nonobstructive left   renal calculus. CT HEAD WO CONTRAST   Final Result   No acute intracranial hemorrhage. No wedge-shaped area of acute ischemia is   identified. There are a few patchy focal areas of white matter low attenuation which is   nonspecific, though can be seen in the setting of chronic microvascular   ischemia or demyelination. No significant cortical atrophy or intracranial mass. XR CHEST PORTABLE   Final Result   COPD, though no acute cardiopulmonary process. Assessment/Plan:    Active Hospital Problems    Diagnosis     Alcohol use [Z72.89]     Hypotension [I95.9]     Hx of congestive heart failure [Z86.79]     Generalized weakness [R53.1]     Hyponatremia [E87.1]     HTN (hypertension) [I10]    hypovolemic shock  ARF  Rt rib fractures  Hyponatremia  COPD--no home O2  Alcohol dependence  Macrocytic anemia   Moderate poitein calorie malnutrition     --will still be drinking all night. Per daughter using a pull up her drinks are 32oz size. poos pees on self sits at desk too wek to go upstairs to go bathroom wont sleep in her bed sleeps on couch. Not eating   --MSW to see for alcohol support  --nutrition to see for supplements (on fluid restriction )  --continue IV NS 75cc/hr follow serial na levels.  If plateaus dc IVF     On levophed this am weaned by this afternoon downgrade to tele   Serial Na 116->199->120->122 on NS   duoneb QID  Urine sodium 29  Nephrology following entresto and torsemide on hold     DVT Prophylaxis: lovenox  Diet: ADULT DIET; Regular; 4 carb choices (60 gm/meal);  Low Sodium (2 gm); 1500 ml  Code Status: Full Code    PT/OT Eval Status: diasbled  and 17yo granddaughter   Outside stairs uses cane bc no handrail but otherwise independent     Dispo - home  Dennis Rivas daughter  [phone]    Thad Coronel MD

## 2022-01-29 NOTE — PROGRESS NOTES
Pulmonary Critical Care progress Note     Patient's name:  [address] [place] [address] Record Number: [id number]  Patient's account/billing number: [de-identified]  Patient's YOB: 1960  Age: 62 y.o. Date of Admission: 1/28/2022  1:38 AM  Date of Consult: 1/29/2022      Primary Care Physician: Randa Vazquez MD      Code Status: Full Code    Reason for consult: hypovolemic shock     Assessment and Plan     1. Hypovolemic shock  2. ARF prerenal vs bactrim   3. Right 6th and 7th rib fracture  4. Hyponatremia   5. COPD/emphysema       Plan:  I believe hypotension related to poor oral intake and her Bp meds and diuretics   Hold Bp meds  Pressors to keep MAP > 60  Gentle hydration, monitor Na Q 6hr. Check cosyntropin stimulation test   Pain control   IS  Bronchodilators. D/c Arizmendi      REVIEW OF SYSTEMS:  Review of Systems -   General ROS: fatigue   Psychological ROS: negative  Ophthalmic ROS: negative  ENT ROS: negative  Allergy and Immunology ROS: negative  Hematological and Lymphatic ROS: negative  Endocrine ROS: negative  Breast ROS: negative  Respiratory ROS: chronic cough, sputum, sob,   Cardiovascular ROS: chest pain   Gastrointestinal ROS: lose stool   Genito-Urinary ROS: pain around arizmendi site   Musculoskeletal ROS: negative  Neurological ROS: negative  Dermatological ROS: negative        Physical Exam:    Vitals: BP (!) 101/59   Pulse 105   Temp 98 °F (36.7 °C) (Oral)   Resp 18   Ht 5' 1\" (1.549 m)   Wt 125 lb 10.6 oz (57 kg)   SpO2 97%   BMI 23.74 kg/m²     Last Body weight:   Wt Readings from Last 3 Encounters:   01/29/22 125 lb 10.6 oz (57 kg)   01/24/22 115 lb (52.2 kg)   11/17/21 117 lb 12.8 oz (53.4 kg)       Body Mass Index : Body mass index is 23.74 kg/m².       Intake and Output summary:     Intake/Output Summary (Last 24 hours) at 1/29/2022 0904  Last data filed at 1/29/2022 0755  Gross per 24 hour   Intake 1551.84 ml   Output 1125 ml   Net 426.84 ml       Physical Examination:     PHYSICAL EXAM:    Gen:  Mild acute distress   Eyes: PERRL. Anicteric sclera. No conjunctival injection. ENT: No discharge. Posterior oropharynx clear. External appearance of ears and nose normal.  Neck: Trachea midline. No mass, no lymphadenopathy    Resp:  Diminished with rhonchi, right chest pain   CV: Regular rate. Regular rhythm. No murmur or rub. No edema. GI: Soft, Non-tender. Non-distended. +BS  Skin: Warm, dry, w/o erythema. Lymph: No cervical or supraclavicular LAD. M/S: No cyanosis. No clubbing. Neuro:  CN 2-12 tested, no focal neurologic deficit. Moves all extremities  Psych: Awake and alert, Oriented x 3. Judgement and insight appropriate. Mood stable. Laboratory findings:-    CBC:   Recent Labs     01/29/22  0409   WBC 8.1   HGB 8.2*        BMP:    Recent Labs     01/28/22  2348 01/28/22  2348 01/29/22  0409 01/29/22  0409 01/29/22  0748   *   < > 120*   < > 120*   K 3.4*   < > 3.5   < > 3.6   CL 82*   < > 86*   < > 86*   CO2 23   < > 22   < > 23   BUN 9   < > 7   < > 8   CREATININE 0.8  --  0.8  --  0.8   GLUCOSE 105*   < > 99   < > 84    < > = values in this interval not displayed. S. Calcium:  Recent Labs     01/29/22  0748   CALCIUM 7.5*     S. Ionized Calcium:No results for input(s): IONCA in the last 72 hours. S. Magnesium:No results for input(s): MG in the last 72 hours. S. Phosphorus:No results for input(s): PHOS in the last 72 hours. S. Glucose:No results for input(s): POCGLU in the last 72 hours. Glycosylated hemoglobin A1C: No results for input(s): LABA1C in the last 72 hours. INR: No results for input(s): INR in the last 72 hours. Hepatic functions:   Recent Labs     01/28/22  0159   ALKPHOS 173*   ALT 26   AST 71*   PROT 5.8*   BILITOT 0.8   LABALBU 2.9*     Pancreatic functions:  Recent Labs     01/28/22 0518   LACTA 1.4     S.  Lactic Acid:   Recent Labs     01/28/22 0518   LACTA 1.4 Cardiac enzymes:  Recent Labs     01/28/22  0159   TROPONINI <0.01     BNP:No results for input(s): BNP in the last 72 hours. Lipid profile: No results for input(s): CHOL, TRIG, HDL, LDL, LDLCALC in the last 72 hours. Blood Gases: No results found for: PH, PCO2, PO2, HCO3, O2SAT  Thyroid functions:   Lab Results   Component Value Date    TSH 1.01 06/04/2021          Radiology Review:  Pertinent images / reports were reviewed as a part of this visit. CT Chest w/ contrast: Results for orders placed during the hospital encounter of 11/18/10    CT chest with contrast    Narrative  Reason for exam-->weak lt vocal cord    CT CHEST WITH CONTRAST-    INDICATION-  Weak left vocal cord, possible recurrent laryngeal  nerve palsy. COMPARISON- None available. FINDINGS-    There is mild centrilobular emphysema within the upper lungs. Mild  dependent atelectasis is present bilaterally. No suspicious  lesions are detected within the lung parenchyma. The airways are  patent. No mediastinal masses are identified. No axillary lymphadenopathy. There is atherosclerotic calcification of the thoracic aorta. Coronary artery calcification is also present. These are somewhat  advanced for patient's age. Images of the upper abdomen are unremarkable. Mild spondylosis within the thoracic spine is seen. No suspicious  lesions detected within the bony structures. IMPRESSION-    1. No mediastinal masses identified to explain recurrent laryngeal  nerve palsy. 2. Coronary artery and aortic calcification, advanced for the  patient's age. 3. Mild centrilobular emphysema within the upper lungs. Read By- Henry Child  Released By- Henry Child  Released Date Time- 11/18/10 1054    [facility]  ------------------------------------------------------------------------------      CT Chest w/o contrast: No results found for this or any previous visit.       CTPA: Results for orders placed during the hospital encounter of 06/04/21    CT CHEST PULMONARY EMBOLISM W CONTRAST    Narrative  EXAMINATION:  CTA OF THE CHEST 6/4/2021 11:13 am    TECHNIQUE:  CTA of the chest was performed after the administration of intravenous  contrast.  Multiplanar reformatted images are provided for review. MIP  images are provided for review. Dose modulation, iterative reconstruction,  and/or weight based adjustment of the mA/kV was utilized to reduce the  radiation dose to as low as reasonably achievable. COMPARISON:  None. HISTORY:  ORDERING SYSTEM PROVIDED HISTORY: KARYNA, tachkendall  TECHNOLOGIST PROVIDED HISTORY:  Reason for exam:->SOB, tachy  Decision Support Exception - unselect if not a suspected or confirmed  emergency medical condition->Emergency Medical Condition (MA)  Reason for Exam: sob  Acuity: Acute  Type of Exam: Initial    FINDINGS:  Thyroid gland appears normal.  Small mediastinal and hilar nodes are noted. Moderate to severe coronary artery calcification is seen. Trace pericardial  fluid is seen. Small hiatal hernia seen. There is nonspecific thickening at  the GE junction. No embolus is seen in the central, right, or left main pulmonary artery. No  embolus is seen on the right or left, at least to the subsegmental level. Beam hardening artifact is seen from contrast bolus in the SVC, limiting  evaluation of the pulmonary arteries on the right. Mild underlying emphysema is seen. Small left-sided pleural effusion is  seen. There is adjacent left basilar consolidation. On the right, hazy ground-glass opacity is seen in the right upper, right  middle and right lower lobe. Small right-sided pleural effusion is seen. There is adjacent right basilar consolidation. There is subtle septal  thickening in the right lung base. There is reflux of contrast into the IVC and hepatic veins. There is fatty  infiltration of the liver. Atherosclerotic change seen in aorta.   2 mm  calcifications seen in the right and left kidney. There is lobular contour  to the left kidney. Cyst is seen in the left kidney, measuring 9 mm. Spurring is seen in the spine. Spurring is seen in the shoulder joints. Impression  No central pulmonary embolus identified    Bilateral pleural effusions, with adjacent consolidation, at the lung bases  either due to atelectasis or pneumonia. Scattered opacities are seen  throughout the right lung. .  There is a background of pulmonary emphysema. Subtle septal thickening at the right lung base, suggest a component of fluid  overload-pulmonary edema    Moderate to severe coronary artery calcification. CXR PA/LAT: Results for orders placed during the hospital encounter of 06/04/21    XR CHEST (2 VW)    Narrative  EXAMINATION:  TWO XRAY VIEWS OF THE CHEST    6/7/2021 11:30 am    COMPARISON:  CT of the chest and chest x-ray June 4, 2021    HISTORY:  ORDERING SYSTEM PROVIDED HISTORY: Pulm edema, Pleural effusions, follow up  study. TECHNOLOGIST PROVIDED HISTORY:  Reason for exam:->Pulm edema, Pleural effusions, follow up study. Reason for Exam: no chest pain, SOB getting better, recent heart procedure  (cardiac ablation)  Acuity: Acute  Type of Exam: Initial    FINDINGS:  Slightly decreased right pleural effusion. Pulmonary edema appears  decreased. No pneumothorax. Impression  Decreased right pleural effusion, with a small residual pleural effusion. Decreased pulmonary edema. Minimal trace left pleural effusion      CXR portable: Results for orders placed during the hospital encounter of 01/28/22    XR CHEST PORTABLE    Narrative  EXAMINATION:  ONE XRAY VIEW OF THE CHEST    1/28/2022 2:15 am    COMPARISON:  06/07/2021    HISTORY:  ORDERING SYSTEM PROVIDED HISTORY: SOB  TECHNOLOGIST PROVIDED HISTORY:  Reason for exam:->SOB  Reason for Exam: SOB    FINDINGS:  Cardiac and mediastinal silhouettes appear within normal limits for size.   Pulmonary vascularity is normal.  No parenchymal consolidation or pleural  effusion is identified. No pneumothorax. Chronic interstitial change  related to underlying COPD. Atherosclerotic calcifications are identified. Osteopenia. Degenerative changes in the spine and shoulders. Impression  COPD, though no acute cardiopulmonary process. Critical Care time 35 minutes.        Dagoberto Bang MD, M.D.            1/29/2022, 9:04 AM

## 2022-01-29 NOTE — PLAN OF CARE
Problem: Falls - Risk of:  Goal: Will remain free from falls  Description: Will remain free from falls  Outcome: Ongoing     Fall risk assessment completed per unit protocol. Patient's bed is in the lowest position, call light is within reach and the patient has been instructed to call for assistance before getting out of bed. Problem: Skin Integrity:  Goal: Will show no infection signs and symptoms  Description: Will show no infection signs and symptoms  Outcome: Ongoing     Patient's skin has been assessed per unit protocol and the patient is being  encouraged to turn every two hours to prevent skin breakdown and promote healing.

## 2022-01-29 NOTE — PROGRESS NOTES
Pt transferred to [id number] from the ICU via wheelchair. Pt could stand and walk with assistance to the bed. Oriented to the room and call light within reach. Pt stated no other needs presently.

## 2022-01-29 NOTE — PROGRESS NOTES
Visit us at [facility]. com or call us at [address] [place] [address] NOTE    Patient: Jorje Webb MRN: [id number]     YOB: 1960  Age: 62 y.o. Sex: female    Unit: [address] [facility] 2 ICU Room/Bed: Q4S-0871/2111-01 Location: [address] [place] [address]     Admitting Physician: Adalgisa Singh    Primary Care Physician: Julia Patel MD          LOS: 1 day       Reason for evaluation:   Hyponatremia      SUBJECTIVE:      The patient was seen and examined. Notes and labs reviewed. There were no complications over night. Patient's review of systems: comfortable, oral intake is still poor      OBJECTIVE:     Vitals:    01/29/22 0820 01/29/22 0821 01/29/22 1133 01/29/22 1203   BP:    102/82   Pulse:    112   Resp: 12 18 19 15   Temp:    98.4 °F (36.9 °C)   TempSrc:    Oral   SpO2: 99% 97% 97% 94%   Weight:       Height:           Intake and Output:      Intake/Output Summary (Last 24 hours) at 1/29/2022 1526  Last data filed at 1/29/2022 0940  Gross per 24 hour   Intake 950.71 ml   Output 925 ml   Net 25.71 ml       Continuous Infusions:   sodium chloride 75 mL/hr at 01/29/22 1344    norepinephrine 1 mcg/min (01/29/22 0529)    sodium chloride         Exam:   CONSTITUTIONAL/PSYCHIATRY: awake, alert and oriented x3. Not in acute distress   EYES: Conjunctivae: normal. Pupils: reactive to light  RESPIRATORY: Respiratory effort: normal. Auscultation: CTA  CARDIOVASCULAR: Auscultation: RRR. Neck veins: no JVD. Edema: none  GASTROINTESTINAL: Soft, nontender, nondistended. EXTREMITIES:  No cyanosis or clubbing. SKIN: Warm and dry.  No significant rashes      LABS:   RFP:   Recent Labs     01/29/22  0409 01/29/22  0748 01/29/22  1205   * 120* 122*   K 3.5 3.6 3.6   CL 86* 86* 86*   CO2 22 23 22   BUN 7 8 8   CREATININE 0.8 0.8 0.7   CALCIUM 7.4* 7.5* 7.5*   GFRAA >60 >60 >60       Liver panel:  Recent Labs     01/28/22  0159   AST 71*   ALT 26       Endocrine: @[id number](VitD,PTH,TSH,aldosterone,renin activity,cortisol,metanephrine)@    CBC:   Recent Labs     01/28/22  0159 01/29/22  0409   WBC 8.4 8.1   HGB 10.9* 8.2*   HCT 31.9* 23.5*   .9* 105.8*    257       Iron Panel:   @[id number](FERA,FE)@    Serology: @[id number](ANAS,ANCA,C3,C4,RNP,AGBM,DNA,SSA,SSB,SPEP,UPEP,ESR,HEPT)@    Urine studies: @[id number](UAPR,UCOL,UNAR,UUNR,UCRR,UCLR,UKR,UUAR,UOSM,EOS)@        ASSESSMENT and PLAN:     1. Hyponatremia: recurrent acute/severe hyponatremia episodes. It is suspected to be related to severely decreased solute intake in combination with diuretic use. SNa improving slowly with 0.9% NaCl   - Continue 0.9% NaCl at current rate. No need for 3%. - Liberalize Na in diet ++    2. CLAUDIA: mostly hemodynamic/prerenal. Kidney US is unremarkable. Entresto/Torsemide are held. Patient was also on Bactrim (had a little and additional effect)  SCr improved and back to baseline.    - Hemodynamic support/ IVF/ Hold culprit meds    3. Hypovolemic shock: improved. Cortisol level is good. 4. Anemia:    - Transfuse prn for Hb <7    5. Rt 6/7 rib fx/ COPD: per pulmonary    6.  Chronic diastolic heart failure: compensated, volume depleted        Signed By: Hernesto Ivan MD

## 2022-01-30 LAB
A/G RATIO: 1.1 (ref 1.1–2.2)
ALBUMIN SERPL-MCNC: 2.4 G/DL (ref 3.4–5)
ALP BLD-CCNC: 113 U/L (ref 40–129)
ALT SERPL-CCNC: 18 U/L (ref 10–40)
ANION GAP SERPL CALCULATED.3IONS-SCNC: 10 MMOL/L (ref 3–16)
ANION GAP SERPL CALCULATED.3IONS-SCNC: 11 MMOL/L (ref 3–16)
ANION GAP SERPL CALCULATED.3IONS-SCNC: 13 MMOL/L (ref 3–16)
ANION GAP SERPL CALCULATED.3IONS-SCNC: 13 MMOL/L (ref 3–16)
AST SERPL-CCNC: 44 U/L (ref 15–37)
BASOPHILS ABSOLUTE: 0 K/UL (ref 0–0.2)
BASOPHILS RELATIVE PERCENT: 0.1 %
BILIRUB SERPL-MCNC: 0.7 MG/DL (ref 0–1)
BUN BLDV-MCNC: 7 MG/DL (ref 7–20)
CALCIUM SERPL-MCNC: 7.5 MG/DL (ref 8.3–10.6)
CALCIUM SERPL-MCNC: 7.6 MG/DL (ref 8.3–10.6)
CALCIUM SERPL-MCNC: 7.9 MG/DL (ref 8.3–10.6)
CALCIUM SERPL-MCNC: 8.2 MG/DL (ref 8.3–10.6)
CHLORIDE BLD-SCNC: 87 MMOL/L (ref 99–110)
CHLORIDE BLD-SCNC: 88 MMOL/L (ref 99–110)
CHLORIDE BLD-SCNC: 90 MMOL/L (ref 99–110)
CHLORIDE BLD-SCNC: 91 MMOL/L (ref 99–110)
CO2: 20 MMOL/L (ref 21–32)
CO2: 21 MMOL/L (ref 21–32)
CREAT SERPL-MCNC: 0.6 MG/DL (ref 0.6–1.2)
CREAT SERPL-MCNC: 0.6 MG/DL (ref 0.6–1.2)
CREAT SERPL-MCNC: 0.7 MG/DL (ref 0.6–1.2)
CREAT SERPL-MCNC: 0.7 MG/DL (ref 0.6–1.2)
EOSINOPHILS ABSOLUTE: 0 K/UL (ref 0–0.6)
EOSINOPHILS RELATIVE PERCENT: 0 %
GFR AFRICAN AMERICAN: >60
GFR NON-AFRICAN AMERICAN: >60
GLUCOSE BLD-MCNC: 104 MG/DL (ref 70–99)
GLUCOSE BLD-MCNC: 107 MG/DL (ref 70–99)
GLUCOSE BLD-MCNC: 124 MG/DL (ref 70–99)
GLUCOSE BLD-MCNC: 125 MG/DL (ref 70–99)
HCT VFR BLD CALC: 21.5 % (ref 36–48)
HEMOGLOBIN: 7.4 G/DL (ref 12–16)
LYMPHOCYTES ABSOLUTE: 0.5 K/UL (ref 1–5.1)
LYMPHOCYTES RELATIVE PERCENT: 7.6 %
MAGNESIUM: 1.5 MG/DL (ref 1.8–2.4)
MCH RBC QN AUTO: 36.7 PG (ref 26–34)
MCHC RBC AUTO-ENTMCNC: 34.4 G/DL (ref 31–36)
MCV RBC AUTO: 106.7 FL (ref 80–100)
MONOCYTES ABSOLUTE: 0.4 K/UL (ref 0–1.3)
MONOCYTES RELATIVE PERCENT: 6.5 %
NEUTROPHILS ABSOLUTE: 5.4 K/UL (ref 1.7–7.7)
NEUTROPHILS RELATIVE PERCENT: 85.8 %
PDW BLD-RTO: 15.1 % (ref 12.4–15.4)
PLATELET # BLD: 250 K/UL (ref 135–450)
PMV BLD AUTO: 6.9 FL (ref 5–10.5)
POTASSIUM SERPL-SCNC: 3.8 MMOL/L (ref 3.5–5.1)
POTASSIUM SERPL-SCNC: 3.8 MMOL/L (ref 3.5–5.1)
POTASSIUM SERPL-SCNC: 3.9 MMOL/L (ref 3.5–5.1)
POTASSIUM SERPL-SCNC: 3.9 MMOL/L (ref 3.5–5.1)
PREALBUMIN: 8.9 MG/DL (ref 20–40)
RBC # BLD: 2.02 M/UL (ref 4–5.2)
SODIUM BLD-SCNC: 120 MMOL/L (ref 136–145)
SODIUM BLD-SCNC: 121 MMOL/L (ref 136–145)
SODIUM BLD-SCNC: 123 MMOL/L (ref 136–145)
TOTAL PROTEIN: 4.6 G/DL (ref 6.4–8.2)
WBC # BLD: 6.3 K/UL (ref 4–11)

## 2022-01-30 PROCEDURE — 6370000000 HC RX 637 (ALT 250 FOR IP): Performed by: HOSPITALIST

## 2022-01-30 PROCEDURE — 94640 AIRWAY INHALATION TREATMENT: CPT

## 2022-01-30 PROCEDURE — 36415 COLL VENOUS BLD VENIPUNCTURE: CPT

## 2022-01-30 PROCEDURE — 99232 SBSQ HOSP IP/OBS MODERATE 35: CPT | Performed by: INTERNAL MEDICINE

## 2022-01-30 PROCEDURE — 1200000000 HC SEMI PRIVATE

## 2022-01-30 PROCEDURE — 2580000003 HC RX 258: Performed by: INTERNAL MEDICINE

## 2022-01-30 PROCEDURE — 85025 COMPLETE CBC W/AUTO DIFF WBC: CPT

## 2022-01-30 PROCEDURE — 6360000002 HC RX W HCPCS: Performed by: INTERNAL MEDICINE

## 2022-01-30 PROCEDURE — 84134 ASSAY OF PREALBUMIN: CPT

## 2022-01-30 PROCEDURE — 94761 N-INVAS EAR/PLS OXIMETRY MLT: CPT

## 2022-01-30 PROCEDURE — 6370000000 HC RX 637 (ALT 250 FOR IP): Performed by: STUDENT IN AN ORGANIZED HEALTH CARE EDUCATION/TRAINING PROGRAM

## 2022-01-30 PROCEDURE — 6370000000 HC RX 637 (ALT 250 FOR IP): Performed by: INTERNAL MEDICINE

## 2022-01-30 PROCEDURE — 84295 ASSAY OF SERUM SODIUM: CPT

## 2022-01-30 PROCEDURE — 80053 COMPREHEN METABOLIC PANEL: CPT

## 2022-01-30 PROCEDURE — 83735 ASSAY OF MAGNESIUM: CPT

## 2022-01-30 RX ORDER — IPRATROPIUM BROMIDE AND ALBUTEROL SULFATE 2.5; .5 MG/3ML; MG/3ML
1 SOLUTION RESPIRATORY (INHALATION) 2 TIMES DAILY
Status: DISCONTINUED | OUTPATIENT
Start: 2022-01-30 | End: 2022-01-31

## 2022-01-30 RX ORDER — MORPHINE SULFATE 2 MG/ML
1 INJECTION, SOLUTION INTRAMUSCULAR; INTRAVENOUS EVERY 6 HOURS PRN
Status: DISCONTINUED | OUTPATIENT
Start: 2022-01-30 | End: 2022-02-02 | Stop reason: HOSPADM

## 2022-01-30 RX ORDER — GAUZE BANDAGE 2" X 2"
100 BANDAGE TOPICAL DAILY
Status: DISCONTINUED | OUTPATIENT
Start: 2022-01-30 | End: 2022-02-02 | Stop reason: HOSPADM

## 2022-01-30 RX ORDER — FOLIC ACID 1 MG/1
1 TABLET ORAL DAILY
Status: DISCONTINUED | OUTPATIENT
Start: 2022-01-30 | End: 2022-02-02 | Stop reason: HOSPADM

## 2022-01-30 RX ORDER — SODIUM CHLORIDE 1000 MG
1 TABLET, SOLUBLE MISCELLANEOUS
Status: DISCONTINUED | OUTPATIENT
Start: 2022-01-30 | End: 2022-02-02 | Stop reason: HOSPADM

## 2022-01-30 RX ORDER — IPRATROPIUM BROMIDE AND ALBUTEROL SULFATE 2.5; .5 MG/3ML; MG/3ML
1 SOLUTION RESPIRATORY (INHALATION) EVERY 4 HOURS PRN
Status: DISCONTINUED | OUTPATIENT
Start: 2022-01-30 | End: 2022-01-31 | Stop reason: SDUPTHER

## 2022-01-30 RX ADMIN — Medication 1 G: at 18:44

## 2022-01-30 RX ADMIN — ATORVASTATIN CALCIUM 20 MG: 20 TABLET, FILM COATED ORAL at 07:59

## 2022-01-30 RX ADMIN — Medication 1 G: at 10:04

## 2022-01-30 RX ADMIN — Medication 2 PUFF: at 20:52

## 2022-01-30 RX ADMIN — SODIUM CHLORIDE: 9 INJECTION, SOLUTION INTRAVENOUS at 01:51

## 2022-01-30 RX ADMIN — ALLOPURINOL 300 MG: 300 TABLET ORAL at 07:59

## 2022-01-30 RX ADMIN — HYDROCORTISONE SODIUM SUCCINATE 50 MG: 100 INJECTION, POWDER, FOR SOLUTION INTRAMUSCULAR; INTRAVENOUS at 10:04

## 2022-01-30 RX ADMIN — FOLIC ACID 1 MG: 1 TABLET ORAL at 12:19

## 2022-01-30 RX ADMIN — IPRATROPIUM BROMIDE AND ALBUTEROL SULFATE 1 AMPULE: .5; 3 SOLUTION RESPIRATORY (INHALATION) at 20:52

## 2022-01-30 RX ADMIN — ACETAMINOPHEN 650 MG: 325 TABLET ORAL at 23:08

## 2022-01-30 RX ADMIN — Medication 100 MG: at 12:19

## 2022-01-30 RX ADMIN — HYDROCORTISONE SODIUM SUCCINATE 50 MG: 100 INJECTION, POWDER, FOR SOLUTION INTRAMUSCULAR; INTRAVENOUS at 18:44

## 2022-01-30 RX ADMIN — HYDROCORTISONE SODIUM SUCCINATE 50 MG: 100 INJECTION, POWDER, FOR SOLUTION INTRAMUSCULAR; INTRAVENOUS at 01:51

## 2022-01-30 RX ADMIN — Medication 2 PUFF: at 08:29

## 2022-01-30 RX ADMIN — PANTOPRAZOLE SODIUM 40 MG: 40 TABLET, DELAYED RELEASE ORAL at 04:57

## 2022-01-30 RX ADMIN — IPRATROPIUM BROMIDE AND ALBUTEROL SULFATE 1 AMPULE: 2.5; .5 SOLUTION RESPIRATORY (INHALATION) at 08:28

## 2022-01-30 RX ADMIN — MONTELUKAST 10 MG: 10 TABLET, FILM COATED ORAL at 07:59

## 2022-01-30 RX ADMIN — ASPIRIN 81 MG: 81 TABLET, COATED ORAL at 07:59

## 2022-01-30 ASSESSMENT — PAIN SCALES - GENERAL
PAINLEVEL_OUTOF10: 3
PAINLEVEL_OUTOF10: 4
PAINLEVEL_OUTOF10: 0

## 2022-01-30 NOTE — RT PROTOCOL NOTE
RT Inhaler-Nebulizer Bronchodilator Protocol Note    There is a bronchodilator order in the chart from a provider indicating to follow the RT Bronchodilator Protocol and there is an Initiate RT Inhaler-Nebulizer Bronchodilator Protocol order as well (see protocol at bottom of note). CXR Findings:  No results found. The findings from the last RT Protocol Assessment were as follows:   History Pulmonary Disease: Chronic pulmonary disease  Respiratory Pattern: Regular pattern and RR 12-20 bpm  Breath Sounds: Slightly diminished and/or crackles  Cough: Strong, spontaneous, non-productive  Indication for Bronchodilator Therapy: On home bronchodilators,Decreased or absent breath sounds  Bronchodilator Assessment Score: 4    Aerosolized bronchodilator medication orders have been revised according to the RT Inhaler-Nebulizer Bronchodilator Protocol below. Respiratory Therapist to perform RT Therapy Protocol Assessment initially then follow the protocol. Repeat RT Therapy Protocol Assessment PRN for score 0-3 or on second treatment, BID, and PRN for scores above 3. No Indications - adjust the frequency to every 6 hours PRN wheezing or bronchospasm, if no treatments needed after 48 hours then discontinue using Per Protocol order mode. If indication present, adjust the RT bronchodilator orders based on the Bronchodilator Assessment Score as indicated below. Use Inhaler orders unless patient has one or more of the following: on home nebulizer, not able to hold breath for 10 seconds, is not alert and oriented, cannot activate and use MDI correctly, or respiratory rate 25 breaths per minute or more, then use the equivalent nebulizer order(s) with same Frequency and PRN reasons based on the score. If a patient is on this medication at home then do not decrease Frequency below that used at home.     0-3 - enter or revise RT bronchodilator order(s) to equivalent RT Bronchodilator order with Frequency of every 4 hours PRN for wheezing or increased work of breathing using Per Protocol order mode. 4-6 - enter or revise RT Bronchodilator order(s) to two equivalent RT bronchodilator orders with one order with BID Frequency and one order with Frequency of every 4 hours PRN wheezing or increased work of breathing using Per Protocol order mode. 7-10 - enter or revise RT Bronchodilator order(s) to two equivalent RT bronchodilator orders with one order with TID Frequency and one order with Frequency of every 4 hours PRN wheezing or increased work of breathing using Per Protocol order mode. 11-13 - enter or revise RT Bronchodilator order(s) to one equivalent RT bronchodilator order with QID Frequency and an Albuterol order with Frequency of every 4 hours PRN wheezing or increased work of breathing using Per Protocol order mode. Greater than 13 - enter or revise RT Bronchodilator order(s) to one equivalent RT bronchodilator order with every 4 hours Frequency and an Albuterol order with Frequency of every 2 hours PRN wheezing or increased work of breathing using Per Protocol order mode. RT to enter RT Home Evaluation for COPD & MDI Assessment order using Per Protocol order mode.     Electronically signed by Jhony Alcantar RCP on 1/30/2022 at 11:26 AM

## 2022-01-30 NOTE — PROGRESS NOTES
Patient is alert and oriented x4, up with stand by assist x1, call light within reach, bed/chair alarm on. AM meds complete, patient tolerated well. VSS and WDL. No s/s of distress, no further needs noted at this time.  Electronically signed by Michelle Borrero RN on 1/30/2022 at 9:46 AM

## 2022-01-30 NOTE — PROGRESS NOTES
Hospitalist Progress Note      PCP: Chuckie Franz MD    Date of Admission: 1/28/2022    Chief Complaint: Generalized weakness, lightheadedness, poor p.o. intake    Hospital Course:  62 y.o. female PMH GERD, smoking abuse, alcohol use with 1 to 2 glasses of wine nightly, hypertension, previous history of congestive heart failure had an echo normal EF January 2022, gout, and hyperlipidemia   presents to [facility] with progressive generalized weakness and fatigue, poor p.o. intake x 6 to 7 days. c/o dysuria saw her PCP on Monday urine was examined and noted to have a UTI. started on Bactrim. progressively continued to become more generally weak and had lightheadedness and BOONE. continued to have poor p.o. intake although she was still taking her antihypertensive medications and her diuretic. She got worse to the point where tonight she called EMS because her weakness was so bad. SBP  70s to 80s was brought to the ED. symptoms of generalized weakness, lightheadedness, poor p.o. intake, patient denies any other recent symptoms of fever, chills, syncope, chest pain, blood in urine/stool/sputum, leg swelling, rashes, abdominal pain/nausea/vomiting/diarrhea  , BUN 15, CR 1.7 lactic acid 2.2,  troponin 0 0.01. EtOH negative U tox negative TSH 1.22. WBC 8.4.  UA cloudy 6 WBC.,  No urine culture warranted. She just had E. coli in the urine January 24, 2022 which was sensitive to the Bactrim. Required Levophed. seen by nephrology given gentle IV NS hydration sodium increased to 120    Subjective: Appears tremulous but denies feeling anxious or that she is going into alcohol withdrawal we discussed her alcohol usage she said she has been trying to cut back as well as cigarette smoking she admits to drinking to at least 32 ounce cups of alcohol each night he has a plan in her head for how to continue to cut back.  I mentioned she will not be drinking while here family is talking about removing the alcohol from the home setting for when she does go home    Medications:  Reviewed    Infusion Medications    sodium chloride       Scheduled Medications    sodium chloride  1 g Oral TID WC    ipratropium-albuterol  1 ampule Inhalation BID    thiamine mononitrate  100 mg Oral Daily    folic acid  1 mg Oral Daily    hydrocortisone sodium succinate PF  50 mg IntraVENous Q8H    allopurinol  300 mg Oral Daily    aspirin  81 mg Oral Daily    atorvastatin  20 mg Oral Daily    budesonide-formoterol  2 puff Inhalation BID    pantoprazole  40 mg Oral QAM AC    fluticasone  2 spray Each Nostril Daily    montelukast  10 mg Oral Daily    nicotine  1 patch TransDERmal Daily    sodium chloride flush  5-40 mL IntraVENous 2 times per day     PRN Meds: ipratropium-albuterol, morphine, ALPRAZolam, sodium chloride flush, sodium chloride, acetaminophen **OR** acetaminophen, ondansetron    No intake or output data in the 24 hours ending 01/30/22 8374    Physical Exam Performed:    /76   Pulse 92   Temp 98.2 °F (36.8 °C) (Oral)   Resp 16   Ht 5' 1\" (1.549 m)   Wt 126 lb 5.2 oz (57.3 kg)   SpO2 96%   BMI 23.87 kg/m²     General appearance: No distress, on RA  HEENT: Pupils equal, round, and reactive to light. glasses  Neck: Supple, full range of motion. No jugular venous distention. Respiratory:  Normal  effort. Clear to auscultation Cardiovascular:  S1/S2 RRRwithout murmurs, rubs or gallops. Abdomen: Soft, non-tender, non-distended with normal bowel sounds. Musculoskeletal: No clubbing, cyanosis or edema   Skin: Skin color, texture, turgor normal.  No rashes or lesions.   Neurologic:  Neurovascularly intact slight tremors  Psychiatric: Alert and oriented  Peripheral Pulses: +2 palpable, equal bilaterally       Labs:   Recent Labs     01/28/22  0159 01/29/22  0409 01/30/22 0445   WBC 8.4 8.1 6.3   HGB 10.9* 8.2* 7.4*   HCT 31.9* 23.5* 21.5*    257 250     Recent Labs     01/30/22 0449 01/30/22  0840 01/30/22  1220   * 123* 121*   K 3.9 3.8 3.8   CL 90* 91* 87*   CO2 21 21 21   BUN 7 7 7   CREATININE 0.6 0.6 0.7   CALCIUM 7.6* 7.9* 8.2*     Recent Labs     01/28/22  0159 01/30/22  0445   AST 71* 44*   ALT 26 18   BILITOT 0.8 0.7   ALKPHOS 173* 113     No results for input(s): INR in the last 72 hours. Recent Labs     01/28/22  0159   TROPONINI <0.01       Urinalysis:      Lab Results   Component Value Date    NITRU Negative 01/28/2022    WBCUA 6 01/28/2022    RBCUA 7 01/28/2022    BLOODU Negative 01/28/2022    SPECGRAV 1.014 01/28/2022    GLUCOSEU Negative 01/28/2022       Radiology:  US RENAL COMPLETE   Final Result   No hydronephrosis. CT CHEST ABDOMEN PELVIS WO CONTRAST   Final Result   No spiculated lung mass or lymphadenopathy in the chest.      Mild emphysematous changes, with minimal dependent change though no acute   parenchymal infiltrate or reactive effusion. Resolved effusions. Acute to subacute appearing right lateral rib fractures involving the 6th and   7th rib. No pneumothorax. No acute inflammatory process seen within the abdomen or pelvis. No ileus or   obstruction. No obstructive urinary tract calculi. Tiny nonobstructive left   renal calculus. CT HEAD WO CONTRAST   Final Result   No acute intracranial hemorrhage. No wedge-shaped area of acute ischemia is   identified. There are a few patchy focal areas of white matter low attenuation which is   nonspecific, though can be seen in the setting of chronic microvascular   ischemia or demyelination. No significant cortical atrophy or intracranial mass. XR CHEST PORTABLE   Final Result   COPD, though no acute cardiopulmonary process.                  Assessment/Plan:    Active Hospital Problems    Diagnosis     Alcohol use [Z72.89]     Hypotension [I95.9]     Hx of congestive heart failure [Z86.79]     Generalized weakness [R53.1]     Hyponatremia [E87.1]     HTN (hypertension) [I10]    hypovolemic shock  ARF  Rt rib fractures  Hyponatremia  COPD--no home O2  Alcohol dependence  Macrocytic anemia   Moderate poitein calorie malnutrition     ---MSW to see for alcohol support  --nutrition to see for supplements   Sodium plateaued hep-locked IVF started sodium chloride 1 g tab 3 times daily  Serial Na 116->199->120->122 sodium essentially unchanged  duoneb QID  Urine sodium 29  Nephrology following entresto and torsemide on hold   Was ordered for a cosyntropin stim test today  Add thiamine and folic acid p.o. DVT Prophylaxis: lovenox  Diet: ADULT DIET;  Regular; 1500 ml  Code Status: Full Code    PT/OT Eval Status: diasbled  and 19yo granddaughter   Outside stairs uses cane bc no handrail but otherwise independent     Dispo - home  Thor Janel daughter  [phone]    Iftikhar Brown MD

## 2022-01-30 NOTE — PLAN OF CARE
Problem: Falls - Risk of:  Goal: Will remain free from falls  Description: Will remain free from falls  1/30/2022 0945 by Blanca Jacques RN  Outcome: Ongoing     Problem: Skin Integrity:  Goal: Will show no infection signs and symptoms  Description: Will show no infection signs and symptoms  1/30/2022 0945 by Blanca Jacques RN  Outcome: Ongoing

## 2022-01-30 NOTE — PROGRESS NOTES
Pulmonary Critical Care progress Note     Patient's name:  [address] [place] [address] Record Number: [id number]  Patient's account/billing number: [de-identified]  Patient's YOB: 1960  Age: 62 y.o. Date of Admission: 1/28/2022  1:38 AM  Date of Consult: 1/30/2022      Primary Care Physician: Pat Solano MD      Code Status: Full Code    Reason for consult: hypovolemic shock     Assessment and Plan     1. Hypovolemic shock  2. ARF prerenal vs bactrim   3. Right 6th and 7th rib fracture  4. Hyponatremia   5. COPD/emphysema       Plan:  Wean hydrocortisone   Pain control   IS  Bronchodilators. REVIEW OF SYSTEMS:  Review of Systems -   General ROS: fatigue   Psychological ROS: negative  Ophthalmic ROS: negative  ENT ROS: negative  Allergy and Immunology ROS: negative  Hematological and Lymphatic ROS: negative  Endocrine ROS: negative  Breast ROS: negative  Respiratory ROS: chronic cough, sputum, sob,   Cardiovascular ROS: chest pain pleuritic  Gastrointestinal ROS: lose stool   Genito-Urinary ROS: pain around arizmendi site   Musculoskeletal ROS: negative  Neurological ROS: negative  Dermatological ROS: negative        Physical Exam:    Vitals: /76   Pulse 94   Temp 98 °F (36.7 °C) (Oral)   Resp 16   Ht 5' 1\" (1.549 m)   Wt 126 lb 5.2 oz (57.3 kg)   SpO2 95%   BMI 23.87 kg/m²     Last Body weight:   Wt Readings from Last 3 Encounters:   01/30/22 126 lb 5.2 oz (57.3 kg)   01/24/22 115 lb (52.2 kg)   11/17/21 117 lb 12.8 oz (53.4 kg)       Body Mass Index : Body mass index is 23.87 kg/m². Intake and Output summary:     Intake/Output Summary (Last 24 hours) at 1/30/2022 1409  Last data filed at 1/29/2022 1526  Gross per 24 hour   Intake 419.36 ml   Output --   Net 419.36 ml       Physical Examination:     PHYSICAL EXAM:    Gen:  Mild acute distress   Eyes: PERRL. Anicteric sclera. No conjunctival injection. ENT: No discharge.  Posterior oropharynx clear. External appearance of ears and nose normal.  Neck: Trachea midline. No mass, no lymphadenopathy    Resp:  Diminished with rhonchi, right chest pain   CV: Regular rate. Regular rhythm. No murmur or rub. No edema. GI: Soft, Non-tender. Non-distended. +BS  Skin: Warm, dry, w/o erythema. Lymph: No cervical or supraclavicular LAD. M/S: No cyanosis. No clubbing. Neuro:  CN 2-12 tested, no focal neurologic deficit. Moves all extremities  Psych: Awake and alert, Oriented x 3. Judgement and insight appropriate. Mood stable. Laboratory findings:-    CBC:   Recent Labs     01/30/22 0445   WBC 6.3   HGB 7.4*        BMP:    Recent Labs     01/30/22  0445 01/30/22  0445 01/30/22  0840 01/30/22  0840 01/30/22  1220   *   < > 123*   < > 121*   K 3.9   < > 3.8   < > 3.8   CL 90*   < > 91*   < > 87*   CO2 21   < > 21   < > 21   BUN 7   < > 7   < > 7   CREATININE 0.6  --  0.6  --  0.7   GLUCOSE 107*   < > 124*   < > 104*    < > = values in this interval not displayed. S. Calcium:  Recent Labs     01/30/22  1220   CALCIUM 8.2*     S. Ionized Calcium:No results for input(s): IONCA in the last 72 hours. S. Magnesium:  Recent Labs     01/30/22  0445   MG 1.50*     S. Phosphorus:No results for input(s): PHOS in the last 72 hours. S. Glucose:No results for input(s): POCGLU in the last 72 hours. Glycosylated hemoglobin A1C: No results for input(s): LABA1C in the last 72 hours. INR: No results for input(s): INR in the last 72 hours. Hepatic functions:   Recent Labs     01/30/22  0445   ALKPHOS 113   ALT 18   AST 44*   PROT 4.6*   BILITOT 0.7   LABALBU 2.4*     Pancreatic functions:  Recent Labs     01/28/22  0518   LACTA 1.4     S. Lactic Acid:   Recent Labs     01/28/22  0518   LACTA 1.4     Cardiac enzymes:  Recent Labs     01/28/22  0159   TROPONINI <0.01     BNP:No results for input(s): BNP in the last 72 hours.   Lipid profile: No results for input(s): CHOL, TRIG, HDL, LDL, [address] in the last 72 hours. Blood Gases: No results found for: PH, PCO2, PO2, HCO3, O2SAT  Thyroid functions:   Lab Results   Component Value Date    TSH 1.01 06/04/2021          Radiology Review:  Pertinent images / reports were reviewed as a part of this visit. CT Chest w/ contrast: Results for orders placed during the hospital encounter of 11/18/10    CT chest with contrast    Narrative  Reason for exam-->weak lt vocal cord    CT CHEST WITH CONTRAST-    INDICATION-  Weak left vocal cord, possible recurrent laryngeal  nerve palsy. COMPARISON- None available. FINDINGS-    There is mild centrilobular emphysema within the upper lungs. Mild  dependent atelectasis is present bilaterally. No suspicious  lesions are detected within the lung parenchyma. The airways are  patent. No mediastinal masses are identified. No axillary lymphadenopathy. There is atherosclerotic calcification of the thoracic aorta. Coronary artery calcification is also present. These are somewhat  advanced for patient's age. Images of the upper abdomen are unremarkable. Mild spondylosis within the thoracic spine is seen. No suspicious  lesions detected within the bony structures. IMPRESSION-    1. No mediastinal masses identified to explain recurrent laryngeal  nerve palsy. 2. Coronary artery and aortic calcification, advanced for the  patient's age. 3. Mild centrilobular emphysema within the upper lungs. Read By- Darryl Elizalde  Released By- Darryl Elizalde  Released Date Time- 11/18/10 1054    [address] [facility]  ------------------------------------------------------------------------------      CT Chest w/o contrast: No results found for this or any previous visit.       CTPA: Results for orders placed during the hospital encounter of 06/04/21    CT CHEST PULMONARY EMBOLISM W CONTRAST    Narrative  EXAMINATION:  CTA OF THE CHEST 6/4/2021 11:13 am    TECHNIQUE:  CTA of the chest was performed after the administration of intravenous  contrast.  Multiplanar reformatted images are provided for review. MIP  images are provided for review. Dose modulation, iterative reconstruction,  and/or weight based adjustment of the mA/kV was utilized to reduce the  radiation dose to as low as reasonably achievable. COMPARISON:  None. HISTORY:  ORDERING SYSTEM PROVIDED HISTORY: SOB, tachy  TECHNOLOGIST PROVIDED HISTORY:  Reason for exam:->SOB, tachy  Decision Support Exception - unselect if not a suspected or confirmed  emergency medical condition->Emergency Medical Condition (MA)  Reason for Exam: sob  Acuity: Acute  Type of Exam: Initial    FINDINGS:  Thyroid gland appears normal.  Small mediastinal and hilar nodes are noted. Moderate to severe coronary artery calcification is seen. Trace pericardial  fluid is seen. Small hiatal hernia seen. There is nonspecific thickening at  the GE junction. No embolus is seen in the central, right, or left main pulmonary artery. No  embolus is seen on the right or left, at least to the subsegmental level. Beam hardening artifact is seen from contrast bolus in the SVC, limiting  evaluation of the pulmonary arteries on the right. Mild underlying emphysema is seen. Small left-sided pleural effusion is  seen. There is adjacent left basilar consolidation. On the right, hazy ground-glass opacity is seen in the right upper, right  middle and right lower lobe. Small right-sided pleural effusion is seen. There is adjacent right basilar consolidation. There is subtle septal  thickening in the right lung base. There is reflux of contrast into the IVC and hepatic veins. There is fatty  infiltration of the liver. Atherosclerotic change seen in aorta. 2 mm  calcifications seen in the right and left kidney. There is lobular contour  to the left kidney. Cyst is seen in the left kidney, measuring 9 mm. Spurring is seen in the spine.   Spurring is seen in the shoulder joints. Impression  No central pulmonary embolus identified    Bilateral pleural effusions, with adjacent consolidation, at the lung bases  either due to atelectasis or pneumonia. Scattered opacities are seen  throughout the right lung. .  There is a background of pulmonary emphysema. Subtle septal thickening at the right lung base, suggest a component of fluid  overload-pulmonary edema    Moderate to severe coronary artery calcification. CXR PA/LAT: Results for orders placed during the hospital encounter of 06/04/21    XR CHEST (2 VW)    Narrative  EXAMINATION:  TWO XRAY VIEWS OF THE CHEST    6/7/2021 11:30 am    COMPARISON:  CT of the chest and chest x-ray June 4, 2021    HISTORY:  ORDERING SYSTEM PROVIDED HISTORY: Pulm edema, Pleural effusions, follow up  study. TECHNOLOGIST PROVIDED HISTORY:  Reason for exam:->Pulm edema, Pleural effusions, follow up study. Reason for Exam: no chest pain, SOB getting better, recent heart procedure  (cardiac ablation)  Acuity: Acute  Type of Exam: Initial    FINDINGS:  Slightly decreased right pleural effusion. Pulmonary edema appears  decreased. No pneumothorax. Impression  Decreased right pleural effusion, with a small residual pleural effusion. Decreased pulmonary edema. Minimal trace left pleural effusion      CXR portable: Results for orders placed during the hospital encounter of 01/28/22    XR CHEST PORTABLE    Narrative  EXAMINATION:  ONE XRAY VIEW OF THE CHEST    1/28/2022 2:15 am    COMPARISON:  06/07/2021    HISTORY:  ORDERING SYSTEM PROVIDED HISTORY: SOB  TECHNOLOGIST PROVIDED HISTORY:  Reason for exam:->SOB  Reason for Exam: SOB    FINDINGS:  Cardiac and mediastinal silhouettes appear within normal limits for size. Pulmonary vascularity is normal.  No parenchymal consolidation or pleural  effusion is identified. No pneumothorax. Chronic interstitial change  related to underlying COPD.   Atherosclerotic calcifications are identified. Osteopenia. Degenerative changes in the spine and shoulders. Impression  COPD, though no acute cardiopulmonary process.         Ban Modi MD, M.D.            1/30/2022, 2:09 PM

## 2022-01-30 NOTE — PROGRESS NOTES
Patient A/Ox4, resting in bed. BMP collected from right femoral CVC - sent to lab. Complains of no pain at this time. H2T assessment completed. No further needs noted at this time. Checking on patient Q2H for nutrition needs, hygiene needs, comfort measures, mobility, fall risk interventions, and safe environment. All precautions and interventions in place. Educated patient on use of call light and telephone. Patient verbalizes understanding. Call light/telephone in reach.

## 2022-01-31 ENCOUNTER — TELEPHONE (OUTPATIENT)
Dept: PULMONOLOGY | Age: 62
End: 2022-01-31

## 2022-01-31 LAB
A/G RATIO: 1.1 (ref 1.1–2.2)
ALBUMIN SERPL-MCNC: 2.5 G/DL (ref 3.4–5)
ALP BLD-CCNC: 119 U/L (ref 40–129)
ALT SERPL-CCNC: 18 U/L (ref 10–40)
ANION GAP SERPL CALCULATED.3IONS-SCNC: 14 MMOL/L (ref 3–16)
AST SERPL-CCNC: 45 U/L (ref 15–37)
BASOPHILS ABSOLUTE: 0 K/UL (ref 0–0.2)
BASOPHILS RELATIVE PERCENT: 0.2 %
BILIRUB SERPL-MCNC: 0.7 MG/DL (ref 0–1)
BUN BLDV-MCNC: 7 MG/DL (ref 7–20)
CALCIUM SERPL-MCNC: 8 MG/DL (ref 8.3–10.6)
CHLORIDE BLD-SCNC: 86 MMOL/L (ref 99–110)
CO2: 20 MMOL/L (ref 21–32)
CREAT SERPL-MCNC: 0.7 MG/DL (ref 0.6–1.2)
EOSINOPHILS ABSOLUTE: 0 K/UL (ref 0–0.6)
EOSINOPHILS RELATIVE PERCENT: 0 %
GFR AFRICAN AMERICAN: >60
GFR NON-AFRICAN AMERICAN: >60
GLUCOSE BLD-MCNC: 103 MG/DL (ref 70–99)
HCT VFR BLD CALC: 21.6 % (ref 36–48)
HEMOGLOBIN: 7.6 G/DL (ref 12–16)
LYMPHOCYTES ABSOLUTE: 0.5 K/UL (ref 1–5.1)
LYMPHOCYTES RELATIVE PERCENT: 10.5 %
MAGNESIUM: 1.5 MG/DL (ref 1.8–2.4)
MCH RBC QN AUTO: 37.5 PG (ref 26–34)
MCHC RBC AUTO-ENTMCNC: 35.1 G/DL (ref 31–36)
MCV RBC AUTO: 107 FL (ref 80–100)
MONOCYTES ABSOLUTE: 0.4 K/UL (ref 0–1.3)
MONOCYTES RELATIVE PERCENT: 8.3 %
NEUTROPHILS ABSOLUTE: 3.9 K/UL (ref 1.7–7.7)
NEUTROPHILS RELATIVE PERCENT: 81 %
PDW BLD-RTO: 15.4 % (ref 12.4–15.4)
PLATELET # BLD: 267 K/UL (ref 135–450)
PMV BLD AUTO: 6.7 FL (ref 5–10.5)
POTASSIUM SERPL-SCNC: 3.5 MMOL/L (ref 3.5–5.1)
RBC # BLD: 2.02 M/UL (ref 4–5.2)
SODIUM BLD-SCNC: 120 MMOL/L (ref 136–145)
SODIUM BLD-SCNC: 123 MMOL/L (ref 136–145)
TOTAL PROTEIN: 4.8 G/DL (ref 6.4–8.2)
URIC ACID, SERUM: 3.4 MG/DL (ref 2.6–6)
WBC # BLD: 4.9 K/UL (ref 4–11)

## 2022-01-31 PROCEDURE — 6370000000 HC RX 637 (ALT 250 FOR IP): Performed by: STUDENT IN AN ORGANIZED HEALTH CARE EDUCATION/TRAINING PROGRAM

## 2022-01-31 PROCEDURE — 1200000000 HC SEMI PRIVATE

## 2022-01-31 PROCEDURE — 80053 COMPREHEN METABOLIC PANEL: CPT

## 2022-01-31 PROCEDURE — 85025 COMPLETE CBC W/AUTO DIFF WBC: CPT

## 2022-01-31 PROCEDURE — 84550 ASSAY OF BLOOD/URIC ACID: CPT

## 2022-01-31 PROCEDURE — 94761 N-INVAS EAR/PLS OXIMETRY MLT: CPT

## 2022-01-31 PROCEDURE — 6370000000 HC RX 637 (ALT 250 FOR IP): Performed by: HOSPITALIST

## 2022-01-31 PROCEDURE — 6360000002 HC RX W HCPCS: Performed by: INTERNAL MEDICINE

## 2022-01-31 PROCEDURE — 99232 SBSQ HOSP IP/OBS MODERATE 35: CPT | Performed by: INTERNAL MEDICINE

## 2022-01-31 PROCEDURE — 94640 AIRWAY INHALATION TREATMENT: CPT

## 2022-01-31 PROCEDURE — 2580000003 HC RX 258: Performed by: STUDENT IN AN ORGANIZED HEALTH CARE EDUCATION/TRAINING PROGRAM

## 2022-01-31 PROCEDURE — 84295 ASSAY OF SERUM SODIUM: CPT

## 2022-01-31 PROCEDURE — 83735 ASSAY OF MAGNESIUM: CPT

## 2022-01-31 RX ORDER — IPRATROPIUM BROMIDE AND ALBUTEROL SULFATE 2.5; .5 MG/3ML; MG/3ML
1 SOLUTION RESPIRATORY (INHALATION) EVERY 4 HOURS PRN
Status: DISCONTINUED | OUTPATIENT
Start: 2022-01-31 | End: 2022-02-02 | Stop reason: HOSPADM

## 2022-01-31 RX ADMIN — Medication 2 PUFF: at 07:42

## 2022-01-31 RX ADMIN — HYDROCORTISONE SODIUM SUCCINATE 50 MG: 100 INJECTION, POWDER, FOR SOLUTION INTRAMUSCULAR; INTRAVENOUS at 02:25

## 2022-01-31 RX ADMIN — MORPHINE SULFATE 1 MG: 2 INJECTION, SOLUTION INTRAMUSCULAR; INTRAVENOUS at 00:22

## 2022-01-31 RX ADMIN — Medication 2 PUFF: at 20:22

## 2022-01-31 RX ADMIN — ASPIRIN 81 MG: 81 TABLET, COATED ORAL at 09:31

## 2022-01-31 RX ADMIN — FOLIC ACID 1 MG: 1 TABLET ORAL at 09:22

## 2022-01-31 RX ADMIN — PANTOPRAZOLE SODIUM 40 MG: 40 TABLET, DELAYED RELEASE ORAL at 05:55

## 2022-01-31 RX ADMIN — Medication 1 G: at 12:29

## 2022-01-31 RX ADMIN — SODIUM CHLORIDE, PRESERVATIVE FREE 10 ML: 5 INJECTION INTRAVENOUS at 23:52

## 2022-01-31 RX ADMIN — FLUTICASONE PROPIONATE 2 SPRAY: 50 SPRAY, METERED NASAL at 09:31

## 2022-01-31 RX ADMIN — HYDROCORTISONE SODIUM SUCCINATE 50 MG: 100 INJECTION, POWDER, FOR SOLUTION INTRAMUSCULAR; INTRAVENOUS at 09:22

## 2022-01-31 RX ADMIN — IPRATROPIUM BROMIDE AND ALBUTEROL SULFATE 1 AMPULE: .5; 3 SOLUTION RESPIRATORY (INHALATION) at 07:42

## 2022-01-31 RX ADMIN — ATORVASTATIN CALCIUM 20 MG: 20 TABLET, FILM COATED ORAL at 09:22

## 2022-01-31 RX ADMIN — Medication 1 G: at 09:22

## 2022-01-31 RX ADMIN — Medication 100 MG: at 09:22

## 2022-01-31 RX ADMIN — ALLOPURINOL 300 MG: 300 TABLET ORAL at 09:22

## 2022-01-31 RX ADMIN — Medication 1 G: at 17:37

## 2022-01-31 RX ADMIN — SODIUM CHLORIDE, PRESERVATIVE FREE 10 ML: 5 INJECTION INTRAVENOUS at 09:31

## 2022-01-31 RX ADMIN — MONTELUKAST 10 MG: 10 TABLET, FILM COATED ORAL at 09:22

## 2022-01-31 ASSESSMENT — PAIN DESCRIPTION - LOCATION: LOCATION: OTHER (COMMENT);RIB CAGE

## 2022-01-31 ASSESSMENT — PAIN SCALES - GENERAL
PAINLEVEL_OUTOF10: 8
PAINLEVEL_OUTOF10: 3
PAINLEVEL_OUTOF10: 7

## 2022-01-31 ASSESSMENT — PAIN DESCRIPTION - PAIN TYPE: TYPE: ACUTE PAIN

## 2022-01-31 NOTE — TELEPHONE ENCOUNTER
Called patient.   She will call [address] [place] [address] and see if they have CPAP and if they take her insurance, then call us back

## 2022-01-31 NOTE — PROGRESS NOTES
Kimberlee Molina         : 1960  [x] [address]     [] [address]      [] [facility]     [][facility]    [] [facility]  [] [facility]   [] Other:  Diagnosis: [x] TANNER (G47.33) [] CSA (G47.31) [] Apnea (G47.30)   Length of Need: [] 12 Months [x] 99 Months [] Other:    Machine (JAYLA!): [] Respironics Dream Station      Auto [x] ResMed AirSense     Auto [] Other:     [x]  CPAP () [] Bilevel ()   Mode: [x] Auto [] Spontaneous    Mode: [] Auto [] Spontaneous           Between 8 and 14 cm                 Comfort Settings:   - Ramp Pressure: 5 cmH2O                                        - Ramp time: 15 min                                     -  Flex/EPR - 3 full time                                    - For ResMed Bilevel (TiMax-4 sec   TiMin- 0.2 sec)     Humidifier: [x] Heated ()        [x] Water chamber replacement ()/ 1 per 6 months        Mask:  Please always start with the mask the patient used during the titraion   [x] Nasal () /1 per 3 months [x] Full Face () /1 per 3 months   [x] Patient choice -Size and fit mask [x] Patient Choice - Size and fit mask   [] Dispense:  [] Dispense:    [x] Headgear () / 1 per 3 months [x] Headgear () / 1 per 3 months   [x] Replacement Nasal Cushion ()/2 per month [x] Interface Replacement ()/1 per month   [x] Replacement Nasal Pillows ()/2 per month         Tubing: [x] Heated ()/1 per 3 months    [] Standard ()/1 per 3 months [] Other:           Filters: [x] Non-disposable ()/1 per 6 months     [x] Ultra-Fine, Disposable ()/2 per month        Miscellaneous: [x] Chin Strap ()/ 1 per 6 months [] O2 bleed-in:       LPM   [] Oximetry on CPAP/Bilevel []  Other:          Start Order Date: 22    MEDICAL JUSTIFICATION:  I, the undersigned, certify that the above prescribed supplies are medically necessary for this patients wellbeing.   In my opinion, the supplies are both reasonable and necessary in reference to accepted standards of medicalpractice in treatment of this patients condition.     Mariaelena Florez MD      NPI: [id number]       Order Signed Date: 01/31/22    Electronically signed by Mariaelena Florez MD on 1/31/2022 at 2:22 PM

## 2022-01-31 NOTE — PROGRESS NOTES
Hospitalist Progress Note      PCP: Chuckie Franz MD    Date of Admission: 1/28/2022    Chief Complaint: Generalized weakness, lightheadedness, poor p.o. intake    Hospital Course:  62 y.o. female PMH GERD, smoking abuse, alcohol use with 1 to 2 glasses of wine nightly, hypertension, previous history of congestive heart failure had an echo normal EF January 2022, gout, and hyperlipidemia   presents to [facility] with progressive generalized weakness and fatigue, poor p.o. intake x 6 to 7 days. c/o dysuria saw her PCP on Monday urine was examined and noted to have a UTI. started on Bactrim. progressively continued to become more generally weak and had lightheadedness and BOONE. continued to have poor p.o. intake although she was still taking her antihypertensive medications and her diuretic. She got worse to the point where tonight she called EMS because her weakness was so bad. SBP  70s to 80s was brought to the ED. symptoms of generalized weakness, lightheadedness, poor p.o. intake, patient denies any other recent symptoms of fever, chills, syncope, chest pain, blood in urine/stool/sputum, leg swelling, rashes, abdominal pain/nausea/vomiting/diarrhea  , BUN 15, CR 1.7 lactic acid 2.2,  troponin 0 0.01. EtOH negative U tox negative TSH 1.22. WBC 8.4.  UA cloudy 6 WBC.,  No urine culture warranted. She just had E. coli in the urine January 24, 2022 which was sensitive to the Bactrim. Required Levophed. seen by nephrology given gentle IV NS hydration sodium increased to 120. Sodium levelled off with IVF. started sodium chloride tabs and a fluid restriction but NA unchanged 123. Subjective: we educated pt about the fluid restriction how many cups ml and in ounces. Despite fluid restriction na unchanged 123. PT OT ordered.  Can sit self up in  Bed   No tremors oriented conversant  All other systems neg    Medications:  Reviewed    Infusion Medications    sodium chloride Scheduled Medications    tiotropium  2 puff Inhalation Daily    sodium chloride  1 g Oral TID WC    thiamine mononitrate  100 mg Oral Daily    folic acid  1 mg Oral Daily    allopurinol  300 mg Oral Daily    aspirin  81 mg Oral Daily    atorvastatin  20 mg Oral Daily    budesonide-formoterol  2 puff Inhalation BID    pantoprazole  40 mg Oral QAM AC    fluticasone  2 spray Each Nostril Daily    montelukast  10 mg Oral Daily    nicotine  1 patch TransDERmal Daily    sodium chloride flush  5-40 mL IntraVENous 2 times per day     PRN Meds: ipratropium-albuterol, morphine, ALPRAZolam, sodium chloride flush, sodium chloride, acetaminophen **OR** acetaminophen, ondansetron      Intake/Output Summary (Last 24 hours) at 1/31/2022 1546  Last data filed at 1/30/2022 2053  Gross per 24 hour   Intake 300 ml   Output --   Net 300 ml       Physical Exam Performed:    /77   Pulse 113   Temp 98 °F (36.7 °C) (Oral)   Resp 18   Ht 5' 1\" (1.549 m)   Wt 126 lb 5.2 oz (57.3 kg)   SpO2 98%   BMI 23.87 kg/m²     General appearance: No distress, on RA seated upright in bed mobile   HEENT: Pupils equal, round, and reactive to light. Glasses anicteric   Neck: Supple, full range of motion. No JVD    Respiratory:  Normal  effort. Clear to auscultation   Cardiovascular:  S1/S2 RRRwithout murmurs, rubs or gallops. Abdomen: Soft, non-tender, non-distended with normal bowel sounds. Musculoskeletal: No clubbing, cyanosis or edema   Skin: Skin color, texture, turgor normal.  No rashes or lesions.   Neurologic:  Neurovascularly intact slight tremors  Psychiatric: Alert and oriented  Peripheral Pulses: +2 palpable, equal bilaterally       Labs:   Recent Labs     01/29/22  0409 01/30/22  0445 01/31/22  0415   WBC 8.1 6.3 4.9   HGB 8.2* 7.4* 7.6*   HCT 23.5* 21.5* 21.6*    250 267     Recent Labs     01/30/22  0840 01/30/22  0840 01/30/22  1220 01/30/22  1220 01/30/22  1611 01/31/22  0415 01/31/22  0948   *   < > 121*   < > 120* 120* 123*   K 3.8  --  3.8  --   --  3.5  --    CL 91*  --  87*  --   --  86*  --    CO2 21  --  21  --   --  20*  --    BUN 7  --  7  --   --  7  --    CREATININE 0.6  --  0.7  --   --  0.7  --    CALCIUM 7.9*  --  8.2*  --   --  8.0*  --     < > = values in this interval not displayed. Recent Labs     01/30/22  0445 01/31/22  0415   AST 44* 45*   ALT 18 18   BILITOT 0.7 0.7   ALKPHOS 113 119     No results for input(s): INR in the last 72 hours. No results for input(s): Carlie [place] in the last 72 hours. Urinalysis:      Lab Results   Component Value Date    NITRU Negative 01/28/2022    WBCUA 6 01/28/2022    RBCUA 7 01/28/2022    BLOODU Negative 01/28/2022    SPECGRAV 1.014 01/28/2022    GLUCOSEU Negative 01/28/2022       Radiology:  US RENAL COMPLETE   Final Result   No hydronephrosis. CT CHEST ABDOMEN PELVIS WO CONTRAST   Final Result   No spiculated lung mass or lymphadenopathy in the chest.      Mild emphysematous changes, with minimal dependent change though no acute   parenchymal infiltrate or reactive effusion. Resolved effusions. Acute to subacute appearing right lateral rib fractures involving the 6th and   7th rib. No pneumothorax. No acute inflammatory process seen within the abdomen or pelvis. No ileus or   obstruction. No obstructive urinary tract calculi. Tiny nonobstructive left   renal calculus. CT HEAD WO CONTRAST   Final Result   No acute intracranial hemorrhage. No wedge-shaped area of acute ischemia is   identified. There are a few patchy focal areas of white matter low attenuation which is   nonspecific, though can be seen in the setting of chronic microvascular   ischemia or demyelination. No significant cortical atrophy or intracranial mass. XR CHEST PORTABLE   Final Result   COPD, though no acute cardiopulmonary process.                  Assessment/Plan:    Active Hospital Problems    Diagnosis     Alcohol use [Z72.89]     Hypotension [I95.9]     Hx of congestive heart failure [Z86.79]     Generalized weakness [R53.1]     Hyponatremia [E87.1]     HTN (hypertension) [I10]    hypovolemic shock  ARF  Rt rib fractures  Hyponatremia  COPD--no home O2  Alcohol dependence  Macrocytic anemia   Moderate protein calorie malnutrition     -MSW to see for alcohol support/rehab info   nutrition dietary supplements   Sodium plateaued hep-locked IVF started sodium chloride 1 g tab 3 times daily and fluid restrict to 1.5L/day na 123 unchanged   duoneb QID  Urine sodium 29  Nephrology following entresto and torsemide on hold   cosyntropin stim test neg steroid dc'd    thiamine and folic acid p.o. Would ask nephrology to proceed with [facility]   Once na normalizes can be dc home   PT OT today     DVT Prophylaxis: lovenox  Diet: ADULT DIET;  Regular; 1200 ml  ADULT ORAL NUTRITION SUPPLEMENT; Lunch, Dinner; Frozen Oral Supplement  Code Status: Full Code    PT/OT Eval Status: diasbled  and 17yo granddaughter   Outside stairs uses cane bc no handrail but otherwise independent     Dispo - home  Meliza Bagley daughter  [phone]    Rakel Ellsworth MD

## 2022-01-31 NOTE — PROGRESS NOTES
[facility] [address] [email or web address] or call us at [address] [facility] NOTE    Patient: Mamie Fisher MRN: [id number]     YOB: 1960  Age: 62 y.o. Sex: female    Unit: [address] MED SURG Room/Bed: R9S-4745/4122-01 Location: [address] [place] [address]     Admitting Physician: Marcelo Monzon    Primary Care Physician: Jacinto Roy MD          LOS: 3 days       Reason for evaluation:   Hyponatremia      SUBJECTIVE:      The patient was seen and examined. Notes and labs reviewed. There were no complications over night. Patient's review of systems: comfortable, oral intake is still poor  Continues to drink fluids despite on FR      OBJECTIVE:     Vitals:    01/30/22 2301 01/31/22 0453 01/31/22 0744 01/31/22 0918   BP: 137/76 121/79  111/60   Pulse: 119 82  111   Resp: 16 16  16   Temp: 98 °F (36.7 °C) 98.1 °F (36.7 °C)  98.1 °F (36.7 °C)   TempSrc: Oral Oral  Oral   SpO2: 95% 95% 97% 98%   Weight:  126 lb 5.2 oz (57.3 kg)     Height:           Intake and Output:      Intake/Output Summary (Last 24 hours) at 1/31/2022 1250  Last data filed at 1/30/2022 2053  Gross per 24 hour   Intake 300 ml   Output --   Net 300 ml       Continuous Infusions:   sodium chloride         Exam:   CONSTITUTIONAL/PSYCHIATRY: awake, alert and oriented x3. Not in acute distress   EYES: Conjunctivae: normal. Pupils: reactive to light  RESPIRATORY: Respiratory effort: normal. Auscultation: CTA  CARDIOVASCULAR: Auscultation: RRR. Neck veins: no JVD. Edema: none  GASTROINTESTINAL: Soft, nontender, nondistended. EXTREMITIES:  No cyanosis or clubbing. SKIN: Warm and dry.  No significant rashes      LABS:   RFP:   Recent Labs     01/30/22  0445 01/30/22  0445 01/30/22  0840 01/30/22  0840 01/30/22  1220 01/30/22  1220 01/30/22  1611 01/31/22  0415 01/31/22  0948   *   < > 123*   < > 121*   < > 120* 120* 123*   K 3.9   < > 3.8  --  3.8  --   --  3.5  --    CL 90*   < > 91*  --  87*  --   --  86*  --    CO2 21   < > 21  --  21  --   --  20*  --    BUN 7   < > 7  --  7  --   --  7  --    CREATININE 0.6   < > 0.6  --  0.7  --   --  0.7  --    CALCIUM 7.6*   < > 7.9*  --  8.2*  --   --  8.0*  --    MG 1.50*  --   --   --   --   --   --  1.50*  --    GFRAA >60   < > >60  --  >60  --   --  >60  --     < > = values in this interval not displayed. Liver panel:  Recent Labs     01/30/22  0445 01/31/22  0415   AST 44* 45*   ALT 18 18       Endocrine:   @[id number](VitD,PTH,TSH,aldosterone,renin activity,cortisol,metanephrine)@    CBC:   Recent Labs     01/29/22  0409 01/30/22  0445 01/31/22  0415   WBC 8.1 6.3 4.9   HGB 8.2* 7.4* 7.6*   HCT 23.5* 21.5* 21.6*   .8* 106.7* 107.0*    250 267       Iron Panel:   @[id number](FERA,FE)@    Serology: @[id number](ANAS,ANCA,C3,C4,RNP,AGBM,DNA,SSA,SSB,SPEP,UPEP,ESR,HEPT)@    Urine studies: @[id number](UAPR,UCOL,UNAR,UUNR,UCRR,UCLR,UKR,UUAR,UOSM,EOS)@        ASSESSMENT and PLAN:     1. Hyponatremia: recurrent acute/severe hyponatremia episodes. It is suspected to be related to severely decreased solute intake in combination with diuretic use. Víctor 29-37. UOsm 263! SNa improving improved initially with 0.9% NaCl and worsened again (which is unusual in view of UOsm of <300! and while on 0.9%). - Started on Na tabs. Na+ 123 meq today Monitor May need Samsca    - Liberalize Na in diet ++   - Continue water restrictions Stressed with pt and RN    - Will eventually resume loop diuretic once we get proper solute intake     2. CLAUDIA: mostly hemodynamic/prerenal. Kidney US is unremarkable. Entresto/Torsemide are held. Patient was also on Bactrim (had a little and additional effect)  SCr improved and back to baseline. 3. Hypovolemic shock: improved. Cortisol level is good. 4. Anemia:    - Transfuse prn for Hb <7    5. Rt 6/7 rib fx/ COPD: per pulmonary    6.  Chronic diastolic heart failure: compensated, volume depleted        Signed By: Ginette Thompson MD

## 2022-01-31 NOTE — PROGRESS NOTES
Pulmonary Critical Care progress Note     Patient's name:  [address] [place] [address] Record Number: [id number]  Patient's account/billing number: [de-identified]  Patient's YOB: 1960  Age: 62 y.o. Date of Admission: 1/28/2022  1:38 AM  Date of Consult: 1/31/2022      Primary Care Physician: Alanis Jose MD      Code Status: Full Code    Reason for consult: hypovolemic shock     Assessment and Plan     1. Hypovolemic shock resolved   2. ARF prerenal vs bactrim resolved   3. Right 6th and 7th rib fracture  4. Hyponatremia   5. COPD/emphysema       Plan:  cortisol response alfreda. D/c hydrocortisone  Bronchodilators. spiriva and symbicort, Duoneb as needed. IS  Advance pt/ot        REVIEW OF SYSTEMS:  Review of Systems -   General ROS: fatigue   Psychological ROS: negative  Ophthalmic ROS: negative  ENT ROS: negative  Allergy and Immunology ROS: negative  Hematological and Lymphatic ROS: negative  Endocrine ROS: negative  Breast ROS: negative  Respiratory ROS: chronic cough, sputum, sob,   Cardiovascular ROS: chest pain pleuritic  Gastrointestinal ROS: lose stool   Genito-Urinary ROS: pain around arizmendi site   Musculoskeletal ROS: negative  Neurological ROS: negative  Dermatological ROS: negative        Physical Exam:    Vitals: /77   Pulse 113   Temp 98 °F (36.7 °C) (Oral)   Resp 18   Ht 5' 1\" (1.549 m)   Wt 126 lb 5.2 oz (57.3 kg)   SpO2 98%   BMI 23.87 kg/m²     Last Body weight:   Wt Readings from Last 3 Encounters:   01/31/22 126 lb 5.2 oz (57.3 kg)   01/24/22 115 lb (52.2 kg)   11/17/21 117 lb 12.8 oz (53.4 kg)       Body Mass Index : Body mass index is 23.87 kg/m².       Intake and Output summary:     Intake/Output Summary (Last 24 hours) at 1/31/2022 1334  Last data filed at 1/30/2022 2053  Gross per 24 hour   Intake 300 ml   Output --   Net 300 ml       Physical Examination:     PHYSICAL EXAM:    Gen:  Mild acute distress   Eyes: PERRL. Anicteric sclera. No conjunctival injection. ENT: No discharge. Posterior oropharynx clear. External appearance of ears and nose normal.  Neck: Trachea midline. No mass, no lymphadenopathy    Resp:  Diminished with rhonchi, right chest pain   CV: Regular rate. Regular rhythm. No murmur or rub. No edema. GI: Soft, Non-tender. Non-distended. +BS  Skin: Warm, dry, w/o erythema. Lymph: No cervical or supraclavicular LAD. M/S: No cyanosis. No clubbing. Neuro:  CN 2-12 tested, no focal neurologic deficit. Moves all extremities  Psych: Awake and alert, Oriented x 3. Judgement and insight appropriate. Mood stable. Laboratory findings:-    CBC:   Recent Labs     01/31/22 0415   WBC 4.9   HGB 7.6*        BMP:    Recent Labs     01/30/22  0840 01/30/22  0840 01/30/22  1220 01/30/22  1611 01/31/22  0415 01/31/22 0415 01/31/22  0948   *   < > 121*   < > 120*   < > 123*   K 3.8   < > 3.8   < > 3.5  --   --    CL 91*   < > 87*   < > 86*  --   --    CO2 21   < > 21   < > 20*  --   --    BUN 7   < > 7   < > 7  --   --    CREATININE 0.6  --  0.7  --  0.7  --   --    GLUCOSE 124*   < > 104*   < > 103*  --   --     < > = values in this interval not displayed. S. Calcium:  Recent Labs     01/31/22 0415   CALCIUM 8.0*     S. Ionized Calcium:No results for input(s): IONCA in the last 72 hours. S. Magnesium:  Recent Labs     01/31/22 0415   MG 1.50*     S. Phosphorus:No results for input(s): PHOS in the last 72 hours. S. Glucose:No results for input(s): POCGLU in the last 72 hours. Glycosylated hemoglobin A1C: No results for input(s): LABA1C in the last 72 hours. INR: No results for input(s): INR in the last 72 hours. Hepatic functions:   Recent Labs     01/31/22 0415   ALKPHOS 119   ALT 18   AST 45*   PROT 4.8*   BILITOT 0.7   LABALBU 2.5*     Pancreatic functions:  No results for input(s): LACTA, AMYLASE in the last 72 hours.   S. Lactic Acid:   No results for input(s): LACTA in the last 72 hours. Cardiac enzymes:  No results for input(s): CKTOTAL, CKMB, CKMBINDEX, TROPONINI in the last 72 hours. BNP:No results for input(s): BNP in the last 72 hours. Lipid profile: No results for input(s): CHOL, TRIG, HDL, LDL, LDLCALC in the last 72 hours. Blood Gases: No results found for: PH, PCO2, PO2, HCO3, O2SAT  Thyroid functions:   Lab Results   Component Value Date    TSH 1.01 06/04/2021          Radiology Review:  Pertinent images / reports were reviewed as a part of this visit. CT Chest w/ contrast: Results for orders placed during the hospital encounter of 11/18/10    CT chest with contrast    Narrative  Reason for exam-->weak lt vocal cord    CT CHEST WITH CONTRAST-    INDICATION-  Weak left vocal cord, possible recurrent laryngeal  nerve palsy. COMPARISON- None available. FINDINGS-    There is mild centrilobular emphysema within the upper lungs. Mild  dependent atelectasis is present bilaterally. No suspicious  lesions are detected within the lung parenchyma. The airways are  patent. No mediastinal masses are identified. No axillary lymphadenopathy. There is atherosclerotic calcification of the thoracic aorta. Coronary artery calcification is also present. These are somewhat  advanced for patient's age. Images of the upper abdomen are unremarkable. Mild spondylosis within the thoracic spine is seen. No suspicious  lesions detected within the bony structures. IMPRESSION-    1. No mediastinal masses identified to explain recurrent laryngeal  nerve palsy. 2. Coronary artery and aortic calcification, advanced for the  patient's age. 3. Mild centrilobular emphysema within the upper lungs.     Read By- Page Julien  Released By- Page Ion  Released Date Time- 11/18/10 [address]  ------------------------------------------------------------------------------      CT Chest w/o contrast: No results found for this or any previous visit.      CTPA: Results for orders placed during the hospital encounter of 06/04/21    CT CHEST PULMONARY EMBOLISM W CONTRAST    Narrative  EXAMINATION:  CTA OF THE CHEST 6/4/2021 11:13 am    TECHNIQUE:  CTA of the chest was performed after the administration of intravenous  contrast.  Multiplanar reformatted images are provided for review. MIP  images are provided for review. Dose modulation, iterative reconstruction,  and/or weight based adjustment of the mA/kV was utilized to reduce the  radiation dose to as low as reasonably achievable. COMPARISON:  None. HISTORY:  ORDERING SYSTEM PROVIDED HISTORY: wesley TRONCOSO  TECHNOLOGIST PROVIDED HISTORY:  Reason for exam:->SOB, tachy  Decision Support Exception - unselect if not a suspected or confirmed  emergency medical condition->Emergency Medical Condition (MA)  Reason for Exam: sob  Acuity: Acute  Type of Exam: Initial    FINDINGS:  Thyroid gland appears normal.  Small mediastinal and hilar nodes are noted. Moderate to severe coronary artery calcification is seen. Trace pericardial  fluid is seen. Small hiatal hernia seen. There is nonspecific thickening at  the GE junction. No embolus is seen in the central, right, or left main pulmonary artery. No  embolus is seen on the right or left, at least to the subsegmental level. Beam hardening artifact is seen from contrast bolus in the SVC, limiting  evaluation of the pulmonary arteries on the right. Mild underlying emphysema is seen. Small left-sided pleural effusion is  seen. There is adjacent left basilar consolidation. On the right, hazy ground-glass opacity is seen in the right upper, right  middle and right lower lobe. Small right-sided pleural effusion is seen. There is adjacent right basilar consolidation. There is subtle septal  thickening in the right lung base. There is reflux of contrast into the IVC and hepatic veins. There is fatty  infiltration of the liver.   Atherosclerotic change seen in aorta. 2 mm  calcifications seen in the right and left kidney. There is lobular contour  to the left kidney. Cyst is seen in the left kidney, measuring 9 mm. Spurring is seen in the spine. Spurring is seen in the shoulder joints. Impression  No central pulmonary embolus identified    Bilateral pleural effusions, with adjacent consolidation, at the lung bases  either due to atelectasis or pneumonia. Scattered opacities are seen  throughout the right lung. .  There is a background of pulmonary emphysema. Subtle septal thickening at the right lung base, suggest a component of fluid  overload-pulmonary edema    Moderate to severe coronary artery calcification. CXR PA/LAT: Results for orders placed during the hospital encounter of 06/04/21    XR CHEST (2 VW)    Narrative  EXAMINATION:  TWO XRAY VIEWS OF THE CHEST    6/7/2021 11:30 am    COMPARISON:  CT of the chest and chest x-ray June 4, 2021    HISTORY:  ORDERING SYSTEM PROVIDED HISTORY: Pulm edema, Pleural effusions, follow up  study. TECHNOLOGIST PROVIDED HISTORY:  Reason for exam:->Pulm edema, Pleural effusions, follow up study. Reason for Exam: no chest pain, SOB getting better, recent heart procedure  (cardiac ablation)  Acuity: Acute  Type of Exam: Initial    FINDINGS:  Slightly decreased right pleural effusion. Pulmonary edema appears  decreased. No pneumothorax. Impression  Decreased right pleural effusion, with a small residual pleural effusion. Decreased pulmonary edema.     Minimal trace left pleural effusion      CXR portable: Results for orders placed during the hospital encounter of 01/28/22    XR CHEST PORTABLE    Narrative  EXAMINATION:  ONE XRAY VIEW OF THE CHEST    1/28/2022 2:15 am    COMPARISON:  06/07/2021    HISTORY:  ORDERING SYSTEM PROVIDED HISTORY: SOB  TECHNOLOGIST PROVIDED HISTORY:  Reason for exam:->SOB  Reason for Exam: SOB    FINDINGS:  Cardiac and mediastinal silhouettes appear within normal limits for size.  Pulmonary vascularity is normal.  No parenchymal consolidation or pleural  effusion is identified. No pneumothorax. Chronic interstitial change  related to underlying COPD. Atherosclerotic calcifications are identified. Osteopenia. Degenerative changes in the spine and shoulders. Impression  COPD, though no acute cardiopulmonary process.         Jonathan Vargas MD, M.D.            1/31/2022, 1:34 PM

## 2022-01-31 NOTE — TELEPHONE ENCOUNTER
Patient calls back [facility] takes her insurance. They have asked her to have us send her order to them.

## 2022-01-31 NOTE — PLAN OF CARE
Problem: Falls - Risk of:  Goal: Will remain free from falls  Description: Will remain free from falls  1/31/2022 1512 by Suma Ragland RN  Outcome: Met This Shift     Problem: Falls - Risk of:  Goal: Absence of physical injury  Description: Absence of physical injury  1/31/2022 1512 by Suma Ragland RN  Outcome: Met This Shift     Problem: Skin Integrity:  Goal: Will show no infection signs and symptoms  Description: Will show no infection signs and symptoms  1/31/2022 1512 by Suma Ragland RN  Outcome: Ongoing     Problem: Skin Integrity:  Goal: Absence of new skin breakdown  Description: Absence of new skin breakdown  1/31/2022 1512 by Suma Ragland RN  Outcome: Ongoing     Problem: Pain:  Description: Pain management should include both nonpharmacologic and pharmacologic interventions. Goal: Pain level will decrease  Description: Pain level will decrease  1/31/2022 1512 by Suma Ragland RN  Outcome: Met This Shift     Problem: Pain:  Description: Pain management should include both nonpharmacologic and pharmacologic interventions. Goal: Control of acute pain  Description: Control of acute pain  1/31/2022 1512 by Suma Ragland RN  Outcome: Met This Shift     Problem: Pain:  Description: Pain management should include both nonpharmacologic and pharmacologic interventions.   Goal: Control of chronic pain  Description: Control of chronic pain  1/31/2022 1512 by Suma Ragland RN  Outcome: Met This Shift

## 2022-01-31 NOTE — PROGRESS NOTES
Visit us at [facility]. com or call us at [address] [place] [address] NOTE    Patient: Sushma Morgan MRN: [id number]     YOB: 1960  Age: 62 y.o. Sex: female    Unit: [address] MED SURG Room/Bed: U3P-5821/4122-01 Location: [address] [place] [address]     Admitting Physician: Stephanie Ram    Primary Care Physician: Lisandro Man MD          LOS: 2 days       Reason for evaluation:   Hyponatremia      SUBJECTIVE:      The patient was seen and examined. Notes and labs reviewed. There were no complications over night. Patient's review of systems: comfortable, oral intake is still poor      OBJECTIVE:     Vitals:    01/30/22 1430 01/30/22 1600 01/30/22 1733 01/30/22 1800   BP: 124/76  115/76    Pulse: 92 96 93 88   Resp: 16  18    Temp: 98.2 °F (36.8 °C)  97.7 °F (36.5 °C)    TempSrc: Oral  Oral    SpO2: 96%  95%    Weight:       Height:           Intake and Output:    No intake or output data in the 24 hours ending 01/30/22 1902    Continuous Infusions:   sodium chloride         Exam:   CONSTITUTIONAL/PSYCHIATRY: awake, alert and oriented x3. Not in acute distress   EYES: Conjunctivae: normal. Pupils: reactive to light  RESPIRATORY: Respiratory effort: normal. Auscultation: CTA  CARDIOVASCULAR: Auscultation: RRR. Neck veins: no JVD. Edema: none  GASTROINTESTINAL: Soft, nontender, nondistended. EXTREMITIES:  No cyanosis or clubbing. SKIN: Warm and dry. No significant rashes      LABS:   RFP:   Recent Labs     01/30/22  0445 01/30/22  0445 01/30/22  0840 01/30/22  1220 01/30/22  1611   *   < > 123* 121* 120*   K 3.9  --  3.8 3.8  --    CL 90*  --  91* 87*  --    CO2 21  --  21 21  --    BUN 7  --  7 7  --    CREATININE 0.6  --  0.6 0.7  --    CALCIUM 7.6*  --  7.9* 8.2*  --    MG 1.50*  --   --   --   --    GFRAA >60  --  >60 >60  --     < > = values in this interval not displayed.        Liver panel:  Recent Labs     01/28/22  0159 01/30/22  0445   AST 71* 44*   ALT 26 18 Endocrine:   @[id number](VitD,PTH,TSH,aldosterone,renin activity,cortisol,metanephrine)@    CBC:   Recent Labs     01/28/22  0159 01/29/22  0409 01/30/22  0445   WBC 8.4 8.1 6.3   HGB 10.9* 8.2* 7.4*   HCT 31.9* 23.5* 21.5*   .9* 105.8* 106.7*    257 250       Iron Panel:   @[id number](FERA,FE)@    Serology: @[id number](ANAS,ANCA,C3,C4,RNP,AGBM,DNA,SSA,SSB,SPEP,UPEP,ESR,HEPT)@    Urine studies: @[id number](UAPR,UCOL,UNAR,UUNR,UCRR,UCLR,UKR,UUAR,UOSM,EOS)@        ASSESSMENT and PLAN:     1. Hyponatremia: recurrent acute/severe hyponatremia episodes. It is suspected to be related to severely decreased solute intake in combination with diuretic use. Víctor 29-37. UOsm 263! SNa improving improved initially with 0.9% NaCl and worsened again (which is unusual in view of UOsm of <300! and while on 0.9%). - Agree with stopping 0.9%   - Started on Na tabs. If no improvement, will consider Samsca (as opposed to 3%; no mid line/central line)    - Liberalize Na in diet ++   - Continue water restrictions   - Recheck urine studies   - Will eventually resume loop diuretic once we get proper solute intake     2. CLAUDIA: mostly hemodynamic/prerenal. Kidney US is unremarkable. Entresto/Torsemide are held. Patient was also on Bactrim (had a little and additional effect)  SCr improved and back to baseline. 3. Hypovolemic shock: improved. Cortisol level is good. 4. Anemia:    - Transfuse prn for Hb <7    5. Rt 6/7 rib fx/ COPD: per pulmonary    6.  Chronic diastolic heart failure: compensated, volume depleted        Signed By: Dulce Maria Botello MD

## 2022-01-31 NOTE — PROGRESS NOTES
Nutrition Assessment     Type and Reason for Visit: Initial,Positive Nutrition Screen    Nutrition Recommendations/Plan:   Monitor meal and supplement intake  Add Magic Cup bid  Monitor pertinent labs     Nutrition Assessment:  Pt was seen d/t positive nutrition screen for poor po. Pt admitted for hyponatremia with PMH of HTN, HLD, gout, COPD, anemia,CHF(normal EF 1/22). Pt is now on regular diet with 1200 ml fluid restriction. Pt intake has been improving while here in hospital. To not add fluid to diet pt agreed to [employer] bid. Pt drinks Boost and Gatroade at home. Malnutrition Assessment:  Malnutrition Status: At risk for malnutrition (Comment)    Nutrition Related Findings: no edema, BM 1/28, Na 120, Mag 1.5      Current Nutrition Therapies:    ADULT DIET; Regular; 1200 ml    Anthropometric Measures:  · Height: 5' 1\" (154.9 cm)  · Current Body Wt: 126 lb 5.2 oz (57.3 kg)   · BMI: 23.9    Nutrition Diagnosis:   · Inadequate oral intake related to inadequate protein-energy intake as evidenced by poor intake prior to admission      Nutrition Interventions:   Food and/or Nutrient Delivery:  Continue Current Diet,Start Oral Nutrition Supplement  Nutrition Education/Counseling:  No recommendation at this time   Coordination of Nutrition Care:  Continue to monitor while inpatient    Goals:  >50% of meals and supplement consumed       Nutrition Monitoring and Evaluation:   Behavioral-Environmental Outcomes:  None Identified   Food/Nutrient Intake Outcomes:  Food and Nutrient Intake,Supplement Intake  Physical Signs/Symptoms Outcomes:  Biochemical Data,Nutrition Focused Physical Findings,Skin,Weight     Discharge Planning:     Too soon to determine     Electronically signed by Daina Garcia RD, LD on 1/31/22 at 2:12 PM EST    Contact: [phone]

## 2022-02-01 LAB
ANION GAP SERPL CALCULATED.3IONS-SCNC: 9 MMOL/L (ref 3–16)
BLOOD CULTURE, ROUTINE: NORMAL
BUN BLDV-MCNC: 8 MG/DL (ref 7–20)
CALCIUM SERPL-MCNC: 8.5 MG/DL (ref 8.3–10.6)
CHLORIDE BLD-SCNC: 97 MMOL/L (ref 99–110)
CO2: 22 MMOL/L (ref 21–32)
CREAT SERPL-MCNC: 0.7 MG/DL (ref 0.6–1.2)
CULTURE, BLOOD 2: NORMAL
GFR AFRICAN AMERICAN: >60
GFR NON-AFRICAN AMERICAN: >60
GLUCOSE BLD-MCNC: 89 MG/DL (ref 70–99)
HCT VFR BLD CALC: 25.7 % (ref 36–48)
HEMOGLOBIN: 8.8 G/DL (ref 12–16)
MCH RBC QN AUTO: 37.5 PG (ref 26–34)
MCHC RBC AUTO-ENTMCNC: 34.1 G/DL (ref 31–36)
MCV RBC AUTO: 109.9 FL (ref 80–100)
PDW BLD-RTO: 16.1 % (ref 12.4–15.4)
PLATELET # BLD: 378 K/UL (ref 135–450)
PMV BLD AUTO: 6.4 FL (ref 5–10.5)
POTASSIUM SERPL-SCNC: 3.6 MMOL/L (ref 3.5–5.1)
RBC # BLD: 2.34 M/UL (ref 4–5.2)
SODIUM BLD-SCNC: 128 MMOL/L (ref 136–145)
SODIUM BLD-SCNC: 131 MMOL/L (ref 136–145)
WBC # BLD: 9.4 K/UL (ref 4–11)

## 2022-02-01 PROCEDURE — 85027 COMPLETE CBC AUTOMATED: CPT

## 2022-02-01 PROCEDURE — 99232 SBSQ HOSP IP/OBS MODERATE 35: CPT | Performed by: INTERNAL MEDICINE

## 2022-02-01 PROCEDURE — 6370000000 HC RX 637 (ALT 250 FOR IP): Performed by: HOSPITALIST

## 2022-02-01 PROCEDURE — 94761 N-INVAS EAR/PLS OXIMETRY MLT: CPT

## 2022-02-01 PROCEDURE — 84295 ASSAY OF SERUM SODIUM: CPT

## 2022-02-01 PROCEDURE — 6370000000 HC RX 637 (ALT 250 FOR IP): Performed by: STUDENT IN AN ORGANIZED HEALTH CARE EDUCATION/TRAINING PROGRAM

## 2022-02-01 PROCEDURE — 97535 SELF CARE MNGMENT TRAINING: CPT

## 2022-02-01 PROCEDURE — 80048 BASIC METABOLIC PNL TOTAL CA: CPT

## 2022-02-01 PROCEDURE — 97161 PT EVAL LOW COMPLEX 20 MIN: CPT

## 2022-02-01 PROCEDURE — 97530 THERAPEUTIC ACTIVITIES: CPT

## 2022-02-01 PROCEDURE — 36415 COLL VENOUS BLD VENIPUNCTURE: CPT

## 2022-02-01 PROCEDURE — 1200000000 HC SEMI PRIVATE

## 2022-02-01 PROCEDURE — 97116 GAIT TRAINING THERAPY: CPT

## 2022-02-01 PROCEDURE — 2580000003 HC RX 258: Performed by: STUDENT IN AN ORGANIZED HEALTH CARE EDUCATION/TRAINING PROGRAM

## 2022-02-01 PROCEDURE — 6370000000 HC RX 637 (ALT 250 FOR IP): Performed by: INTERNAL MEDICINE

## 2022-02-01 PROCEDURE — 97165 OT EVAL LOW COMPLEX 30 MIN: CPT

## 2022-02-01 PROCEDURE — 94640 AIRWAY INHALATION TREATMENT: CPT

## 2022-02-01 RX ORDER — LANOLIN ALCOHOL/MO/W.PET/CERES
400 CREAM (GRAM) TOPICAL
Status: DISCONTINUED | OUTPATIENT
Start: 2022-02-01 | End: 2022-02-02 | Stop reason: HOSPADM

## 2022-02-01 RX ADMIN — Medication 1 G: at 12:20

## 2022-02-01 RX ADMIN — Medication 2 PUFF: at 08:59

## 2022-02-01 RX ADMIN — Medication 1 G: at 09:28

## 2022-02-01 RX ADMIN — PANTOPRAZOLE SODIUM 40 MG: 40 TABLET, DELAYED RELEASE ORAL at 06:51

## 2022-02-01 RX ADMIN — Medication 400 MG: at 09:29

## 2022-02-01 RX ADMIN — FOLIC ACID 1 MG: 1 TABLET ORAL at 09:28

## 2022-02-01 RX ADMIN — SODIUM CHLORIDE, PRESERVATIVE FREE 10 ML: 5 INJECTION INTRAVENOUS at 09:29

## 2022-02-01 RX ADMIN — ATORVASTATIN CALCIUM 20 MG: 20 TABLET, FILM COATED ORAL at 09:28

## 2022-02-01 RX ADMIN — Medication 1 G: at 16:01

## 2022-02-01 RX ADMIN — MONTELUKAST 10 MG: 10 TABLET, FILM COATED ORAL at 09:29

## 2022-02-01 RX ADMIN — SODIUM CHLORIDE, PRESERVATIVE FREE 10 ML: 5 INJECTION INTRAVENOUS at 21:12

## 2022-02-01 RX ADMIN — ASPIRIN 81 MG: 81 TABLET, COATED ORAL at 09:29

## 2022-02-01 RX ADMIN — Medication 100 MG: at 09:28

## 2022-02-01 RX ADMIN — TIOTROPIUM BROMIDE INHALATION SPRAY 2 PUFF: 3.12 SPRAY, METERED RESPIRATORY (INHALATION) at 08:57

## 2022-02-01 RX ADMIN — ALLOPURINOL 300 MG: 300 TABLET ORAL at 09:29

## 2022-02-01 RX ADMIN — FLUTICASONE PROPIONATE 2 SPRAY: 50 SPRAY, METERED NASAL at 09:31

## 2022-02-01 RX ADMIN — Medication 2 PUFF: at 19:46

## 2022-02-01 ASSESSMENT — PAIN SCALES - GENERAL: PAINLEVEL_OUTOF10: 0

## 2022-02-01 NOTE — PROGRESS NOTES
Visit us at [facility]. com or call us at [address] [place] [address] NOTE    Patient: Shalom Baldwin MRN: [id number]     YOB: 1960  Age: 62 y.o. Sex: female    Unit: [address] MED SURG Room/Bed: [id number]/4122-01 Location: [address] [place] [address]     Admitting Physician: Sidra Rouse    Primary Care Physician: Yousif Castillo MD          LOS: 4 days       Reason for evaluation:   Hyponatremia      SUBJECTIVE:      The patient was seen and examined. Notes and labs reviewed. There were no complications over night. Patient's review of systems: feels OK eating better  No HA dizziness nausea  Labs pending this AM   Has been restrictng fluids      OBJECTIVE:     Vitals:    01/31/22 2022 01/31/22 2315 02/01/22 0344 02/01/22 0857   BP:  123/66 128/77    Pulse:  122 100    Resp: 20 18 17    Temp:  97.7 °F (36.5 °C) 98.1 °F (36.7 °C)    TempSrc:  Oral Oral    SpO2: 95%  95% 95%   Weight:   126 lb 8.7 oz (57.4 kg)    Height:           Intake and Output:    No intake or output data in the 24 hours ending 02/01/22 1202    Continuous Infusions:   sodium chloride         Exam:   CONSTITUTIONAL/PSYCHIATRY: awake, alert and oriented x3. Not in acute distress   EYES: Conjunctivae: normal. Pupils: reactive to light  RESPIRATORY: Respiratory effort: normal. Auscultation: CTA  CARDIOVASCULAR: Auscultation: RRR. Neck veins: no JVD. Edema: none  GASTROINTESTINAL: Soft, nontender, nondistended. EXTREMITIES:  No cyanosis or clubbing. SKIN: Warm and dry.  No significant rashes      LABS:   RFP:   Recent Labs     01/30/22  0445 01/30/22  0445 01/30/22  0840 01/30/22  0840 01/30/22  1220 01/30/22  1220 01/30/22  1611 01/31/22  0415 01/31/22  0948   *   < > 123*   < > 121*   < > 120* 120* 123*   K 3.9   < > 3.8  --  3.8  --   --  3.5  --    CL 90*   < > 91*  --  87*  --   --  86*  --    CO2 21   < > 21  --  21  --   --  20*  --    BUN 7   < > 7  --  7  --   --  7  --    CREATININE 0.6   < > 0.6  --  0.7 --   --  0.7  --    CALCIUM 7.6*   < > 7.9*  --  8.2*  --   --  8.0*  --    MG 1.50*  --   --   --   --   --   --  1.50*  --    GFRAA >60   < > >60  --  >60  --   --  >60  --     < > = values in this interval not displayed. Liver panel:  Recent Labs     01/30/22  0445 01/31/22  0415   AST 44* 45*   ALT 18 18       Endocrine:   @ALGEPBLU24(VitD,PTH,TSH,aldosterone,renin activity,cortisol,metanephrine)@    CBC:   Recent Labs     01/30/22  0445 01/31/22  0415   WBC 6.3 4.9   HGB 7.4* 7.6*   HCT 21.5* 21.6*   .7* 107.0*    267       Iron Panel:   @LAIXFJEI53(FERA,FE)@    Serology: @UJKLJHZR30(ANAS,ANCA,C3,C4,RNP,AGBM,DNA,SSA,SSB,SPEP,UPEP,ESR,HEPT)@    Urine studies: @[id number](UAPR,UCOL,UNAR,UUNR,UCRR,UCLR,UKR,UUAR,UOSM,EOS)@        ASSESSMENT and PLAN:     1. Hyponatremia: recurrent acute/severe hyponatremia episodes. It is suspected to be related to severely decreased solute intake in combination with diuretic use. Víctor 29-37. UOsm 263! SNa improving improved initially with 0.9% NaCl and worsened again (which is unusual in view of UOsm of <300! and while on 0.9%). - Started on Na tabs. Na+ 123 meq yesterday Labs pending today    - Liberalize Na in diet ++   - If Na+ not improving will give Samsca today    - Will eventually resume loop diuretic once we get proper solute intake     2. CLAUDIA: mostly hemodynamic/prerenal. Kidney US is unremarkable. Entresto/Torsemide are held. Patient was also on Bactrim (had a little and additional effect)  SCr improved and back to baseline. 3. Hypovolemic shock: improved. Cortisol level is good. 4. Anemia: stable   - Transfuse prn for Hb <7    5. Rt 6/7 rib fx/ COPD: per pulmonary    6.  Chronic diastolic heart failure: compensated, volume depleted        Signed By: Jason Real MD

## 2022-02-01 NOTE — PROGRESS NOTES
Occupational Therapy   Occupational Therapy Initial Assessment  Date: 2022   Patient Name: Shalom Baldwin  MRN: [id number]     : 1960    Date of Service: 2022    Discharge Recommendations:  Home with assist PRN  OT Equipment Recommendations  Equipment Needed: Alis Molina scored a 21/24 on the [facility] ADL Inpatient form. At this time, no further OT is recommended upon discharge due to pt functioning near baseline. Recommend patient returns to prior setting with prior services. Assessment   Performance deficits / Impairments: Decreased functional mobility ; Decreased ADL status; Decreased strength;Decreased endurance;Decreased high-level IADLs  Assessment: Pt functioning slightly below baseline d/t the above deficits, today requiring supervision fxl transfers, SBA fxl mobility with no AD, supervision for grooming in stance at sink, SBA dressing, and anticipate pt would require overall SBA/supervision for ADLs. HR up to 150 with standing activity, but pt asymptomatic, denied any lightheadedness or dizziness. Will cont to see on acute to address the above limitations and maximize pt's safety and independence. Anticipate pt will be safe to return home with assist prn at d/c. Prognosis: Good  Decision Making: Low Complexity  OT Education: OT Role;Plan of Care;ADL Adaptive Strategies;Transfer Training  Barriers to Learning: none  REQUIRES OT FOLLOW UP: Yes  Activity Tolerance  Activity Tolerance: Patient Tolerated treatment well  Activity Tolerance: HR 120s sitting EOB, up to 150 with ambulation  Safety Devices  Safety Devices in place: Yes  Type of devices: Call light within reach; Chair alarm in place; Left in chair;Nurse notified;Gait belt           Patient Diagnosis(es): The primary encounter diagnosis was Hyponatremia. A diagnosis of Shock ([address] was also pertinent to this visit.      has a past medical history of Anxiety, GERD (gastroesophageal reflux disease), Gout, Hyperlipidemia, and Hypertension. has a past surgical history that includes Knee arthroscopy; Carpal tunnel release; bladder suspension; Tonsillectomy; Carpal tunnel release (7-12-10); Carpal tunnel release (Left wrist); joint replacement; sinus surgery; and Foot surgery (3/2013). Restrictions  Restrictions/Precautions  Restrictions/Precautions: Fall Risk  Position Activity Restriction  Other position/activity restrictions: 1200 mL fluid restriction    Subjective   General  Chart Reviewed: Yes  Additional Pertinent Hx: Per Jorge A Quintana MD's 1/31 : \"62 y.o. female PMH GERD, smoking abuse, alcohol use with 1 to 2 glasses of wine nightly, hypertension, previous history of congestive heart failure had an echo normal EF January 2022, gout, and hyperlipidemia presents to [facility] with progressive generalized weakness and fatigue, poor p.o. intake x 6 to 7 days. c/o dysuria saw her PCP on Monday urine was examined and noted to have a UTI. started on Bactrim. progressively continued to become more generally weak and had lightheadedness and BOONE. continued to have poor p.o. intake although she was still taking her antihypertensive medications and her diuretic. She got worse to the point where tonight she called EMS because her weakness was so bad. SBP  70s to 80s was brought to the ED. symptoms of generalized weakness, lightheadedness, poor p.o. intake, patient denies any other recent symptoms of fever, chills, syncope, chest pain, blood in urine/stool/sputum, leg swelling, rashes, abdominal pain/nausea/vomiting/diarrheaNA 116, BUN 15, CR 1.7 lactic acid 2.2,  troponin 0 0.01. EtOH negative U tox negative TSH 1.22. WBC 8.4.  UA cloudy 6 WBC.,  No urine culture warranted. She just had E. coli in the urine January 24, 2022 which was sensitive to the Bactrim. Required Levophed. seen by nephrology given gentle IV NS hydration sodium increased to 120. Sodium levelled off with IVF.  started sodium chloride tabs and a fluid restriction but NA unchanged 123. \"  Family / Caregiver Present: No  Referring Practitioner: Courtney Ziegler MD  Diagnosis: hypovolemic shock, ARF, Rt rib fractures, Hyponatremia, Alcohol dependence, Macrocytic anemia, Moderate protein calorie malnutrition  Subjective  Subjective: Pt met b/s for OT eval/tx. Pt in bed on arrival, agreeable to participate in therapy. Pt reports 8/10 pain R ribcage.     Vital Signs  Orthostatic B/P and Pulse?: Yes  Blood Pressure Lyin/70  Pulse Lyin PER MINUTE  Blood Pressure Sittin/69  Pulse Sittin PER MINUTE  Blood Pressure Standin/64  Pulse Standin PER MINUTE  Orthostatic B/P and Pulse?: Yes    Social/Functional History  Social/Functional History  Lives With: Family ( (disabled) and 25 y.o. granddaughter)  Type of Home: House  Home Layout: Two level,Laundry in basement (13 [address] with handrail to 2nd floor bedroom/bathroom)  Home Access: Stairs to enter with rails  Entrance Stairs - Number of Steps: 4 SHERRI  Entrance Stairs - Rails: None  Bathroom Shower/Tub: Tub/Shower unit  Bathroom Toilet: Standard  Bathroom Equipment: Tub transfer bench,Hand-held shower,Grab bars in shower (grab bar on side of tub; recently purchased toilet safety frame)  Bathroom Accessibility: Walker accessible  Home Equipment: Cane  ADL Assistance: Independent  Homemaking Assistance: Independent (pt uses [employer]-list for groceries and does cleaning, granddaughter does laundry,)  Ambulation Assistance: Independent (with no AD in home, uses cane to get up the front steps)  Transfer Assistance: Independent  Active : Yes  Occupation: On disability  Additional Comments: Pt reports 1 recent fall ~1 week ago (tripped over 's cane)       Objective   Vision: Impaired  Vision Exceptions:  (wears glasses for the computer, start of cataract)  Hearing: Within functional limits      Orientation  Overall Orientation Status: Within Functional Limits  Orientation Level: Oriented to person;Oriented to time;Oriented to place;Oriented to situation     Balance  Sitting Balance: Independent  Standing Balance: Supervision  Functional Mobility  Functional - Mobility Device: No device  Activity: To/from bathroom; Other  Assist Level: Stand by assistance  Functional Mobility Comments: pt completed fxl mobility to/from BR and 1 lap around room with no AD and SBA. no LOB. -150 with ambulation. ADL  Grooming: Stand by assistance;Supervision (standing at sink to brush teeth)  UE Dressing: Stand by assistance;Supervision (to don jacket)  LE Dressing: Stand by assistance;Supervision (to don/doff socks)  Toileting:  (pt demo'd toilet transfer with SBA, anticipate SBA/supervision for toileting)  Additional Comments: Anticipate pt is independent feeding, SBA/supervision bathing and dressing based on ROM/strength, balance, endurance.      Bed mobility  Supine to Sit: Stand by assistance (HOB elevated, increased time and effort)  Sit to Supine: Unable to assess  Scooting: Independent     Transfers  Sit to stand: Supervision  Stand to sit: Supervision   Toilet Transfers  Toilet - Technique: Ambulating  Equipment Used: Grab bars  Toilet Transfer: Supervision    Cognition  Overall Cognitive Status: WFL     Sensation  Overall Sensation Status: WFL       LUE AROM (degrees)  LUE AROM : WFL  RUE AROM (degrees)  RUE AROM : WFL  RUE General AROM: pain with overhead d/t R rib fxs, but still WFL AROM throughout for purpose of ADLs                      Plan   Plan  Times per week: 3-5  Current Treatment Recommendations: Functional Mobility Training,Balance Training,Strengthening,Endurance Training,Safety Education & Training,Self-Care / ADL      AM-PAC Score        AM-PAC Inpatient Daily Activity Raw Score: 21 (02/01/22 1030)  AM-PAC Inpatient ADL T-Scale Score : 44.27 (02/01/22 1030)  ADL Inpatient CMS 0-100% Score: 32.79 (02/01/22 1030)  ADL Inpatient CMS G-Code Modifier : Marleny Gregg (02/01/22 1030)    Goals  Short term goals  Time Frame for Short term goals: Prior to d/c:  Short term goal 1: Pt will bathe with mod I. Short term goal 2: Pt will dress with mod I. Short term goal 3: Pt will toilet with mod I. Short term goal 4: Pt will complete fxl mobility and fxl transfers to/from ADL surfaces independent/mod I. Short term goal 5: Pt will complete 10 reps of B UE therex in all planes to improve strength for ADLs. Long term goals  Time Frame for Long term goals : STGs=LTGs  Patient Goals   Patient goals : to return home. Therapy Time   Individual Concurrent Group Co-treatment   Time In 0944         Time Out 1025         Minutes 41         Timed Code Treatment Minutes: 26 Minutes     This note to serve as OT d/c summary if pt is d/c-ed prior to next therapy session.     Stephany Javed, OTR/L [id number]

## 2022-02-01 NOTE — PROGRESS NOTES
Patient in bed resting. She is A/O x4, on RA, and VSS. Evening assessment and medications given per [facility] order and with no complications. Bed in lowest locked position, refused bed alarm, an call light within reach. Patient educated on the importance of the call light. Will continue to monitor throughout the shift.

## 2022-02-01 NOTE — CONSULTS
12/1 Consult for alcohol rehab/resources. Met with patient at bedside to discuss alcohol rehab/resources. Patient states that this is not an issue for her and she does not need any help. \"I don't drink like I used to\". Refuses assistance. Electronically signed by Cait Son RN on 2/1/2022 at 8:19 AM

## 2022-02-01 NOTE — PROGRESS NOTES
visit.     has a past medical history of Anxiety, GERD (gastroesophageal reflux disease), Gout, Hyperlipidemia, and Hypertension. has a past surgical history that includes Knee arthroscopy; Carpal tunnel release; bladder suspension; Tonsillectomy; Carpal tunnel release (7-12-10); Carpal tunnel release (Left wrist); joint replacement; sinus surgery; and Foot surgery (3/2013). Restrictions  Restrictions/Precautions  Restrictions/Precautions: Fall Risk  Position Activity Restriction  Other position/activity restrictions: 1200 mL fluid restriction  Vision/Hearing  Vision: Impaired  Vision Exceptions:  (wears glasses for the computer, start of cataract)  Hearing: Within functional limits     Subjective  General  Chart Reviewed: Yes  Additional Pertinent Hx: Per Bogdan Price MD progress note from 2022: The pt is a 61 yo female who came to the ED with progressive generalized weakness and fatigue, poor po intake and dysuria. The pt was started on abx by PCP for a UTI.       PMHx: GERD, smoking abuse, alcohol use with 1 to 2 glasses of wine nightly, hypertension, previous history of congestive heart failure had an echo normal EF 2022, gout, and hyperlipidemia  Response To Previous Treatment: Not applicable  Family / Caregiver Present: No  Referring Practitioner: Bogdan Price MD  Referral Date : 22  Diagnosis: hypovolemic shock,ARF,Rt rib fractures,Hyponatremia,COPD--no home O2,Alcohol dependence,Macrocytic anemia, Moderate protein calorie malnutrition  Follows Commands: Within Functional Limits  Subjective  Subjective: the pt was found to be in the bed and awake; she was agreeable to therapy; reporting pain in her R rib cage from fx's, 8/10, (she reports she tripped over her 's cane last week)  Vital Signs  Orthostatic B/P and Pulse?: Yes  Blood Pressure Lyin/70  Pulse Lyin PER MINUTE  Blood Pressure Sittin/69  Pulse Sittin PER MINUTE  Blood Pressure Standing: 107/64  Pulse Standin PER MINUTE  Orthostatic B/P and Pulse?: Yes    Orientation  Orientation  Overall Orientation Status: Within Functional Limits  Social/Functional History  Social/Functional History  Lives With: Family ( (disabled) and 25 y.o. granddaughter)  Type of Home: House  Home Layout: Two level,Laundry in basement ([address] with handrail to 2nd floor bedroom/bathroom)  Home Access: Stairs to enter with rails  Entrance Stairs - Number of Steps: 4 SHERRI  Entrance Stairs - Rails: None  Bathroom Shower/Tub: Tub/Shower unit  Bathroom Toilet: Standard  Bathroom Equipment: Tub transfer bench,Hand-held shower,Grab bars in shower (grab bar on side of tub; recently purchased toilet safety frame)  Bathroom Accessibility: Walker accessible  Home Equipment: Cane  ADL Assistance: [address] [place] [address] Avenue: Independent (pt uses [employer]-list for groceries and does cleaning, granddaughter does laundry,)  Ambulation Assistance: Independent (with no AD in home, uses cane to get up the front steps)  Transfer Assistance: Independent  Active : Yes  Occupation: On disability  Additional Comments: Pt reports 1 recent fall ~1 week ago (tripped over 's cane)  Cognition   Cognition  Overall Cognitive Status: WFL    Objective  AROM RLE (degrees)  RLE AROM: WFL  AROM LLE (degrees)  LLE AROM : WFL  Strength RLE  Strength RLE: WFL  Strength LLE  Strength LLE: WFL     Sensation  Overall Sensation Status: WFL  Bed mobility  Supine to Sit: Stand by assistance (HOB elevated, increased time and effort)  Sit to Supine: Unable to assess  Scooting: Independent  Transfers  Sit to Stand: Stand by assistance  Stand to sit: Stand by assistance  Ambulation  Ambulation?: Yes  Ambulation 1  Surface: level tile  Device: No Device  Assistance: Stand by assistance  Quality of Gait: steady and w/o LOB, able to walk in the room setting  Distance: 10 feet x 1, 40 feet x 1  Comments: HR increased to 147 -150 with activity, see vitals for orthostatic BP's  Stairs/Curb  Stairs?: No     Balance  Sitting - Static: Good  Sitting - Dynamic: Good  Standing - Static: Good  Standing - Dynamic: Good        Plan   Plan  Times per week: 2-3x/week  Current Treatment Recommendations: Functional Mobility Training,Transfer Training,Gait Training,Stair training,Strengthening,ROM,Safety Education & Training,Patient/Caregiver Education & Training,Equipment Evaluation, Education, & procurement  Safety Devices  Type of devices: Call light within reach,Chair alarm in place,Gait belt,Patient at risk for falls,Left in chair,Nurse notified      AM-PAC Score  AM-PAC Inpatient Mobility Raw Score : 20 (02/01/22 1023)  AM-PAC Inpatient T-Scale Score : 47.67 (02/01/22 1023)  Mobility Inpatient CMS 0-100% Score: 35.83 (02/01/22 1023)  Mobility Inpatient CMS G-Code Modifier : Mayra Randal (02/01/22 1023)          Goals  Short term goals  Time Frame for Short term goals: upon d/c  Short term goal 1: Bed mobility with mod I. Short term goal 2: Transfers sit <> stand with mod I. Short term goal 3: Ambulate without device100 feet with SBA/Sup. Short term goal 4: Negotiate steps with railing with SBA.   Patient Goals   Patient goals : to be able to be up on her own in the room       Therapy Time   Individual Concurrent Group Co-treatment   Time In 0944         Time Out 1025         Minutes 41         Timed Code Treatment Minutes: 26 Minutes       Electronically signed by Belkis Key, PT [id number] on 2/1/2022 at 10:35 AM

## 2022-02-01 NOTE — PROGRESS NOTES
Dr. Domingo Faye at bedside inquired about labs, RN placed call to lab informed that phlebotomist will be up. Will continue to monitor.

## 2022-02-01 NOTE — PROGRESS NOTES
R triple lumen femoral access removed per MD order , dressing applied site clean dry intact. Pt instructed to lie supine for at least 30 minutes to prevent bleeding.

## 2022-02-01 NOTE — PROGRESS NOTES
Pulmonary Critical Care progress Note     Patient's name:  [address] [place] [address] Record Number: [id number]  Patient's account/billing number: [de-identified]  Patient's YOB: 1960  Age: 62 y.o. Date of Admission: 1/28/2022  1:38 AM  Date of Consult: 2/1/2022      Primary Care Physician: Melissa Macias MD      Code Status: Full Code    Reason for consult: hypovolemic shock     Assessment and Plan     1. Hypovolemic shock resolved   2. ARF prerenal vs bactrim resolved   3. Right 6th and 7th rib fracture  4. Hyponatremia   5. COPD/emphysema   6. H/o alcohol abuse      Plan:  Bronchodilators. spiriva and symbicort, Duoneb as needed. IS  Advance pt/ot        REVIEW OF SYSTEMS:  Review of Systems -   General ROS: fatigue   Psychological ROS: negative  Ophthalmic ROS: negative  ENT ROS: negative  Allergy and Immunology ROS: negative  Hematological and Lymphatic ROS: negative  Endocrine ROS: negative  Breast ROS: negative  Respiratory ROS: chronic cough, sputum, sob,   Cardiovascular ROS: chest pain pleuritic  Gastrointestinal ROS: lose stool   Genito-Urinary ROS: pain around arizmendi site   Musculoskeletal ROS: negative  Neurological ROS: negative  Dermatological ROS: negative        Physical Exam:    Vitals: /77   Pulse 100   Temp 98.1 °F (36.7 °C) (Oral)   Resp 17   Ht 5' 1\" (1.549 m)   Wt 126 lb 8.7 oz (57.4 kg)   SpO2 95%   BMI 23.91 kg/m²     Last Body weight:   Wt Readings from Last 3 Encounters:   02/01/22 126 lb 8.7 oz (57.4 kg)   01/24/22 115 lb (52.2 kg)   11/17/21 117 lb 12.8 oz (53.4 kg)       Body Mass Index : Body mass index is 23.91 kg/m². Intake and Output summary:   No intake or output data in the 24 hours ending 02/01/22 1207    Physical Examination:     PHYSICAL EXAM:    Gen:  Mild acute distress   Eyes: PERRL. Anicteric sclera. No conjunctival injection. ENT: No discharge. Posterior oropharynx clear.  External appearance of ears and nose normal.  Neck: Trachea midline. No mass, no lymphadenopathy    Resp:  Diminished with rhonchi, right chest pain   CV: Regular rate. Regular rhythm. No murmur or rub. No edema. GI: Soft, Non-tender. Non-distended. +BS  Skin: Warm, dry, w/o erythema. Lymph: No cervical or supraclavicular LAD. M/S: No cyanosis. No clubbing. Neuro:  CN 2-12 tested, no focal neurologic deficit. Moves all extremities  Psych: Awake and alert, Oriented x 3. Judgement and insight appropriate. Mood stable. Laboratory findings:-    CBC:   Recent Labs     01/31/22 0415   WBC 4.9   HGB 7.6*        BMP:    Recent Labs     01/30/22  0840 01/30/22  0840 01/30/22  1220 01/30/22  1611 01/31/22  0415 01/31/22  0415 01/31/22  0948   *   < > 121*   < > 120*   < > 123*   K 3.8   < > 3.8   < > 3.5  --   --    CL 91*   < > 87*   < > 86*  --   --    CO2 21   < > 21   < > 20*  --   --    BUN 7   < > 7   < > 7  --   --    CREATININE 0.6  --  0.7  --  0.7  --   --    GLUCOSE 124*   < > 104*   < > 103*  --   --     < > = values in this interval not displayed. S. Calcium:  Recent Labs     01/31/22 0415   CALCIUM 8.0*     S. Ionized Calcium:No results for input(s): IONCA in the last 72 hours. S. Magnesium:  Recent Labs     01/31/22 0415   MG 1.50*     S. Phosphorus:No results for input(s): PHOS in the last 72 hours. S. Glucose:No results for input(s): POCGLU in the last 72 hours. Glycosylated hemoglobin A1C: No results for input(s): LABA1C in the last 72 hours. INR: No results for input(s): INR in the last 72 hours. Hepatic functions:   Recent Labs     01/31/22 0415   ALKPHOS 119   ALT 18   AST 45*   PROT 4.8*   BILITOT 0.7   LABALBU 2.5*     Pancreatic functions:  No results for input(s): LACTA, AMYLASE in the last 72 hours. S. Lactic Acid:   No results for input(s): LACTA in the last 72 hours.   Cardiac enzymes:  No results for input(s): CKTOTAL, CKMB, CKMBINDEX, TROPONINI in the last 72 hours.  BNP:No results for input(s): BNP in the last 72 hours. Lipid profile: No results for input(s): CHOL, TRIG, HDL, LDL, LDLCALC in the last 72 hours. Blood Gases: No results found for: PH, PCO2, PO2, HCO3, O2SAT  Thyroid functions:   Lab Results   Component Value Date    TSH 1.01 06/04/2021          Radiology Review:  Pertinent images / reports were reviewed as a part of this visit. CT Chest w/ contrast: Results for orders placed during the hospital encounter of 11/18/10    CT chest with contrast    Narrative  Reason for exam-->weak lt vocal cord    CT CHEST WITH CONTRAST-    INDICATION-  Weak left vocal cord, possible recurrent laryngeal  nerve palsy. COMPARISON- None available. FINDINGS-    There is mild centrilobular emphysema within the upper lungs. Mild  dependent atelectasis is present bilaterally. No suspicious  lesions are detected within the lung parenchyma. The airways are  patent. No mediastinal masses are identified. No axillary lymphadenopathy. There is atherosclerotic calcification of the thoracic aorta. Coronary artery calcification is also present. These are somewhat  advanced for patient's age. Images of the upper abdomen are unremarkable. Mild spondylosis within the thoracic spine is seen. No suspicious  lesions detected within the bony structures. IMPRESSION-    1. No mediastinal masses identified to explain recurrent laryngeal  nerve palsy. 2. Coronary artery and aortic calcification, advanced for the  patient's age. 3. Mild centrilobular emphysema within the upper lungs. Read By- Melinda Perez  Released By- Melinda Perez  Released Date Time- 11/18/10 1054    [facility]  ------------------------------------------------------------------------------      CT Chest w/o contrast: No results found for this or any previous visit.       CTPA: Results for orders placed during the hospital encounter of 06/04/21    CT CHEST PULMONARY EMBOLISM W CONTRAST    Narrative  EXAMINATION:  CTA OF THE CHEST 6/4/2021 11:13 am    TECHNIQUE:  CTA of the chest was performed after the administration of intravenous  contrast.  Multiplanar reformatted images are provided for review. MIP  images are provided for review. Dose modulation, iterative reconstruction,  and/or weight based adjustment of the mA/kV was utilized to reduce the  radiation dose to as low as reasonably achievable. COMPARISON:  None. HISTORY:  ORDERING SYSTEM PROVIDED HISTORY: SOB, tachy  TECHNOLOGIST PROVIDED HISTORY:  Reason for exam:->SOB, tachy  Decision Support Exception - unselect if not a suspected or confirmed  emergency medical condition->Emergency Medical Condition (MA)  Reason for Exam: sob  Acuity: Acute  Type of Exam: Initial    FINDINGS:  Thyroid gland appears normal.  Small mediastinal and hilar nodes are noted. Moderate to severe coronary artery calcification is seen. Trace pericardial  fluid is seen. Small hiatal hernia seen. There is nonspecific thickening at  the GE junction. No embolus is seen in the central, right, or left main pulmonary artery. No  embolus is seen on the right or left, at least to the subsegmental level. Beam hardening artifact is seen from contrast bolus in the SVC, limiting  evaluation of the pulmonary arteries on the right. Mild underlying emphysema is seen. Small left-sided pleural effusion is  seen. There is adjacent left basilar consolidation. On the right, hazy ground-glass opacity is seen in the right upper, right  middle and right lower lobe. Small right-sided pleural effusion is seen. There is adjacent right basilar consolidation. There is subtle septal  thickening in the right lung base. There is reflux of contrast into the IVC and hepatic veins. There is fatty  infiltration of the liver. Atherosclerotic change seen in aorta. 2 mm  calcifications seen in the right and left kidney.   There is lobular contour  to the left kidney. Cyst is seen in the left kidney, measuring 9 mm. Spurring is seen in the spine. Spurring is seen in the shoulder joints. Impression  No central pulmonary embolus identified    Bilateral pleural effusions, with adjacent consolidation, at the lung bases  either due to atelectasis or pneumonia. Scattered opacities are seen  throughout the right lung. .  There is a background of pulmonary emphysema. Subtle septal thickening at the right lung base, suggest a component of fluid  overload-pulmonary edema    Moderate to severe coronary artery calcification. CXR PA/LAT: Results for orders placed during the hospital encounter of 06/04/21    XR CHEST (2 VW)    Narrative  EXAMINATION:  TWO XRAY VIEWS OF THE CHEST    6/7/2021 11:30 am    COMPARISON:  CT of the chest and chest x-ray June 4, 2021    HISTORY:  ORDERING SYSTEM PROVIDED HISTORY: Pulm edema, Pleural effusions, follow up  study. TECHNOLOGIST PROVIDED HISTORY:  Reason for exam:->Pulm edema, Pleural effusions, follow up study. Reason for Exam: no chest pain, SOB getting better, recent heart procedure  (cardiac ablation)  Acuity: Acute  Type of Exam: Initial    FINDINGS:  Slightly decreased right pleural effusion. Pulmonary edema appears  decreased. No pneumothorax. Impression  Decreased right pleural effusion, with a small residual pleural effusion. Decreased pulmonary edema. Minimal trace left pleural effusion      CXR portable: Results for orders placed during the hospital encounter of 01/28/22    XR CHEST PORTABLE    Narrative  EXAMINATION:  ONE XRAY VIEW OF THE CHEST    1/28/2022 2:15 am    COMPARISON:  06/07/2021    HISTORY:  ORDERING SYSTEM PROVIDED HISTORY: SOB  TECHNOLOGIST PROVIDED HISTORY:  Reason for exam:->SOB  Reason for Exam: SOB    FINDINGS:  Cardiac and mediastinal silhouettes appear within normal limits for size. Pulmonary vascularity is normal.  No parenchymal consolidation or pleural  effusion is identified.   No pneumothorax. Chronic interstitial change  related to underlying COPD. Atherosclerotic calcifications are identified. Osteopenia. Degenerative changes in the spine and shoulders. Impression  COPD, though no acute cardiopulmonary process.         Viv Montiel MD, M.D.            2/1/2022, 12:07 PM

## 2022-02-02 VITALS
WEIGHT: 130.07 LBS | OXYGEN SATURATION: 93 % | HEART RATE: 118 BPM | RESPIRATION RATE: 16 BRPM | DIASTOLIC BLOOD PRESSURE: 84 MMHG | BODY MASS INDEX: 24.56 KG/M2 | TEMPERATURE: 97.5 F | HEIGHT: 61 IN | SYSTOLIC BLOOD PRESSURE: 132 MMHG

## 2022-02-02 PROBLEM — R53.1 GENERALIZED WEAKNESS: Status: RESOLVED | Noted: 2022-01-28 | Resolved: 2022-02-02

## 2022-02-02 LAB
ANION GAP SERPL CALCULATED.3IONS-SCNC: 12 MMOL/L (ref 3–16)
BASOPHILS ABSOLUTE: 0 K/UL (ref 0–0.2)
BASOPHILS RELATIVE PERCENT: 0.2 %
BUN BLDV-MCNC: 7 MG/DL (ref 7–20)
CALCIUM SERPL-MCNC: 8 MG/DL (ref 8.3–10.6)
CHLORIDE BLD-SCNC: 96 MMOL/L (ref 99–110)
CO2: 21 MMOL/L (ref 21–32)
CREAT SERPL-MCNC: 0.5 MG/DL (ref 0.6–1.2)
EOSINOPHILS ABSOLUTE: 0 K/UL (ref 0–0.6)
EOSINOPHILS RELATIVE PERCENT: 1 %
GFR AFRICAN AMERICAN: >60
GFR NON-AFRICAN AMERICAN: >60
GLUCOSE BLD-MCNC: 81 MG/DL (ref 70–99)
HCT VFR BLD CALC: 22 % (ref 36–48)
HEMOGLOBIN: 7.5 G/DL (ref 12–16)
LYMPHOCYTES ABSOLUTE: 1.3 K/UL (ref 1–5.1)
LYMPHOCYTES RELATIVE PERCENT: 28.9 %
MAGNESIUM: 1.7 MG/DL (ref 1.8–2.4)
MCH RBC QN AUTO: 36.9 PG (ref 26–34)
MCHC RBC AUTO-ENTMCNC: 34 G/DL (ref 31–36)
MCV RBC AUTO: 108.7 FL (ref 80–100)
MONOCYTES ABSOLUTE: 0.7 K/UL (ref 0–1.3)
MONOCYTES RELATIVE PERCENT: 15.4 %
NEUTROPHILS ABSOLUTE: 2.5 K/UL (ref 1.7–7.7)
NEUTROPHILS RELATIVE PERCENT: 54.5 %
PDW BLD-RTO: 15.4 % (ref 12.4–15.4)
PLATELET # BLD: 313 K/UL (ref 135–450)
PMV BLD AUTO: 6.3 FL (ref 5–10.5)
POTASSIUM SERPL-SCNC: 3 MMOL/L (ref 3.5–5.1)
RBC # BLD: 2.02 M/UL (ref 4–5.2)
SODIUM BLD-SCNC: 129 MMOL/L (ref 136–145)
SODIUM BLD-SCNC: 129 MMOL/L (ref 136–145)
WBC # BLD: 4.5 K/UL (ref 4–11)

## 2022-02-02 PROCEDURE — 6370000000 HC RX 637 (ALT 250 FOR IP): Performed by: STUDENT IN AN ORGANIZED HEALTH CARE EDUCATION/TRAINING PROGRAM

## 2022-02-02 PROCEDURE — 94640 AIRWAY INHALATION TREATMENT: CPT

## 2022-02-02 PROCEDURE — 97116 GAIT TRAINING THERAPY: CPT

## 2022-02-02 PROCEDURE — 84295 ASSAY OF SERUM SODIUM: CPT

## 2022-02-02 PROCEDURE — 99231 SBSQ HOSP IP/OBS SF/LOW 25: CPT | Performed by: INTERNAL MEDICINE

## 2022-02-02 PROCEDURE — 6370000000 HC RX 637 (ALT 250 FOR IP): Performed by: HOSPITALIST

## 2022-02-02 PROCEDURE — 80048 BASIC METABOLIC PNL TOTAL CA: CPT

## 2022-02-02 PROCEDURE — 94760 N-INVAS EAR/PLS OXIMETRY 1: CPT

## 2022-02-02 PROCEDURE — 36415 COLL VENOUS BLD VENIPUNCTURE: CPT

## 2022-02-02 PROCEDURE — 6360000002 HC RX W HCPCS: Performed by: HOSPITALIST

## 2022-02-02 PROCEDURE — 97530 THERAPEUTIC ACTIVITIES: CPT

## 2022-02-02 PROCEDURE — 85025 COMPLETE CBC W/AUTO DIFF WBC: CPT

## 2022-02-02 PROCEDURE — 83735 ASSAY OF MAGNESIUM: CPT

## 2022-02-02 PROCEDURE — 97110 THERAPEUTIC EXERCISES: CPT

## 2022-02-02 PROCEDURE — 2580000003 HC RX 258: Performed by: STUDENT IN AN ORGANIZED HEALTH CARE EDUCATION/TRAINING PROGRAM

## 2022-02-02 RX ORDER — CARVEDILOL 3.12 MG/1
3.12 TABLET ORAL 2 TIMES DAILY WITH MEALS
Status: DISCONTINUED | OUTPATIENT
Start: 2022-02-02 | End: 2022-02-02 | Stop reason: HOSPADM

## 2022-02-02 RX ORDER — MAGNESIUM SULFATE IN WATER 40 MG/ML
2000 INJECTION, SOLUTION INTRAVENOUS ONCE
Status: COMPLETED | OUTPATIENT
Start: 2022-02-02 | End: 2022-02-02

## 2022-02-02 RX ORDER — POTASSIUM CHLORIDE 20 MEQ/1
40 TABLET, EXTENDED RELEASE ORAL ONCE
Status: COMPLETED | OUTPATIENT
Start: 2022-02-02 | End: 2022-02-02

## 2022-02-02 RX ORDER — SODIUM CHLORIDE 1000 MG
1 TABLET, SOLUBLE MISCELLANEOUS
Qty: 21 TABLET | Refills: 0 | Status: SHIPPED | OUTPATIENT
Start: 2022-02-02 | End: 2022-02-11

## 2022-02-02 RX ADMIN — ALLOPURINOL 300 MG: 300 TABLET ORAL at 09:59

## 2022-02-02 RX ADMIN — MAGNESIUM SULFATE HEPTAHYDRATE 2000 MG: 40 INJECTION, SOLUTION INTRAVENOUS at 11:38

## 2022-02-02 RX ADMIN — PANTOPRAZOLE SODIUM 40 MG: 40 TABLET, DELAYED RELEASE ORAL at 06:19

## 2022-02-02 RX ADMIN — Medication 2 PUFF: at 09:26

## 2022-02-02 RX ADMIN — POTASSIUM CHLORIDE 40 MEQ: 20 TABLET, EXTENDED RELEASE ORAL at 09:59

## 2022-02-02 RX ADMIN — MONTELUKAST 10 MG: 10 TABLET, FILM COATED ORAL at 10:00

## 2022-02-02 RX ADMIN — Medication 1 G: at 11:32

## 2022-02-02 RX ADMIN — FOLIC ACID 1 MG: 1 TABLET ORAL at 10:00

## 2022-02-02 RX ADMIN — SODIUM CHLORIDE, PRESERVATIVE FREE 10 ML: 5 INJECTION INTRAVENOUS at 10:01

## 2022-02-02 RX ADMIN — ASPIRIN 81 MG: 81 TABLET, COATED ORAL at 09:59

## 2022-02-02 RX ADMIN — ATORVASTATIN CALCIUM 20 MG: 20 TABLET, FILM COATED ORAL at 10:00

## 2022-02-02 RX ADMIN — TIOTROPIUM BROMIDE INHALATION SPRAY 2 PUFF: 3.12 SPRAY, METERED RESPIRATORY (INHALATION) at 09:26

## 2022-02-02 RX ADMIN — FLUTICASONE PROPIONATE 2 SPRAY: 50 SPRAY, METERED NASAL at 10:00

## 2022-02-02 RX ADMIN — CARVEDILOL 3.12 MG: 3.12 TABLET, FILM COATED ORAL at 11:32

## 2022-02-02 RX ADMIN — Medication 1 G: at 10:00

## 2022-02-02 RX ADMIN — Medication 400 MG: at 10:00

## 2022-02-02 RX ADMIN — Medication 100 MG: at 10:00

## 2022-02-02 NOTE — PLAN OF CARE
Problem: Falls - Risk of:  Goal: Will remain free from falls  Description: Will remain free from falls  2/2/2022 1110 by Samm Burks RN  Outcome: Ongoing  2/2/2022 0220 by Melania Painter RN  Outcome: Ongoing  Goal: Absence of physical injury  Description: Absence of physical injury  2/2/2022 1110 by Samm Burks RN  Outcome: Ongoing  2/2/2022 0220 by Melania Painter RN  Outcome: Ongoing     Problem: Skin Integrity:  Goal: Will show no infection signs and symptoms  Description: Will show no infection signs and symptoms  2/2/2022 0220 by Melania Painter RN  Outcome: Ongoing  Goal: Absence of new skin breakdown  Description: Absence of new skin breakdown  2/2/2022 1110 by Samm Burks RN  Outcome: Ongoing  2/2/2022 0220 by Melania Painter RN  Outcome: Ongoing     Problem: Pain:  Goal: Pain level will decrease  Description: Pain level will decrease  2/2/2022 0220 by Melania Painter RN  Outcome: Ongoing  Goal: Control of acute pain  Description: Control of acute pain  2/2/2022 0220 by Melania Painter RN  Outcome: Ongoing  Goal: Control of chronic pain  Description: Control of chronic pain  2/2/2022 1110 by Samm Burks RN  Outcome: Ongoing  2/2/2022 0220 by Melania Painter RN  Outcome: Ongoing

## 2022-02-02 NOTE — PROGRESS NOTES
Occupational Therapy  Facility/Department: [facility]  Daily Treatment Note  NAME: Redd Elias  : 1960  MRN: [id number]    Date of Service: 2022    Discharge Recommendations:  Home with assist PRN  OT Equipment Recommendations  Equipment Needed: Alis Molina scored a 23/24 on the AM-PAC ADL Inpatient form. At this time, no further OT is recommended upon discharge due to pt functioning near baseline. Recommend patient returns to prior setting with prior services. Assessment   Performance deficits / Impairments: Decreased functional mobility ; Decreased ADL status; Decreased strength;Decreased endurance;Decreased high-level IADLs  Assessment: Pt making good progress towards goals, nearing baseline. Pt particiapted in B UE HEP to improve strength/endurance for ADLs. Pt completed fxl mobility further distances in hallway with no AD and supervision. Pt declined to complete ADLs, but anticipate pt would require overall supervision for ADLs. Cont per OT POC. Anticipate pt will be safe to return home with assist prn at d/c. Prognosis: Good  OT Education: OT Role;Transfer Training;Home Exercise Program;Plan of Care  Barriers to Learning: none  REQUIRES OT FOLLOW UP: Yes  Activity Tolerance  Activity Tolerance: Patient Tolerated treatment well  Safety Devices  Safety Devices in place: Yes  Type of devices: Call light within reach; Left in bed (left seated EOB as found upon OT arrival, pt reports UAL)         Patient Diagnosis(es): The primary encounter diagnosis was Hyponatremia. A diagnosis of Shock ([address] was also pertinent to this visit. has a past medical history of Anxiety, GERD (gastroesophageal reflux disease), Gout, Hyperlipidemia, and Hypertension. has a past surgical history that includes Knee arthroscopy; Carpal tunnel release; bladder suspension; Tonsillectomy; Carpal tunnel release (712-10);  Carpal tunnel release (Left wrist); joint replacement; sinus surgery; and Foot surgery (3/2013). Restrictions  Restrictions/Precautions  Restrictions/Precautions: Fall Risk  Position Activity Restriction  Other position/activity restrictions: 1200 mL fluid restriction  Subjective   General  Chart Reviewed: Yes  Additional Pertinent Hx: Per Geovanna Edgar MD's 1/31 : \"62 y.o. female PMH GERD, smoking abuse, alcohol use with 1 to 2 glasses of wine nightly, hypertension, previous history of congestive heart failure had an echo normal EF January 2022, gout, and hyperlipidemia presents to [facility] with progressive generalized weakness and fatigue, poor p.o. intake x 6 to 7 days. c/o dysuria saw her PCP on Monday urine was examined and noted to have a UTI. started on Bactrim. progressively continued to become more generally weak and had lightheadedness and BOONE. continued to have poor p.o. intake although she was still taking her antihypertensive medications and her diuretic. She got worse to the point where tonight she called EMS because her weakness was so bad. SBP  70s to 80s was brought to the ED. symptoms of generalized weakness, lightheadedness, poor p.o. intake, patient denies any other recent symptoms of fever, chills, syncope, chest pain, blood in urine/stool/sputum, leg swelling, rashes, abdominal pain/nausea/vomiting/diarrheaNA 116, BUN 15, CR 1.7 lactic acid 2.2,  troponin 0 0.01. EtOH negative U tox negative TSH 1.22. WBC 8.4.  UA cloudy 6 WBC.,  No urine culture warranted. She just had E. coli in the urine January 24, 2022 which was sensitive to the Bactrim. Required Levophed. seen by nephrology given gentle IV NS hydration sodium increased to 120. Sodium levelled off with IVF. started sodium chloride tabs and a fluid restriction but NA unchanged 123.  \"  Family / Caregiver Present: No  Referring Practitioner: Geovanna Edgar MD  Diagnosis: hypovolemic shock, ARF, Rt rib fractures, Hyponatremia, Alcohol dependence, Macrocytic anemia, Moderate protein calorie malnutrition  Subjective  Subjective: Pt met b/s for OT tx. Pt seated EOB on arrival, agreeable to participate in therpay for therex. Pt reports she is going home today. Objective    ADL  LE Dressing: Modified independent  (to don/doff socks, don shoes)  Additional Comments: pt declined completing ADLs, reports she is going to complete at home. Balance  Sitting Balance: Independent  Standing Balance: Supervision  Functional Mobility  Functional - Mobility Device: No device  Activity: Other  Assist Level: Supervision  Functional Mobility Comments: Pt completed fxl mobility in room and community distance in hallway to visitor's lounge and back with no AD and supervision. Bed mobility  Comment: NT-pt seated EOB at start/end of session     Transfers  Sit to stand: Supervision  Stand to sit: Supervision     Cognition  Overall Cognitive Status: WFL     Type of ROM/Therapeutic Exercise  Type of ROM/Therapeutic Exercise: AROM  Comment: Pt completed theraband HEP using green tband 8 exercises x10 reps each, as well as AROM B UE's and educated to complete with free weights at home. Pt provided with 2 HEP handouts and 2 therabands (red and green) for home. Exercises  Shoulder Flexion: x10  Shoulder ABduction: x10  Shoulder ADduction: x10  Horizontal ABduction: x10  Other: chest press x10                    Plan   Plan  Times per week: 3-5  Current Treatment Recommendations: Functional Mobility Training,Balance Training,Strengthening,Endurance Training,Safety Education & Training,Self-Care / ADL           AM-PAC Score        AM-[facility] Inpatient Daily Activity Raw Score: 23 (02/02/22 1054)  AM-PAC Inpatient ADL T-Scale Score : 51.12 (02/02/22 1054)  ADL Inpatient CMS 0-100% Score: 15.86 (02/02/22 1054)  ADL Inpatient CMS G-Code Modifier : CI (02/02/22 1054)    Goals  Short term goals  Time Frame for Short term goals: Prior to d/c: status goals 2/2: all goals ongonig  Short term goal 1: Pt will bathe with mod I.   Short term goal 2: Pt will dress with mod I. Short term goal 3: Pt will toilet with mod I. Short term goal 4: Pt will complete fxl mobility and fxl transfers to/from ADL surfaces independent/mod I. Short term goal 5: Pt will complete 10 reps of B UE therex in all planes to improve strength for ADLs. -goal met once and ongoing  Long term goals  Time Frame for Long term goals : STGs=LTGs  Patient Goals   Patient goals : to return home.        Therapy Time   Individual Concurrent Group Co-treatment   Time In 1006         Time Out [address] OTR/L 9896

## 2022-02-02 NOTE — PROGRESS NOTES
Pt educated on discharge information. Pt with no further questions at this time. Pt IV removed with no complications. Pt transported to [facility] where she is driving herself home.     Electronically signed by Barney Saeed RN on 2/2/22 at 4:02 PM EST

## 2022-02-02 NOTE — CARE COORDINATION
Amrita  Ph: [address]    Caridad Pryor Sr.  Administrative Assist, Case Management  [phone]  Electronically signed by Caridad Pryor on 2/2/2022 at 8:55 AM

## 2022-02-02 NOTE — PROGRESS NOTES
Pulmonary Critical Care progress Note     Patient's name:  [address] [place] [address] Record Number: [id number]  Patient's account/billing number: [de-identified]  Patient's YOB: 1960  Age: 62 y.o. Date of Admission: 1/28/2022  1:38 AM  Date of Consult: 2/2/2022      Primary Care Physician: Danae Gray MD      Code Status: Full Code    Reason for consult: hypovolemic shock     Assessment and Plan     1. Hypovolemic shock resolved   2. ARF prerenal resolved   3. Right 6th and 7th rib fracture  4. COPD/emphysema   5. H/o alcohol abuse      Plan:  Ok to d/c from pulmonary stand point   Bronchodilators. spiriva and symbicort  Advised to quit smoking         REVIEW OF SYSTEMS:  Review of Systems -   General ROS: fatigue   Psychological ROS: negative  Ophthalmic ROS: negative  ENT ROS: negative  Allergy and Immunology ROS: negative  Hematological and Lymphatic ROS: negative  Endocrine ROS: negative  Breast ROS: negative  Respiratory ROS: chronic cough, sputum, sob,   Cardiovascular ROS: chest pain pleuritic  Gastrointestinal ROS: lose stool   Genito-Urinary ROS: pain around arizmendi site   Musculoskeletal ROS: negative  Neurological ROS: negative  Dermatological ROS: negative        Physical Exam:    Vitals: /81   Pulse 93   Temp 97.9 °F (36.6 °C) (Oral)   Resp 18   Ht 5' 1\" (1.549 m)   Wt 130 lb 1.1 oz (59 kg)   SpO2 96%   BMI 24.58 kg/m²     Last Body weight:   Wt Readings from Last 3 Encounters:   02/02/22 130 lb 1.1 oz (59 kg)   01/24/22 115 lb (52.2 kg)   11/17/21 117 lb 12.8 oz (53.4 kg)       Body Mass Index : Body mass index is 24.58 kg/m². Intake and Output summary:     Intake/Output Summary (Last 24 hours) at 2/2/2022 0945  Last data filed at 2/2/2022 8478  Gross per 24 hour   Intake 836 ml   Output --   Net 836 ml       Physical Examination:     PHYSICAL EXAM:    Gen:  Mild acute distress   Eyes: PERRL. Anicteric sclera.  No conjunctival injection. ENT: No discharge. Posterior oropharynx clear. External appearance of ears and nose normal.  Neck: Trachea midline. No mass, no lymphadenopathy    Resp:  Diminished with rhonchi, right chest pain   CV: Regular rate. Regular rhythm. No murmur or rub. No edema. GI: Soft, Non-tender. Non-distended. +BS  Skin: Warm, dry, w/o erythema. Lymph: No cervical or supraclavicular LAD. M/S: No cyanosis. No clubbing. Neuro:  CN 2-12 tested, no focal neurologic deficit. Moves all extremities  Psych: Awake and alert, Oriented x 3. Judgement and insight appropriate. Mood stable. Laboratory findings:-    CBC:   Recent Labs     02/01/22  1210   WBC 9.4   HGB 8.8*        BMP:    Recent Labs     01/31/22  0415 01/31/22  0948 02/01/22  1210 02/01/22  2112 02/02/22  0607   *   < > 128*   < > 129*   K 3.5  --  3.6  --  3.0*   CL 86*  --  97*  --  96*   CO2 20*  --  22  --  21   BUN 7  --  8  --  7   CREATININE 0.7  --  0.7  --  0.5*   GLUCOSE 103*  --  89  --  81    < > = values in this interval not displayed. S. Calcium:  Recent Labs     02/02/22  0607   CALCIUM 8.0*     S. Ionized Calcium:No results for input(s): IONCA in the last 72 hours. S. Magnesium:  Recent Labs     02/02/22  0844   MG 1.70*     S. Phosphorus:No results for input(s): PHOS in the last 72 hours. S. Glucose:No results for input(s): POCGLU in the last 72 hours. Glycosylated hemoglobin A1C: No results for input(s): LABA1C in the last 72 hours. INR: No results for input(s): INR in the last 72 hours. Hepatic functions:   Recent Labs     01/31/22  0415   ALKPHOS 119   ALT 18   AST 45*   PROT 4.8*   BILITOT 0.7   LABALBU 2.5*     Pancreatic functions:  No results for input(s): LACTA, AMYLASE in the last 72 hours. S. Lactic Acid:   No results for input(s): LACTA in the last 72 hours. Cardiac enzymes:  No results for input(s): CKTOTAL, CKMB, CKMBINDEX, TROPONINI in the last 72 hours.   BNP:No results for input(s): BNP in the last 72 hours. Lipid profile: No results for input(s): CHOL, TRIG, HDL, LDL, LDLCALC in the last 72 hours. Blood Gases: No results found for: PH, PCO2, PO2, HCO3, O2SAT  Thyroid functions:   Lab Results   Component Value Date    TSH 1.01 06/04/2021          Radiology Review:  Pertinent images / reports were reviewed as a part of this visit. CT Chest w/ contrast: Results for orders placed during the hospital encounter of 11/18/10    CT chest with contrast    Narrative  Reason for exam-->weak lt vocal cord    CT CHEST WITH CONTRAST-    INDICATION-  Weak left vocal cord, possible recurrent laryngeal  nerve palsy. COMPARISON- None available. FINDINGS-    There is mild centrilobular emphysema within the upper lungs. Mild  dependent atelectasis is present bilaterally. No suspicious  lesions are detected within the lung parenchyma. The airways are  patent. No mediastinal masses are identified. No axillary lymphadenopathy. There is atherosclerotic calcification of the thoracic aorta. Coronary artery calcification is also present. These are somewhat  advanced for patient's age. Images of the upper abdomen are unremarkable. Mild spondylosis within the thoracic spine is seen. No suspicious  lesions detected within the bony structures. IMPRESSION-    1. No mediastinal masses identified to explain recurrent laryngeal  nerve palsy. 2. Coronary artery and aortic calcification, advanced for the  patient's age. 3. Mild centrilobular emphysema within the upper lungs. Read By- Glory Higgins  Released By- Glory Higgins  Released Date Time- 11/18/10 1054    [address] [place] [facility]  ------------------------------------------------------------------------------      CT Chest w/o contrast: No results found for this or any previous visit.       CTPA: Results for orders placed during the hospital encounter of 06/04/21    CT CHEST PULMONARY EMBOLISM W CONTRAST    Narrative  EXAMINATION:  CTA OF THE CHEST 6/4/2021 11:13 am    TECHNIQUE:  CTA of the chest was performed after the administration of intravenous  contrast.  Multiplanar reformatted images are provided for review. MIP  images are provided for review. Dose modulation, iterative reconstruction,  and/or weight based adjustment of the mA/kV was utilized to reduce the  radiation dose to as low as reasonably achievable. COMPARISON:  None. HISTORY:  ORDERING SYSTEM PROVIDED HISTORY: SOB, tachy  TECHNOLOGIST PROVIDED HISTORY:  Reason for exam:->SOB, tachy  Decision Support Exception - unselect if not a suspected or confirmed  emergency medical condition->Emergency Medical Condition (MA)  Reason for Exam: sob  Acuity: Acute  Type of Exam: Initial    FINDINGS:  Thyroid gland appears normal.  Small mediastinal and hilar nodes are noted. Moderate to severe coronary artery calcification is seen. Trace pericardial  fluid is seen. Small hiatal hernia seen. There is nonspecific thickening at  the GE junction. No embolus is seen in the central, right, or left main pulmonary artery. No  embolus is seen on the right or left, at least to the subsegmental level. Beam hardening artifact is seen from contrast bolus in the SVC, limiting  evaluation of the pulmonary arteries on the right. Mild underlying emphysema is seen. Small left-sided pleural effusion is  seen. There is adjacent left basilar consolidation. On the right, hazy ground-glass opacity is seen in the right upper, right  middle and right lower lobe. Small right-sided pleural effusion is seen. There is adjacent right basilar consolidation. There is subtle septal  thickening in the right lung base. There is reflux of contrast into the IVC and hepatic veins. There is fatty  infiltration of the liver. Atherosclerotic change seen in aorta. 2 mm  calcifications seen in the right and left kidney.   There is lobular contour  to the left kidney. Cyst is seen in the left kidney, measuring 9 mm. Spurring is seen in the spine. Spurring is seen in the shoulder joints. Impression  No central pulmonary embolus identified    Bilateral pleural effusions, with adjacent consolidation, at the lung bases  either due to atelectasis or pneumonia. Scattered opacities are seen  throughout the right lung. .  There is a background of pulmonary emphysema. Subtle septal thickening at the right lung base, suggest a component of fluid  overload-pulmonary edema    Moderate to severe coronary artery calcification. CXR PA/LAT: Results for orders placed during the hospital encounter of 06/04/21    XR CHEST (2 VW)    Narrative  EXAMINATION:  TWO XRAY VIEWS OF THE CHEST    6/7/2021 11:30 am    COMPARISON:  CT of the chest and chest x-ray June 4, 2021    HISTORY:  ORDERING SYSTEM PROVIDED HISTORY: Pulm edema, Pleural effusions, follow up  study. TECHNOLOGIST PROVIDED HISTORY:  Reason for exam:->Pulm edema, Pleural effusions, follow up study. Reason for Exam: no chest pain, SOB getting better, recent heart procedure  (cardiac ablation)  Acuity: Acute  Type of Exam: Initial    FINDINGS:  Slightly decreased right pleural effusion. Pulmonary edema appears  decreased. No pneumothorax. Impression  Decreased right pleural effusion, with a small residual pleural effusion. Decreased pulmonary edema. Minimal trace left pleural effusion      CXR portable: Results for orders placed during the hospital encounter of 01/28/22    XR CHEST PORTABLE    Narrative  EXAMINATION:  ONE XRAY VIEW OF THE CHEST    1/28/2022 2:15 am    COMPARISON:  06/07/2021    HISTORY:  ORDERING SYSTEM PROVIDED HISTORY: SOB  TECHNOLOGIST PROVIDED HISTORY:  Reason for exam:->SOB  Reason for Exam: SOB    FINDINGS:  Cardiac and mediastinal silhouettes appear within normal limits for size. Pulmonary vascularity is normal.  No parenchymal consolidation or pleural  effusion is identified.   No pneumothorax. Chronic interstitial change  related to underlying COPD. Atherosclerotic calcifications are identified. Osteopenia. Degenerative changes in the spine and shoulders. Impression  COPD, though no acute cardiopulmonary process.         Felicia Lake MD, M.D.            2/2/2022, 9:45 AM

## 2022-02-02 NOTE — DISCHARGE SUMMARY
Hospital Medicine Discharge Summary    Patient ID: Pauline Pearl      Patient's PCP: Randa Vazquez MD    Admit Date: 1/28/2022     Discharge Date:   2/2/22    Admitting Provider: Althea Bernabe DO     Discharge Provider: Thad Coronel MD     Discharge Diagnoses:     hyponatremia   Active Hospital Problems    Diagnosis     Alcohol use [Z72.89]     Hypotension [I95.9]     Hx of congestive heart failure [Z86.79]     Hyponatremia [E87.1]     HTN (hypertension) [I10]    chronic  diastolic CHF  Tobacco dependence  Generalized weakness   Hypovolemic shock   Right rib fractures   Macrocytic anemia   Moderate protein calorie malnutrition   COPD chronic  stable   Acute renal failure   Hypovolemic shock     The patient was seen and examined on day of discharge and this discharge summary is in conjunction with any daily progress note from day of discharge. Hospital Course: 62 y.o. female PMH GERD, smoking abuse, alcohol use with 1 to 2 glasses of wine nightly, hypertension, previous history of congestive heart failure had an echo normal EF January 2022, gout, and hyperlipidemia   presents to [facility] with progressive generalized weakness and fatigue, poor p.o. intake x 6 to 7 days. c/o dysuria saw her PCP on Monday urine was examined and noted to have a UTI. started on Bactrim.  progressively continued to become more generally weak and had lightheadedness and BOONE.  continued to have poor p.o. intake although she was still taking her antihypertensive medications and her diuretic.  She got worse to the point where tonight she called EMS because her weakness was so bad.  SBP  70s to 80s was brought to the ED.  symptoms of generalized weakness, lightheadedness, poor p.o. intake, patient denies any other recent symptoms of fever, chills, syncope, chest pain, blood in urine/stool/sputum, leg swelling, rashes, abdominal pain/nausea/vomiting/diarrhea  , BUN 15, CR 1.7 lactic acid 2.2,  troponin 0 0.01. EtOH negative U tox negative TSH 1.22. WBC 8.4.  UA cloudy 6 WBC.,  No urine culture warranted. She just had E. coli in the urine January 24, 2022 which was sensitive to the Bactrim.     Required Levophed. seen by nephrology given gentle IV NS hydration sodium increased to 120. Sodium levelled off with IVF. started sodium chloride tabs and a fluid restriction but NA unchanged 123. We reinforced restriction po and sodium then began to increase to 129-130 by dc. Did not go into alcohol withdrawal dicussed with her daughter and pt regarding remaining sober and tobacco free at dc . Held her entresto and torsemide. Follow up with her cardiologist outpt to determine when to attempt restart . Lung fields remained clear has a mild pitting edema. Recommended compression hose (she has). Nephrology recommends continuing the sodium chloride tabs on dc. Rx'd one week worth with recommended repeat FU or labs in that time        Physical Exam Performed:     /81   Pulse 93   Temp 97.9 °F (36.6 °C) (Oral)   Resp 18   Ht 5' 1\" (1.549 m)   Wt 130 lb 1.1 oz (59 kg)   SpO2 96%   BMI 24.58 kg/m²     General appearance: on RA dressed in tshirt/clothes ambulatory    HEENT: PERRL EOMI anicteric  Glasses   Neck: Supple, full range of motion. No JVD    Respiratory:  Normal  effort. Clear to auscultation   Cardiovascular:  S1/S2 RRRwithout murmurs, rubs or gallops. Abdomen: Soft, non-tender, non-distended with normal bowel sounds. Musculoskeletal: No clubbing, cyanosis 1+ pitting   Skin: Skin color, texture, turgor normal.  No rashes or lesions. Neurologic:  Neurovascularly intact    Psychiatric: Alert and oriented  Peripheral Pulses: +2 palpable, equal bilaterally     Labs:  For convenience and continuity at follow-up the following most recent labs are provided:      CBC:    Lab Results   Component Value Date    WBC 4.5 02/02/2022    HGB 7.5 02/02/2022    HCT 22.0 02/02/2022     02/02/2022 MD   2/2/2022      Thank you Stephanie Edmonds MD for the opportunity to be involved in this patient's care. If you have any questions or concerns please feel free to contact me at [phone].

## 2022-02-02 NOTE — PLAN OF CARE
Problem: Falls - Risk of:  Goal: Will remain free from falls  Description: Will remain free from falls  2/2/2022 1603 by Etta Peck RN  Outcome: Completed  2/2/2022 1110 by Etta Peck RN  Outcome: Ongoing  2/2/2022 0220 by Percy Mohr RN  Outcome: Ongoing  Goal: Absence of physical injury  Description: Absence of physical injury  2/2/2022 1603 by Etta Peck RN  Outcome: Completed  2/2/2022 1110 by Etta Peck RN  Outcome: Ongoing  2/2/2022 0220 by Percy Mohr RN  Outcome: Ongoing     Problem: Skin Integrity:  Goal: Will show no infection signs and symptoms  Description: Will show no infection signs and symptoms  2/2/2022 1603 by Etta Peck RN  Outcome: Completed  2/2/2022 0220 by Percy Mohr RN  Outcome: Ongoing  Goal: Absence of new skin breakdown  Description: Absence of new skin breakdown  2/2/2022 1603 by Etta Peck RN  Outcome: Completed  2/2/2022 1110 by Etta Peck RN  Outcome: Ongoing  2/2/2022 0220 by Percy Mohr RN  Outcome: Ongoing     Problem: Pain:  Goal: Pain level will decrease  Description: Pain level will decrease  2/2/2022 1603 by Etta Peck RN  Outcome: Completed  2/2/2022 0220 by Percy Mohr RN  Outcome: Ongoing  Goal: Control of acute pain  Description: Control of acute pain  2/2/2022 1603 by Etta Peck RN  Outcome: Completed  2/2/2022 0220 by Percy Mohr RN  Outcome: Ongoing  Goal: Control of chronic pain  Description: Control of chronic pain  2/2/2022 1603 by Etta Peck RN  Outcome: Completed  2/2/2022 1110 by Etta Peck RN  Outcome: Ongoing  2/2/2022 0220 by Percy Mohr RN  Outcome: Ongoing

## 2022-02-02 NOTE — PLAN OF CARE
Problem: Falls - Risk of:  Goal: Will remain free from falls  Description: Will remain free from falls  2/2/2022 0220 by Sophia Alexander RN  Outcome: Ongoing  2/1/2022 1337 by Nicole Steward RN  Outcome: Met This Shift  Goal: Absence of physical injury  Description: Absence of physical injury  2/2/2022 0220 by Sophia Alexander RN  Outcome: Ongoing  2/1/2022 1337 by Nicole Steward RN  Outcome: Met This Shift     Problem: Skin Integrity:  Goal: Will show no infection signs and symptoms  Description: Will show no infection signs and symptoms  2/2/2022 0220 by Sophia Alexander RN  Outcome: Ongoing  2/1/2022 1337 by Nicole Steward RN  Outcome: Ongoing  Goal: Absence of new skin breakdown  Description: Absence of new skin breakdown  2/2/2022 0220 by Sophia Alexander RN  Outcome: Ongoing  2/1/2022 1337 by Nicole Steward RN  Outcome: Ongoing     Problem: Pain:  Goal: Pain level will decrease  Description: Pain level will decrease  2/2/2022 0220 by Sophia Alexander RN  Outcome: Ongoing  2/1/2022 1337 by Nicole Steward RN  Outcome: Ongoing  Goal: Control of acute pain  Description: Control of acute pain  2/2/2022 0220 by Sophia Alexander RN  Outcome: Ongoing  2/1/2022 1337 by Nicole Steward RN  Outcome: Ongoing  Goal: Control of chronic pain  Description: Control of chronic pain  2/2/2022 0220 by Sophia Alexander RN  Outcome: Ongoing  2/1/2022 1337 by Nicole Steward RN  Outcome: Ongoing

## 2022-02-02 NOTE — PROGRESS NOTES
Visit us at [facility]. com or call us at [address] [place] [address] NOTE    Patient: Francia Vanegas MRN: [id number]     YOB: 1960  Age: 62 y.o. Sex: female    Unit: [address] MED SURG Room/Bed: Q9A-3173/4122-01 Location: [address] [place] [address]     Admitting Physician: Johnnie Ma    Primary Care Physician: Chris Schwartz MD          LOS: 5 days       Reason for evaluation:   Hyponatremia      SUBJECTIVE:      The patient was seen and examined. Notes and labs reviewed. There were no complications over night. Patient's review of systems: feels OK eating better  No HA dizziness nausea  Has been restrictng fluids  Na+ improved    OBJECTIVE:     Vitals:    02/01/22 2054 02/02/22 0432 02/02/22 0600 02/02/22 0930   BP: (!) 141/85 138/81     Pulse: 75 93     Resp: 17 18     Temp: 97.9 °F (36.6 °C) 97.9 °F (36.6 °C)     TempSrc: Oral Oral     SpO2: 92% 94%  96%   Weight:   130 lb 1.1 oz (59 kg)    Height:           Intake and Output:      Intake/Output Summary (Last 24 hours) at 2/2/2022 1329  Last data filed at 2/2/2022 7959  Gross per 24 hour   Intake 836 ml   Output --   Net 836 ml       Continuous Infusions:   sodium chloride         Exam:   CONSTITUTIONAL/PSYCHIATRY: awake, alert and oriented x3. Not in acute distress   EYES: Conjunctivae: normal. Pupils: reactive to light  RESPIRATORY: Respiratory effort: normal. Auscultation: CTA  CARDIOVASCULAR: Auscultation: RRR. Neck veins: no JVD. Edema: none  GASTROINTESTINAL: Soft, nontender, nondistended. EXTREMITIES:  No cyanosis or clubbing. SKIN: Warm and dry.  No significant rashes      LABS:   RFP:   Recent Labs     01/31/22  0415 01/31/22  0948 02/01/22  1210 02/01/22  1210 02/01/22  2112 02/02/22  0607 02/02/22  0844   *   < > 128*   < > 131* 129* 129*   K 3.5  --  3.6  --   --  3.0*  --    CL 86*  --  97*  --   --  96*  --    CO2 20*  --  22  --   --  21  --    BUN 7  --  8  --   --  7  --    CREATININE 0.7  --  0.7  --   -- 0.5*  --    CALCIUM 8.0*  --  8.5  --   --  8.0*  --    MG 1.50*  --   --   --   --   --  1.70*   GFRAA >60  --  >60  --   --  >60  --     < > = values in this interval not displayed. Liver panel:  Recent Labs     01/31/22  0415   AST 45*   ALT 18       Endocrine:   @[id number](VitD,PTH,TSH,aldosterone,renin activity,cortisol,metanephrine)@    CBC:   Recent Labs     01/31/22  0415 02/01/22  1210 02/02/22  0607   WBC 4.9 9.4 4.5   HGB 7.6* 8.8* 7.5*   HCT 21.6* 25.7* 22.0*   .0* 109.9* 108.7*    378 313       Iron Panel:   @[id number](FERA,FE)@    Serology: @[id number](ANAS,ANCA,C3,C4,RNP,AGBM,DNA,SSA,SSB,SPEP,UPEP,ESR,HEPT)@    Urine studies: @[id number](UAPR,UCOL,UNAR,UUNR,UCRR,UCLR,UKR,UUAR,UOSM,EOS)@        ASSESSMENT and PLAN:     1. Hyponatremia: recurrent acute/severe hyponatremia episodes. It is suspected to be related to severely decreased solute intake in combination with diuretic use. Víctor 29-37. UOsm 263! SNa improving improved initially with 0.9% NaCl and worsened again (which is unusual in view of UOsm of <300! and while on 0.9%). - Started on Na tabs. Na+ improved to 131 meq last night and now stable at 129 meq    -OK for discharge from renal SP Stressed FR with pt Continue Nacl tabs on discharge    2. CLAUDIA: mostly hemodynamic/prerenal. Kidney US is unremarkable. Entresto/Torsemide are held. Patient was also on Bactrim (had a little and additional effect)  SCr improved and back to baseline. 3. Hypokalemia replace     4. Anemia: stable 7.5 gm   - Transfuse prn for Hb <7    5. Rt 6/7 rib fx/ COPD: per pulmonary    6.  Chronic diastolic heart failure: compensated        Signed By: Justin Hensley MD

## 2022-02-02 NOTE — PROGRESS NOTES
Physical Therapy    Facility/Department: [facility] 4 MED SURG  Daily Note  If pt. is D/C'd prior to next visit please refer back to last daily progress note for D/C status. NAME: Romana Marking  : 1960  MRN: [id number]    Date of Service: 2022    Discharge Recommendations:  Home with assist PRN   Kimberlee Molina scored a 23/24 on the AM-PAC short mobility form. At this time, no further PT is recommended upon discharge. Recommend patient returns to prior setting with prior services. PT Equipment Recommendations  Equipment Needed: No    Assessment   Assessment: Today, the pt was able to walk further in the hallway without a device and SBA. She had no LOB and reports she has been up in the room on her own. The pt also completed a flight of steps with CGA/SBA; vc's to take then one at a time appeard to help her; after completing the steps the pt's SpO2 levels were found to be 84%; with seated rest break and PLB she recovered quickly. Discussed energy conservation with the pt re: being able to manage things at home. Anticipate that the pt will be safe for home with prn asisst. Do not feel she will need further skilled PT at d/c. Will con't to follow as an acute care pt. Prognosis: Good  PT Education: PT Role;General Safety; Energy Conservation  Barriers to Learning: none  REQUIRES PT FOLLOW UP: Yes  Activity Tolerance  Activity Tolerance: Patient limited by endurance; Patient limited by fatigue  Activity Tolerance: SpO2 on room air after completing a flight of steps was 84%       Patient Diagnosis(es): The primary encounter diagnosis was Hyponatremia. A diagnosis of Shock ([address] was also pertinent to this visit. has a past medical history of Anxiety, GERD (gastroesophageal reflux disease), Gout, Hyperlipidemia, and Hypertension. has a past surgical history that includes Knee arthroscopy; Carpal tunnel release; bladder suspension; Tonsillectomy; Carpal tunnel release (7-12-10);  Carpal tunnel release (Left wrist); joint replacement; sinus surgery; and Foot surgery (3/2013). Restrictions  Restrictions/Precautions  Restrictions/Precautions: Fall Risk  Position Activity Restriction  Other position/activity restrictions: 1200 mL fluid restriction     Subjective  General  Chart Reviewed: Yes  Additional Pertinent Hx: Per Sheryle Husk, MD progress note from 1-: The pt is a 63 yo female who came to the ED with progressive generalized weakness and fatigue, poor po intake and dysuria. The pt was started on abx by PCP for a UTI. PMHx: GERD, smoking abuse, alcohol use with 1 to 2 glasses of wine nightly, hypertension, previous history of congestive heart failure had an echo normal EF January 2022, gout, and hyperlipidemia  Response To Previous Treatment: Patient with no complaints from previous session.   Family / Caregiver Present: No  Referring Practitioner: Sheryle Husk, MD  Subjective  Subjective: the pt was found to be sitting on the EOB and talking on the phone; the pt agreeable to therapy and hopes to go home today; the pt con't to report R rib pain but did not rate          Orientation  Orientation  Overall Orientation Status: Within Functional Limits  Social/Functional History  Social/Functional History  Lives With: Family ( (disabled) and 25 y.o. granddaughter)  Type of Home: [address] [place], [place] [address],[address]: Two level,Laundry in basement (13 [address] with handrail to 2nd floor bedroom/bathroom)  Home Access: Stairs to enter with rails  Entrance Stairs - Number of Steps: 4 SHERRI  Entrance Stairs - Rails: None  Bathroom Shower/Tub: Tub/Shower unit  Bathroom Toilet: Standard  Bathroom Equipment: Tub transfer bench,Hand-held shower,Grab bars in shower (grab bar on side of tub; recently purchased toilet safety frame)  Bathroom Accessibility: Walker accessible  Home Equipment: [employer]  ADL Assistance: [address]: Independent (pt uses [address] [employer] for groceries and does cleaning, granddaughter does laundry,)  Ambulation Assistance: Independent (with no AD in home, uses cane to get up the front steps)  Transfer Assistance: Independent  Active : Yes  Occupation: On disability  Additional Comments: Pt reports 1 recent fall ~1 week ago (tripped over 's cane)  Cognition   Cognition  Overall Cognitive Status: WFL    Objective  Bed mobility  Comment: n/a tis session, the pt already seated at start and end of session  Transfers  Sit to Stand: Supervision  Stand to sit: Supervision  Ambulation  Ambulation?: Yes  Ambulation 1  Surface: level tile  Device: No Device  Assistance: Stand by assistance;Supervision  Quality of Gait: steady and w/o LOB, able to walk in the hallway with a slowed pace  Distance: 60 feet x 2  Stairs/Curb  Stairs?: Yes  Stairs  # Steps : 12  Stairs Height: 6\"  Rails: Right ascending  Device: No Device  Assistance: Contact guard assistance;Stand by assistance  Comment: the pt demonstrated difficulty going step-over-step but was able to complete the flight of steps going one step at a time much easier; slowed pace and no LOB noted; the pt fatigued following and upon sitting for a rest her SpO2 on room air was down to 84%; with PLB and rest the pt quickly recovered to the 90's (RN was notified)     Balance  Sitting - Static: Good  Sitting - Dynamic: Good  Standing - Static: Good  Standing - Dynamic: Good        Plan   Plan  Times per week: 2-3x/week  Current Treatment Recommendations: Functional Mobility Training,Transfer Training,Gait Training,Stair training,Strengthening,ROM,Safety Education & Training,Patient/Caregiver Education & Training,Equipment Evaluation, Education, & procurement  Safety Devices  Type of devices: Call light within reach,Nurse notified (the pt was seated EOB, no alarm being used and the pt reports getting up to the bathroom on her own)      AM-PAC Score  AM-PAC Inpatient Mobility Raw Score : 23 (02/02/22 1131)  AM-PAC Inpatient T-Scale Score : 56.93 (02/02/22 1131)  Mobility Inpatient CMS 0-100% Score: 11.2 (02/02/22 1131)  Mobility Inpatient CMS G-Code Modifier : CI (02/02/22 1131)          Goals  Short term goals  Time Frame for Short term goals: upon d/c  (goals are unmet and still ongoing)  Short term goal 1: Bed mobility with mod I. Short term goal 2: Transfers sit <> stand with mod I. Short term goal 3: Ambulate without device 100 feet with SBA/Sup. Short term goal 4: Negotiate steps with railing with SBA.   Patient Goals   Patient goals : to be able to be up on her own in the room       Therapy Time   Individual Concurrent Group Co-treatment   Time In 1118         Time Out 1144         Minutes 26                 Electronically signed by Sergio Edwards, PT  [id number] on 2/2/2022 at 11:46 AM

## 2022-02-08 ENCOUNTER — TELEPHONE (OUTPATIENT)
Dept: FAMILY MEDICINE CLINIC | Age: 62
End: 2022-02-08

## 2022-02-08 RX ORDER — FLUOXETINE 10 MG/1
10 TABLET, FILM COATED ORAL DAILY
Qty: 30 TABLET | Refills: 0 | Status: SHIPPED | OUTPATIENT
Start: 2022-02-08 | End: 2022-02-15

## 2022-02-08 NOTE — TELEPHONE ENCOUNTER
Patient came in today to drop off her [facility] paperwork for Dr. Naseem Goldstein to fill out. She has a VV with Dr. Naseem Goldstein on Friday and stated that Dr. Naseem Goldstein would fill her paperwork out after. Her forms are in Dr. Dumont Pollen folder. Please call patient when completed and she will .     Please Advise

## 2022-02-09 NOTE — PROGRESS NOTES
[facility]    Romana Marking  1960 February 16, 2022      CC: \"I was in the hospital\"     HPI:  The patient is 62 y.o. female with a past medical history significant for nonobstructive CAD, chronic combined systolic and diastolic CHF, hyperlipidemia and hypertension. She presented to [facility] with worsening shortness of breath. An inpatient echocardiogram revealed reduced LVEF of 40%. She underwent heart catheterization on 6/7/21 revealing nonobstructive coronary disease not requiring any intervention. She completed an echocardiogram on 1/6/22 revealing normalized EF of 55-60%. She presents today for follow up. She was admitted 2/2022 due to hyponatremia. Her dose of Entresto was discontinued by nephrology due to her kidney function. She was unaware of this change and has still been taking this medication since discharge. She does complain of lower extremity swelling. She denies chest pain with rest or exertion. She is not scheduled for follow up with nephrology. Review of Systems:  Constitutional: No fatigue, weakness, night sweats or fever. HEENT: No new vision difficulties or ringing in the ears. Respiratory: Positive for cough. No new SOB, PND, orthopnea. Cardiovascular: See HPI   GI: No n/v, diarrhea, constipation, abdominal pain or changes in bowel habits. No melena, no hematochezia  : No urinary frequency, urgency, incontinence, hematuria or dysuria. Skin: No cyanosis or skin lesions. Musculoskeletal: No new muscle or joint pain. Neurological: No syncope or TIA-like symptoms.   Psychiatric: No anxiety, insomnia or depression     Past Medical History:   Diagnosis Date    Anxiety     GERD (gastroesophageal reflux disease)     Gout     had once years ago    Hyperlipidemia     at last check it was normal    Hypertension      Past Surgical History:   Procedure Laterality Date    BLADDER SUSPENSION      CARPAL TUNNEL RELEASE      right    CARPAL TUNNEL RELEASE  7-12-10    Left    CARPAL TUNNEL RELEASE  Left wrist    FOOT SURGERY  3/2013    EXCISION NEUROMA 3RD NERVE LEFT FOOT    JOINT REPLACEMENT      right    KNEE ARTHROSCOPY      left x1, right x 1    SINUS SURGERY      TONSILLECTOMY       Family History   Problem Relation Age of Onset    Cancer Mother     Heart Disease Mother     Diabetes Mother     High Blood Pressure Mother     Heart Disease Father     Heart Disease Maternal Grandmother      Social History     Tobacco Use    Smoking status: Current Every Day Smoker     Packs/day: 0.50     Years: 35.00     Pack years: 17.50     Types: Cigarettes    Smokeless tobacco: Never Used    Tobacco comment: trying to quit   Vaping Use    Vaping Use: Every day    Substances: Nicotine    Devices: Disposable   Substance Use Topics    Alcohol use:  Yes     Alcohol/week: 14.0 standard drinks     Types: 14 Glasses of wine per week     Comment: wine    Drug use: No       Allergies   Allergen Reactions    Augmentin [Amoxicillin-Pot Clavulanate] Anaphylaxis and Swelling     THROATSWELLS  Pt has taken amoxicillin with no reaction but cannot take augmentin    Pork-Derived Products Swelling     Current Outpatient Medications   Medication Sig Dispense Refill    esomeprazole (NEXIUM) 40 MG delayed release capsule TAKE 1 CAPSULE BY MOUTH DAILY 90 capsule 1    allopurinol (ZYLOPRIM) 300 MG tablet TAKE 1 TABLET BY MOUTH DAILY (Patient taking differently: Take 300 mg by mouth daily ) 90 tablet 2    carvedilol (COREG) 3.125 MG tablet TAKE 1 TABLET BY MOUTH TWICE DAILY (Patient taking differently: Take 3.125 mg by mouth 2 times daily ) 180 tablet 1    ondansetron (ZOFRAN-ODT) 4 MG disintegrating tablet Take 1 tablet by mouth 3 times daily as needed for Nausea or Vomiting 12 tablet 0    MAGNESIUM-OXIDE 400 (241.3 Mg) MG TABS tablet TAKE 1 TABLET BY MOUTH DAILY (Patient taking differently: Take 400 mg by mouth daily ) 90 tablet 0    atorvastatin (LIPITOR) 20 MG tablet TAKE 1 TABLET BY MOUTH DAILY 30 tablet 3    budesonide-formoterol (SYMBICORT) 160-4.5 MCG/ACT AERO Inhale 2 puffs into the lungs 2 times daily 1 each 11    albuterol sulfate  (90 Base) MCG/ACT inhaler Inhale 2 puffs into the lungs every 6 hours as needed for Wheezing 1 Inhaler 3    fluticasone (FLONASE) 50 MCG/ACT nasal spray 2 sprays by Each Nostril route daily      Multiple Vitamins-Minerals (THERAPEUTIC MULTIVITAMIN-MINERALS) tablet Take 1 tablet by mouth daily      levocetirizine (XYZAL) 5 MG tablet Take 5 mg by mouth daily.  aspirin 81 MG EC tablet Take 81 mg by mouth daily.  montelukast (SINGULAIR) 10 MG tablet Take 10 mg by mouth daily        No current facility-administered medications for this visit. Physical Exam:   /66   Pulse 69   Ht 5' 1\" (1.549 m)   Wt 125 lb 3.2 oz (56.8 kg)   SpO2 100%   BMI 23.66 kg/m²   No intake or output data in the 24 hours ending 02/16/22 1614  Wt Readings from Last 2 Encounters:   02/16/22 125 lb 3.2 oz (56.8 kg)   02/15/22 125 lb (56.7 kg)     Constitutional: She is oriented to person, place, and time. She appears well-developed and well-nourished. In no acute distress. Head: Normocephalic and atraumatic. Neck: Neck supple. No JVD present. Carotid bruit is not present. No mass and no thyromegaly present. No lymphadenopathy present. Cardiovascular: Normal rate, regular rhythm, normal heart sounds and intact distal pulses. Exam reveals no gallop and no friction rub. No murmur heard. Pulmonary/Chest: Effort normal and breath sounds normal. No respiratory distress. She has no wheezes, rhonchi or rales. Abdominal: Soft, non-tender. Bowel sounds and aorta are normal. She exhibits no organomegaly, mass or bruit. Extremities: No edema, cyanosis, or clubbing. Pulses are 2+ radial/carotid/dorsalis pedis and posterior tibial bilaterally. Neurological: She is alert and oriented to person, place, and time.  She has normal reflexes. No cranial nerve deficit. Coordination normal.   Skin: Skin is warm and dry. There is no rash or diaphoresis. Psychiatric: She has a normal mood and affect. Her speech is normal and behavior is normal.       Procedures:     /[facility] 6/7/21  1. Evidence of persistent right-sided volume overload with a right  atrial pressure that was low at 5 mmHg but a pulmonary capillary wedge  pressure elevated at 20 mmHg. 2.  Evidence of pulmonary hypertension with an instantaneous pressure of  38/17 for a mean pulmonary arterial pressure of 28.  3.  Mildly reduced cardiac output by thermodilution at 4.53 liters per  minute with a cardiac index of 2.89 liters per kilogram per minute. 4.  Pulmonary vascular resistance of 139 dynes per second. 5.  Normal mixed venous oxygen saturations at 66% in the right atrium  and 64% in the pulmonary artery. 6.  Left ventricular end-diastolic pressure of 23 mmHg. 7.  Right-dominant coronary arterial system with no obstructive coronary  lesions. The left main is free of disease. The circumflex is smaller. OM1 has 50% stenosis. The left anterior descending artery is calcified  in the proximal and midportion with a 50% stenosis at the first septal   and the first diagonal branch but no flow limitation. Further, there is 50% stenosis in the midportion. The right coronary  artery is large with 50% mid RCA stenosis. 8.  Mildly reduced LV systolic function. LV ejection fraction of 40%  and mild apical hypokinesis in a pattern likely consistent with a  stress-induced cardiomyopathy. 9.  No gradient across the aortic valve on pullback to suggest aortic  stenosis. Imaging:     Echo 6/5/21  Overall left ventricular systolic function appears mildly reduced. Ejection fraction is visually estimated to be 40%. Severe andrew-lateral hypokinesis  Mid to apical akinesis  Grade II diastolic dysfunction with elevated LV filling pressures.   Normal right ventricular size.  Right ventricular systolic function is reduced . Echo limited 1/6/22  Limited study. Normal left ventricle size, wall thickness, and systolic function with an  estimated ejection fraction of 55-60%. No regional wall motion abnormalities are seen. No pericardial effusion noted. No pleural effusion. Lab Review:   Lab Results   Component Value Date    TRIG 104 10/19/2021    HDL 91 10/19/2021    LDLCALC 63 10/19/2021    LABVLDL 21 10/19/2021     Lab Results   Component Value Date     02/15/2022    K 5.2 02/15/2022    K 4.3 06/04/2021    BUN 7 02/15/2022    CREATININE 0.5 02/15/2022       Assessment:  1. Chronic combined systolic and diastolic CHF, class 2   2. CAD of native coronary arteries without angina  3. Hyperlipidemia with LDL goal <70 mg/dL   4. Essential hypertension  5. Tobacco Abuse    Plan:  She has not been taking any diuretic therapy but has remained on Entresto despite this being discontinued during her recent admission. I will have her remain on Entresto at a reduced dose of 24-26 mg BID. She will also resume torsemide 10 mg daily. She will complete a BNP and BMP in 1 week to assess her kidney function. Her LVEF has normalized by her recent echocardiogram. She is not endorsing any symptoms representing angina and her blood pressure is well controlled today in the office. I have again encouraged her in smoking cessation. I have personally reviewed all previous testing for this visit today including imaging, lab results and EKG as detailed above. I will have her seen in the office with Amrit Enriquez CNP in 6 weeks. This note was scribed in the presence of Verenice Ayala MD by Malik Bourne RN. Physician Attestation:  The scribes documentation has been prepared under my direction and personally reviewed by me in its entirety.      I, Dr. Pop Acosta personally performed the services described in this documentation as scribed by my RN in my presence, and I confirm that the note above accurately reflects all work, treatment, procedures, and medical decision making performed by me.

## 2022-02-11 ENCOUNTER — APPOINTMENT (OUTPATIENT)
Dept: GENERAL RADIOLOGY | Age: 62
End: 2022-02-11
Payer: COMMERCIAL

## 2022-02-11 ENCOUNTER — TELEMEDICINE (OUTPATIENT)
Dept: FAMILY MEDICINE CLINIC | Age: 62
End: 2022-02-11
Payer: COMMERCIAL

## 2022-02-11 ENCOUNTER — HOSPITAL ENCOUNTER (EMERGENCY)
Age: 62
Discharge: HOME | End: 2022-02-11
Attending: EMERGENCY MEDICINE
Payer: COMMERCIAL

## 2022-02-11 VITALS
SYSTOLIC BLOOD PRESSURE: 126 MMHG | BODY MASS INDEX: 24.6 KG/M2 | HEART RATE: 97 BPM | OXYGEN SATURATION: 100 % | TEMPERATURE: 97.6 F | HEIGHT: 61 IN | RESPIRATION RATE: 17 BRPM | DIASTOLIC BLOOD PRESSURE: 73 MMHG | WEIGHT: 130.29 LBS

## 2022-02-11 DIAGNOSIS — J90 PLEURAL EFFUSION ON RIGHT: Primary | ICD-10-CM

## 2022-02-11 DIAGNOSIS — N39.0 URINARY TRACT INFECTION WITHOUT HEMATURIA, SITE UNSPECIFIED: ICD-10-CM

## 2022-02-11 DIAGNOSIS — Z78.9 ALCOHOL USE: ICD-10-CM

## 2022-02-11 DIAGNOSIS — R53.1 GENERALIZED WEAKNESS: ICD-10-CM

## 2022-02-11 DIAGNOSIS — E87.1 HYPONATREMIA: Primary | ICD-10-CM

## 2022-02-11 DIAGNOSIS — D64.9 ANEMIA, UNSPECIFIED TYPE: ICD-10-CM

## 2022-02-11 LAB
A/G RATIO: 1 (ref 1.1–2.2)
ALBUMIN SERPL-MCNC: 2.9 G/DL (ref 3.4–5)
ALP BLD-CCNC: 125 U/L (ref 40–129)
ALT SERPL-CCNC: 13 U/L (ref 10–40)
ANION GAP SERPL CALCULATED.3IONS-SCNC: 9 MMOL/L (ref 3–16)
AST SERPL-CCNC: 33 U/L (ref 15–37)
BACTERIA: ABNORMAL /HPF
BASOPHILS ABSOLUTE: 0.1 K/UL (ref 0–0.2)
BASOPHILS RELATIVE PERCENT: 0.7 %
BILIRUB SERPL-MCNC: 0.4 MG/DL (ref 0–1)
BILIRUBIN URINE: NEGATIVE
BLOOD, URINE: NEGATIVE
BUN BLDV-MCNC: 6 MG/DL (ref 7–20)
CALCIUM SERPL-MCNC: 8.2 MG/DL (ref 8.3–10.6)
CHLORIDE BLD-SCNC: 104 MMOL/L (ref 99–110)
CLARITY: CLEAR
CO2: 22 MMOL/L (ref 21–32)
COLOR: YELLOW
CREAT SERPL-MCNC: 0.7 MG/DL (ref 0.6–1.2)
EOSINOPHILS ABSOLUTE: 0.1 K/UL (ref 0–0.6)
EOSINOPHILS RELATIVE PERCENT: 0.9 %
EPITHELIAL CELLS, UA: 8 /HPF (ref 0–5)
GFR AFRICAN AMERICAN: >60
GFR NON-AFRICAN AMERICAN: >60
GLUCOSE BLD-MCNC: 93 MG/DL (ref 70–99)
GLUCOSE URINE: NEGATIVE MG/DL
HCT VFR BLD CALC: 31.5 % (ref 36–48)
HEMOGLOBIN: 10.3 G/DL (ref 12–16)
HYALINE CASTS: 0 /LPF (ref 0–8)
KETONES, URINE: NEGATIVE MG/DL
LEUKOCYTE ESTERASE, URINE: ABNORMAL
LIPASE: 46 U/L (ref 13–60)
LYMPHOCYTES ABSOLUTE: 2.1 K/UL (ref 1–5.1)
LYMPHOCYTES RELATIVE PERCENT: 26.7 %
MCH RBC QN AUTO: 35.5 PG (ref 26–34)
MCHC RBC AUTO-ENTMCNC: 32.8 G/DL (ref 31–36)
MCV RBC AUTO: 108.4 FL (ref 80–100)
MICROSCOPIC EXAMINATION: YES
MONOCYTES ABSOLUTE: 0.8 K/UL (ref 0–1.3)
MONOCYTES RELATIVE PERCENT: 9.8 %
NEUTROPHILS ABSOLUTE: 5 K/UL (ref 1.7–7.7)
NEUTROPHILS RELATIVE PERCENT: 61.9 %
NITRITE, URINE: NEGATIVE
PDW BLD-RTO: 15 % (ref 12.4–15.4)
PH UA: 5.5 (ref 5–8)
PLATELET # BLD: 596 K/UL (ref 135–450)
PMV BLD AUTO: 6.2 FL (ref 5–10.5)
POTASSIUM SERPL-SCNC: 4.5 MMOL/L (ref 3.5–5.1)
PRO-BNP: 880 PG/ML (ref 0–124)
PROTEIN UA: NEGATIVE MG/DL
RBC # BLD: 2.91 M/UL (ref 4–5.2)
RBC UA: 1 /HPF (ref 0–4)
SODIUM BLD-SCNC: 135 MMOL/L (ref 136–145)
SPECIFIC GRAVITY UA: 1.02 (ref 1–1.03)
TOTAL PROTEIN: 5.7 G/DL (ref 6.4–8.2)
TROPONIN: <0.01 NG/ML
URINE REFLEX TO CULTURE: ABNORMAL
URINE TYPE: ABNORMAL
UROBILINOGEN, URINE: 0.2 E.U./DL
WBC # BLD: 8 K/UL (ref 4–11)
WBC UA: 5 /HPF (ref 0–5)

## 2022-02-11 PROCEDURE — 93005 ELECTROCARDIOGRAM TRACING: CPT | Performed by: PHYSICIAN ASSISTANT

## 2022-02-11 PROCEDURE — 99282 EMERGENCY DEPT VISIT SF MDM: CPT

## 2022-02-11 PROCEDURE — 84484 ASSAY OF TROPONIN QUANT: CPT

## 2022-02-11 PROCEDURE — 81001 URINALYSIS AUTO W/SCOPE: CPT

## 2022-02-11 PROCEDURE — 71046 X-RAY EXAM CHEST 2 VIEWS: CPT

## 2022-02-11 PROCEDURE — 85025 COMPLETE CBC W/AUTO DIFF WBC: CPT

## 2022-02-11 PROCEDURE — 83690 ASSAY OF LIPASE: CPT

## 2022-02-11 PROCEDURE — 99214 OFFICE O/P EST MOD 30 MIN: CPT | Performed by: INTERNAL MEDICINE

## 2022-02-11 PROCEDURE — 83880 ASSAY OF NATRIURETIC PEPTIDE: CPT

## 2022-02-11 PROCEDURE — 80053 COMPREHEN METABOLIC PANEL: CPT

## 2022-02-11 ASSESSMENT — ENCOUNTER SYMPTOMS
COUGH: 0
NAUSEA: 0
VOMITING: 0
SHORTNESS OF BREATH: 0
ABDOMINAL PAIN: 0
VOMITING: 0
SORE THROAT: 0
NAUSEA: 0
BACK PAIN: 0
EYE PAIN: 0

## 2022-02-11 NOTE — ED PROVIDER NOTES
Attending Note:    The patient was seen and examined by the mid-level provider. I also completed a face-to-face examination. HPI: Danya Mariscal is a 62 y.o. female who presents to the emergency department request of her PCP who sent her to the emergency department for laboratory studies today. Apparently, the outpatient lab closes at 4 PM.  Currently, she denies any physical complaints other than stating that she still feels weak after recently been mated to the hospital.    The patient had a video follow-up visit for her recent hospitalization with Dr. Mirna Carolina today. Apparently she was just recently discharged from the hospital on February 2. She has known history of alcohol use, congestive heart failure, hypertension, chronic diastolic CHF, hypertension, hyperlipidemia, COPD. She was admitted with progressive generalized weakness and fatigue and decreased oral intake as well as dysuria. She had recently been started on Bactrim. She was hypotensive and was admitted to the hospital and eventually required pressor support. She was found to have significant hyponatremia with a sodium of 116 and was evaluated by nephrology who felt that this was likely to be related to severely decreased sodium intake in combination with diuretic use. She was started on sodium tablets and her sodium improved to 131. There was also evidence of an acute kidney injury. No hydronephrosis. Renal ultrasound was negative. Torsemide and Entresto were discontinued. She also had recent right sixth and seventh rib fractures with COPD. Apparently, the patient has continued to take her potassium. She was supposed to be on fluid restriction as well. Apparently she restarted her Bactrim at home and that she was supposed to continue this for UTI which she had prior to going to the hospital.  Oertli she denies any dysuria hematuria frequency urgency. No abdominal pain back pain or flank pain.     She denies any headache, vision change, speech change, focal weakness, numbness. She is able to walk without assistance. She denies any dizziness or vertigo. She denies any chest pain heaviness pressure tightness shortness of breath. She denies any dyspnea on exertion. No leg or calf pain. No edema. She does admit to 1 glass of wine last night but denies any other alcohol intake. Physical Exam:     Constitutional: No apparent distress. Pale but not ill-appearing. Sitting in a chair. Head: No visible evidence of trauma. Normocephalic. Eyes: Pupils equal and reactive. No photophobia. Conjunctiva normal.    HENT: Oral mucosa moist.  Airway patent. Neck:  Soft and supple. Nontender. Heart:  Regular rate and rhythm. No murmur. Lungs:  Clear to auscultation. No wheezes, rales, or ronchi. No conversational dyspnea or accessory muscle use. Abdomen:  Soft, non-distended. Nontender. No guarding rigidity or rebound. Musculoskeletal: Extremities non-tender with full range of motion. Radial and dorsalis pedis pulses were equal laterally. No calf tenderness erythema or edema. Neurological: Alert and oriented x 3. Speech clear. No acute focal motor or sensory deficits. GCS 15. No dysarthria. No aphasia. No pronator drift. No tremor. Skin: Skin is warm and dry. No rash. Psychiatric: Normal mood and affect. Behavior is normal.     DIAGNOSTIC RESULTS     EKG: All EKG's are interpreted by the Emergency Department Physician who either signs or Co-signs this chart in the absence of a cardiologist.    Normal sinus rhythm. Rate 90. MI interval 138 ms. QRS duration 48 ms. QTc 442 ms. R axis VIII degrees. No ST elevation. Baseline artifact was noted. Low voltage. EKG is unchanged from prior EKG from June 28, 2021.     RADIOLOGY:   Non-plain film images such as CT, Ultrasound and MRI are read by the radiologist. Plain radiographic images are visualized and preliminarily interpreted by the emergency physician with right pleural effusion with basilar opacification secondary to atelectasis. Urinalysis reveals small leukocytes with 5 white cells and 1+ bacteria. Laboratory studies are pending at the time of shift at 8 PM.  The physician assistant will follow up on laboratory studies and discuss with the oncoming attending physician if needed. I suspect that if her laboratory studies are not significantly changed from her most recent hospitalization she will be able to be discharged home. MDM      CRITICAL CARE TIME     I personally saw the patient and independently provided 0 minutes of non-concurrent critical care out of the total shared critical care time provided. This excludes separately reportable procedures. There was a high probability of clinically significant/life threatening deterioration in the patient's condition which required my urgent intervention. CONSULTS:  None    PROCEDURES:  Unless otherwise noted below, none     Procedures        FINAL IMPRESSION    No diagnosis found. DISPOSITION/PLAN   DISPOSITION        PATIENT REFERRED TO:  No follow-up provider specified. DISCHARGE MEDICATIONS:  New Prescriptions    No medications on file     Controlled Substances Monitoring:     No flowsheet data found. (Please note that portions of this note were completed with a voice recognition program.  Efforts were made to edit the dictations but occasionally words are mis-transcribed. )    [id number] Srikanth Tomlin DO (electronically signed)  Attending Emergency Physician      Klever Newsome DO  02/11/22 1945
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
## 2022-02-12 LAB
EKG ATRIAL RATE: 90 BPM
EKG DIAGNOSIS: NORMAL
EKG P AXIS: 14 DEGREES
EKG P-R INTERVAL: 138 MS
EKG Q-T INTERVAL: 362 MS
EKG QRS DURATION: 48 MS
EKG QTC CALCULATION (BAZETT): 442 MS
EKG R AXIS: 8 DEGREES
EKG T AXIS: 30 DEGREES
EKG VENTRICULAR RATE: 90 BPM

## 2022-02-12 PROCEDURE — 93010 ELECTROCARDIOGRAM REPORT: CPT | Performed by: INTERNAL MEDICINE

## 2022-02-12 NOTE — ED PROVIDER NOTES
[address] [facility]        Pt Name: Mervin Moscoso  MRN: [id number]  [place] 1960  Date of evaluation: 2/11/2022  Provider: Lincoln Meade PA-C  PCP: Tiffany Lama MD  Note Started: 8:21 PM EST       I have seen and evaluated this patient with my supervising physician Riki Vergara DO. Triage CHIEF COMPLAINT       Chief Complaint   Patient presents with    Other     pt sent in for labs and urine, outpt lab closed, told to come to ED         HISTORY OF PRESENT ILLNESS   (Location/Symptom, Timing/Onset, Context/Setting, Quality, Duration, Modifying Factors, Severity)  Note limiting factors. Chief Complaint: Patient was sent in by primary care physician to be reevaluated. Concerned that her sodium may have dropped again or some electrolyte abnormality. Patient was recently released from the hospital for low sodium. According to the primary physician she was acting not her normal self. Patient tells me that she is not having any chest pain, no shortness of breath, not having any cough. No abdominal pain. No nausea vomiting. Denies dizziness or lightheadedness. Carrol Varela it was she was just here to get some lab work. I informed her that her primary care physician was concerned for her. We will get lab work and see what is going on with her. Patient states she has been keeping to her fluid restriction. Mervin Moscoso is a 62 y.o. female who presents with the above complaint. Nursing Notes were all reviewed and agreed with or any disagreements were addressed in the HPI. REVIEW OF SYSTEMS    (2-9 systems for level 4, 10 or more for level 5)     Review of Systems   Constitutional: Negative for chills and fever. HENT: Negative for congestion and sore throat. Eyes: Negative for pain and visual disturbance. Respiratory: Negative for cough and shortness of breath.     Cardiovascular: Negative for chest pain and leg swelling. Gastrointestinal: Negative for abdominal pain, nausea and vomiting. Genitourinary: Negative for dysuria and frequency. Musculoskeletal: Negative for back pain and neck pain. Skin: Negative for rash and wound. Neurological: Negative for dizziness and light-headedness.        PAST MEDICAL HISTORY     Past Medical History:   Diagnosis Date    Anxiety     GERD (gastroesophageal reflux disease)     Gout     had once years ago    Hyperlipidemia     at last check it was normal    Hypertension        SURGICAL HISTORY     Past Surgical History:   Procedure Laterality Date    BLADDER SUSPENSION      CARPAL TUNNEL RELEASE      right    CARPAL TUNNEL RELEASE  7-12-10    Left    CARPAL TUNNEL RELEASE  Left wrist    FOOT SURGERY  3/2013    EXCISION NEUROMA 3RD NERVE LEFT FOOT    JOINT REPLACEMENT      right    KNEE ARTHROSCOPY      left x1, right x 1    SINUS SURGERY      TONSILLECTOMY         CURRENTMEDICATIONS       Discharge Medication List as of 2/11/2022  9:33 PM      CONTINUE these medications which have NOT CHANGED    Details   FLUoxetine (PROZAC) 10 MG tablet TAKE 1 TABLET BY MOUTH DAILY, Disp-30 tablet, R-0Normal      allopurinol (ZYLOPRIM) 300 MG tablet TAKE 1 TABLET BY MOUTH DAILY, Disp-90 tablet, R-2Normal      carvedilol (COREG) 3.125 MG tablet TAKE 1 TABLET BY MOUTH TWICE DAILY, Disp-180 tablet, R-1Normal      ondansetron (ZOFRAN-ODT) 4 MG disintegrating tablet Take 1 tablet by mouth 3 times daily as needed for Nausea or Vomiting, Disp-12 tablet, R-0Normal      MAGNESIUM-OXIDE 400 (241.3 Mg) MG TABS tablet TAKE 1 TABLET BY MOUTH DAILY, Disp-90 tablet, R-0Normal      atorvastatin (LIPITOR) 20 MG tablet TAKE 1 TABLET BY MOUTH DAILY, Disp-30 tablet, R-3Normal      budesonide-formoterol (SYMBICORT) 160-4.5 MCG/ACT AERO Inhale 2 puffs into the lungs 2 times daily, Disp-1 each, R-11Normal      albuterol sulfate  (90 Base) MCG/ACT inhaler Inhale 2 puffs into the lungs every 6 hours as needed for Wheezing, Disp-1 Inhaler, R-3Normal      potassium chloride (KLOR-CON M) 20 MEQ extended release tablet Take 1 tablet by mouth daily, Disp-90 tablet, R-3Normal      nicotine (NICODERM CQ) 21 MG/24HR Place 1 patch onto the skin daily, Disp-30 patch, R-3Normal      fluticasone (FLONASE) 50 MCG/ACT nasal spray 2 sprays by Each Nostril route dailyHistorical Med      Multiple Vitamins-Minerals (THERAPEUTIC MULTIVITAMIN-MINERALS) tablet Take 1 tablet by mouth dailyHistorical Med      ALPRAZolam (ALPRAZOLAM XR) 0.5 MG extended release tablet Take 0.5 mg by mouth daily as needed (anxiety). Historical Med      levocetirizine (XYZAL) 5 MG tablet Take 5 mg by mouth daily. Historical Med      aspirin 81 MG EC tablet Take 81 mg by mouth daily. Historical Med      montelukast (SINGULAIR) 10 MG tablet Take 10 mg by mouth daily Historical Med      esomeprazole (NEXIUM) 40 MG delayed release capsule Take 40 mg by mouth every morning (before breakfast) Historical Med             ALLERGIES     Augmentin [amoxicillin-pot clavulanate] and Pork-derived products    FAMILYHISTORY       Family History   Problem Relation Age of Onset    Cancer Mother     Heart Disease Mother     Diabetes Mother     High Blood Pressure Mother     Heart Disease Father     Heart Disease Maternal Grandmother         SOCIAL HISTORY       Social History     Socioeconomic History    Marital status:      Spouse name: None    Number of children: None    Years of education: None    Highest education level: None   Occupational History    None   Tobacco Use    Smoking status: Current Every Day Smoker     Packs/day: 0.50     Years: 35.00     Pack years: 17.50     Types: Cigarettes    Smokeless tobacco: Never Used    Tobacco comment: trying to quit   Vaping Use    Vaping Use: Every day    Substances: Nicotine    Devices: Disposable   Substance and Sexual Activity    Alcohol use:  Yes     Alcohol/week: 14.0 standard drinks Types: 14 Glasses of wine per week     Comment: wine    Drug use: No    Sexual activity: None   Other Topics Concern    None   Social History Narrative    None     Social Determinants of Health     Financial Resource Strain: Low Risk     Difficulty of Paying Living Expenses: Not hard at all   Food Insecurity: No Food Insecurity    Worried About Running Out of Food in the Last Year: Never true    Tatianna of Food in the Last Year: Never true   Transportation Needs: No Transportation Needs    Lack of Transportation (Medical): No    Lack of Transportation (Non-Medical): No   Physical Activity:     Days of Exercise per Week: Not on file    Minutes of Exercise per Session: Not on file   Stress:     Feeling of Stress : Not on file   Social Connections:     Frequency of Communication with Friends and Family: Not on file    Frequency of Social Gatherings with Friends and Family: Not on file    Attends Jewish Services: Not on file    Active Member of [address] [place] [address] or Organizations: Not on file    Attends Club or Organization Meetings: Not on file    Marital Status: Not on file   Intimate Partner Violence:     Fear of Current or Ex-Partner: Not on file    Emotionally Abused: Not on file    Physically Abused: Not on file    Sexually Abused: Not on file   Housing Stability:     Unable to Pay for Housing in the Last Year: Not on file    Number of [place] in the Last Year: Not on file    Unstable Housing in the Last Year: Not on file       SCREENINGS             PHYSICAL EXAM    (up to 7 for level 4, 8 or more for level 5)     ED Triage Vitals [02/11/22 1739]   BP Temp Temp Source Pulse Resp SpO2 Height Weight   126/73 97.6 °F (36.4 °C) Tympanic 97 17 100 % 5' 1\" (1.549 m) 130 lb 4.7 oz (59.1 kg)       Physical Exam  Vitals and nursing note reviewed. Constitutional:       Appearance: She is well-developed. She is not diaphoretic. HENT:      Head: Normocephalic and atraumatic.       Nose: Nose normal. Mouth/Throat:      Mouth: Mucous membranes are moist.      Pharynx: Oropharynx is clear. No oropharyngeal exudate or posterior oropharyngeal erythema. Eyes:      General: No scleral icterus. Right eye: No discharge. Left eye: No discharge. Conjunctiva/sclera: Conjunctivae normal.      Pupils: Pupils are equal, round, and reactive to light. Neck:      Comments: No nuchal rigidity  Cardiovascular:      Rate and Rhythm: Normal rate and regular rhythm. Heart sounds: Normal heart sounds. No murmur heard. No friction rub. No gallop. Pulmonary:      Effort: Pulmonary effort is normal. No respiratory distress. Breath sounds: Normal breath sounds. No wheezing or rales. Chest:      Chest wall: No tenderness. Abdominal:      General: Abdomen is flat. There is no distension. Palpations: Abdomen is soft. There is no mass. Tenderness: There is no abdominal tenderness. There is no guarding or rebound. Musculoskeletal:         General: Normal range of motion. Cervical back: Normal range of motion and neck supple. Skin:     General: Skin is warm and dry. Neurological:      General: No focal deficit present. Mental Status: She is alert and oriented to person, place, and time. She is not disoriented. GCS: GCS eye subscore is 4. GCS verbal subscore is 5. GCS motor subscore is 6. Cranial Nerves: No cranial nerve deficit. Sensory: No sensory deficit. Motor: No tremor, abnormal muscle tone or seizure activity.       Coordination: Coordination normal.      Comments: Finger to nose normal   Psychiatric:         Behavior: Behavior normal.         DIAGNOSTIC RESULTS   LABS:    Labs Reviewed   CBC WITH AUTO DIFFERENTIAL - Abnormal; Notable for the following components:       Result Value    RBC 2.91 (*)     Hemoglobin 10.3 (*)     Hematocrit 31.5 (*)     .4 (*)     MCH 35.5 (*)     Platelets 684 (*)     All other components within normal limits Narrative:     Performed at:  [facility]  [address] [facility] [place] [address]   Phone [phone]   COMPREHENSIVE METABOLIC PANEL - Abnormal; Notable for the following components:    Sodium 135 (*)     BUN 6 (*)     Calcium 8.2 (*)     Total Protein 5.7 (*)     Albumin 2.9 (*)     Albumin/Globulin Ratio 1.0 (*)     All other components within normal limits    Narrative:     Performed at:  [facility]  [address] [place] [address]   Phone [phone]   URINE RT REFLEX TO CULTURE - Abnormal; Notable for the following components:    Leukocyte Esterase, Urine SMALL (*)     All other components within normal limits    Narrative:     Performed at:  [facility]  [address] [facility] [place] [address]   Phone [phone]   BRAIN NATRIURETIC PEPTIDE - Abnormal; Notable for the following components:    Pro- (*)     All other components within normal limits    Narrative:     Performed at:  [facility]  [address] [facility] [place] [address]   Phone [phone]   MICROSCOPIC URINALYSIS - Abnormal; Notable for the following components:    Bacteria, UA 1+ (*)     Epithelial Cells, UA 8 (*)     All other components within normal limits    Narrative:     Performed at:  [facility]  [address] [facility] [place] [address]   Phone [phone]   LIPASE    Narrative:     Performed at:  [facility]  [address] [facility] [place] [address]   Phone [phone]   TROPONIN    Narrative:     Performed at:  [place] [address] [facility] [place] [address]   Phone [phone]       When ordered, only abnormal lab results are displayed. All other labs were within normal range or not returned as of this dictation. EKG:  When ordered, EKG's are interpreted by the Emergency Department Physician in the absence of a cardiologist.  Please see their note for interpretation of EKG. RADIOLOGY:   Non-plain film images such as CT, Ultrasound and MRI are read by the radiologist. [facility] radiographic images are visualized andpreliminarily interpreted by the  ED Provider with the below findings:        Interpretation perthe Radiologist below, if available at the time of this note:    XR CHEST (2 VW)   Final Result   New moderate right pleural effusion with basilar opacification, likely   atelectasis. XR CHEST (2 VW)    Result Date: 2/11/2022  EXAMINATION: TWO XRAY VIEWS OF THE CHEST 2/11/2022 6:29 pm COMPARISON: 01/28/2022. HISTORY: ORDERING SYSTEM PROVIDED HISTORY: Chest Discomfort TECHNOLOGIST PROVIDED HISTORY: Reason for exam:->Chest Discomfort Reason for Exam: Chest Discomfort FINDINGS: There is a new moderate right pleural effusion with basilar opacification. The left lung is clear. There is no evidence of superimposed failure. The cardiac silhouette is stable. Aortic vascular calcification. No acute osseous findings. Mild multilevel osteoarthritic spurring in the thoracic spine. New moderate right pleural effusion with basilar opacification, likely atelectasis. PROCEDURES   Unless otherwise noted below, none     Procedures    CRITICAL CARE TIME   N/A    CONSULTS:  None      EMERGENCY DEPARTMENT COURSE and DIFFERENTIAL DIAGNOSIS/MDM:   Vitals:    Vitals:    02/11/22 1739   BP: 126/73   Pulse: 97   Resp: 17   Temp: 97.6 °F (36.4 °C)   TempSrc: Tympanic   SpO2: 100%   Weight: 130 lb 4.7 oz (59.1 kg)   Height: 5' 1\" (1.549 m)       Patient was given thefollowing medications:  Medications - No data to display      Emergency room course: Patient on exam throat is clear. Nonerythematous no exudate noted. Cardiovascular regular rate rhythm, lungs are clear. No wheeze, rales or rhonchi noted. Abdomen is soft nontender. Nondistended. Normal bowel sounds all 4 quadrants. Patient bilateral extremities show no edema. She is alert oriented x4. Does not appear to be in acute distress. No weakness noted. She is ambulatory with no difficulty. No other motor or sensory deficit noted. Lab result from today shows:  CBC with a white count of 8.0, RBC 2.91, hemoglobin 10.3, hematocrit 31.5. CMP shows sodium of 135, potassium 4.5, chloride 104 with a BUN of 6 creatinine 0.7. Normal transaminases. Lipase 46.0. ,    Troponin less than 0.01. Urinalysis shows negative for nitrites of small leukocytes, negative for blood negative ketones. Microscopically bacteria is 1+, hyaline casts zero, WBC of 5, RBC of one and epithelial cells of 8. Chest x-ray shows new moderate right pleural effusion with basilar opacification likely atelectasis. Discussed patient x-ray results with her as well as her lab results. Her sodium is normal at 135. I have her follow-up with her primary care physician continue her home medication as prescribed. And return for any worsening. She was okay with this plan. She will be discharged stable condition. FINAL IMPRESSION      1.  Pleural effusion on right          DISPOSITION/PLAN   DISPOSITION Decision To Discharge 02/11/2022 09:23:53 PM      PATIENT REFERREDTO:  Vernia Krabbe, MD  [address] [place] [address]  [phone]    Call in 3 days      [facility] Emergency Department  [address] [facility]  [phone]  Call   If symptoms worsen      DISCHARGE MEDICATIONS:  Discharge Medication List as of 2/11/2022  9:33 PM          DISCONTINUED MEDICATIONS:  Discharge Medication List as of 2/11/2022  9:33 PM                 (Please note that portions ofthis note were completed with a voice recognition program.  Efforts were made to edit the dictations but occasionally words are mis-transcribed.)    Gwendolyn Garcia PA-C (electronically signed)             Jena Marroquin PA-C  02/16/22 2040

## 2022-02-12 NOTE — ED NOTES
Bed: C-30  Expected date:   Expected time:   Means of arrival:   Comments:  20 Hans Ocasio, RN  02/11/22 2020

## 2022-02-14 ENCOUNTER — TELEPHONE (OUTPATIENT)
Dept: FAMILY MEDICINE CLINIC | Age: 62
End: 2022-02-14

## 2022-02-14 DIAGNOSIS — J90 PLEURAL EFFUSION ON RIGHT: Primary | ICD-10-CM

## 2022-02-14 RX ORDER — LEVOFLOXACIN 500 MG/1
500 TABLET, FILM COATED ORAL DAILY
Qty: 7 TABLET | Refills: 0 | Status: SHIPPED | OUTPATIENT
Start: 2022-02-14 | End: 2022-02-15

## 2022-02-15 ENCOUNTER — OFFICE VISIT (OUTPATIENT)
Dept: FAMILY MEDICINE CLINIC | Age: 62
End: 2022-02-15
Payer: COMMERCIAL

## 2022-02-15 VITALS
DIASTOLIC BLOOD PRESSURE: 80 MMHG | WEIGHT: 125 LBS | OXYGEN SATURATION: 97 % | HEART RATE: 94 BPM | SYSTOLIC BLOOD PRESSURE: 130 MMHG | BODY MASS INDEX: 23.62 KG/M2 | RESPIRATION RATE: 14 BRPM

## 2022-02-15 DIAGNOSIS — D75.89 MACROCYTOSIS: ICD-10-CM

## 2022-02-15 DIAGNOSIS — D64.9 ANEMIA, UNSPECIFIED TYPE: ICD-10-CM

## 2022-02-15 DIAGNOSIS — Z78.9 ALCOHOL USE: ICD-10-CM

## 2022-02-15 DIAGNOSIS — E87.1 HYPONATREMIA: ICD-10-CM

## 2022-02-15 DIAGNOSIS — J90 PLEURAL EFFUSION ON RIGHT: Primary | ICD-10-CM

## 2022-02-15 LAB
ALBUMIN SERPL-MCNC: 3 G/DL (ref 3.4–5)
ANION GAP SERPL CALCULATED.3IONS-SCNC: 12 MMOL/L (ref 3–16)
BASOPHILS ABSOLUTE: 0 K/UL (ref 0–0.2)
BASOPHILS RELATIVE PERCENT: 0.6 %
BUN BLDV-MCNC: 7 MG/DL (ref 7–20)
CALCIUM SERPL-MCNC: 8.9 MG/DL (ref 8.3–10.6)
CHLORIDE BLD-SCNC: 104 MMOL/L (ref 99–110)
CO2: 22 MMOL/L (ref 21–32)
CREAT SERPL-MCNC: 0.5 MG/DL (ref 0.6–1.2)
EOSINOPHILS ABSOLUTE: 0.1 K/UL (ref 0–0.6)
EOSINOPHILS RELATIVE PERCENT: 0.7 %
FOLATE: 7.09 NG/ML (ref 4.78–24.2)
GFR AFRICAN AMERICAN: >60
GFR NON-AFRICAN AMERICAN: >60
GLUCOSE BLD-MCNC: 116 MG/DL (ref 70–99)
HCT VFR BLD CALC: 34.6 % (ref 36–48)
HEMOGLOBIN: 11.2 G/DL (ref 12–16)
LYMPHOCYTES ABSOLUTE: 1.6 K/UL (ref 1–5.1)
LYMPHOCYTES RELATIVE PERCENT: 23.1 %
MCH RBC QN AUTO: 35 PG (ref 26–34)
MCHC RBC AUTO-ENTMCNC: 32.4 G/DL (ref 31–36)
MCV RBC AUTO: 108 FL (ref 80–100)
MONOCYTES ABSOLUTE: 0.8 K/UL (ref 0–1.3)
MONOCYTES RELATIVE PERCENT: 11.4 %
NEUTROPHILS ABSOLUTE: 4.5 K/UL (ref 1.7–7.7)
NEUTROPHILS RELATIVE PERCENT: 64.2 %
PDW BLD-RTO: 14.3 % (ref 12.4–15.4)
PHOSPHORUS: 4.3 MG/DL (ref 2.5–4.9)
PLATELET # BLD: 531 K/UL (ref 135–450)
PMV BLD AUTO: 7.2 FL (ref 5–10.5)
POTASSIUM SERPL-SCNC: 5.2 MMOL/L (ref 3.5–5.1)
RBC # BLD: 3.21 M/UL (ref 4–5.2)
SODIUM BLD-SCNC: 138 MMOL/L (ref 136–145)
VITAMIN B-12: 699 PG/ML (ref 211–911)
WBC # BLD: 7 K/UL (ref 4–11)

## 2022-02-15 PROCEDURE — 99214 OFFICE O/P EST MOD 30 MIN: CPT | Performed by: INTERNAL MEDICINE

## 2022-02-15 ASSESSMENT — ENCOUNTER SYMPTOMS
NAUSEA: 0
VOMITING: 0
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
## 2022-02-15 NOTE — TELEPHONE ENCOUNTER
Started Kimberlee on levaquin  Concern this might be an infectious pl effusion  Let me know what you think.   [place]

## 2022-02-15 NOTE — TELEPHONE ENCOUNTER
Michaela Narayan,  Thank you so much for addressing this. I will see her later today.   Roberth Whitlock

## 2022-02-15 NOTE — TELEPHONE ENCOUNTER
I called pt about her cxr   New right pleural effusion- had a recent chest CT to compare  No fever   No unusual cough  Sees Dr Trejo May  Not short of breath  Weak going up the stairs. Off bactrim  She resumed entresto  Will see me tomorrow  To ER with any fever.

## 2022-02-15 NOTE — TELEPHONE ENCOUNTER
Called [phone]. We discussed DDX including infection and cancer. Discussed options of a thoracentesis vs follow up chest X-ray. Risk of waiting and thoracentesis discussed. She did not descide and has a follow up with Randa Vazquez MD today. Thoracentesis orders placed if she wishes to have procedure. If not, advised follow up chest X-ray in 1-2 weeks. If does not improve, advise thoracentesis then.

## 2022-02-16 ENCOUNTER — OFFICE VISIT (OUTPATIENT)
Dept: CARDIOLOGY CLINIC | Age: 62
End: 2022-02-16
Payer: COMMERCIAL

## 2022-02-16 VITALS
SYSTOLIC BLOOD PRESSURE: 114 MMHG | BODY MASS INDEX: 23.64 KG/M2 | OXYGEN SATURATION: 100 % | HEIGHT: 61 IN | DIASTOLIC BLOOD PRESSURE: 66 MMHG | WEIGHT: 125.2 LBS | HEART RATE: 69 BPM

## 2022-02-16 DIAGNOSIS — Z72.0 TOBACCO ABUSE: ICD-10-CM

## 2022-02-16 DIAGNOSIS — I10 ESSENTIAL HYPERTENSION: ICD-10-CM

## 2022-02-16 DIAGNOSIS — I25.10 CORONARY ARTERY DISEASE INVOLVING NATIVE CORONARY ARTERY OF NATIVE HEART WITHOUT ANGINA PECTORIS: ICD-10-CM

## 2022-02-16 DIAGNOSIS — E78.5 HYPERLIPIDEMIA LDL GOAL <70: ICD-10-CM

## 2022-02-16 DIAGNOSIS — I50.42 CHRONIC COMBINED SYSTOLIC AND DIASTOLIC CHF, NYHA CLASS 2 (HCC): Primary | ICD-10-CM

## 2022-02-16 PROCEDURE — 99214 OFFICE O/P EST MOD 30 MIN: CPT | Performed by: INTERNAL MEDICINE

## 2022-02-16 RX ORDER — TORSEMIDE 10 MG/1
10 TABLET ORAL DAILY
Qty: 30 TABLET | Refills: 3 | Status: SHIPPED | OUTPATIENT
Start: 2022-02-16 | End: 2022-06-07

## 2022-02-17 ENCOUNTER — TELEPHONE (OUTPATIENT)
Dept: PULMONOLOGY | Age: 62
End: 2022-02-17

## 2022-02-17 DIAGNOSIS — J90 PLEURAL EFFUSION: Primary | ICD-10-CM

## 2022-02-17 NOTE — TELEPHONE ENCOUNTER
I am confused why she is calling. I called her and she was supposed to get thoracentesis after talking to [facility] MD TAINA     She need to schedule thoracentesis. Everything was ordered days ago.

## 2022-02-17 NOTE — TELEPHONE ENCOUNTER
Kimberlee calls. \"    Dr. Gerhardt Africa called me Tuesday morning and mentioned a test/ procedure to have fluid removed from my lungs, that showed on my chest xray. \" Xray done on 2/11/22  Dr. Gerhardt Africa please advise.

## 2022-02-18 NOTE — TELEPHONE ENCOUNTER
I called  She states no mondays  Called scheduling-due to unfortunate circumstances they were closed  Will try again later

## 2022-02-23 ENCOUNTER — HOSPITAL ENCOUNTER (OUTPATIENT)
Dept: ULTRASOUND IMAGING | Age: 62
Discharge: HOME | End: 2022-02-23
Payer: COMMERCIAL

## 2022-02-23 ENCOUNTER — HOSPITAL ENCOUNTER (OUTPATIENT)
Dept: INTERVENTIONAL RADIOLOGY/VASCULAR | Age: 62
Discharge: HOME | End: 2022-02-23
Payer: COMMERCIAL

## 2022-02-23 ENCOUNTER — HOSPITAL ENCOUNTER (OUTPATIENT)
Dept: GENERAL RADIOLOGY | Age: 62
Discharge: HOME | End: 2022-02-23
Payer: COMMERCIAL

## 2022-02-23 VITALS
OXYGEN SATURATION: 99 % | WEIGHT: 121.47 LBS | HEIGHT: 61 IN | DIASTOLIC BLOOD PRESSURE: 62 MMHG | TEMPERATURE: 96.8 F | HEART RATE: 103 BPM | SYSTOLIC BLOOD PRESSURE: 112 MMHG | RESPIRATION RATE: 18 BRPM | BODY MASS INDEX: 22.93 KG/M2

## 2022-02-23 VITALS
TEMPERATURE: 96.8 F | DIASTOLIC BLOOD PRESSURE: 78 MMHG | RESPIRATION RATE: 18 BRPM | OXYGEN SATURATION: 98 % | HEART RATE: 104 BPM | SYSTOLIC BLOOD PRESSURE: 117 MMHG

## 2022-02-23 DIAGNOSIS — J90 PLEURAL EFFUSION ON RIGHT: ICD-10-CM

## 2022-02-23 DIAGNOSIS — J90 PLEURAL EFFUSION: ICD-10-CM

## 2022-02-23 LAB
ALBUMIN FLUID: 1.3 G/DL
ANION GAP SERPL CALCULATED.3IONS-SCNC: 14 MMOL/L (ref 3–16)
BUN BLDV-MCNC: 5 MG/DL (ref 7–20)
CALCIUM SERPL-MCNC: 8.5 MG/DL (ref 8.3–10.6)
CHLORIDE BLD-SCNC: 100 MMOL/L (ref 99–110)
CO2: 25 MMOL/L (ref 21–32)
CREAT SERPL-MCNC: 0.6 MG/DL (ref 0.6–1.2)
FLUID TYPE: NORMAL
GFR AFRICAN AMERICAN: >60
GFR NON-AFRICAN AMERICAN: >60
GLUCOSE BLD-MCNC: 78 MG/DL (ref 70–99)
GLUCOSE, FLUID: 79 MG/DL
INR BLD: 1 (ref 0.88–1.12)
LD, FLUID: 124 U/L
MAGNESIUM: 1.5 MG/DL (ref 1.8–2.4)
PH, BODY FLUID: 8
POTASSIUM REFLEX MAGNESIUM: 3.5 MMOL/L (ref 3.5–5.1)
PRO-BNP: 527 PG/ML (ref 0–124)
PROTEIN FLUID: 1.9 G/DL
PROTHROMBIN TIME: 11.3 SEC (ref 9.9–12.7)
SODIUM BLD-SCNC: 139 MMOL/L (ref 136–145)

## 2022-02-23 PROCEDURE — 82945 GLUCOSE OTHER FLUID: CPT

## 2022-02-23 PROCEDURE — 84157 ASSAY OF PROTEIN OTHER: CPT

## 2022-02-23 PROCEDURE — 80048 BASIC METABOLIC PNL TOTAL CA: CPT

## 2022-02-23 PROCEDURE — 83986 ASSAY PH BODY FLUID NOS: CPT

## 2022-02-23 PROCEDURE — 83615 LACTATE (LD) (LDH) ENZYME: CPT

## 2022-02-23 PROCEDURE — 71045 X-RAY EXAM CHEST 1 VIEW: CPT

## 2022-02-23 PROCEDURE — 82042 OTHER SOURCE ALBUMIN QUAN EA: CPT

## 2022-02-23 PROCEDURE — 88184 FLOWCYTOMETRY/ TC 1 MARKER: CPT

## 2022-02-23 PROCEDURE — 7100000010 HC PHASE II RECOVERY - FIRST 15 MIN

## 2022-02-23 PROCEDURE — 7100000011 HC PHASE II RECOVERY - ADDTL 15 MIN

## 2022-02-23 PROCEDURE — 83735 ASSAY OF MAGNESIUM: CPT

## 2022-02-23 PROCEDURE — 32555 ASPIRATE PLEURA W/ IMAGING: CPT

## 2022-02-23 PROCEDURE — 36415 COLL VENOUS BLD VENIPUNCTURE: CPT

## 2022-02-23 PROCEDURE — 85610 PROTHROMBIN TIME: CPT

## 2022-02-23 PROCEDURE — 88185 FLOWCYTOMETRY/TC ADD-ON: CPT

## 2022-02-23 PROCEDURE — 83880 ASSAY OF NATRIURETIC PEPTIDE: CPT

## 2022-02-23 PROCEDURE — 2709999900 IR GUIDED THORACENTESIS PLEURAL

## 2022-02-23 PROCEDURE — 88112 CYTOPATH CELL ENHANCE TECH: CPT

## 2022-02-23 PROCEDURE — 88305 TISSUE EXAM BY PATHOLOGIST: CPT

## 2022-02-23 ASSESSMENT — PAIN DESCRIPTION - DESCRIPTORS: DESCRIPTORS: DISCOMFORT

## 2022-02-23 ASSESSMENT — PAIN - FUNCTIONAL ASSESSMENT: PAIN_FUNCTIONAL_ASSESSMENT: 0-10

## 2022-02-24 ENCOUNTER — TELEPHONE (OUTPATIENT)
Dept: PULMONOLOGY | Age: 62
End: 2022-02-24

## 2022-02-24 DIAGNOSIS — E87.6 HYPOKALEMIA: Primary | ICD-10-CM

## 2022-02-24 NOTE — TELEPHONE ENCOUNTER
Called with results. Cytology negative. Flow pending. Serum LDH / protein was not completed but pleural studies low mostly consistent with transudate.

## 2022-02-24 NOTE — TELEPHONE ENCOUNTER
Patient has Thoracentesis yesterday ,   Does she need to schedule a follow up? Can she also have the results from the procedure?

## 2022-03-04 ENCOUNTER — TELEMEDICINE (OUTPATIENT)
Dept: FAMILY MEDICINE CLINIC | Age: 62
End: 2022-03-04
Payer: COMMERCIAL

## 2022-03-04 DIAGNOSIS — E87.1 HYPONATREMIA: ICD-10-CM

## 2022-03-04 DIAGNOSIS — I50.42 CHRONIC COMBINED SYSTOLIC AND DIASTOLIC CHF (CONGESTIVE HEART FAILURE) (HCC): ICD-10-CM

## 2022-03-04 DIAGNOSIS — J90 PLEURAL EFFUSION ON RIGHT: ICD-10-CM

## 2022-03-04 DIAGNOSIS — E87.6 HYPOKALEMIA: Primary | ICD-10-CM

## 2022-03-04 DIAGNOSIS — Z78.9 ALCOHOL USE: ICD-10-CM

## 2022-03-04 DIAGNOSIS — E87.6 HYPOKALEMIA: ICD-10-CM

## 2022-03-04 LAB
A/G RATIO: 0.9 (ref 1.1–2.2)
ALBUMIN SERPL-MCNC: 2.7 G/DL (ref 3.4–5)
ALP BLD-CCNC: 186 U/L (ref 40–129)
ALT SERPL-CCNC: 10 U/L (ref 10–40)
ANION GAP SERPL CALCULATED.3IONS-SCNC: 11 MMOL/L (ref 3–16)
AST SERPL-CCNC: 35 U/L (ref 15–37)
BILIRUB SERPL-MCNC: 0.4 MG/DL (ref 0–1)
BUN BLDV-MCNC: 7 MG/DL (ref 7–20)
CALCIUM SERPL-MCNC: 8.2 MG/DL (ref 8.3–10.6)
CHLORIDE BLD-SCNC: 98 MMOL/L (ref 99–110)
CO2: 26 MMOL/L (ref 21–32)
CREAT SERPL-MCNC: 0.6 MG/DL (ref 0.6–1.2)
GFR AFRICAN AMERICAN: >60
GFR NON-AFRICAN AMERICAN: >60
GLUCOSE BLD-MCNC: 96 MG/DL (ref 70–99)
POTASSIUM SERPL-SCNC: 3.9 MMOL/L (ref 3.5–5.1)
SODIUM BLD-SCNC: 135 MMOL/L (ref 136–145)
TOTAL PROTEIN: 5.6 G/DL (ref 6.4–8.2)

## 2022-03-04 PROCEDURE — 99213 OFFICE O/P EST LOW 20 MIN: CPT | Performed by: INTERNAL MEDICINE

## 2022-03-04 ASSESSMENT — ENCOUNTER SYMPTOMS
COUGH: 1
NAUSEA: 0
VOMITING: 0
SHORTNESS OF BREATH: 0

## 2022-03-04 NOTE — PROGRESS NOTES
Kimberlee Molina (:  1960) is here for evaluation of the following:    Leena Lara [address] was seen today for forms. Diagnoses and all orders for this visit:    Hypokalemia  -     Cancel: Renal Function Panel; Future  -     Comprehensive Metabolic Panel; Future    Hyponatremia  -     Comprehensive Metabolic Panel; Future    Alcohol use    Chronic combined systolic and diastolic CHF (congestive heart failure) (HCC)    Pleural effusion on right    will finish paperwork on Monday  Await labs that I ordered today  She should be able to go to work next week if labs ok. Follow up one month  HPI     Follow up visit  Severe hyponatremia  And pleural effusion  Feeling ok  Not as strong as she was before  Can go up stairs using a handrail  Can get down ok    Had started bactrim after our visit stopped this. Had pleural effusion on the right  Tapped. She talked to dr Delores Pruitt who she said told her was not cancerous    Also follow up with dr Jose Serra    Put back on entresto and diurectic    Also had seen   but not as an outpt    Drinking water , d coke and gatoraid  No alcohol  No alcohol since about the time she was in the hospital  No shortness of breath        Review of Systems   Constitutional: Negative for appetite change and unexpected weight change. Respiratory: Positive for cough. Negative for shortness of breath. Chronic cough       Gastrointestinal: Negative for nausea and vomiting. Objective   Patient-Reported Vitals  No data recorded     Physical Exam  Constitutional:       Appearance: Normal appearance. HENT:      Head: Normocephalic and atraumatic. Pulmonary:      Effort: Pulmonary effort is normal.   Neurological:      Mental Status: She is alert. Psychiatric:         Mood and Affect: Mood normal.         Behavior: Behavior normal.         Thought Content:  Thought content normal.         Judgment: Judgment normal.              Kimberlee Molina, was evaluated through a synchronous (real-time) audio-video encounter. The patient (or guardian if applicable) is aware that this is a billable service, which includes applicable co-pays. This Virtual Visit was conducted with patient's (and/or legal guardian's) consent. The visit was conducted pursuant to the emergency declaration under the [address] [place] [address] authority and the [employer] General Act. Patient identification was verified, and a caregiver was present when appropriate. The patient was located at home in a state where the provider was licensed to provide care.        --Jordy Jolly MD

## 2022-03-07 ENCOUNTER — TELEPHONE (OUTPATIENT)
Dept: FAMILY MEDICINE CLINIC | Age: 62
End: 2022-03-07

## 2022-03-07 NOTE — TELEPHONE ENCOUNTER
----- Message from Kevyn Mesa sent at 3/7/2022  8:40 AM EST -----  Subject: Message to Provider    QUESTIONS  Information for Provider? patient stated that she need a return to work   letter. If you can please fax to work number. ---------------------------------------------------------------------------  --------------  Steve Danger INFO  What is the best way for the office to contact you? OK to leave message on   voicemail  Preferred Call Back Phone Number? [phone]  ---------------------------------------------------------------------------  --------------  SCRIPT ANSWERS  Relationship to Patient?  Self

## 2022-03-08 NOTE — TELEPHONE ENCOUNTER
She is feeling ok  Currently working at home until next month. Returned to work yesterday so will need letter backdated. OK to create letter? Should I include restrictions or no restrictions? She was given lab results and has f/u appt scheduled.   Will start drinking boost for calories and protein

## 2022-03-11 ENCOUNTER — TELEPHONE (OUTPATIENT)
Dept: FAMILY MEDICINE CLINIC | Age: 62
End: 2022-03-11

## 2022-03-11 NOTE — TELEPHONE ENCOUNTER
Please fax my letter for Shawanda Loges that was written on  March 8 . She will call with the fax number.

## 2022-03-21 ENCOUNTER — TELEMEDICINE (OUTPATIENT)
Dept: FAMILY MEDICINE CLINIC | Age: 62
End: 2022-03-21
Payer: COMMERCIAL

## 2022-03-21 DIAGNOSIS — R74.8 ELEVATED SERUM ALKALINE PHOSPHATASE LEVEL: ICD-10-CM

## 2022-03-21 DIAGNOSIS — E87.1 HYPONATREMIA: ICD-10-CM

## 2022-03-21 DIAGNOSIS — R53.1 WEAKNESS: Primary | ICD-10-CM

## 2022-03-21 PROCEDURE — 99214 OFFICE O/P EST MOD 30 MIN: CPT | Performed by: INTERNAL MEDICINE

## 2022-03-21 ASSESSMENT — ENCOUNTER SYMPTOMS
VOMITING: 0
NAUSEA: 0

## 2022-03-21 NOTE — PROGRESS NOTES
Kimberlee Molina (:  1960) is here for evaluation of the following:      Bryson Garcia was seen today for forms. Diagnoses and all orders for this visit:    Weakness  -     Ambulatory referral to Physical Therapy  -     Ambulatory referral to Occupational Therapy    Hyponatremia  -     Comprehensive Metabolic Panel; Future    Elevated serum alkaline phosphatase level  -     Comprehensive Metabolic Panel; Future    needs PT /OT  Wants to do that here  Will repeat labs  Follow up in April  Needs to inc haley JAMES   Bryson Garcia is in [facility] full time from home  Has worked 2 full weeks since the illness  Still has an issue with the stairs. Using it to pull her up  This weakness happened after hospitalization  sched to go back to work   She is physically weak  Not shortness of breath with exertion    Eating better   Drinking boost most days   Review of Systems   Constitutional: Negative for appetite change and unexpected weight change. Respiratory:        Coughing is less  Not shortness of breath  Not going very far   Gastrointestinal: Negative for nausea and vomiting. No abd pain  Still has a gallbladder          Objective   Patient-Reported Vitals  No data recorded     Physical Exam  Constitutional:       Appearance: Normal appearance. HENT:      Head: Normocephalic and atraumatic. Pulmonary:      Effort: Pulmonary effort is normal.   Neurological:      Mental Status: She is alert. Psychiatric:         Mood and Affect: Mood normal.         Behavior: Behavior normal.         Thought Content: Thought content normal.         Judgment: Judgment normal.              Kimberlee Molina, was evaluated through a synchronous (real-time) audio-video encounter. The patient (or guardian if applicable) is aware that this is a billable service, which includes applicable co-pays. This Virtual Visit was conducted with patient's (and/or legal guardian's) consent.  The visit was conducted pursuant to the emergency declaration under the [facility], [address] [place] [address] authority and the [employer] General Act. Patient identification was verified, and a caregiver was present when appropriate. The patient was located at home in a state where the provider was licensed to provide care.        --Parminder Belle MD

## 2022-03-22 DIAGNOSIS — R74.8 ELEVATED SERUM ALKALINE PHOSPHATASE LEVEL: ICD-10-CM

## 2022-03-22 DIAGNOSIS — E87.1 HYPONATREMIA: ICD-10-CM

## 2022-03-22 LAB
A/G RATIO: 1.1 (ref 1.1–2.2)
ALBUMIN SERPL-MCNC: 3.2 G/DL (ref 3.4–5)
ALP BLD-CCNC: 171 U/L (ref 40–129)
ALT SERPL-CCNC: <5 U/L (ref 10–40)
ANION GAP SERPL CALCULATED.3IONS-SCNC: 13 MMOL/L (ref 3–16)
AST SERPL-CCNC: 42 U/L (ref 15–37)
BILIRUB SERPL-MCNC: 0.4 MG/DL (ref 0–1)
BUN BLDV-MCNC: 7 MG/DL (ref 7–20)
CALCIUM SERPL-MCNC: 8.5 MG/DL (ref 8.3–10.6)
CHLORIDE BLD-SCNC: 103 MMOL/L (ref 99–110)
CO2: 24 MMOL/L (ref 21–32)
CREAT SERPL-MCNC: 0.7 MG/DL (ref 0.6–1.2)
GFR AFRICAN AMERICAN: >60
GFR NON-AFRICAN AMERICAN: >60
GLUCOSE BLD-MCNC: 93 MG/DL (ref 70–99)
POTASSIUM SERPL-SCNC: 4.6 MMOL/L (ref 3.5–5.1)
SODIUM BLD-SCNC: 140 MMOL/L (ref 136–145)
TOTAL PROTEIN: 6.2 G/DL (ref 6.4–8.2)

## 2022-03-30 NOTE — PROGRESS NOTES
[facility], [address] [place] [address] [address] [facility] Medical CenterDonald jang [place] [address]  [phone]    PrimaryCare Doctor:  Alanis Jose MD  Primary Cardiologist: Dr. Hailey Mccollum    Chief Complaint   Patient presents with    Follow-up     edema on both ankles and feet      History of Present Illness:  Danya Mariscal is a 62 y.o. female with PMH HTN, seasonal allergies, TANNER (non compliant with CPAP), COPD, smoker, HLP, ETOH. Patient was admitted to [facility] 6/4/2021 with sudden onset SOB. Woke up on 6/4 feeling SOB. Associated with cough, tachycardia, BLE edema, fatigue. Found new heart failure and hypervolemic hyponatremia. ECHO indicated severe anterolateral hypokinesis ischemia and EKG with anterolateral ST changes. Underwent R & LHC > non occlusive CAD. Started on ferrous sulfate for RISSA. EF improved with GDMT 55-60%. Adm [facility] 1/2022 weakness/fatigue>hyponatremia. Required levophed for hypotension. Started on NA tabs, torsemide and entresto DC'd. Adm to ED 2/11/2022 with right pl effusion with atelectasis>thoracentesis 2/23 Dr. Lennox Estrada . Seen by Dr. Hailey Mccollum 2/16/22- torsemide and entresto restarted. Patient presents to [facility] cardiology for follow up for heart failure/hyponatremia. She is feeling better. Continues with SOB and \"lack of energy. \" Denies SOB with rest. + SOB with minimal activity. She is scheduled to start physical therapy tomorrow and is hoping that helps her get some strength back. She has bilateral pedal/ankle edema. Gradually worsened over the last few weeks. Typically wears compression stockings which helps some. Denies CP, LH, dizzy, syncope,palpitations. Review of Systems:   General: Denies fever, chills  Skin: Denies skin changes, rash, itching, lesions.   HEENT: Denies headache, dizziness, vision changes, nosebleeds, sore throat, nasal drainage  RESP: Denies cough, sputum,  wheeze  CARD: Denies palpitations,  murmur  GI:Denies nausea, vomiting, heartburn, loss of appetite, change in bowels  : Denies frequency, pain, incontinence, polyuria  VASC: Denies claudication, leg cramps, clots  MUSC/SKEL: Denies pain, stiffness, arthritis  PSYCH: Denies anxiety, depression, stress  NEURO: Denies numbness, tingling, weakness,change in mood or memory  HEME: Denies abn bruising, bleeding, anemia  ENDO: Denies intolerance to heat, cold, excessive thirst or hunger, hx thyroid disease    /72   Pulse 95   Ht 5' 1\" (1.549 m)   Wt 120 lb 3.2 oz (54.5 kg)   SpO2 96%   BMI 22.71 kg/m²   Wt Readings from Last 3 Encounters:   03/31/22 120 lb 3.2 oz (54.5 kg)   02/23/22 121 lb 7.6 oz (55.1 kg)   02/16/22 125 lb 3.2 oz (56.8 kg)       Physical Exam:  GEN: Appears frail, no acute distress  SKIN: Pink, warm, dry. Nails without clubbing. HEENT: PERRLA. Normocephalic, atraumatic. Neck supple. No adenopathy. LUNG: AP diameter normal. Diminished BS bilateral. No wheeze, rales, or ronchi. Respiratory effort normal.  HEART: S1S2 A/R. Mild JVD. No carotid bruit. No murmur, rub or gallop. ABD: Soft, nontender. +BS X 4 quads. No hepatomegaly. EXT: Radial and pedal pulses 2+ and symmetric. Without varicosities. 2+ pedal LLE>RLE edema. MUSCSKEL: Good ROM X4 extremities. No deformity. NEURO: A/O X3. Calm and cooperative. Past Medical History:   has a past medical history of Anxiety, GERD (gastroesophageal reflux disease), Gout, Hyperlipidemia, and Hypertension. Surgical History:   has a past surgical history that includes Knee arthroscopy; Carpal tunnel release; bladder suspension; Tonsillectomy; Carpal tunnel release (7-12-10); Carpal tunnel release (Left wrist); joint replacement; sinus surgery; and Foot surgery (3/2013). Social History:   reports that she has been smoking cigarettes. She has a 17.50 pack-year smoking history. She has never used smokeless tobacco. She reports current alcohol use of about 14.0 standard drinks of alcohol per week.  She reports that she does not use drugs. Family History:   Family History   Problem Relation Age of Onset   [facility] Cancer Mother     Heart Disease Mother     Diabetes Mother     High Blood Pressure Mother     Heart Disease Father     Heart Disease Maternal Grandmother        HomeMedications:  Prior to Admission medications    Medication Sig Start Date End Date Taking?  Authorizing Provider   folic acid (FOLVITE) 1 MG tablet Take 1 mg by mouth daily   Yes Historical Provider, MD   torsemide (DEMADEX) 10 MG tablet Take 1 tablet by mouth daily 2/16/22  Yes Edgar Byrne MD   sacubitril-valsartan [facility]) 24-26 MG per tablet Take 1 tablet by mouth 2 times daily 2/16/22  Yes Edgar Byrne MD   esomeprazole ([address]) 40 MG delayed release capsule TAKE 1 CAPSULE BY MOUTH DAILY 2/15/22  Yes Bebe Aguirre MD   allopurinol (ZYLOPRIM) 300 MG tablet TAKE 1 TABLET BY MOUTH DAILY  Patient taking differently: Take 300 mg by mouth daily  1/13/22  Yes Bebe Aguirre MD   carvedilol (COREG) 3.125 MG tablet TAKE 1 TABLET BY MOUTH TWICE DAILY  Patient taking differently: Take 3.125 mg by mouth 2 times daily  12/16/21  Yes Edgar Byrne MD   MAGNESIUM-OXIDE 400 (241.3 Mg) MG TABS tablet TAKE 1 TABLET BY MOUTH DAILY  Patient taking differently: Take 400 mg by mouth daily  9/28/21  Yes Edgar Byrne MD   atorvastatin (LIPITOR) 20 MG tablet TAKE 1 TABLET BY MOUTH DAILY 9/21/21  Yes Edgar Byrne MD   budesonide-formoterol [facility]) 160-4.5 MCG/ACT AERO Inhale 2 puffs into the lungs 2 times daily 9/20/21  Yes Gabriel Canada MD   albuterol sulfate  (90 Base) MCG/ACT inhaler Inhale 2 puffs into the lungs every 6 hours as needed for Wheezing 6/21/21  Yes FRANCISCO J Rodriguez - CNP   fluticasone (FLONASE) 50 MCG/ACT nasal spray 2 sprays by Each Nostril route daily   Yes Historical Provider, MD   Multiple Vitamins-Minerals (THERAPEUTIC MULTIVITAMIN-MINERALS) tablet Take 1 tablet by mouth daily Yes Historical Provider, MD   levocetirizine (XYZAL) 5 MG tablet Take 5 mg by mouth daily. 6/28/10  Yes Historical Provider, MD   aspirin 81 MG EC tablet Take 81 mg by mouth daily. Yes Historical Provider, MD   montelukast (SINGULAIR) 10 MG tablet Take 10 mg by mouth daily    Yes Historical Provider, MD   ondansetron (ZOFRAN-ODT) 4 MG disintegrating tablet Take 1 tablet by mouth 3 times daily as needed for Nausea or Vomiting  Patient not taking: Reported on 3/31/2022 11/12/21   Teja Vail MD        Allergies:  Augmentin [amoxicillin-pot clavulanate] and Pork-derived products       LABS: Results reviewed with patient today. CBC:   Lab Results   Component Value Date    WBC 7.0 02/15/2022    WBC 8.0 02/11/2022    WBC 4.5 02/02/2022    RBC 3.21 02/15/2022    RBC 2.91 02/11/2022    RBC 2.02 02/02/2022    HGB 11.2 02/15/2022    HGB 10.3 02/11/2022    HGB 7.5 02/02/2022    HCT 34.6 02/15/2022    HCT 31.5 02/11/2022    HCT 22.0 02/02/2022    .0 02/15/2022    .4 02/11/2022    .7 02/02/2022    RDW 14.3 02/15/2022    RDW 15.0 02/11/2022    RDW 15.4 02/02/2022     02/15/2022     02/11/2022     02/02/2022   Results for Veena Henry (MRN [id number]) as of 6/28/2021 10:00   Ref.  Range 6/6/2021 09:37   Iron Latest Ref Range: 37 - 145 ug/dL 25 (L)   Iron Saturation Latest Ref Range: 15 - 50 % 19   TIBC Latest Ref Range: 260 - 445 ug/dL 134 (L)     BMP:  Lab Results   Component Value Date     03/22/2022     03/04/2022     02/23/2022    K 4.6 03/22/2022    K 3.9 03/04/2022    K 3.5 02/23/2022    K 5.2 02/15/2022    K 4.3 06/04/2021     03/22/2022    CL 98 03/04/2022     02/23/2022    CO2 24 03/22/2022    CO2 26 03/04/2022    CO2 25 02/23/2022    PHOS 4.3 02/15/2022    PHOS 5.0 06/08/2021    PHOS 3.2 06/07/2021    BUN 7 03/22/2022    BUN 7 03/04/2022    BUN 5 02/23/2022    CREATININE 0.7 03/22/2022    CREATININE 0.6 03/04/2022    CREATININE 0.6 02/23/2022     BNP:   Lab Results   Component Value Date    PROBNP 527 02/23/2022    PROBNP 880 02/11/2022    PROBNP 863 01/28/2022       Parameters:   > 450 pg/mL under age 48  > 900 pg/mL ages 54-65  > 1800 pg/mL over age 76    Iron Studies:    Lab Results   Component Value Date    TIBC 226 10/19/2021    TIBC 134 06/06/2021    FERRITIN 1,021.0 10/19/2021     GLUCOSE: No results for input(s): GLUCOSE in the last 72 hours. LIVER PROFILE:   Lab Results   Component Value Date    AST 42 03/22/2022    ALT <5 03/22/2022    LIPASE 46.0 02/11/2022    LABALBU 3.2 03/22/2022    BILIDIR 0.7 06/05/2021    BILITOT 0.4 03/22/2022    ALKPHOS 171 03/22/2022     PT/INR:   Lab Results   Component Value Date    PROTIME 11.3 02/23/2022    INR 1.00 02/23/2022     Cardiac Enzymes:  Lab Results   Component Value Date    CKMB 2.18 04/23/2010    TROPONINI <0.01 02/11/2022     FASTING LIPID PANEL:  Lab Results   Component Value Date    CHOL 175 10/19/2021    HDL 91 10/19/2021    LDLCALC 63 10/19/2021    TRIG 104 10/19/2021       Cardiac Imaging: Reports reviewed with patient today. EKG: Normal sinus rhythmLow voltage QRSConfirmed by RYAN COVARRUBIAS, Charline Cabot ([id number]) on 2/12/2022 9:14:37 AM     ECHO: 6/5/21:  Overall left ventricular systolic function appears mildly reduced.   Ejection fraction is visually estimated to be 40%.   Severe andrew-lateral hypokinesis   Mid to apical akinesis   Grade II diastolic dysfunction with elevated LV filling pressures.   Normal right ventricular size.   Right ventricular systolic function is reduced .     CATH:  Cardiac Angiography: [facility] 6/7/2021  FINDINGS:  1.  Evidence of persistent right-sided volume overload with a right  atrial pressure that was low at 5 mmHg but a pulmonary capillary wedge  pressure elevated at 20 mmHg.   2.  Evidence of pulmonary hypertension with an instantaneous pressure of  38/17 for a mean pulmonary arterial pressure of 28.  3.  Mildly reduced cardiac output by thermodilution at 4.53 liters per  minute with a cardiac index of 2.89 liters per kilogram per minute. 4.  Pulmonary vascular resistance of 139 dynes per second. 5.  Normal mixed venous oxygen saturations at 66% in the right atrium  and 64% in the pulmonary artery. 6.  Left ventricular end-diastolic pressure of 23 mmHg. 7.  Right-dominant coronary arterial system with no obstructive coronary  lesions.  The left main is free of disease.  The circumflex is smaller. OM1 has 50% stenosis.  The left anterior descending artery is calcified  in the proximal and midportion with a 50% stenosis at the first septal   and the first diagonal branch but no flow limitation. Further, there is 50% stenosis in the midportion.  The right coronary  artery is large with 50% mid RCA stenosis. 8.  Mildly reduced LV systolic function.  LV ejection fraction of 40%  and mild apical hypokinesis in a pattern likely consistent with a  stress-induced cardiomyopathy. 9.  No gradient across the aortic valve on pullback to suggest aortic  Stenosis. ECHO 1/6/2022  Summary   Limited study. Normal left ventricle size, wall thickness, and systolic function with an   estimated ejection fraction of 55-60%. No regional wall motion abnormalities   are seen. No pericardial effusion noted. No pleural effusion. Assessment/Plan:     1.) Acute on chronic combined systolic and diastolic heart failure, EF 40%: NICM. SOB multifactorial with COPD. Continues with fatigue and activity tolerance. Reports t hat she is compliant with meds and diet restrictions. Clinically appears mildly hypervolemic. Recent labs stable. She will take extra torsemide 20mg X3 days then resume 10mg daily. Reviewed S/S of worsening HD- she will notify [facility] if S/S worsen.    NYHA Class III              Stage C  Diuretic: torsemide 10mg Daily  Beta Blocker:coreg  ACEi/ARB/ARNI: entresto  Aldosterone Antagonist: none  SGLT2 Inhibitor: after GDMT optimized  2gm Na diet, daily weight, 64 oz fluid restriction  Avoid NSAIDS and other nephrotoxic meds  Cardiac Rehab: Not indicated for EF>35%  ICD:Not indicated for EF>35%    2.) Non ischemic CAD:   DAPT: Continue asa  Beta Blocker: coreg  Lipid management/high intensity statin: lipitor  Risk factor management: high blood pressure, high cholesterol, Diabetes, smoking, obesity, family hx  Lifestyle modification: Heart healthy diet, regular exercise, weight loss, smoking cessation, stress reduction     3.) Hypertension:   Goal BP <130/80. Non pharmacologic interventions include:   -weight loss  -heart healthy low sodium and low fat diet that consist of mostly fruits, vegetables and grains (Dash diet)  -limited amount of alcohol (no more than 1 drink/day for women, 2 drinks/day for men)  -regular physical activity  -no smoking  -stress reduction     4.) TANNER: Compliant with CPAP    mild per Sleep Study 8/2021  Added CPAP 9/2021    5.) Smoking Addiction: Trying to quit, wearing nicoderm patch  Smoking cessation counseling provided > 3 min. Benefits of smoking cessation include decrease risk for lung disease, heart disease, stroke, and peripheral vascular disease.  Recommendations include medications such as Chantix, Zyban or nicotine replacement products, counseling and resources such as 1-800-QUIT-NOW.     7.) COPD: Follows with Dr. Sloane Howell    I appreciate the opportunity of cooperating in the care of this individual.    FRANCISCO J Hunt - CNP, CNP, 3/31/2022,3:13 PM

## 2022-03-31 ENCOUNTER — OFFICE VISIT (OUTPATIENT)
Dept: CARDIOLOGY CLINIC | Age: 62
End: 2022-03-31
Payer: COMMERCIAL

## 2022-03-31 VITALS
SYSTOLIC BLOOD PRESSURE: 118 MMHG | OXYGEN SATURATION: 96 % | BODY MASS INDEX: 22.69 KG/M2 | HEART RATE: 95 BPM | WEIGHT: 120.2 LBS | DIASTOLIC BLOOD PRESSURE: 72 MMHG | HEIGHT: 61 IN

## 2022-03-31 DIAGNOSIS — I50.22 CHRONIC SYSTOLIC HEART FAILURE (HCC): ICD-10-CM

## 2022-03-31 PROCEDURE — 99214 OFFICE O/P EST MOD 30 MIN: CPT | Performed by: NURSE PRACTITIONER

## 2022-03-31 RX ORDER — FOLIC ACID 1 MG/1
1 TABLET ORAL DAILY
COMMUNITY

## 2022-03-31 NOTE — PLAN OF CARE
[facility] [place]. [address]  Phone: [phone]   Fax:     [phone]                                                       Physical Therapy Certification    Dear Referring Practitioner: Dr. Tiffanie Hernandes,    We had the pleasure of evaluating the following patient for physical therapy services at [facility]. A summary of our findings can be found in the initial assessment below. This includes our plan of care. If you have any questions or concerns regarding these findings, please do not hesitate to contact me at the office phone number checked above. Thank you for the referral.       Physician Signature:_______________________________Date:__________________  By signing above (or electronic signature), therapists plan is approved by physician          Patient: Nikhil Saenz   : 1960   MRN: [id number]  Referring Physician: Referring Practitioner: Dr. Tiffanie Hernandes      Evaluation Date: 3/31/2022      Medical Diagnosis Information:  Diagnosis: R53.1 (ICD-10-CM) - Weakness   Treatment Diagnosis: decreased abilty to ambualte and function                                         Insurance information: PT Insurance Information: cigna     Precautions/ Contra-indications:   Latex Allergy:  [x]NO      []YES  Preferred Language for Healthcare:   [x]English       []other:    C-SSRS Triggered by Intake questionnaire (Past 2 wk assessment ):   [x] No, Questionnaire did not trigger screening.   [] Yes, Patient intake triggered C-SSRS Screening      [] C-SSRS Screening completed  [] PCP notified via Epic     SUBJECTIVE: Patient is a 63 y/o female who was in the hospital in January for a week and has been weak since she came out. She c/o decreased abilty to ambulate, do steps, and stand for prolonged time. She also says it is hard to lift things.   She has fallen once in the past year. She wants to be able to do the steps, walk further and do her adl's as well as improve her balance. She wall walks. She uses a cane to so the steps at home. 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 ROM LEFT RIGHT   HIP Flex 100 100   HIP Abd 25 20   HIP Ext 5 5   HIP IR 30 25   HIP ER 40 30   Knee ext 0 0   Knee Flex 120 120   Ankle PF     Ankle DF 5 5   Ankle In     Ankle Ev     Strength  LEFT RIGHT   HIP Flexors 4-/5 all 4-/5 all   HIP Abductors     HIP Ext     Hip ER     Knee EXT (quad)     Knee Flex (HS)     Ankle DF     Ankle PF     Ankle Inv     Ankle EV          Circumference  Mid apex  7 cm prox           Proprio- 2/5  Balance- static-5-/5,  dynamic- 3/5                  Shoulder strength- 4/5  Reflexes/Sensation:    [x]Dermatomes/Myotomes intact    [x]Reflexes equal and normal bilaterally   []Other:    Joint mobility:   []Normal    [x]Hypo   []Hyper    Orthopedic Special Tests:na                       [x] Patient history, allergies, meds reviewed. Medical chart reviewed. See intake form. Review Of Systems (ROS):  [x]Performed Review of systems (Integumentary, CardioPulmonary, Neurological) by intake and observation. Intake form has been scanned into medical record. Patient has been instructed to contact their primary care physician regarding ROS issues if not already being addressed at this time.       Co-morbidities/Complexities (which will affect course of rehabilitation): []None           Arthritic conditions   []Rheumatoid arthritis (M05.9)  []Osteoarthritis (M19.91)   Cardiovascular conditions   [x]Hypertension (I10)  [x]Hyperlipidemia (E78.5)  []Angina pectoris (I20)  []Atherosclerosis (I70)  []CVA Musculoskeletal conditions   []Disc pathology   []Congenital spine pathologies   []Prior surgical intervention  []Osteoporosis (M81.8)  []Osteopenia (M85.8)   Endocrine conditions   []Hypothyroid (E03.9)  []Hyperthyroid Gastrointestinal conditions   []Constipation (F05.77)   Metabolic conditions   []Morbid obesity (E66.01)  []Diabetes type 1(E10.65) or 2 (E11.65)   []Neuropathy (G60.9)     Pulmonary conditions   []Asthma (J45)  []Coughing   []COPD (J44.9)   Psychological Disorders  [x]Anxiety (F41.9)  [x]Depression (F32.9)   []Other:   [x]Other: gerd, gout,  Right tka         Barriers to/and or personal factors that will affect rehab potential:              []Age  []Sex    []Smoker              []Motivation/Lack of Motivation                        [x]Co-Morbidities              []Cognitive Function, education/learning barriers              []Environmental, home barriers              []profession/work barriers  [x]past PT/medical experience  [x]other:recent hospitalization  Justification:    Falls Risk Assessment (30 days):   [x] Falls Risk assessed and no intervention required.   [] Falls Risk assessed and Patient requires intervention due to being higher risk   TUG score (>12s at risk):     [] Falls education provided, including         ASSESSMENT:   Functional Impairments:     [x]Noted lumbar/proximal hip/LE hypomobility   [x]Decreased LE functional ROM   [x]Decreased core/proximal hip strength and neuromuscular control   [x]Decreased LE functional strength   [x]Reduced balance/proprioceptive control   []other:      Functional Activity Limitations (from functional questionnaire and intake)   [x]Reduced ability to tolerate prolonged functional positions   [x]Reduced ability or difficulty with changes of positions or transfers between positions   []Reduced ability to maintain good posture and demonstrate good body mechanics with sitting, bending, and lifting   []Reduced ability to sleep   [] Reduced ability or tolerance with driving and/or computer work   [x]Reduced ability to perform lifting, carrying tasks   [x]Reduced ability to squat   []Reduced ability to forward bend   [x]Reduced ability to ambulate prolonged functional periods/distances/surfaces   [x]Reduced ability to ascend/descend stairs   [x]Reduced ability to run, hop or jump   [x]otherwalk and function, steps:     Participation Restrictions   [x]Reduced participation in self care activities   [x]Reduced participation in home management activities   [x]Reduced participation in work activities   [x]Reduced participation in social activities. [x]Reduced participation in sport activities. Classification :    []Signs/symptoms consistent with post-surgical status including decreased ROM, strength and function.    []Signs/symptoms consistent with joint sprain/strain   []Signs/symptoms consistent with patella-femoral syndrome   []Signs/symptoms consistent with knee OA/hip OA   []Signs/symptoms consistent with internal derangement of knee/Hip   [x]Signs/symptoms consistent with functional hip weakness/NMR control      []Signs/symptoms consistent with tendinitis/tendinosis    []signs/symptoms consistent with pathology which may benefit from Dry needling      [x]other: general weakness     Prognosis/Rehab Potential:      []Excellent   [x]Good    []Fair   []Poor    Tolerance of evaluation/treatment:    []Excellent   [x]Good    []Fair   []Poor    Physical Therapy Evaluation Complexity Justification  [x] A history of present problem with:  [] no personal factors and/or comorbidities that impact the plan of care;  [x]1-2 personal factors and/or comorbidities that impact the plan of care  []3 personal factors and/or comorbidities that impact the plan of care  [x] An examination of body systems using standardized tests and measures addressing any of the following: body structures and functions (impairments), activity limitations, and/or participation restrictions;:  [] a total of 1-2 or more elements   [] a total of 3 or more elements   [x] a total of 4 or more elements   [x] A clinical presentation with:  [] stable and/or uncomplicated characteristics   [x] evolving clinical presentation with changing characteristics  [] unstable and unpredictable characteristics;   [x] Clinical decision making of [] low, [] moderate, [] high complexity using standardized patient assessment instrument and/or measurable assessment of functional outcome. [x] EVAL (LOW) 90482 (typically 20 minutes face-to-face)  [] EVAL (MOD) 40774 (typically 30 minutes face-to-face)  [] EVAL (HIGH) 20518 (typically 45 minutes face-to-face)  [] RE-EVAL     PLAN:  Frequency/Duration:  2 days per week for 8 Weeks:  Interventions:  [x]  Therapeutic exercise including: strength training, ROM, for Lower extremity and core   [x]  NMR activation and proprioception for LE, Glutes and Core   [x]  Manual therapy as indicated for LE, Hip and spine to include: Dry Needling/IASTM, STM, PROM, Gr I-IV mobilizations, manipulation. [x] Modalities as needed that may include: thermal agents, E-stim, Biofeedback, US, iontophoresis as indicated  [x] Patient education on joint protection, postural re-education, activity modification, progression of HEP. HEP instruction(see scanned forms)    GOALS:  Patient stated goal: \" I want to walk and function better \"  [] Progressing: [] Met: [] Not Met: [] Adjusted    Therapist goals for Patient:   Short Term Goals: To be achieved in: 2 weeks  1. Independent in HEP and progression per patient tolerance, in order to prevent re-injury. [] Progressing: [] Met: [] Not Met: [] Adjusted  2.  Patient will have a decrease in pain to facilitate improvement in movement, function, and ADLs as indicated by Functional Deficits. [] Progressing: [] Met: [] Not Met: [] Adjusted    Long Term Goals: To be achieved in: 8 weeks  1. Disability index score of 30%% or less for the LEFS to assist with reaching prior level of function. [] Progressing: [] Met: [] Not Met: [] Adjusted  2. Patient will demonstrate increased AROM to wfl to allow for proper joint functioning as indicated by patients Functional Deficits. [] Progressing: [] Met: [] Not Met: [] Adjusted  3. Patient will demonstrate an increase in Strength to good proximal hip strength and control, within 5lb HHD in LE to allow for proper functional mobility as indicated by patients Functional Deficits. [] Progressing: [] Met: [] Not Met: [] Adjusted  4. Patient will return to 70% functional activities without increased symptoms or restriction. [] Progressing: [] Met: [] Not Met: [] Adjusted  5. (patient specific functional goal)    [] Progressing: [] Met: [] Not Met: [] Adjusted     Electronically signed by:  Attila Mcfadden PT      Note: If patient does not return for scheduled/recommended follow up visits, this note will serve as a discharge from care along with the most recent update on progress.

## 2022-04-01 ENCOUNTER — HOSPITAL ENCOUNTER (OUTPATIENT)
Dept: PHYSICAL THERAPY | Age: 62
Setting detail: THERAPIES SERIES
Discharge: HOME | End: 2022-04-01
Payer: COMMERCIAL

## 2022-04-01 PROCEDURE — 97110 THERAPEUTIC EXERCISES: CPT

## 2022-04-01 PROCEDURE — 97161 PT EVAL LOW COMPLEX 20 MIN: CPT

## 2022-04-01 PROCEDURE — 97530 THERAPEUTIC ACTIVITIES: CPT

## 2022-04-01 NOTE — FLOWSHEET NOTE
[facility]  [address]  Phone: [phone]   Fax:     [phone]      Physical Therapy Treatment Note/ Progress Report:     Date:  2022    Patient Name:  Amrit Quinones    :  1960  MRN: [id number]    Pertinent Medical History:Additional Pertinent Hx: hpl, htn, anxiety, geerd, gout, tka    Medical/Treatment Diagnosis Information:  · Diagnosis: R53.1 (ICD-10-CM) - Weakness  · Treatment Diagnosis: decreased abilty to ambualte and function    Insurance/Certification information:  PT Insurance Information: halle  Physician Information:  Referring Practitioner: Dr. iSrisha Murphy of care signed (Y/N): routed    Date of Patient follow up with Physician:      Progress Report: []  Yes  [x]  No     Date Range for reporting period:  Beginnin2022  Ending:      Progress report due (10 Rx/or 30 days whichever is TFAX):6/3/37    Recertification due (POC duration/ or 90 days whichever is less):      Visit # POC/Insurance Allowable Auth Needed   1 12 []Yes   []No     Latex Allergy:  [x]NO      []YES  Preferred Language for Healthcare:   [x]English       []Other:    Functional Scale:      Date assessed: at eval  Test: LEFS-31  Score:    Pain level:  0/10     History of Injury: Patient is a 27[address] [facility] y/o female who was in the hospital in January for a week and has been weak since she came out. She c/o decreased abilty to ambulate, do steps, and stand for prolonged time. She also says it is hard to lift things. She has fallen once in the past year. She wants to be able to do the steps, walk further and do her adl's as well as improve her balance. She wall walks. She uses a cane to so the steps at home.      SUBJECTIVE:  Pt states, \" I have a hard time doing steps and lifting things \"  OBJECTIVE:       ROM LEFT RIGHT   HIP Flex 100 100   HIP Abd 25 20   HIP Ext 5 5   HIP IR 30 25   HIP ER 40 30   Knee ext 0 0   Knee Flex 120 120   Ankle PF       Ankle DF 5 5   Ankle In       Ankle Ev       Strength  LEFT RIGHT   HIP Flexors 4-/5 all 4-/5 all   HIP Abductors       HIP Ext       Hip ER       Knee EXT (quad)       Knee Flex (HS)       Ankle DF       Ankle PF       Ankle Inv       Ankle EV                      RESTRICTIONS/PRECAUTIONS:    Exercises/Interventions:     Therapeutic Ex (59901)   Min: Reps/Resistance Notes/CUES   Nu step     Leg press     Partial sit     Dead bug     lawnmower     Box lift                                        Manual Intervention (34768)  Min:     Knee mobs/PROM     Tib/Fem Mobs     Patella Mobs     Ankle mobs               NMR re-education (91982)  Min:  CUES NEEDED   Sit to stand     Step ups     Step tap     Lift from floor     Walk with ball     lunge     Side step     Monster walk     Tandem stand                    Therapeutic Activity (00163)  Min:               Modalities  Min:     IFC with      CP after exercises     MH after exercises            Other Therapeutic Activities: Pt was educated on PT POC, Diagnosis, Prognosis, pathomechanics as well as frequency and duration of scheduling future physical therapy appointments. Time was also taken on this day to answer all patient questions and participation in PT. Reviewed appointment policy in detail with patient and patient verbalized understanding. Home Exercise Program: Patient was instructed in the following for HEP:     . Patient verbalized/demonstrated understanding and was issued written handout. Access Code: [email or web address]  URL: [email or web address]  Date: 04/01/2022  Prepared by: Hedy Kaye    Exercises  Mini Squat with Counter Support - 1 x daily - 7 x weekly - 3 sets - 10 reps  Standing Hip Abduction - 1 x daily - 7 x weekly - 3 sets - 10 reps  Step Up - 1 x daily - 7 x weekly - 3 sets - 10 reps  Lateral Step Up - 1 x daily - 7 x weekly - 3 sets - 10 reps  Mini Lunge - 1 x daily - 7 x weekly - 3 sets - 10 reps      Therapeutic Exercise and NMR EXR  [] (24411) Provided verbal/tactile cueing for activities related to strengthening, flexibility, endurance, ROM for improvements in LE, proximal hip, and core control with self care, mobility, lifting, ambulation.  [] (04274) Provided verbal/tactile cueing for activities related to improving balance, coordination, kinesthetic sense, posture, motor skill, proprioception  to assist with LE, proximal hip, and core control in self care, mobility, lifting, ambulation and eccentric single leg control.      NMR and Therapeutic Activities:    [] (73969 or 72043) Provided verbal/tactile cueing for activities related to improving balance, coordination, kinesthetic sense, posture, motor skill, proprioception and motor activation to allow for proper function of core, proximal hip and LE with self care and ADLs and functional mobility.   [] (20662) Gait Re-education- Provided training and instruction to the patient for proper LE, core and proximal hip recruitment and positioning and eccentric body weight control with ambulation re-education including up and down stairs     Home Exercise Program:    [x] (77853) Reviewed/Progressed HEP activities related to strengthening, flexibility, endurance, ROM of core, proximal hip and LE for functional self-care, mobility, lifting and ambulation/stair navigation   [] (66598)Reviewed/Progressed HEP activities related to improving balance, coordination, kinesthetic sense, posture, motor skill, proprioception of core, proximal hip and LE for self care, mobility, lifting, and ambulation/stair navigation      Manual Treatments:  PROM / STM / Oscillations-Mobs:  G-I, II, III, IV (PA's, Inf., Post.)  [] (38842) Provided manual therapy to mobilize LE, proximal hip and/or LS spine soft tissue/joints for the purpose of modulating pain, promoting relaxation,  increasing ROM, reducing/eliminating soft tissue swelling/inflammation/restriction, improving soft tissue extensibility and allowing for proper ROM for normal function with self care, mobility, lifting and ambulation. If [facility] Please Indicate Time In/Out  CPT Code Time in Time out                         Approval Dates:  CPT Code Units Approved Units Used  Date Updated:                     Charges:  Timed Code Treatment Minutes: 30   Total Treatment Minutes: 50      [x] EVAL (LOW) 67459 (typically 20 minutes face-to-face)  [] EVAL (MOD) 15858 (typically 30 minutes face-to-face)  [] EVAL (HIGH) 44060 (typically 45 minutes face-to-face)  [] RE-EVAL     [x] TD(38382) x 1    [] Dry needle 1 or 2 Muscles (12029)  [] NMR (48094) x     [] Dry needle 3+ Muscles (42944)  [] Manual (15251) x     [] Ultrasound (65989) x  [x] TA (45029) x  1   [] Mech Traction (97284)  [] ES(attended) (39215)     [] ES (un) (78980):   [] Vasopump (75961) [] Ionto (78316)   [] Other:      GOALS:  Patient stated goal: \" I want to walk and function better \"  []? Progressing: []? Met: []? Not Met: []? Adjusted     Therapist goals for Patient:   Short Term Goals: To be achieved in: 2 weeks  1. Independent in HEP and progression per patient tolerance, in order to prevent re-injury. []? Progressing: []? Met: []? Not Met: []? Adjusted  2. Patient will have a decrease in pain to facilitate improvement in movement, function, and ADLs as indicated by Functional Deficits. []? Progressing: []? Met: []? Not Met: []? Adjusted     Long Term Goals: To be achieved in: 8 weeks  1. Disability index score of 30%% or less for the LEFS to assist with reaching prior level of function. []? Progressing: []? Met: []? Not Met: []? Adjusted  2. Patient will demonstrate increased AROM to wfl to allow for proper joint functioning as indicated by patients Functional Deficits. []? Progressing: []? Met: []? Not Met: []? Adjusted  3.  Patient will demonstrate an increase in Strength to good proximal hip strength and control, within 5lb HHD in LE to allow for proper functional mobility as indicated by patients Functional Deficits. []? Progressing: []? Met: []? Not Met: []? Adjusted  4. Patient will return to 70% functional activities without increased symptoms or restriction. []? Progressing: []? Met: []? Not Met: []? Adjusted  5. \" I want to be able to do the steps and have improved balance\"  \" I also want to be able to lift heavier things \"  []? Progressing: []? Met: []? Not Met: []? Adjusted            ASSESSMENT:  See eval    Treatment/Activity Tolerance:  [x] Patient tolerated treatment well [] Patient limited by fatique  [] Patient limited by pain  [] Patient limited by other medical complications  [] Other:     Overall Progression Towards Functional goals/ Treatment Progress Update:  [] Patient is progressing as expected towards functional goals listed. [] Progression is slowed due to complexities/Impairments listed. [] Progression has been slowed due to co-morbidities. [x] Plan just implemented, too soon to assess goals progression <30days   [] Goals require adjustment due to lack of progress  [] Patient is not progressing as expected and requires additional follow up with physician  [] Other    Prognosis for POC: [x] Good [] Fair  [] Poor    Patient requires continued skilled intervention: [x] Yes  [] No        PLAN: LE arom, prom  strength, proprioception, gait, balance, functional activities. Mfr, joint mobs, mods as needed, hep. Progress as tolerated . Goal is to do steps, go from sit to stand, sit up from supine, lift from floor , improved balance    [] Continue per plan of care [] Alter current plan (see comments)  [x] Plan of care initiated [] Hold pending MD visit [] Discharge    Electronically signed by: Demetria Montemayor PT    Note: If patient does not return for scheduled/recommended follow up visits, this note will serve as a discharge from care along with the most recent update on progress.

## 2022-04-08 ENCOUNTER — OFFICE VISIT (OUTPATIENT)
Dept: FAMILY MEDICINE CLINIC | Age: 62
End: 2022-04-08
Payer: COMMERCIAL

## 2022-04-08 VITALS
OXYGEN SATURATION: 96 % | RESPIRATION RATE: 12 BRPM | DIASTOLIC BLOOD PRESSURE: 76 MMHG | HEIGHT: 61 IN | SYSTOLIC BLOOD PRESSURE: 120 MMHG | HEART RATE: 110 BPM | BODY MASS INDEX: 21.52 KG/M2 | WEIGHT: 114 LBS

## 2022-04-08 DIAGNOSIS — K76.0 FATTY LIVER: ICD-10-CM

## 2022-04-08 DIAGNOSIS — R74.8 ELEVATED ALKALINE PHOSPHATASE LEVEL: ICD-10-CM

## 2022-04-08 DIAGNOSIS — R13.10 DYSPHAGIA, UNSPECIFIED TYPE: ICD-10-CM

## 2022-04-08 DIAGNOSIS — I10 PRIMARY HYPERTENSION: Primary | Chronic | ICD-10-CM

## 2022-04-08 DIAGNOSIS — E44.1 MILD PROTEIN-CALORIE MALNUTRITION (HCC): ICD-10-CM

## 2022-04-08 DIAGNOSIS — E87.1 HYPONATREMIA: ICD-10-CM

## 2022-04-08 DIAGNOSIS — Z12.11 SCREENING FOR COLON CANCER: ICD-10-CM

## 2022-04-08 PROCEDURE — 99214 OFFICE O/P EST MOD 30 MIN: CPT | Performed by: INTERNAL MEDICINE

## 2022-04-08 NOTE — PROGRESS NOTES
Kodi Moore (:  1960) is a 62 y.o. female, here for evaluation of the following chief complaint(s): Other (weakness, discuss returning to work (in person), currently virtual)         ASSESSMENT/PLAN:    Lisa Beltran was seen today for other. Diagnoses and all orders for this visit:    Primary hypertension    Hyponatremia    Elevated alkaline phosphatase level   FATTY LIVER   -     AFL - Daniel Vergara MD, Gastroenterology, [facility]    Mild protein-calorie malnutrition [facility])  -     Fay Nicolas MD, Gastroenterology, [facility]    Dysphagia, unspecified type  -     Fay Nicolas MD, Gastroenterology, [facility] Joleen Canada MD, Gastroenterology, [address] [place] [address] for colon cancer  -     Fay Nicolas MD, Gastroenterology, [facility]      protein  Level is improving  bp good  Watch for low bp  Sodium is normal on the last labs  Has fatty liver  Probably from drinking     Drinks wine   Used to drink a lot more wine  Best to stop drinking  Follow up end may      SUBJECTIVE/OBJECTIVE:  HPI  Kimberlee feeling better. Getting stronger  Relies on hand rail. Had one therapy session     Stressed about some money that was missing      Sees Dr Wolf Reyes for her heart failure  Doing well  Saw Aby Berger  On entresto, torsemide 10 mg/day  Coreg  3.125 bid    Takes nexium everyday  Takes  40 mg  Can tell when she does   Tried prilosec did not work well  Feels like she needs it    Low protein  Eating pork for the pro    4 days ago everything she tried would chew because hard to swollow  Before going to the hospital had trouble swollowing    Protein level was low  5.7   Up to 6.2    Review of Systems       Objective   Physical Exam  Constitutional:       Appearance: Normal appearance. She is well-developed. HENT:      Head: Normocephalic and atraumatic.    Cardiovascular:      Rate and Rhythm: Normal rate and regular rhythm. Heart sounds: No murmur heard. Pulmonary:      Breath sounds: Normal breath sounds. No wheezing. Skin:     General: Skin is warm and dry. Neurological:      Mental Status: She is alert. Psychiatric:         Mood and Affect: Mood normal.         Behavior: Behavior normal.         Thought Content:  Thought content normal.         Judgment: Judgment normal.            Chad Valentine MD

## 2022-04-08 NOTE — PATIENT INSTRUCTIONS
Information about fatty liver from the [facility] of gastroenterology:    [email or web address]

## 2022-04-12 ENCOUNTER — HOSPITAL ENCOUNTER (OUTPATIENT)
Dept: PHYSICAL THERAPY | Age: 62
Setting detail: THERAPIES SERIES
Discharge: HOME | End: 2022-04-12
Payer: COMMERCIAL

## 2022-04-12 PROCEDURE — 97110 THERAPEUTIC EXERCISES: CPT

## 2022-04-12 PROCEDURE — 97112 NEUROMUSCULAR REEDUCATION: CPT

## 2022-04-12 NOTE — FLOWSHEET NOTE
[facility] - Outpatient Rehabilitation & Therapy  [address] [facility] [address]. [place]  Phone: [phone]   Fax:     [phone]      Physical Therapy Treatment Note/ Progress Report:     Date:  2022    Patient Name:  Paolo Lagos    :  1960  MRN: [id number]    Pertinent Medical History:Additional Pertinent Hx: hpl, htn, anxiety, geerd, gout, tka    Medical/Treatment Diagnosis Information:  · Diagnosis: R53.1 (ICD-10-CM) - Weakness  · Treatment Diagnosis: decreased abilty to ambualte and function    Insurance/Certification information:  PT Insurance Information: halle  Physician Information:  Referring Practitioner: Dr. Nely Lakears of care signed (Y/N): routed    Date of Patient follow up with Physician:      Progress Report: []  Yes  [x]  No     Date Range for reporting period:  Beginnin2022  Ending:      Progress report due (10 Rx/or 30 days whichever is ILTI):02    Recertification due (POC duration/ or 90 days whichever is less):      Visit # POC/Insurance Allowable Auth Needed   2 12 []Yes   []No     Latex Allergy:  [x]NO      []YES  Preferred Language for Healthcare:   [x]English       []Other:    Functional Scale:      Date assessed: at eval  Test: LEFS-31  Score:    Pain level:  0/10     History of Injury: Patient is a 61 y/o female who was in the hospital in January for a week and has been weak since she came out. She c/o decreased abilty to ambulate, do steps, and stand for prolonged time. She also says it is hard to lift things. She has fallen once in the past year. She wants to be able to do the steps, walk further and do her adl's as well as improve her balance. She wall walks. She uses a cane to so the steps at home.      SUBJECTIVE:  Pt states, \" I have a hard time doing steps and lifting things \"  22  Pt states, \" Doing ok, getting around a little easier \"  OBJECTIVE:       ROM LEFT RIGHT   HIP Flex 100 100   HIP Abd 25 20   HIP Ext 5 5   HIP IR 30 25   HIP ER 40 30   Knee ext 0 0   Knee Flex 120 120   Ankle PF       Ankle DF 5 5   Ankle In       Ankle Ev       Strength  LEFT RIGHT   HIP Flexors 4-/5 all 4-/5 all   HIP Abductors       HIP Ext       Hip ER       Knee EXT (quad)       Knee Flex (HS)       Ankle DF       Ankle PF       Ankle Inv       Ankle EV                      RESTRICTIONS/PRECAUTIONS:    Exercises/Interventions:     Therapeutic Ex (09182)   Min: Reps/Resistance Notes/CUES   Nu step L2 x 6 min    Leg press 90# x 40    Partial sit X 30    Dead bug X 2 min    lawnmower     Box lift     incline 60 x 3                                  Manual Intervention (41039)  Min:     Knee mobs/PROM     Tib/Fem Mobs     Patella Mobs     Ankle mobs               NMR re-education (87689)  Min:  CUES NEEDED   Sit to stand     Step ups 6\" x 30 ea    Step tap     Lift from floor     Walk with ball     lunge     Side step Red x 2 min    Monster walk     Tandem stand     Dead lift 5 # x 30 8\" box     wobble X 2 min side         Therapeutic Activity (36428)  Min:               Modalities  Min:     IFC with      CP after exercises     MH after exercises            Other Therapeutic Activities: Pt was educated on PT POC, Diagnosis, Prognosis, pathomechanics as well as frequency and duration of scheduling future physical therapy appointments. Time was also taken on this day to answer all patient questions and participation in PT. Reviewed appointment policy in detail with patient and patient verbalized understanding. Home Exercise Program: Patient was instructed in the following for HEP:     . Patient verbalized/demonstrated understanding and was issued written handout. Access Code: [email or web address]  URL: [email or web address]  Date: 04/01/2022  Prepared by: Mary Gallego    Exercises  Mini Squat with Counter Support - 1 x daily - 7 x weekly - 3 sets - 10 reps  Standing Hip Abduction - 1 x daily - 7 x weekly - 3 sets - 10 reps  Step Up - 1 x daily - 7 x weekly - 3 sets - 10 reps  Lateral Step Up - 1 x daily - 7 x weekly - 3 sets - 10 reps  Mini Lunge - 1 x daily - 7 x weekly - 3 sets - 10 reps      Therapeutic Exercise and NMR EXR  [] (58042) Provided verbal/tactile cueing for activities related to strengthening, flexibility, endurance, ROM for improvements in LE, proximal hip, and core control with self care, mobility, lifting, ambulation.  [] (38076) Provided verbal/tactile cueing for activities related to improving balance, coordination, kinesthetic sense, posture, motor skill, proprioception  to assist with LE, proximal hip, and core control in self care, mobility, lifting, ambulation and eccentric single leg control.      NMR and Therapeutic Activities:    [] (22222 or 07668) Provided verbal/tactile cueing for activities related to improving balance, coordination, kinesthetic sense, posture, motor skill, proprioception and motor activation to allow for proper function of core, proximal hip and LE with self care and ADLs and functional mobility.   [] (67765) Gait Re-education- Provided training and instruction to the patient for proper LE, core and proximal hip recruitment and positioning and eccentric body weight control with ambulation re-education including up and down stairs     Home Exercise Program:    [x] (31390) Reviewed/Progressed HEP activities related to strengthening, flexibility, endurance, ROM of core, proximal hip and LE for functional self-care, mobility, lifting and ambulation/stair navigation   [] (56705)Reviewed/Progressed HEP activities related to improving balance, coordination, kinesthetic sense, posture, motor skill, proprioception of core, proximal hip and LE for self care, mobility, lifting, and ambulation/stair navigation      Manual Treatments:  PROM / STM / Oscillations-Mobs:  G-I, II, III, IV (PA's, Inf., Post.)  [] (56529) Provided manual therapy to mobilize LE, proximal hip and/or LS spine soft tissue/joints for the purpose of modulating pain, promoting relaxation,  increasing ROM, reducing/eliminating soft tissue swelling/inflammation/restriction, improving soft tissue extensibility and allowing for proper ROM for normal function with self care, mobility, lifting and ambulation. If [facility] Please Indicate Time In/Out  CPT Code Time in Time out                         Approval Dates:  CPT Code Units Approved Units Used  Date Updated:                     Charges:  Timed Code Treatment Minutes: 45   Total Treatment Minutes: 50      [] EVAL (LOW) 23877 (typically 20 minutes face-to-face)  [] EVAL (MOD) 21707 (typically 30 minutes face-to-face)  [] EVAL (HIGH) 18679 (typically 45 minutes face-to-face)  [] RE-EVAL     [x] HS(47349) x 2    [] Dry needle 1 or 2 Muscles (43985)  [x] NMR (05302) x 1    [] Dry needle 3+ Muscles (36799)  [] Manual (61157) x     [] Ultrasound (83659) x  [] TA (03892) x    [] Mech Traction (68653)  [] ES(attended) (89302)     [] ES (un) (34817):   [] Vasopump (30271) [] Ionto (65654)   [] Other:      GOALS:  Patient stated goal: \" I want to walk and function better \"  []? Progressing: []? Met: []? Not Met: []? Adjusted     Therapist goals for Patient:   Short Term Goals: To be achieved in: 2 weeks  1. Independent in HEP and progression per patient tolerance, in order to prevent re-injury. []? Progressing: []? Met: []? Not Met: []? Adjusted  2. Patient will have a decrease in pain to facilitate improvement in movement, function, and ADLs as indicated by Functional Deficits. []? Progressing: []? Met: []? Not Met: []? Adjusted     Long Term Goals: To be achieved in: 8 weeks  1. Disability index score of 30%% or less for the LEFS to assist with reaching prior level of function. []? Progressing: []? Met: []? Not Met: []? Adjusted  2. Patient will demonstrate increased AROM to wfl to allow for proper joint functioning as indicated by patients Functional Deficits. []?  Progressing: []? Met: []? Not Met: []? Adjusted  3. Patient will demonstrate an increase in Strength to good proximal hip strength and control, within 5lb HHD in LE to allow for proper functional mobility as indicated by patients Functional Deficits. []? Progressing: []? Met: []? Not Met: []? Adjusted  4. Patient will return to 70% functional activities without increased symptoms or restriction. []? Progressing: []? Met: []? Not Met: []? Adjusted  5. \" I want to be able to do the steps and have improved balance\"  \" I also want to be able to lift heavier things \"  []? Progressing: []? Met: []? Not Met: []? Adjusted            ASSESSMENT:  See eval    Treatment/Activity Tolerance:  [x] Patient tolerated treatment well [] Patient limited by fatique  [] Patient limited by pain  [] Patient limited by other medical complications  [] Other:     Overall Progression Towards Functional goals/ Treatment Progress Update:  [] Patient is progressing as expected towards functional goals listed. [] Progression is slowed due to complexities/Impairments listed. [] Progression has been slowed due to co-morbidities. [x] Plan just implemented, too soon to assess goals progression <30days   [] Goals require adjustment due to lack of progress  [] Patient is not progressing as expected and requires additional follow up with physician  [] Other    Prognosis for POC: [x] Good [] Fair  [] Poor    Patient requires continued skilled intervention: [x] Yes  [] No        PLAN: LE arom, prom  strength, proprioception, gait, balance, functional activities. Mfr, joint mobs, mods as needed, hep. Progress as tolerated .   Goal is to do steps, go from sit to stand, sit up from supine, lift from floor , improved balance    [] Continue per plan of care [] Alter current plan (see comments)  [x] Plan of care initiated [] Hold pending MD visit [] Discharge    Electronically signed by: Katty Barbour PT    Note: If patient does not return for scheduled/recommended follow up visits, this note will serve as a discharge from care along with the most recent update on progress.

## 2022-04-14 ENCOUNTER — HOSPITAL ENCOUNTER (OUTPATIENT)
Dept: PHYSICAL THERAPY | Age: 62
Setting detail: THERAPIES SERIES
Discharge: HOME | End: 2022-04-14
Payer: COMMERCIAL

## 2022-04-14 PROCEDURE — 97112 NEUROMUSCULAR REEDUCATION: CPT

## 2022-04-14 PROCEDURE — 97110 THERAPEUTIC EXERCISES: CPT

## 2022-04-14 NOTE — FLOWSHEET NOTE
[facility] - Outpatient Rehabilitation & Therapy  [address] [facility] [address] [facility]. [place]  Phone: [phone]   Fax:     [phone]      Physical Therapy Treatment Note/ Progress Report:     Date:  2022    Patient Name:  Twana Cheadle    :  1960  MRN: [id number]    Pertinent Medical History:Additional Pertinent Hx: hpl, htn, anxiety, geerd, gout, tka    Medical/Treatment Diagnosis Information:  · Diagnosis: R53.1 (ICD-10-CM) - Weakness  · Treatment Diagnosis: decreased abilty to ambualte and function    Insurance/Certification information:  PT Insurance Information: halle  Physician Information:  Referring Practitioner: Dr. Paris Scherer of care signed (Y/N): routed    Date of Patient follow up with Physician:      Progress Report: []  Yes  [x]  No     Date Range for reporting period:  Beginnin2022  Ending:      Progress report due (10 Rx/or 30 days whichever is CHTV):5/3/20    Recertification due (POC duration/ or 90 days whichever is less):      Visit # POC/Insurance Allowable Auth Needed   3 12 []Yes   []No     Latex Allergy:  [x]NO      []YES  Preferred Language for Healthcare:   [x]English       []Other:    Functional Scale:      Date assessed: at eval  Test: LEFS-31  Score:    Pain level:  0/10     History of Injury: Patient is a 61 y/o female who was in the hospital in January for a week and has been weak since she came out. She c/o decreased abilty to ambulate, do steps, and stand for prolonged time. She also says it is hard to lift things. She has fallen once in the past year. She wants to be able to do the steps, walk further and do her adl's as well as improve her balance. She wall walks. She uses a cane to so the steps at home.      SUBJECTIVE:  Pt states, \" I have a hard time doing steps and lifting things \"  22  Pt states, \" Doing ok, getting around a little easier \"  22  Pt states, \"  I felt workout sore after the last rx \"  OBJECTIVE:       ROM LEFT RIGHT   HIP Flex 100 100   HIP Abd 25 20   HIP Ext 5 5   HIP IR 30 25   HIP ER 40 30   Knee ext 0 0   Knee Flex 120 120   Ankle PF       Ankle DF 5 5   Ankle In       Ankle Ev       Strength  LEFT RIGHT   HIP Flexors 4-/5 all 4-/5 all   HIP Abductors       HIP Ext       Hip ER       Knee EXT (quad)       Knee Flex (HS)       Ankle DF       Ankle PF       Ankle Inv       Ankle EV                      RESTRICTIONS/PRECAUTIONS:    Exercises/Interventions:     Therapeutic Ex (56710)   Min: Reps/Resistance Notes/CUES   Nu step L2 x 6 min    Leg press 100# x 40    Partial sit X 30    Dead bug X 2 min    lawnmower     Box lift 10# x 100 min ea side    incline 60 x 3                                  Manual Intervention (71659)  Min:     Knee mobs/PROM     Tib/Fem Mobs     Patella Mobs     Ankle mobs               NMR re-education (26087)  Min:  CUES NEEDED   Sit to stand     Step ups 6\" x 30 ea    Step tap 6\" x 2 min    Lift from floor     Walk with ball     lunge     Side step green x 2 min    Monster walk     Tandem stand X 2 min    Dead lift 5 # x 30 on  8\" box     wobble X 2 min side    Table hip ext                         Therapeutic Activity (73997)  Min:               Modalities  Min:     IFC with      CP after exercises     MH after exercises            Other Therapeutic Activities: Pt was educated on PT POC, Diagnosis, Prognosis, pathomechanics as well as frequency and duration of scheduling future physical therapy appointments. Time was also taken on this day to answer all patient questions and participation in PT. Reviewed appointment policy in detail with patient and patient verbalized understanding. Home Exercise Program: Patient was instructed in the following for HEP:     . Patient verbalized/demonstrated understanding and was issued written handout. Access Code: [email or web address]  URL: [email or web address]  Date: 04/01/2022  Prepared by: Myrtle Rowan Villa    Exercises  Mini Squat with Counter Support - 1 x daily - 7 x weekly - 3 sets - 10 reps  Standing Hip Abduction - 1 x daily - 7 x weekly - 3 sets - 10 reps  Step Up - 1 x daily - 7 x weekly - 3 sets - 10 reps  Lateral Step Up - 1 x daily - 7 x weekly - 3 sets - 10 reps  Mini Lunge - 1 x daily - 7 x weekly - 3 sets - 10 reps      Therapeutic Exercise and NMR EXR  [] (68813) Provided verbal/tactile cueing for activities related to strengthening, flexibility, endurance, ROM for improvements in LE, proximal hip, and core control with self care, mobility, lifting, ambulation.  [] (80895) Provided verbal/tactile cueing for activities related to improving balance, coordination, kinesthetic sense, posture, motor skill, proprioception  to assist with LE, proximal hip, and core control in self care, mobility, lifting, ambulation and eccentric single leg control.      NMR and Therapeutic Activities:    [] (41000 or 58936) Provided verbal/tactile cueing for activities related to improving balance, coordination, kinesthetic sense, posture, motor skill, proprioception and motor activation to allow for proper function of core, proximal hip and LE with self care and ADLs and functional mobility.   [] (43050) Gait Re-education- Provided training and instruction to the patient for proper LE, core and proximal hip recruitment and positioning and eccentric body weight control with ambulation re-education including up and down stairs     Home Exercise Program:    [x] (77334) Reviewed/Progressed HEP activities related to strengthening, flexibility, endurance, ROM of core, proximal hip and LE for functional self-care, mobility, lifting and ambulation/stair navigation   [] (82284)Reviewed/Progressed HEP activities related to improving balance, coordination, kinesthetic sense, posture, motor skill, proprioception of core, proximal hip and LE for self care, mobility, lifting, and ambulation/stair navigation      Manual Treatments: PROM / STM / Oscillations-Mobs:  G-I, II, III, IV (PA's, Inf., Post.)  [] (30756) Provided manual therapy to mobilize LE, proximal hip and/or LS spine soft tissue/joints for the purpose of modulating pain, promoting relaxation,  increasing ROM, reducing/eliminating soft tissue swelling/inflammation/restriction, improving soft tissue extensibility and allowing for proper ROM for normal function with self care, mobility, lifting and ambulation. If [facility] Please Indicate Time In/Out  CPT Code Time in Time out                         Approval Dates:  CPT Code Units Approved Units Used  Date Updated:                     Charges:  Timed Code Treatment Minutes: 45   Total Treatment Minutes: 50      [] EVAL (LOW) 59992 (typically 20 minutes face-to-face)  [] EVAL (MOD) 29834 (typically 30 minutes face-to-face)  [] EVAL (HIGH) 40146 (typically 45 minutes face-to-face)  [] RE-EVAL     [x] EC(33478) x 2    [] Dry needle 1 or 2 Muscles (94741)  [x] NMR (58025) x 1    [] Dry needle 3+ Muscles (28421)  [] Manual (28724) x     [] Ultrasound (32522) x  [] TA (42090) x    [] Mech Traction (22858)  [] ES(attended) (01739)     [] ES (un) (20819):   [] Vasopump (27288) [] Ionto (91785)   [] Other:      GOALS:  Patient stated goal: \" I want to walk and function better \"  []? Progressing: []? Met: []? Not Met: []? Adjusted     Therapist goals for Patient:   Short Term Goals: To be achieved in: 2 weeks  1. Independent in HEP and progression per patient tolerance, in order to prevent re-injury. []? Progressing: []? Met: []? Not Met: []? Adjusted  2. Patient will have a decrease in pain to facilitate improvement in movement, function, and ADLs as indicated by Functional Deficits. []? Progressing: []? Met: []? Not Met: []? Adjusted     Long Term Goals: To be achieved in: 8 weeks  1. Disability index score of 30%% or less for the LEFS to assist with reaching prior level of function. []? Progressing: []? Met: []?  Not Met: []? Adjusted  2. Patient will demonstrate increased AROM to wfl to allow for proper joint functioning as indicated by patients Functional Deficits. []? Progressing: []? Met: []? Not Met: []? Adjusted  3. Patient will demonstrate an increase in Strength to good proximal hip strength and control, within 5lb HHD in LE to allow for proper functional mobility as indicated by patients Functional Deficits. []? Progressing: []? Met: []? Not Met: []? Adjusted  4. Patient will return to 70% functional activities without increased symptoms or restriction. []? Progressing: []? Met: []? Not Met: []? Adjusted  5. \" I want to be able to do the steps and have improved balance\"  \" I also want to be able to lift heavier things \"  []? Progressing: []? Met: []? Not Met: []? Adjusted            ASSESSMENT:  See eval    Treatment/Activity Tolerance:  [x] Patient tolerated treatment well [] Patient limited by fatique  [] Patient limited by pain  [] Patient limited by other medical complications  [] Other:     Overall Progression Towards Functional goals/ Treatment Progress Update:  [] Patient is progressing as expected towards functional goals listed. [] Progression is slowed due to complexities/Impairments listed. [] Progression has been slowed due to co-morbidities. [x] Plan just implemented, too soon to assess goals progression <30days   [] Goals require adjustment due to lack of progress  [] Patient is not progressing as expected and requires additional follow up with physician  [] Other    Prognosis for POC: [x] Good [] Fair  [] Poor    Patient requires continued skilled intervention: [x] Yes  [] No        PLAN: LE arom, prom  strength, proprioception, gait, balance, functional activities. Mfr, joint mobs, mods as needed, hep. Progress as tolerated .   Goal is to do steps, go from sit to stand, sit up from supine, lift from floor , improved balance    [] Continue per plan of care [] Alter current plan (see comments)  [x] Plan of care initiated [] Hold pending MD visit [] Discharge    Electronically signed by: Pavithra Rasheed PT    Note: If patient does not return for scheduled/recommended follow up visits, this note will serve as a discharge from care along with the most recent update on progress.

## 2022-04-19 ENCOUNTER — HOSPITAL ENCOUNTER (OUTPATIENT)
Dept: PHYSICAL THERAPY | Age: 62
Setting detail: THERAPIES SERIES
Discharge: HOME | End: 2022-04-19
Payer: COMMERCIAL

## 2022-04-19 PROCEDURE — 97140 MANUAL THERAPY 1/> REGIONS: CPT

## 2022-04-19 PROCEDURE — 97110 THERAPEUTIC EXERCISES: CPT

## 2022-04-19 NOTE — FLOWSHEET NOTE
[facility] - Outpatient Rehabilitation & Therapy  [address] [place]  Phone: [phone]   Fax:     [phone]      Physical Therapy Treatment Note/ Progress Report:     Date:  2022    Patient Name:  Wood King    :  1960  MRN: [id number]    Pertinent Medical History:Additional Pertinent Hx: hpl, htn, anxiety, geerd, gout, tka    Medical/Treatment Diagnosis Information:  · Diagnosis: R53.1 (ICD-10-CM) - Weakness  · Treatment Diagnosis: decreased abilty to ambualte and function    Insurance/Certification information:  PT Insurance Information: halle  Physician Information:  Referring Practitioner: Dr. Miquel Nowak of care signed (Y/N): routed    Date of Patient follow up with Physician:      Progress Report: []  Yes  [x]  No     Date Range for reporting period:  Beginnin2022  Ending:      Progress report due (10 Rx/or 30 days whichever is QEJM):6/3/82    Recertification due (POC duration/ or 90 days whichever is less):      Visit # POC/Insurance Allowable Auth Needed   4 12 []Yes   []No     Latex Allergy:  [x]NO      []YES  Preferred Language for Healthcare:   [x]English       []Other:    Functional Scale:      Date assessed: at eval  Test: LEFS-31  Score:    Pain level:  0/10     History of Injury: Patient is a 63 y/o female who was in the hospital in January for a week and has been weak since she came out. She c/o decreased abilty to ambulate, do steps, and stand for prolonged time. She also says it is hard to lift things. She has fallen once in the past year. She wants to be able to do the steps, walk further and do her adl's as well as improve her balance. She wall walks. She uses a cane to so the steps at home.      SUBJECTIVE:  Pt states, \" I have a hard time doing steps and lifting things \"  22  Pt states, \" Doing ok, getting around a little easier \"  22  Pt states, \"  I felt workout sore after the last rx \"  4/19/22  Pt states, \" \" Getting easier to go up and down the steps,  Still kind of hard to get up out of the chair \" \"  OBJECTIVE:       ROM LEFT RIGHT   HIP Flex 100 100   HIP Abd 25 20   HIP Ext 5 5   HIP IR 30 25   HIP ER 40 30   Knee ext 0 0   Knee Flex 120 120   Ankle PF       Ankle DF 5 5   Ankle In       Ankle Ev       Strength  LEFT RIGHT   HIP Flexors 4-/5 all 4-/5 all   HIP Abductors       HIP Ext       Hip ER       Knee EXT (quad)       Knee Flex (HS)       Ankle DF       Ankle PF       Ankle Inv       Ankle EV                      RESTRICTIONS/PRECAUTIONS:    Exercises/Interventions:     Therapeutic Ex (19213)   Min: Reps/Resistance Notes/CUES   Nu step L2 x 6 min    Leg press 100# x 40    Partial sit X 30    Dead bug X 2 min    lawnmower     Box lift 10# x 100 min ea side    incline 60 x 3    clams Red x 30                             Manual Intervention (98403)  Min:     Knee mobs/PROM     Tib/Fem Mobs     Patella Mobs     Ankle mobs               NMR re-education (78036)  Min:  CUES NEEDED   Sit to stand X 30  From 19\"    Step ups 6\" x 30 ea    Step tap 8\" x 2 min    Lift from floor     Walk with ball     lunge     Side step green x 2 min    Monster walk     Tandem stand X 2 min    Dead lift 8 # x 30 on  8\" box     wobble X 2 min side    Table hip ext     lunge     Lunge walk X 2 min    slider X 30    bosu Ski x 2 min                             Therapeutic Activity (42748)  Min:               Modalities  Min:     IFC with      CP after exercises     MH after exercises            Other Therapeutic Activities: Pt was educated on PT POC, Diagnosis, Prognosis, pathomechanics as well as frequency and duration of scheduling future physical therapy appointments. Time was also taken on this day to answer all patient questions and participation in PT. Reviewed appointment policy in detail with patient and patient verbalized understanding.      Home Exercise Program: Patient was instructed in the following for HEP:     . Patient verbalized/demonstrated understanding and was issued written handout. Access Code: [email or web address]  URL: [email or web address]  Date: 04/01/2022  Prepared by: Jayla Louie    Exercises  Mini Squat with Counter Support - 1 x daily - 7 x weekly - 3 sets - 10 reps  Standing Hip Abduction - 1 x daily - 7 x weekly - 3 sets - 10 reps  Step Up - 1 x daily - 7 x weekly - 3 sets - 10 reps  Lateral Step Up - 1 x daily - 7 x weekly - 3 sets - 10 reps  Mini Lunge - 1 x daily - 7 x weekly - 3 sets - 10 reps      Therapeutic Exercise and NMR EXR  [] (40195) Provided verbal/tactile cueing for activities related to strengthening, flexibility, endurance, ROM for improvements in LE, proximal hip, and core control with self care, mobility, lifting, ambulation.  [] (57899) Provided verbal/tactile cueing for activities related to improving balance, coordination, kinesthetic sense, posture, motor skill, proprioception  to assist with LE, proximal hip, and core control in self care, mobility, lifting, ambulation and eccentric single leg control.      NMR and Therapeutic Activities:    [] (85927 or 15113) Provided verbal/tactile cueing for activities related to improving balance, coordination, kinesthetic sense, posture, motor skill, proprioception and motor activation to allow for proper function of core, proximal hip and LE with self care and ADLs and functional mobility.   [] (94712) Gait Re-education- Provided training and instruction to the patient for proper LE, core and proximal hip recruitment and positioning and eccentric body weight control with ambulation re-education including up and down stairs     Home Exercise Program:    [x] (99516) Reviewed/Progressed HEP activities related to strengthening, flexibility, endurance, ROM of core, proximal hip and LE for functional self-care, mobility, lifting and ambulation/stair navigation   [] (29726)Reviewed/Progressed HEP activities related to improving balance, coordination, kinesthetic sense, posture, motor skill, proprioception of core, proximal hip and LE for self care, mobility, lifting, and ambulation/stair navigation      Manual Treatments:  PROM / STM / Oscillations-Mobs:  G-I, II, III, IV (PA's, Inf., Post.)  [] (81602) Provided manual therapy to mobilize LE, proximal hip and/or LS spine soft tissue/joints for the purpose of modulating pain, promoting relaxation,  increasing ROM, reducing/eliminating soft tissue swelling/inflammation/restriction, improving soft tissue extensibility and allowing for proper ROM for normal function with self care, mobility, lifting and ambulation. If [facility] Please Indicate Time In/Out  CPT Code Time in Time out                         Approval Dates:  CPT Code Units Approved Units Used  Date Updated:                     Charges:  Timed Code Treatment Minutes: 60   Total Treatment Minutes: 65      [] EVAL (LOW) 43536 (typically 20 minutes face-to-face)  [] EVAL (MOD) 31340 (typically 30 minutes face-to-face)  [] EVAL (HIGH) 44888 (typically 45 minutes face-to-face)  [] RE-EVAL     [x] CV(97646) x 2    [] Dry needle 1 or 2 Muscles (21618)  [x] NMR (29545) x 2    [] Dry needle 3+ Muscles (50498)  [] Manual (31392) x     [] Ultrasound (30065) x  [] TA (58273) x    [] Mech Traction (45968)  [] ES(attended) (19868)     [] ES (un) (93179):   [] Vasopump (35943) [] Ionto (34423)   [] Other:      GOALS:  Patient stated goal: \" I want to walk and function better \"  [x]? Progressing: []? Met: []? Not Met: []? Adjusted     Therapist goals for Patient:   Short Term Goals: To be achieved in: 2 weeks  1. Independent in HEP and progression per patient tolerance, in order to prevent re-injury. [x]? Progressing: []? Met: []? Not Met: []? Adjusted  2. Patient will have a decrease in pain to facilitate improvement in movement, function, and ADLs as indicated by Functional Deficits. [x]? Progressing: []? Met: []?  Not Met: []? Adjusted     Long Term Goals: To be achieved in: 8 weeks  1. Disability index score of 30%% or less for the LEFS to assist with reaching prior level of function. [x]? Progressing: []? Met: []? Not Met: []? Adjusted  2. Patient will demonstrate increased AROM to wfl to allow for proper joint functioning as indicated by patients Functional Deficits. [x]? Progressing: []? Met: []? Not Met: []? Adjusted  3. Patient will demonstrate an increase in Strength to good proximal hip strength and control, within 5lb HHD in LE to allow for proper functional mobility as indicated by patients Functional Deficits. [x]? Progressing: []? Met: []? Not Met: []? Adjusted  4. Patient will return to 70% functional activities without increased symptoms or restriction. [x]? Progressing: []? Met: []? Not Met: []? Adjusted  5. \" I want to be able to do the steps and have improved balance\"  \" I also want to be able to lift heavier things \"  [x]? Progressing: []? Met: []? Not Met: []? Adjusted            ASSESSMENT:  See eval    Treatment/Activity Tolerance:  [x] Patient tolerated treatment well [] Patient limited by fatique  [] Patient limited by pain  [] Patient limited by other medical complications  [] Other:     Overall Progression Towards Functional goals/ Treatment Progress Update:  [] Patient is progressing as expected towards functional goals listed. [] Progression is slowed due to complexities/Impairments listed. [] Progression has been slowed due to co-morbidities. [x] Plan just implemented, too soon to assess goals progression <30days   [] Goals require adjustment due to lack of progress  [] Patient is not progressing as expected and requires additional follow up with physician  [] Other    Prognosis for POC: [x] Good [] Fair  [] Poor    Patient requires continued skilled intervention: [x] Yes  [] No        PLAN: LE arom, prom  strength, proprioception, gait, balance, functional activities.   Mfr, joint mobs, mods as needed, hep. Progress as tolerated . Goal is to do steps, go from sit to stand, sit up from supine, lift from floor , improved balance    [] Continue per plan of care [] Alter current plan (see comments)  [x] Plan of care initiated [] Hold pending MD visit [] Discharge    Electronically signed by: Dayna Gordon PT    Note: If patient does not return for scheduled/recommended follow up visits, this note will serve as a discharge from care along with the most recent update on progress.

## 2022-04-21 ENCOUNTER — HOSPITAL ENCOUNTER (OUTPATIENT)
Dept: PHYSICAL THERAPY | Age: 62
Setting detail: THERAPIES SERIES
Discharge: HOME | End: 2022-04-21
Payer: COMMERCIAL

## 2022-04-21 PROCEDURE — 97110 THERAPEUTIC EXERCISES: CPT

## 2022-04-21 PROCEDURE — 97112 NEUROMUSCULAR REEDUCATION: CPT

## 2022-04-21 NOTE — FLOWSHEET NOTE
[facility] - Outpatient Rehabilitation & Therapy  [address]. [place]  Phone: [phone]   Fax:     [phone]      Physical Therapy Treatment Note/ Progress Report:     Date:  2022    Patient Name:  Twana Cheadle    :  1960  MRN: [id number]    Pertinent Medical History:Additional Pertinent Hx: hpl, htn, anxiety, geerd, gout, tka    Medical/Treatment Diagnosis Information:  · Diagnosis: R53.1 (ICD-10-CM) - Weakness  · Treatment Diagnosis: decreased abilty to ambualte and function    Insurance/Certification information:  PT Insurance Information: halle  Physician Information:  Referring Practitioner: Dr. Paris Scherer of care signed (Y/N): routed    Date of Patient follow up with Physician:      Progress Report: []  Yes  [x]  No     Date Range for reporting period:  Beginnin2022  Ending:      Progress report due (10 Rx/or 30 days whichever is FQMM):68    Recertification due (POC duration/ or 90 days whichever is less):      Visit # POC/Insurance Allowable Auth Needed   5 12 []Yes   []No     Latex Allergy:  [x]NO      []YES  Preferred Language for Healthcare:   [x]English       []Other:    Functional Scale:      Date assessed: at eval  Test: LEFS-31  Score:    Pain level:  0/10     History of Injury: Patient is a 61 y/o female who was in the hospital in January for a week and has been weak since she came out. She c/o decreased abilty to ambulate, do steps, and stand for prolonged time. She also says it is hard to lift things. She has fallen once in the past year. She wants to be able to do the steps, walk further and do her adl's as well as improve her balance. She wall walks. She uses a cane to so the steps at home.      SUBJECTIVE:  Pt states, \" I have a hard time doing steps and lifting things \"  22  Pt states, \" Doing ok, getting around a little easier \"  22  Pt states, \"  I felt workout sore after the last rx \"  4/19/22  Pt states, \" \" Getting easier to go up and down the steps,  Still kind of hard to get up out of the chair \" \"  4/21/22  Pt states, \" Back to work, still feeling pretty good overall \"    OBJECTIVE:       ROM LEFT RIGHT   HIP Flex 100 100   HIP Abd 25 20   HIP Ext 5 5   HIP IR 30 25   HIP ER 40 30   Knee ext 0 0   Knee Flex 120 120   Ankle PF       Ankle DF 5 5   Ankle In       Ankle Ev       Strength  LEFT RIGHT   HIP Flexors 4-/5 all 4-/5 all   HIP Abductors       HIP Ext       Hip ER       Knee EXT (quad)       Knee Flex (HS)       Ankle DF       Ankle PF       Ankle Inv       Ankle EV                      RESTRICTIONS/PRECAUTIONS:    Exercises/Interventions:     Therapeutic Ex (39233)   Min: Reps/Resistance Notes/CUES   Nu step L2 x 6 min    Leg press 100# x 40,  35# x 30 ea    Partial sit X 30    Dead bug X 2 min    lawnmower     Box lift 10# x 30 from floor    incline 60 x 3    clams Red x 30                             Manual Intervention (43475)  Min:     Knee mobs/PROM     Tib/Fem Mobs     Patella Mobs     Ankle mobs               NMR re-education (92522)  Min:  CUES NEEDED   Sit to stand X 20  From 17\"    Step ups 6\" x 30 ea    Step tap 8\" x 2 min    Lift from floor     Walk with ball     lunge     Side step green x 2 min    Monster walk     Tandem stand X 2 min    Dead lift     wobble     Table hip ext     lunge     Lunge walk     slider     bosu                              Therapeutic Activity (57659)  Min:               Modalities  Min:     IFC with      CP after exercises     MH after exercises            Other Therapeutic Activities: Pt was educated on PT POC, Diagnosis, Prognosis, pathomechanics as well as frequency and duration of scheduling future physical therapy appointments. Time was also taken on this day to answer all patient questions and participation in PT. Reviewed appointment policy in detail with patient and patient verbalized understanding.      Home Exercise Program: Patient was instructed in the following for HEP:     . Patient verbalized/demonstrated understanding and was issued written handout. Access Code: [email or web address]  URL: [email or web address]  Date: 04/01/2022  Prepared by: Venus Crosby    Exercises  Mini Squat with Counter Support - 1 x daily - 7 x weekly - 3 sets - 10 reps  Standing Hip Abduction - 1 x daily - 7 x weekly - 3 sets - 10 reps  Step Up - 1 x daily - 7 x weekly - 3 sets - 10 reps  Lateral Step Up - 1 x daily - 7 x weekly - 3 sets - 10 reps  Mini Lunge - 1 x daily - 7 x weekly - 3 sets - 10 reps      Therapeutic Exercise and NMR EXR  [] (63385) Provided verbal/tactile cueing for activities related to strengthening, flexibility, endurance, ROM for improvements in LE, proximal hip, and core control with self care, mobility, lifting, ambulation.  [] (09269) Provided verbal/tactile cueing for activities related to improving balance, coordination, kinesthetic sense, posture, motor skill, proprioception  to assist with LE, proximal hip, and core control in self care, mobility, lifting, ambulation and eccentric single leg control.      NMR and Therapeutic Activities:    [] (16105 or 93669) Provided verbal/tactile cueing for activities related to improving balance, coordination, kinesthetic sense, posture, motor skill, proprioception and motor activation to allow for proper function of core, proximal hip and LE with self care and ADLs and functional mobility.   [] (45321) Gait Re-education- Provided training and instruction to the patient for proper LE, core and proximal hip recruitment and positioning and eccentric body weight control with ambulation re-education including up and down stairs     Home Exercise Program:    [x] (63909) Reviewed/Progressed HEP activities related to strengthening, flexibility, endurance, ROM of core, proximal hip and LE for functional self-care, mobility, lifting and ambulation/stair navigation   [] (06589)Reviewed/Progressed HEP activities related to improving balance, coordination, kinesthetic sense, posture, motor skill, proprioception of core, proximal hip and LE for self care, mobility, lifting, and ambulation/stair navigation      Manual Treatments:  PROM / STM / Oscillations-Mobs:  G-I, II, III, IV (PA's, Inf., Post.)  [] (84984) Provided manual therapy to mobilize LE, proximal hip and/or LS spine soft tissue/joints for the purpose of modulating pain, promoting relaxation,  increasing ROM, reducing/eliminating soft tissue swelling/inflammation/restriction, improving soft tissue extensibility and allowing for proper ROM for normal function with self care, mobility, lifting and ambulation. If [facility] Please Indicate Time In/Out  CPT Code Time in Time out                         Approval Dates:  CPT Code Units Approved Units Used  Date Updated:                     Charges:  Timed Code Treatment Minutes: 45   Total Treatment Minutes: 50      [] EVAL (LOW) 70175 (typically 20 minutes face-to-face)  [] EVAL (MOD) 25575 (typically 30 minutes face-to-face)  [] EVAL (HIGH) 67225 (typically 45 minutes face-to-face)  [] RE-EVAL     [x] GW(72375) x 2    [] Dry needle 1 or 2 Muscles (82795)  [x] NMR (89028) x1   [] Dry needle 3+ Muscles (03572)  [] Manual (54147) x     [] Ultrasound (31420) x  [] TA (82472) x    [] Mech Traction (89040)  [] ES(attended) (31372)     [] ES (un) (42019):   [] Vasopump (77643) [] Ionto (39260)   [] Other:      GOALS:  Patient stated goal: \" I want to walk and function better \"  [x]? Progressing: []? Met: []? Not Met: []? Adjusted     Therapist goals for Patient:   Short Term Goals: To be achieved in: 2 weeks  1. Independent in HEP and progression per patient tolerance, in order to prevent re-injury. [x]? Progressing: []? Met: []? Not Met: []? Adjusted  2.  Patient will have a decrease in pain to facilitate improvement in movement, function, and ADLs as indicated by Functional Deficits. [x]? Progressing: []? Met: []? Not Met: []? Adjusted     Long Term Goals: To be achieved in: 8 weeks  1. Disability index score of 30%% or less for the LEFS to assist with reaching prior level of function. [x]? Progressing: []? Met: []? Not Met: []? Adjusted  2. Patient will demonstrate increased AROM to wfl to allow for proper joint functioning as indicated by patients Functional Deficits. [x]? Progressing: []? Met: []? Not Met: []? Adjusted  3. Patient will demonstrate an increase in Strength to good proximal hip strength and control, within 5lb HHD in LE to allow for proper functional mobility as indicated by patients Functional Deficits. [x]? Progressing: []? Met: []? Not Met: []? Adjusted  4. Patient will return to 70% functional activities without increased symptoms or restriction. [x]? Progressing: []? Met: []? Not Met: []? Adjusted  5. \" I want to be able to do the steps and have improved balance\"  \" I also want to be able to lift heavier things \"  [x]? Progressing: []? Met: []? Not Met: []? Adjusted            ASSESSMENT:  See eval    Treatment/Activity Tolerance:  [x] Patient tolerated treatment well [] Patient limited by fatique  [] Patient limited by pain  [] Patient limited by other medical complications  [] Other:     Overall Progression Towards Functional goals/ Treatment Progress Update:  [] Patient is progressing as expected towards functional goals listed. [] Progression is slowed due to complexities/Impairments listed. [] Progression has been slowed due to co-morbidities.   [x] Plan just implemented, too soon to assess goals progression <30days   [] Goals require adjustment due to lack of progress  [] Patient is not progressing as expected and requires additional follow up with physician  [] Other    Prognosis for POC: [x] Good [] Fair  [] Poor    Patient requires continued skilled intervention: [x] Yes  [] No        PLAN: LE arom, prom  strength, proprioception, gait, balance, functional activities. Mfr, joint mobs, mods as needed, hep. Progress as tolerated . Goal is to do steps, go from sit to stand, sit up from supine, lift from floor , improved balance    [] Continue per plan of care [] Alter current plan (see comments)  [x] Plan of care initiated [] Hold pending MD visit [] Discharge    Electronically signed by: Rosemary Mclain PT    Note: If patient does not return for scheduled/recommended follow up visits, this note will serve as a discharge from care along with the most recent update on progress.

## 2022-04-27 ENCOUNTER — HOSPITAL ENCOUNTER (OUTPATIENT)
Dept: PHYSICAL THERAPY | Age: 62
Setting detail: THERAPIES SERIES
Discharge: HOME | End: 2022-04-27
Payer: COMMERCIAL

## 2022-04-27 PROCEDURE — 97112 NEUROMUSCULAR REEDUCATION: CPT

## 2022-04-27 PROCEDURE — 97110 THERAPEUTIC EXERCISES: CPT

## 2022-04-27 NOTE — FLOWSHEET NOTE
[facility] - Outpatient Rehabilitation & Therapy  [address]. [facility] [place] [facility] [facility] [address]  Phone: [phone]   Fax:     [phone]      Physical Therapy Treatment Note/ Progress Report:     Date:  2022    Patient Name:  Valdo Fletcher    :  1960  MRN: [id number]    Pertinent Medical History:Additional Pertinent Hx: hpl, htn, anxiety, geerd, gout, tka    Medical/Treatment Diagnosis Information:  · Diagnosis: R53.1 (ICD-10-CM) - Weakness  · Treatment Diagnosis: decreased abilty to ambualte and function    Insurance/Certification information:  PT Insurance Information: halle  Physician Information:  Referring Practitioner: Dr. Yuniel Kiser of care signed (Y/N): routed    Date of Patient follow up with Physician:      Progress Report: []  Yes  [x]  No     Date Range for reporting period:  Beginnin2022  Ending:      Progress report due (10 Rx/or 30 days whichever is XVDN):84    Recertification due (POC duration/ or 90 days whichever is less):      Visit # POC/Insurance Allowable Auth Needed   6 12 []Yes   []No     Latex Allergy:  [x]NO      []YES  Preferred Language for Healthcare:   [x]English       []Other:    Functional Scale:      Date assessed: at eval  Test: LEFS-31  Score:    Pain level:  0/10     History of Injury: Patient is a 61 y/o female who was in the hospital in January for a week and has been weak since she came out. She c/o decreased abilty to ambulate, do steps, and stand for prolonged time. She also says it is hard to lift things. She has fallen once in the past year. She wants to be able to do the steps, walk further and do her adl's as well as improve her balance. She wall walks. She uses a cane to so the steps at home.      SUBJECTIVE:  Pt states, \" I have a hard time doing steps and lifting things \"  22  Pt states, \" Doing ok, getting around a little easier \"  22  Pt states, \"  I felt workout sore after the last rx \"  4/19/22  Pt states, \" \" Getting easier to go up and down the steps,  Still kind of hard to get up out of the chair \" \"  4/21/22  Pt states, \" Back to work, still feeling pretty good overall \"  4/27/22  Pt states, \" I was able to walk around [place] for a few hours over the weekend \"    OBJECTIVE:       ROM LEFT RIGHT   HIP Flex 100 100   HIP Abd 25 20   HIP Ext 5 5   HIP IR 30 25   HIP ER 40 30   Knee ext 0 0   Knee Flex 120 120   Ankle PF       Ankle DF 5 5   Ankle In       Ankle Ev       Strength  LEFT RIGHT   HIP Flexors 4-/5 all 4-/5 all   HIP Abductors       HIP Ext       Hip ER       Knee EXT (quad)       Knee Flex (HS)       Ankle DF       Ankle PF       Ankle Inv       Ankle EV                      RESTRICTIONS/PRECAUTIONS:    Exercises/Interventions:     Therapeutic Ex (16505)   Min: Reps/Resistance Notes/CUES   Nu step L2 x 6 min    Leg press 100# x 40,  35# x 30 ea    Partial sit X 30    Dead bug X 2 min    lawnmower     Box lift 18# x 30 from floor    incline 60 x 3    clams green x 30                             Manual Intervention (13302)  Min:     Knee mobs/PROM     Tib/Fem Mobs     Patella Mobs     Ankle mobs               NMR re-education (79575)  Min:  CUES NEEDED   Sit to stand X 20  From 17\"    Step ups 6\" x 30 ea    Step tap 6\" x 2 min    Lift from floor     Walk with ball     lunge     Side step green x 2 min    Monster walk     Tandem stand X 2 min    Dead lift     wobble     Table hip ext     lunge     Lunge walk     slider     bosu     Walk outs                          Therapeutic Activity (63974)  Min:               Modalities  Min:     IFC with      CP after exercises     MH after exercises            Other Therapeutic Activities: Pt was educated on PT POC, Diagnosis, Prognosis, pathomechanics as well as frequency and duration of scheduling future physical therapy appointments.  Time was also taken on this day to answer all patient questions and participation in PT. Reviewed appointment policy in detail with patient and patient verbalized understanding. Home Exercise Program: Patient was instructed in the following for HEP:     . Patient verbalized/demonstrated understanding and was issued written handout. Access Code: [email or web address]  URL: [email or web address]  Date: 04/01/2022  Prepared by: Tyesha Chi    Exercises  Mini Squat with Counter Support - 1 x daily - 7 x weekly - 3 sets - 10 reps  Standing Hip Abduction - 1 x daily - 7 x weekly - 3 sets - 10 reps  Step Up - 1 x daily - 7 x weekly - 3 sets - 10 reps  Lateral Step Up - 1 x daily - 7 x weekly - 3 sets - 10 reps  Mini Lunge - 1 x daily - 7 x weekly - 3 sets - 10 reps      Therapeutic Exercise and NMR EXR  [] (05854) Provided verbal/tactile cueing for activities related to strengthening, flexibility, endurance, ROM for improvements in LE, proximal hip, and core control with self care, mobility, lifting, ambulation.  [] (88541) Provided verbal/tactile cueing for activities related to improving balance, coordination, kinesthetic sense, posture, motor skill, proprioception  to assist with LE, proximal hip, and core control in self care, mobility, lifting, ambulation and eccentric single leg control.      NMR and Therapeutic Activities:    [] (86909 or 95930) Provided verbal/tactile cueing for activities related to improving balance, coordination, kinesthetic sense, posture, motor skill, proprioception and motor activation to allow for proper function of core, proximal hip and LE with self care and ADLs and functional mobility.   [] (76202) Gait Re-education- Provided training and instruction to the patient for proper LE, core and proximal hip recruitment and positioning and eccentric body weight control with ambulation re-education including up and down stairs     Home Exercise Program:    [x] (58317) Reviewed/Progressed HEP activities related to strengthening, flexibility, endurance, ROM of core, proximal hip and LE for functional self-care, mobility, lifting and ambulation/stair navigation   [] (69442)Reviewed/Progressed HEP activities related to improving balance, coordination, kinesthetic sense, posture, motor skill, proprioception of core, proximal hip and LE for self care, mobility, lifting, and ambulation/stair navigation      Manual Treatments:  PROM / STM / Oscillations-Mobs:  G-I, II, III, IV (PA's, Inf., Post.)  [] (38197) Provided manual therapy to mobilize LE, proximal hip and/or LS spine soft tissue/joints for the purpose of modulating pain, promoting relaxation,  increasing ROM, reducing/eliminating soft tissue swelling/inflammation/restriction, improving soft tissue extensibility and allowing for proper ROM for normal function with self care, mobility, lifting and ambulation. If [facility] Please Indicate Time In/Out  CPT Code Time in Time out                         Approval Dates:  CPT Code Units Approved Units Used  Date Updated:                     Charges:  Timed Code Treatment Minutes: 45   Total Treatment Minutes: 50      [] EVAL (LOW) 65572 (typically 20 minutes face-to-face)  [] EVAL (MOD) 81956 (typically 30 minutes face-to-face)  [] EVAL (HIGH) 24968 (typically 45 minutes face-to-face)  [] RE-EVAL     [x] XS(76713) x 2    [] Dry needle 1 or 2 Muscles (78102)  [x] NMR (74422) x1   [] Dry needle 3+ Muscles (13951)  [] Manual (53078) x     [] Ultrasound (89333) x  [] TA (90354) x    [] Mech Traction (65106)  [] ES(attended) (33143)     [] ES (un) (85565):   [] Vasopump (02734) [] Ionto (36160)   [] Other:      GOALS:  Patient stated goal: \" I want to walk and function better \"  [x]? Progressing: []? Met: []? Not Met: []? Adjusted     Therapist goals for Patient:   Short Term Goals: To be achieved in: 2 weeks  1. Independent in HEP and progression per patient tolerance, in order to prevent re-injury. [x]? Progressing: []? Met: []? Not Met: []? Adjusted  2.  Patient will have a decrease in pain to facilitate improvement in movement, function, and ADLs as indicated by Functional Deficits. [x]? Progressing: []? Met: []? Not Met: []? Adjusted     Long Term Goals: To be achieved in: 8 weeks  1. Disability index score of 30%% or less for the LEFS to assist with reaching prior level of function. [x]? Progressing: []? Met: []? Not Met: []? Adjusted  2. Patient will demonstrate increased AROM to wfl to allow for proper joint functioning as indicated by patients Functional Deficits. [x]? Progressing: []? Met: []? Not Met: []? Adjusted  3. Patient will demonstrate an increase in Strength to good proximal hip strength and control, within 5lb HHD in LE to allow for proper functional mobility as indicated by patients Functional Deficits. [x]? Progressing: []? Met: []? Not Met: []? Adjusted  4. Patient will return to 70% functional activities without increased symptoms or restriction. [x]? Progressing: []? Met: []? Not Met: []? Adjusted  5. \" I want to be able to do the steps and have improved balance\"  \" I also want to be able to lift heavier things \"  [x]? Progressing: []? Met: []? Not Met: []? Adjusted            ASSESSMENT:  See eval    Treatment/Activity Tolerance:  [x] Patient tolerated treatment well [] Patient limited by fatique  [] Patient limited by pain  [] Patient limited by other medical complications  [] Other:     Overall Progression Towards Functional goals/ Treatment Progress Update:  [] Patient is progressing as expected towards functional goals listed. [] Progression is slowed due to complexities/Impairments listed. [] Progression has been slowed due to co-morbidities.   [x] Plan just implemented, too soon to assess goals progression <30days   [] Goals require adjustment due to lack of progress  [] Patient is not progressing as expected and requires additional follow up with physician  [] Other    Prognosis for POC: [x] Good [] Fair  [] Poor    Patient requires continued skilled intervention: [x] Yes  [] No        PLAN: LE arom, prom  strength, proprioception, gait, balance, functional activities. Mfr, joint mobs, mods as needed, hep. Progress as tolerated . Goal is to do steps, go from sit to stand, sit up from supine, lift from floor , improved balance    [] Continue per plan of care [] Alter current plan (see comments)  [x] Plan of care initiated [] Hold pending MD visit [] Discharge    Electronically signed by: Tino Nicholson PT    Note: If patient does not return for scheduled/recommended follow up visits, this note will serve as a discharge from care along with the most recent update on progress.

## 2022-04-29 ENCOUNTER — HOSPITAL ENCOUNTER (OUTPATIENT)
Dept: PHYSICAL THERAPY | Age: 62
Setting detail: THERAPIES SERIES
Discharge: HOME | End: 2022-04-29
Payer: COMMERCIAL

## 2022-04-29 PROCEDURE — 97110 THERAPEUTIC EXERCISES: CPT

## 2022-04-29 PROCEDURE — 97112 NEUROMUSCULAR REEDUCATION: CPT

## 2022-04-29 NOTE — FLOWSHEET NOTE
[facility] - Outpatient Rehabilitation & Therapy  [address] [place]  Phone: [phone]   Fax:     [phone]      Physical Therapy Treatment Note/ Progress Report:     Date:  2022    Patient Name:  Crow Islas    :  1960  MRN: [id number]    Pertinent Medical History:Additional Pertinent Hx: hpl, htn, anxiety, geerd, gout, tka    Medical/Treatment Diagnosis Information:  · Diagnosis: R53.1 (ICD-10-CM) - Weakness  · Treatment Diagnosis: decreased abilty to ambualte and function    Insurance/Certification information:  PT Insurance Information: halle  Physician Information:  Referring Practitioner: Dr. Ghada Jaimes of care signed (Y/N): routed    Date of Patient follow up with Physician:      Progress Report: []  Yes  [x]  No     Date Range for reporting period:  Beginnin2022  Ending:      Progress report due (10 Rx/or 30 days whichever is WXDY):45    Recertification due (POC duration/ or 90 days whichever is less):      Visit # POC/Insurance Allowable Auth Needed   7 12 []Yes   []No     Latex Allergy:  [x]NO      []YES  Preferred Language for Healthcare:   [x]English       []Other:    Functional Scale:      Date assessed: at eval  Test: LEFS-31  Score:    Pain level:  0/10     History of Injury: Patient is a 61 y/o female who was in the hospital in January for a week and has been weak since she came out. She c/o decreased abilty to ambulate, do steps, and stand for prolonged time. She also says it is hard to lift things. She has fallen once in the past year. She wants to be able to do the steps, walk further and do her adl's as well as improve her balance. She wall walks. She uses a cane to so the steps at home.      SUBJECTIVE:  Pt states, \" I have a hard time doing steps and lifting things \"  22  Pt states, \" Doing ok, getting around a little easier \"  22  Pt states, \"  I felt workout sore after the last rx \"  4/19/22  Pt states, \" \" Getting easier to go up and down the steps,  Still kind of hard to get up out of the chair \" \"  4/21/22  Pt states, \" Back to work, still feeling pretty good overall \"  4/27/22  Pt states, \" I was able to walk around [place] for a few hours over the weekend \"  04/29/22 Patient reports she was a little sore from last rx,overall is improving. States she is able to go up and down the steps easier.     OBJECTIVE:       ROM LEFT RIGHT   HIP Flex 100 100   HIP Abd 25 20   HIP Ext 5 5   HIP IR 30 25   HIP ER 40 30   Knee ext 0 0   Knee Flex 120 120   Ankle PF       Ankle DF 5 5   Ankle In       Ankle Ev       Strength  LEFT RIGHT   HIP Flexors 4-/5 all 4-/5 all   HIP Abductors       HIP Ext       Hip ER       Knee EXT (quad)       Knee Flex (HS)       Ankle DF       Ankle PF       Ankle Inv       Ankle EV                      RESTRICTIONS/PRECAUTIONS:    Exercises/Interventions:     Therapeutic Ex (22208)   Min: Reps/Resistance Notes/CUES   Nu step L2 x 6 min    Leg press 100# x 40,  35# x 30 ea    Partial sit X 30    Dead bug X 2 min    lawnmower     Box lift 18# x 30 from floor    incline 60 x 3    clams green x 30                             Manual Intervention (65897)  Min:     Knee mobs/PROM     Tib/Fem Mobs     Patella Mobs     Ankle mobs               NMR re-education (21171)  Min:  CUES NEEDED   Sit to stand X 20  From 17\"    Step ups 6\" x 30 ea    Step tap 6\" x 2 min    Lift from floor     Walk with ball     lunge     Side step green x 2 min    Monster walk     Tandem stand X 2 min    Dead lift     wobble X 2 min side    Table hip ext     lunge     Lunge walk     slider     bosu     Walk outs                          Therapeutic Activity (80202)  Min:               Modalities  Min:     IFC with      CP after exercises     MH after exercises            Other Therapeutic Activities: Pt was educated on PT POC, Diagnosis, Prognosis, pathomechanics as well as frequency and duration of scheduling future physical therapy appointments. Time was also taken on this day to answer all patient questions and participation in PT. Reviewed appointment policy in detail with patient and patient verbalized understanding. Home Exercise Program: Patient was instructed in the following for HEP:     . Patient verbalized/demonstrated understanding and was issued written handout. Access Code: [email or web address]  URL: [email or web address]  Date: 04/01/2022  Prepared by: Wendel Aase    Exercises  Mini Squat with Counter Support - 1 x daily - 7 x weekly - 3 sets - 10 reps  Standing Hip Abduction - 1 x daily - 7 x weekly - 3 sets - 10 reps  Step Up - 1 x daily - 7 x weekly - 3 sets - 10 reps  Lateral Step Up - 1 x daily - 7 x weekly - 3 sets - 10 reps  Mini Lunge - 1 x daily - 7 x weekly - 3 sets - 10 reps      Therapeutic Exercise and NMR EXR  [] (16686) Provided verbal/tactile cueing for activities related to strengthening, flexibility, endurance, ROM for improvements in LE, proximal hip, and core control with self care, mobility, lifting, ambulation.  [] (30306) Provided verbal/tactile cueing for activities related to improving balance, coordination, kinesthetic sense, posture, motor skill, proprioception  to assist with LE, proximal hip, and core control in self care, mobility, lifting, ambulation and eccentric single leg control.      NMR and Therapeutic Activities:    [] (92971 or 60559) Provided verbal/tactile cueing for activities related to improving balance, coordination, kinesthetic sense, posture, motor skill, proprioception and motor activation to allow for proper function of core, proximal hip and LE with self care and ADLs and functional mobility.   [] (80078) Gait Re-education- Provided training and instruction to the patient for proper LE, core and proximal hip recruitment and positioning and eccentric body weight control with ambulation re-education including up and down stairs     Home Exercise Program:    [x] (59397) Reviewed/Progressed HEP activities related to strengthening, flexibility, endurance, ROM of core, proximal hip and LE for functional self-care, mobility, lifting and ambulation/stair navigation   [] (90736)Reviewed/Progressed HEP activities related to improving balance, coordination, kinesthetic sense, posture, motor skill, proprioception of core, proximal hip and LE for self care, mobility, lifting, and ambulation/stair navigation      Manual Treatments:  PROM / STM / Oscillations-Mobs:  G-I, II, III, IV (PA's, Inf., Post.)  [] (55801) Provided manual therapy to mobilize LE, proximal hip and/or LS spine soft tissue/joints for the purpose of modulating pain, promoting relaxation,  increasing ROM, reducing/eliminating soft tissue swelling/inflammation/restriction, improving soft tissue extensibility and allowing for proper ROM for normal function with self care, mobility, lifting and ambulation. If [facility] Please Indicate Time In/Out  CPT Code Time in Time out                         Approval Dates:  CPT Code Units Approved Units Used  Date Updated:                     Charges:  Timed Code Treatment Minutes: 45   Total Treatment Minutes: 50      [] EVAL (LOW) 18986 (typically 20 minutes face-to-face)  [] EVAL (MOD) 93642 (typically 30 minutes face-to-face)  [] EVAL (HIGH) 74732 (typically 45 minutes face-to-face)  [] RE-EVAL     [x] BW(80338) x 2    [] Dry needle 1 or 2 Muscles (52397)  [x] NMR (78981) x1   [] Dry needle 3+ Muscles (73490)  [] Manual (38455) x     [] Ultrasound (29123) x  [] TA (97544) x    [] Mech Traction (65842)  [] ES(attended) (86124)     [] ES (un) (47325):   [] Vasopump (93363) [] Ionto (19580)   [] Other:      GOALS:  Patient stated goal: \" I want to walk and function better \"  [x]? Progressing: []? Met: []? Not Met: []? Adjusted     Therapist goals for Patient:   Short Term Goals: To be achieved in: 2 weeks  1.  Independent in HEP and progression per patient tolerance, in order to prevent re-injury. [x]? Progressing: []? Met: []? Not Met: []? Adjusted  2. Patient will have a decrease in pain to facilitate improvement in movement, function, and ADLs as indicated by Functional Deficits. [x]? Progressing: []? Met: []? Not Met: []? Adjusted     Long Term Goals: To be achieved in: 8 weeks  1. Disability index score of 30%% or less for the LEFS to assist with reaching prior level of function. [x]? Progressing: []? Met: []? Not Met: []? Adjusted  2. Patient will demonstrate increased AROM to wfl to allow for proper joint functioning as indicated by patients Functional Deficits. [x]? Progressing: []? Met: []? Not Met: []? Adjusted  3. Patient will demonstrate an increase in Strength to good proximal hip strength and control, within 5lb HHD in LE to allow for proper functional mobility as indicated by patients Functional Deficits. [x]? Progressing: []? Met: []? Not Met: []? Adjusted  4. Patient will return to 70% functional activities without increased symptoms or restriction. [x]? Progressing: []? Met: []? Not Met: []? Adjusted  5. \" I want to be able to do the steps and have improved balance\"  \" I also want to be able to lift heavier things \"  [x]? Progressing: []? Met: []? Not Met: []? Adjusted            ASSESSMENT:  See eval    Treatment/Activity Tolerance:  [x] Patient tolerated treatment well [] Patient limited by fatique  [] Patient limited by pain  [] Patient limited by other medical complications  [] Other:     Overall Progression Towards Functional goals/ Treatment Progress Update:  [] Patient is progressing as expected towards functional goals listed. [] Progression is slowed due to complexities/Impairments listed. [] Progression has been slowed due to co-morbidities.   [x] Plan just implemented, too soon to assess goals progression <30days   [] Goals require adjustment due to lack of progress  [] Patient is not progressing as expected and requires additional follow up with physician  [] Other    Prognosis for POC: [x] Good [] Fair  [] Poor    Patient requires continued skilled intervention: [x] Yes  [] No        PLAN: LE arom, prom  strength, proprioception, gait, balance, functional activities. Mfr, joint mobs, mods as needed, hep. Progress as tolerated . Goal is to do steps, go from sit to stand, sit up from supine, lift from floor , improved balance    [] Continue per plan of care [] Alter current plan (see comments)  [x] Plan of care initiated [] Hold pending MD visit [] Discharge    Electronically signed by: Ean Haq PTA    Note: If patient does not return for scheduled/recommended follow up visits, this note will serve as a discharge from care along with the most recent update on progress.

## 2022-05-02 ENCOUNTER — HOSPITAL ENCOUNTER (OUTPATIENT)
Dept: PHYSICAL THERAPY | Age: 62
Setting detail: THERAPIES SERIES
Discharge: HOME | End: 2022-05-02
Payer: COMMERCIAL

## 2022-05-02 PROCEDURE — 97110 THERAPEUTIC EXERCISES: CPT

## 2022-05-02 PROCEDURE — 97112 NEUROMUSCULAR REEDUCATION: CPT

## 2022-05-02 NOTE — FLOWSHEET NOTE
[facility] - Outpatient Rehabilitation & Therapy  [address] [facility]. Donald [place]  Phone: [phone]   Fax:     [phone]      Physical Therapy Treatment Note/ Progress Report:     Date:  2022    Patient Name:  Ibrahima Jamison    :  1960  MRN: [id number]    Pertinent Medical History:Additional Pertinent Hx: hpl, htn, anxiety, geerd, gout, tka    Medical/Treatment Diagnosis Information:  · Diagnosis: R53.1 (ICD-10-CM) - Weakness  · Treatment Diagnosis: decreased abilty to ambualte and function    Insurance/Certification information:  PT Insurance Information: halle  Physician Information:  Referring Practitioner: Dr. Nieto Cover of care signed (Y/N): routed    Date of Patient follow up with Physician:      Progress Report: []  Yes  [x]  No     Date Range for reporting period:  Beginnin2022  Ending:      Progress report due (10 Rx/or 30 days whichever is CGBW):31    Recertification due (POC duration/ or 90 days whichever is less):      Visit # POC/Insurance Allowable Auth Needed   8 12 []Yes   []No     Latex Allergy:  [x]NO      []YES  Preferred Language for Healthcare:   [x]English       []Other:    Functional Scale:      Date assessed: at eval  Test: LEFS-31  Score:    Pain level:  0/10     History of Injury: Patient is a 61 y/o female who was in the hospital in January for a week and has been weak since she came out. She c/o decreased abilty to ambulate, do steps, and stand for prolonged time. She also says it is hard to lift things. She has fallen once in the past year. She wants to be able to do the steps, walk further and do her adl's as well as improve her balance. She wall walks. She uses a cane to so the steps at home.      SUBJECTIVE:  Pt states, \" I have a hard time doing steps and lifting things \"  22  Pt states, \" Doing ok, getting around a little easier \"  22  Pt states, \"  I felt workout sore after the last rx \"  4/19/22  Pt states, \" \" Getting easier to go up and down the steps,  Still kind of hard to get up out of the chair \" \"  4/21/22  Pt states, \" Back to work, still feeling pretty good overall \"  4/27/22  Pt states, \" I was able to walk around [place] for a few hours over the weekend \"  04/29/22 Patient reports she was a little sore from last rx,overall is improving. States she is able to go up and down the steps easier. 05/02/22 Patient reports she feels stronger able to get up from the commode easier,balance is improving too.     OBJECTIVE:       ROM LEFT RIGHT   HIP Flex 100 100   HIP Abd 25 20   HIP Ext 5 5   HIP IR 30 25   HIP ER 40 30   Knee ext 0 0   Knee Flex 120 120   Ankle PF       Ankle DF 5 5   Ankle In       Ankle Ev       Strength  LEFT RIGHT   HIP Flexors 4-/5 all 4-/5 all   HIP Abductors       HIP Ext       Hip ER       Knee EXT (quad)       Knee Flex (HS)       Ankle DF       Ankle PF       Ankle Inv       Ankle EV                      RESTRICTIONS/PRECAUTIONS:    Exercises/Interventions:     Therapeutic Ex (04917)   Min: Reps/Resistance Notes/CUES   Nu step L3 x 6 min    Leg press 100# x 40,  40# x 30 ea    Partial sit X 30    Dead bug X 2 min    lawnmower     Box lift 18# x 30 from floor    incline 60 x 3    clams green x 30                             Manual Intervention (00486)  Min:     Knee mobs/PROM     Tib/Fem Mobs     Patella Mobs     Ankle mobs               NMR re-education (78508)  Min:  CUES NEEDED   Sit to stand X 20  From 17\"    Step ups 6\" x 30 ea    Step tap 6\" x 2 min    Lift from floor     Walk with ball     lunge     Side step green x 2 min    Monster walk     Tandem stand X 2 min    Dead lift     wobble X 2 min side    Table hip ext X 20     lunge     Lunge walk     slider X 30    bosu     Walk outs                          Therapeutic Activity (35020)  Min:               Modalities  Min:     IFC with      CP after exercises     MH after exercises Other Therapeutic Activities: Pt was educated on PT POC, Diagnosis, Prognosis, pathomechanics as well as frequency and duration of scheduling future physical therapy appointments. Time was also taken on this day to answer all patient questions and participation in PT. Reviewed appointment policy in detail with patient and patient verbalized understanding. Home Exercise Program: Patient was instructed in the following for HEP:     . Patient verbalized/demonstrated understanding and was issued written handout. Access Code: [email or web address]  URL: [email or web address]  Date: 04/01/2022  Prepared by: Sunil Bernardo    Exercises  Mini Squat with Counter Support - 1 x daily - 7 x weekly - 3 sets - 10 reps  Standing Hip Abduction - 1 x daily - 7 x weekly - 3 sets - 10 reps  Step Up - 1 x daily - 7 x weekly - 3 sets - 10 reps  Lateral Step Up - 1 x daily - 7 x weekly - 3 sets - 10 reps  Mini Lunge - 1 x daily - 7 x weekly - 3 sets - 10 reps      Therapeutic Exercise and NMR EXR  [] (44693) Provided verbal/tactile cueing for activities related to strengthening, flexibility, endurance, ROM for improvements in LE, proximal hip, and core control with self care, mobility, lifting, ambulation.  [] (27847) Provided verbal/tactile cueing for activities related to improving balance, coordination, kinesthetic sense, posture, motor skill, proprioception  to assist with LE, proximal hip, and core control in self care, mobility, lifting, ambulation and eccentric single leg control.      NMR and Therapeutic Activities:    [] (83032 or 64088) Provided verbal/tactile cueing for activities related to improving balance, coordination, kinesthetic sense, posture, motor skill, proprioception and motor activation to allow for proper function of core, proximal hip and LE with self care and ADLs and functional mobility.   [] (11739) Gait Re-education- Provided training and instruction to the patient for proper LE, core and proximal hip recruitment and positioning and eccentric body weight control with ambulation re-education including up and down stairs     Home Exercise Program:    [x] (67458) Reviewed/Progressed HEP activities related to strengthening, flexibility, endurance, ROM of core, proximal hip and LE for functional self-care, mobility, lifting and ambulation/stair navigation   [] (77488)Reviewed/Progressed HEP activities related to improving balance, coordination, kinesthetic sense, posture, motor skill, proprioception of core, proximal hip and LE for self care, mobility, lifting, and ambulation/stair navigation      Manual Treatments:  PROM / STM / Oscillations-Mobs:  G-I, II, III, IV (PA's, Inf., Post.)  [] (30193) Provided manual therapy to mobilize LE, proximal hip and/or LS spine soft tissue/joints for the purpose of modulating pain, promoting relaxation,  increasing ROM, reducing/eliminating soft tissue swelling/inflammation/restriction, improving soft tissue extensibility and allowing for proper ROM for normal function with self care, mobility, lifting and ambulation. If [facility] Please Indicate Time In/Out  CPT Code Time in Time out                         Approval Dates:  CPT Code Units Approved Units Used  Date Updated:                     Charges:  Timed Code Treatment Minutes: 45   Total Treatment Minutes: 50      [] EVAL (LOW) 17392 (typically 20 minutes face-to-face)  [] EVAL (MOD) 29187 (typically 30 minutes face-to-face)  [] EVAL (HIGH) 56299 (typically 45 minutes face-to-face)  [] RE-EVAL     [x] JULIANN(18837) x 2    [] Dry needle 1 or 2 Muscles (71910)  [x] NMR (90525) x1   [] Dry needle 3+ Muscles (44853)  [] Manual (62016) x     [] Ultrasound (35354) x  [] TA (40172) x    [] Mech Traction (61184)  [] ES(attended) (25883)     [] ES (un) (74206):   [] Vasopump (54264) [] Ionto (75256)   [] Other:      GOALS:  Patient stated goal: \" I want to walk and function better \"  [x]? Progressing: []? Met: []?  Not Met: []? Adjusted     Therapist goals for Patient:   Short Term Goals: To be achieved in: 2 weeks  1. Independent in HEP and progression per patient tolerance, in order to prevent re-injury. [x]? Progressing: []? Met: []? Not Met: []? Adjusted  2. Patient will have a decrease in pain to facilitate improvement in movement, function, and ADLs as indicated by Functional Deficits. [x]? Progressing: []? Met: []? Not Met: []? Adjusted     Long Term Goals: To be achieved in: 8 weeks  1. Disability index score of 30%% or less for the LEFS to assist with reaching prior level of function. [x]? Progressing: []? Met: []? Not Met: []? Adjusted  2. Patient will demonstrate increased AROM to wfl to allow for proper joint functioning as indicated by patients Functional Deficits. [x]? Progressing: []? Met: []? Not Met: []? Adjusted  3. Patient will demonstrate an increase in Strength to good proximal hip strength and control, within 5lb HHD in LE to allow for proper functional mobility as indicated by patients Functional Deficits. [x]? Progressing: []? Met: []? Not Met: []? Adjusted  4. Patient will return to 70% functional activities without increased symptoms or restriction. [x]? Progressing: []? Met: []? Not Met: []? Adjusted  5. \" I want to be able to do the steps and have improved balance\"  \" I also want to be able to lift heavier things \"  [x]? Progressing: []? Met: []? Not Met: []? Adjusted            ASSESSMENT:  See eval    Treatment/Activity Tolerance:  [x] Patient tolerated treatment well [] Patient limited by fatique  [] Patient limited by pain  [] Patient limited by other medical complications  [] Other:     Overall Progression Towards Functional goals/ Treatment Progress Update:  [] Patient is progressing as expected towards functional goals listed. [] Progression is slowed due to complexities/Impairments listed. [] Progression has been slowed due to co-morbidities.   [x] Plan just implemented, too soon to assess goals progression <30days   [] Goals require adjustment due to lack of progress  [] Patient is not progressing as expected and requires additional follow up with physician  [] Other    Prognosis for POC: [x] Good [] Fair  [] Poor    Patient requires continued skilled intervention: [x] Yes  [] No        PLAN: LE arom, prom  strength, proprioception, gait, balance, functional activities. Mfr, joint mobs, mods as needed, hep. Progress as tolerated . Goal is to do steps, go from sit to stand, sit up from supine, lift from floor , improved balance    [] Continue per plan of care [] Alter current plan (see comments)  [x] Plan of care initiated [] Hold pending MD visit [] Discharge    Electronically signed by: Mahesh Antony PTA    Note: If patient does not return for scheduled/recommended follow up visits, this note will serve as a discharge from care along with the most recent update on progress.

## 2022-05-03 ENCOUNTER — OFFICE VISIT (OUTPATIENT)
Dept: SLEEP MEDICINE | Age: 62
End: 2022-05-03
Payer: COMMERCIAL

## 2022-05-03 VITALS
OXYGEN SATURATION: 98 % | HEIGHT: 62 IN | DIASTOLIC BLOOD PRESSURE: 60 MMHG | TEMPERATURE: 97.3 F | HEART RATE: 100 BPM | RESPIRATION RATE: 18 BRPM | BODY MASS INDEX: 22.05 KG/M2 | SYSTOLIC BLOOD PRESSURE: 110 MMHG | WEIGHT: 119.8 LBS

## 2022-05-03 DIAGNOSIS — G47.33 OSA ON CPAP: Primary | ICD-10-CM

## 2022-05-03 DIAGNOSIS — Z99.89 OSA ON CPAP: Primary | ICD-10-CM

## 2022-05-03 DIAGNOSIS — Z99.89 DEPENDENCE ON OTHER ENABLING MACHINES AND DEVICES: ICD-10-CM

## 2022-05-03 PROCEDURE — 99213 OFFICE O/P EST LOW 20 MIN: CPT | Performed by: PSYCHIATRY & NEUROLOGY

## 2022-05-03 ASSESSMENT — SLEEP AND FATIGUE QUESTIONNAIRES
HOW LIKELY ARE YOU TO NOD OFF OR FALL ASLEEP WHILE SITTING AND TALKING TO SOMEONE: 0
HOW LIKELY ARE YOU TO NOD OFF OR FALL ASLEEP WHILE SITTING INACTIVE IN A PUBLIC PLACE: 1
HOW LIKELY ARE YOU TO NOD OFF OR FALL ASLEEP WHILE SITTING QUIETLY AFTER LUNCH WITHOUT ALCOHOL: 1
HOW LIKELY ARE YOU TO NOD OFF OR FALL ASLEEP WHILE LYING DOWN TO REST IN THE AFTERNOON WHEN CIRCUMSTANCES PERMIT: 1
ESS TOTAL SCORE: 6
HOW LIKELY ARE YOU TO NOD OFF OR FALL ASLEEP WHILE SITTING AND READING: 1
HOW LIKELY ARE YOU TO NOD OFF OR FALL ASLEEP WHILE WATCHING TV: 1
HOW LIKELY ARE YOU TO NOD OFF OR FALL ASLEEP WHEN YOU ARE A PASSENGER IN A CAR FOR AN HOUR WITHOUT A BREAK: 1
HOW LIKELY ARE YOU TO NOD OFF OR FALL ASLEEP IN A CAR, WHILE STOPPED FOR A FEW MINUTES IN TRAFFIC: 0

## 2022-05-03 ASSESSMENT — ENCOUNTER SYMPTOMS: APNEA: 0

## 2022-05-03 NOTE — PROGRESS NOTES
MD NINFA Gordon Board Certified in Sleep Medicine  Certified in [address] [place] [address] Certified in Neurology [address]  [address]  [facility] [address] [address] [facility],  David Frazier 68  [phone]   [address] [place] [address] [facility]                      [address]  [address] [address] [phone]    Subjective:     Patient ID: Lashae Lagos is a 62 y.o. female. Chief Complaint   Patient presents with    Follow-up    Sleep Apnea       HPI:        Lashae Lagos is a 62 y.o. female was seen today as a follow for obstructive sleep apnea. The patient underwent comprehensive polysomnogram on 07/31/2021, the overnight registration revealed mild obstructive sleep apnea with apnea hypopnea index of 9.9/hr with lowest O2 saturation of 88%, patient spent about 1.9 minutes below 90%. Subsequently, the patient underwent un-successful PAP titration on 0/17/2021, the lowest O2 saturation while on PAP was 95%. Patient is using the PAP machine about 90% of the time, more than 4 hours a nightabout  56 %, in total average of 5:15 hours a night in last 50 days. Currently on PAP at 11.5 cm (8-14), the AHI is only 4.6 events per hour at this pressure. Patient improved regarding daytime sleepiness and fatigue, wakes up refreshed in the morning.   The Patient scored Total score: 6 on [place] Sleepiness Scale ( more than 10 is indicative of daytime sleepiness)   Uses nasal pillow mask and bother    her nostrils       DOT/CDL - N/A        Previous Report(s)Reviewed: historical medical records         Social History     Socioeconomic History    Marital status:      Spouse name: Not on file    Number of children: Not on file    Years of education: Not on file    Highest education level: Not on file   Occupational History    Not on file   Tobacco Use    Smoking status: Current Every Day Smoker     Packs/day: 0.50     Years: 35.00     Pack years: 17.50     Types: Cigarettes    Smokeless tobacco: Never Used    Tobacco comment: trying to quit   Vaping Use    Vaping Use: Every day    Substances: Nicotine    Devices: Disposable   Substance and Sexual Activity    Alcohol use: Yes     Alcohol/week: 14.0 standard drinks     Types: 14 Glasses of wine per week     Comment: wine    Drug use: No    Sexual activity: Not on file   Other Topics Concern    Not on file   Social History Narrative    Not on file     Social Determinants of Health     Financial Resource Strain: Low Risk     Difficulty of Paying Living Expenses: Not hard at all   Food Insecurity: No Food Insecurity    Worried About Running Out of Food in the Last Year: Never true    Tatianna of Food in the Last Year: Never true   Transportation Needs: No Transportation Needs    Lack of Transportation (Medical): No    Lack of Transportation (Non-Medical):  No   Physical Activity:     Days of Exercise per Week: Not on file    Minutes of Exercise per Session: Not on file   Stress:     Feeling of Stress : Not on file   Social Connections:     Frequency of Communication with Friends and Family: Not on file    Frequency of Social Gatherings with Friends and Family: Not on file    Attends Amish Services: Not on file    Active Member of [address] [place] [address] or Organizations: Not on file    Attends Club or Organization Meetings: Not on file    Marital Status: Not on file   Intimate Partner Violence:     Fear of Current or Ex-Partner: Not on file    Emotionally Abused: Not on file    Physically Abused: Not on file    Sexually Abused: Not on file   Housing Stability:     Unable to Pay for Housing in the Last Year: Not on file    Number of [place] in the Last Year: Not on file    Unstable Housing in the Last Year: Not on file       Prior to Admission medications    Medication Sig Start Date End Date Taking? Authorizing Provider   folic acid (FOLVITE) 1 MG tablet Take 1 mg by mouth daily   Yes Historical Provider, MD   torsemide (DEMADEX) 10 MG tablet Take 1 tablet by mouth daily 2/16/22  Yes James Meigs, MD   sacubitril-valsartan [facility]) 24-26 MG per tablet Take 1 tablet by mouth 2 times daily 2/16/22  Yes James Meigs, MD   esomeprazole ([address]) 40 MG delayed release capsule TAKE 1 CAPSULE BY MOUTH DAILY 2/15/22  Yes Lilia Treviño MD   allopurinol (ZYLOPRIM) 300 MG tablet TAKE 1 TABLET BY MOUTH DAILY  Patient taking differently: Take 300 mg by mouth daily  1/13/22  Yes Lilia Treviño MD   carvedilol (COREG) 3.125 MG tablet TAKE 1 TABLET BY MOUTH TWICE DAILY  Patient taking differently: Take 3.125 mg by mouth 2 times daily  12/16/21  Yes James Meigs, MD   MAGNESIUM-OXIDE 400 (241.3 Mg) MG TABS tablet TAKE 1 TABLET BY MOUTH DAILY  Patient taking differently: Take 400 mg by mouth daily  9/28/21  Yes James Meigs, MD   atorvastatin (LIPITOR) 20 MG tablet TAKE 1 TABLET BY MOUTH DAILY 9/21/21  Yes James Meigs, MD   budesonide-formoterol [facility]) 160-4.5 MCG/ACT AERO Inhale 2 puffs into the lungs 2 times daily 9/20/21  Yes Wen Feliz MD   albuterol sulfate  (90 Base) MCG/ACT inhaler Inhale 2 puffs into the lungs every 6 hours as needed for Wheezing 6/21/21  Yes FRANCISCO J Humphrey - CNP   fluticasone (FLONASE) 50 MCG/ACT nasal spray 2 sprays by Each Nostril route daily   Yes Historical Provider, MD   Multiple Vitamins-Minerals (THERAPEUTIC MULTIVITAMIN-MINERALS) tablet Take 1 tablet by mouth daily   Yes Historical Provider, MD   levocetirizine (XYZAL) 5 MG tablet Take 5 mg by mouth daily. 6/28/10  Yes Historical Provider, MD   aspirin 81 MG EC tablet Take 81 mg by mouth daily.    Yes Historical Provider, MD   montelukast (SINGULAIR) 10 MG tablet Take 10 mg by mouth daily    Yes Historical Provider, MD       Allergies as of 05/03/2022 - Fully Reviewed 05/03/2022   Allergen Reaction Noted    Augmentin [amoxicillin-pot clavulanate] Anaphylaxis and Swelling 04/23/2010    Pork-derived products Swelling 03/27/2018       Patient Active Problem List   Diagnosis    Neuroma of foot    HTN (hypertension)    Hyperlipidemia    Environmental allergies    Obstructive sleep apnea    GERD (gastroesophageal reflux disease)    Anxiety    Current smoker    Anemia    Hyponatremia    Abnormal ECG    Simple chronic bronchitis (HCC)    PLMD (periodic limb movement disorder)    Alcohol use    Hypotension    Hx of congestive heart failure    Chronic systolic heart failure (HCC)    Elevated alkaline phosphatase level  2021 FATTY LIVER     Fatty liver    Dysphagia    Mild protein-calorie malnutrition (HCC)       Past Medical History:   Diagnosis Date    Anxiety     GERD (gastroesophageal reflux disease)     Gout     had once years ago    Hyperlipidemia     at last check it was normal    Hypertension     Sleep apnea        Past Surgical History:   Procedure Laterality Date    BLADDER SUSPENSION      CARPAL TUNNEL RELEASE      right    CARPAL TUNNEL RELEASE  7-12-10    Left    CARPAL TUNNEL RELEASE  Left wrist    FOOT SURGERY  3/2013    EXCISION NEUROMA 3RD NERVE LEFT FOOT    JOINT REPLACEMENT      right    KNEE ARTHROSCOPY      left x1, right x 1    SINUS SURGERY      TONSILLECTOMY         Family History   Problem Relation Age of Onset    Cancer Mother     Heart Disease Mother     Diabetes Mother     High Blood Pressure Mother     Heart Disease Father     Heart Disease Maternal Grandmother        Review of Systems   Constitutional: Negative for fatigue. Respiratory: Negative for apnea. Genitourinary: Negative for frequency. Neurological: Negative for headaches.        Objective:     Vitals:  Weight BMI Neck circumference    Wt Readings from Last 3 Encounters:   05/03/22 119 lb 12.8 oz (54.3 kg)   04/08/22 114 lb (51.7 kg)   03/31/22 120 lb 3.2 oz (54.5 kg)    Body mass index is 21.91 kg/m². BP HR SaO2   BP Readings from Last 3 Encounters:   05/03/22 110/60   04/08/22 120/76   03/31/22 118/72    Pulse Readings from Last 3 Encounters:   05/03/22 100   04/08/22 110   03/31/22 95    SpO2 Readings from Last 3 Encounters:   05/03/22 98%   04/08/22 96%   03/31/22 96%        Themandibular molar Class :   [x]1 []2 []3      Mallampati I Airway Classification:   []1 []2 []3 [x]4      Physical Exam  Vitals and nursing note reviewed. Constitutional:       Appearance: Normal appearance. HENT:      Head: Atraumatic. Cardiovascular:      Rate and Rhythm: Normal rate and regular rhythm. Pulmonary:      Effort: Pulmonary effort is normal.      Breath sounds: Normal breath sounds. Neurological:      General: No focal deficit present.         :   Mild Obstructive Sleep Apnea/Hypopnea Syndrome under good control on PAP at 11.5 cmwp. Diagnosis Orders   1. TANNER on CPAP     2. Dependence on other enabling machines and devices       Plan: Will continue the PAP at 8-14 cmwp.   use Ayr nasal gel. I will order PAP supplies, mask, filters. ... Increase the HH. No orders of the defined types were placed in this encounter. Return for Reveiwing CPAP usage and compliance report and tro.     Drake Quinones MD  Medical Director - [facility]

## 2022-05-03 NOTE — PATIENT INSTRUCTIONS
Patient Education        Learning About CPAP for Sleep Apnea  What is CPAP? CPAP is a small machine that you use at home every night while you sleep. It increases air pressure in your throat to keep your airway open. When you have sleep apnea, this can help you sleep better, feel better, and avoid futurehealth problems. CPAP stands for \"continuous positive airway pressure. \"  The CPAP machine will have one of the following:   A mask that covers your nose and mouth   A mask that covers your nose only   A nasal pillow that covers only the openings of your nose  Why is it done? CPAP is usually the best treatment for obstructive sleep apnea. It is the firsttreatment choice and the most widely used. CPAP:   Helps you have more normal sleep, so you feel less sleepy and more alert during the daytime.  May help keep heart failure or other heart problems from getting worse.  May help lower your blood pressure. If you have a bed partner, they may also sleep better when you use a CPAP. That's because you aren't snoring or restless. Your doctor may suggest CPAP if you have:   Moderate to severe sleep apnea.  Sleep apnea and coronary artery disease (CAD).  Sleep apnea and heart failure. What are the side effects? Some people who use CPAP have:   A dry or stuffy nose and a sore throat.  Irritated skin on the face.  Bloating. How can you care for yourself? If using CPAP is not comfortable, or if you have certain side effects, workwith your doctor to fix them. Here are some things you can try:   Be sure the mask, nasal mask, or nasal pillow fits well.  See if your doctor can adjust the pressure of your CPAP.  If your nose or mouth is dry, set the machine to deliver warmer or wetter air. Or try using a humidifier.  If your nose is runny or stuffy, talk to your doctor about using a decongestant medicine or steroid nasal spray. Be safe with medicines. Read and follow all instructions on the label. Do not use the medicine longer than the label says.  Your doctor may also help you with problems like swallowing air, bloating, or claustrophobia. Talk to your doctor if you're still having problems. If these things don'thelp, you might try a different type of machine. Where can you learn more? Go to https://chpepiceweb.[email or web address]. org and sign in to your [employer] account. Enter G159 in the [employer] box to learn more about \"Learning About CPAP for Sleep Apnea. \"     If you do not have an account, please click on the \"Sign Up Now\" link. Current as of: July 6, 2021               Content Version: 13.2  © 2006-2022 [employer]. Care instructions adapted under license by [facility] ([facility]). If you have questions about a medical condition or this instruction, always ask your healthcare professional. [address] any warranty or liability for your use of this information.

## 2022-05-05 ENCOUNTER — APPOINTMENT (OUTPATIENT)
Dept: PHYSICAL THERAPY | Age: 62
End: 2022-05-05
Payer: COMMERCIAL

## 2022-05-05 ENCOUNTER — HOSPITAL ENCOUNTER (OUTPATIENT)
Dept: PHYSICAL THERAPY | Age: 62
Setting detail: THERAPIES SERIES
Discharge: HOME | End: 2022-05-05
Payer: COMMERCIAL

## 2022-05-05 PROCEDURE — 97110 THERAPEUTIC EXERCISES: CPT

## 2022-05-05 PROCEDURE — 97112 NEUROMUSCULAR REEDUCATION: CPT

## 2022-05-05 NOTE — FLOWSHEET NOTE
[facility] - Outpatient Rehabilitation & Therapy  [address] [facility]. [facility] [address]  Phone: [phone]   Fax:     [phone]      Physical Therapy Treatment Note/ Progress Report:     Date:  2022    Patient Name:  Yumiko Gambino    :  1960  MRN: [id number]    Pertinent Medical History:Additional Pertinent Hx: hpl, htn, anxiety, geerd, gout, tka    Medical/Treatment Diagnosis Information:  · Diagnosis: R53.1 (ICD-10-CM) - Weakness  · Treatment Diagnosis: decreased abilty to ambualte and function    Insurance/Certification information:  PT Insurance Information: halle  Physician Information:  Referring Practitioner: Dr. Lupe Triana of care signed (Y/N): routed    Date of Patient follow up with Physician:      Progress Report: []  Yes  [x]  No     Date Range for reporting period:  Beginnin2022  Ending:      Progress report due (10 Rx/or 30 days whichever is STUY):0/3/90    Recertification due (POC duration/ or 90 days whichever is less):      Visit # POC/Insurance Allowable Auth Needed   9 12 []Yes   []No     Latex Allergy:  [x]NO      []YES  Preferred Language for Healthcare:   [x]English       []Other:    Functional Scale:      Date assessed: at eval  Test: LEFS-31  Score:    Pain level:  0/10     History of Injury: Patient is a 63 y/o female who was in the hospital in January for a week and has been weak since she came out. She c/o decreased abilty to ambulate, do steps, and stand for prolonged time. She also says it is hard to lift things. She has fallen once in the past year. She wants to be able to do the steps, walk further and do her adl's as well as improve her balance. She wall walks. She uses a cane to so the steps at home.      SUBJECTIVE:  Pt states, \" I have a hard time doing steps and lifting things \"  22  Pt states, \" Doing ok, getting around a little easier \"  22  Pt states, \"  I felt workout sore after the last rx \"  4/19/22  Pt states, \" \" Getting easier to go up and down the steps,  Still kind of hard to get up out of the chair \" \"  4/21/22  Pt states, \" Back to work, still feeling pretty good overall \"  4/27/22  Pt states, \" I was able to walk around [place] for a few hours over the weekend \"  04/29/22 Patient reports she was a little sore from last rx,overall is improving. States she is able to go up and down the steps easier. 05/02/22 Patient reports she feels stronger able to get up from the commode easier,balance is improving too. 05/05/22 Patient reports knee was a little sore past few days due to wheather. States overall she is improving.     OBJECTIVE:       ROM LEFT RIGHT   HIP Flex 100 100   HIP Abd 25 20   HIP Ext 5 5   HIP IR 30 25   HIP ER 40 30   Knee ext 0 0   Knee Flex 120 120   Ankle PF       Ankle DF 5 5   Ankle In       Ankle Ev       Strength  LEFT RIGHT   HIP Flexors 4-/5 all 4-/5 all   HIP Abductors       HIP Ext       Hip ER       Knee EXT (quad)       Knee Flex (HS)       Ankle DF       Ankle PF       Ankle Inv       Ankle EV                      RESTRICTIONS/PRECAUTIONS:    Exercises/Interventions:     Therapeutic Ex (13240)   Min: Reps/Resistance Notes/CUES   Nu step L3 x 6 min    Leg press 100# x 40,  40# x 30 ea    Partial sit X 30    Dead bug X 2 min    lawnmower     Box lift  resume   incline 60 x 3    clams green x 30                             Manual Intervention (34778)  Min:     Knee mobs/PROM     Tib/Fem Mobs     Patella Mobs     Ankle mobs               NMR re-education (89052)  Min:  CUES NEEDED   Sit to stand X 20  From 17\"    Step ups 6\" x 30 ea    Step tap 6\" x 2 min    Lift from floor  Cone on a 6 \" step x 10 each    Walk with ball     lunge     Side step green x 2 min    Monster walk     Tandem stand X 2 min    Dead lift     wobble X 2 min side    Table hip ext X 20     lunge     Lunge walk     slider X 30    bosu Ski x 2 min    Walk outs Therapeutic Activity (18195)  Min:               Modalities  Min:     IFC with      CP after exercises     MH after exercises            Other Therapeutic Activities: Pt was educated on PT POC, Diagnosis, Prognosis, pathomechanics as well as frequency and duration of scheduling future physical therapy appointments. Time was also taken on this day to answer all patient questions and participation in PT. Reviewed appointment policy in detail with patient and patient verbalized understanding. Home Exercise Program: Patient was instructed in the following for HEP:     . Patient verbalized/demonstrated understanding and was issued written handout. Access Code: [email or web address]  URL: [email or web address]  Date: 04/01/2022  Prepared by: Chapo Randhawa    Exercises  Mini Squat with Counter Support - 1 x daily - 7 x weekly - 3 sets - 10 reps  Standing Hip Abduction - 1 x daily - 7 x weekly - 3 sets - 10 reps  Step Up - 1 x daily - 7 x weekly - 3 sets - 10 reps  Lateral Step Up - 1 x daily - 7 x weekly - 3 sets - 10 reps  Mini Lunge - 1 x daily - 7 x weekly - 3 sets - 10 reps      Therapeutic Exercise and NMR EXR  [] (52365) Provided verbal/tactile cueing for activities related to strengthening, flexibility, endurance, ROM for improvements in LE, proximal hip, and core control with self care, mobility, lifting, ambulation.  [] (36842) Provided verbal/tactile cueing for activities related to improving balance, coordination, kinesthetic sense, posture, motor skill, proprioception  to assist with LE, proximal hip, and core control in self care, mobility, lifting, ambulation and eccentric single leg control.      NMR and Therapeutic Activities:    [] (02161 or 81081) Provided verbal/tactile cueing for activities related to improving balance, coordination, kinesthetic sense, posture, motor skill, proprioception and motor activation to allow for proper function of core, proximal hip and LE with self care and ADLs and functional mobility.   [] (15112) Gait Re-education- Provided training and instruction to the patient for proper LE, core and proximal hip recruitment and positioning and eccentric body weight control with ambulation re-education including up and down stairs     Home Exercise Program:    [x] (71475) Reviewed/Progressed HEP activities related to strengthening, flexibility, endurance, ROM of core, proximal hip and LE for functional self-care, mobility, lifting and ambulation/stair navigation   [] (51613)Reviewed/Progressed HEP activities related to improving balance, coordination, kinesthetic sense, posture, motor skill, proprioception of core, proximal hip and LE for self care, mobility, lifting, and ambulation/stair navigation      Manual Treatments:  PROM / STM / Oscillations-Mobs:  G-I, II, III, IV (PA's, Inf., Post.)  [] (21663) Provided manual therapy to mobilize LE, proximal hip and/or LS spine soft tissue/joints for the purpose of modulating pain, promoting relaxation,  increasing ROM, reducing/eliminating soft tissue swelling/inflammation/restriction, improving soft tissue extensibility and allowing for proper ROM for normal function with self care, mobility, lifting and ambulation.      If [facility] Please Indicate Time In/Out  CPT Code Time in Time out                         Approval Dates:  CPT Code Units Approved Units Used  Date Updated:                     Charges:  Timed Code Treatment Minutes: 45   Total Treatment Minutes: 50      [] EVAL (LOW) 89971 (typically 20 minutes face-to-face)  [] EVAL (MOD) 37455 (typically 30 minutes face-to-face)  [] EVAL (HIGH) 55366 (typically 45 minutes face-to-face)  [] RE-EVAL     [x] ON(11981) x 2    [] Dry needle 1 or 2 Muscles (72751)  [x] NMR (62279) x1   [] Dry needle 3+ Muscles (99924)  [] Manual (74093) x     [] Ultrasound (11488) x  [] TA (35628) x    [] Mech Traction (61615)  [] ES(attended) (21793)     [] ES (un) (73548):   [] Vasopump (56651) [] Ionto (19334) [] Other:      GOALS:  Patient stated goal: \" I want to walk and function better \"  [x]? Progressing: []? Met: []? Not Met: []? Adjusted     Therapist goals for Patient:   Short Term Goals: To be achieved in: 2 weeks  1. Independent in HEP and progression per patient tolerance, in order to prevent re-injury. [x]? Progressing: []? Met: []? Not Met: []? Adjusted  2. Patient will have a decrease in pain to facilitate improvement in movement, function, and ADLs as indicated by Functional Deficits. [x]? Progressing: []? Met: []? Not Met: []? Adjusted     Long Term Goals: To be achieved in: 8 weeks  1. Disability index score of 30%% or less for the LEFS to assist with reaching prior level of function. [x]? Progressing: []? Met: []? Not Met: []? Adjusted  2. Patient will demonstrate increased AROM to wfl to allow for proper joint functioning as indicated by patients Functional Deficits. [x]? Progressing: []? Met: []? Not Met: []? Adjusted  3. Patient will demonstrate an increase in Strength to good proximal hip strength and control, within 5lb HHD in LE to allow for proper functional mobility as indicated by patients Functional Deficits. [x]? Progressing: []? Met: []? Not Met: []? Adjusted  4. Patient will return to 70% functional activities without increased symptoms or restriction. [x]? Progressing: []? Met: []? Not Met: []? Adjusted  5. \" I want to be able to do the steps and have improved balance\"  \" I also want to be able to lift heavier things \"  [x]? Progressing: []? Met: []? Not Met: []? Adjusted            ASSESSMENT:  See eval    Treatment/Activity Tolerance:  [x] Patient tolerated treatment well [] Patient limited by fatique  [] Patient limited by pain  [] Patient limited by other medical complications  [] Other:     Overall Progression Towards Functional goals/ Treatment Progress Update:  [] Patient is progressing as expected towards functional goals listed.     [] Progression is slowed due to complexities/Impairments listed. [] Progression has been slowed due to co-morbidities. [x] Plan just implemented, too soon to assess goals progression <30days   [] Goals require adjustment due to lack of progress  [] Patient is not progressing as expected and requires additional follow up with physician  [] Other    Prognosis for POC: [x] Good [] Fair  [] Poor    Patient requires continued skilled intervention: [x] Yes  [] No        PLAN: LE arom, prom  strength, proprioception, gait, balance, functional activities. Mfr, joint mobs, mods as needed, hep. Progress as tolerated . Goal is to do steps, go from sit to stand, sit up from supine, lift from floor , improved balance    [] Continue per plan of care [] Alter current plan (see comments)  [x] Plan of care initiated [] Hold pending MD visit [] Discharge    Electronically signed by: Greg Jack PTA    Note: If patient does not return for scheduled/recommended follow up visits, this note will serve as a discharge from care along with the most recent update on progress.

## 2022-05-10 ENCOUNTER — HOSPITAL ENCOUNTER (OUTPATIENT)
Dept: PHYSICAL THERAPY | Age: 62
Setting detail: THERAPIES SERIES
Discharge: HOME | End: 2022-05-10
Payer: COMMERCIAL

## 2022-05-10 PROCEDURE — 97112 NEUROMUSCULAR REEDUCATION: CPT

## 2022-05-10 PROCEDURE — 97110 THERAPEUTIC EXERCISES: CPT

## 2022-05-10 NOTE — TELEPHONE ENCOUNTER
Medication Refill    Medication needing refilled: Atorvastatin    Dosage of the medication: 20 mg    How are you taking this medication (QD, BID, TID, QID, PRN): 1 tablet by mouth daily    30 or 90 day supply called in: 30    When will you run out of your medication: [address] [facility] [address] are we sending the medication to?: Mara [address] [place] [address], [facility] [address] [phone] Amita Sotomayor [phone]   [address] [facility], [address] [place] [phone]   Phone:  [phone]  Fax:  [phone]

## 2022-05-10 NOTE — PROGRESS NOTES
[facility] - Outpatient Rehabilitation & Therapy  [address] [facility]. [place]  Phone: [phone]   Fax:     [phone]      Physical Therapy Treatment Note/ Progress Report:     Date:  5/10/2022    Patient Name:  Valdo Fletcher    :  1960  MRN: [id number]    Pertinent Medical History:Additional Pertinent Hx: hpl, htn, anxiety, geerd, gout, tka    Medical/Treatment Diagnosis Information:  · Diagnosis: R53.1 (ICD-10-CM) - Weakness  · Treatment Diagnosis: decreased abilty to ambualte and function    Insurance/Certification information:  PT Insurance Information: halle  Physician Information:  Referring Practitioner: Dr. Yuniel Kiser of care signed (Y/N): routed    Date of Patient follow up with Physician:      Progress Report: []  Yes  [x]  No     Date Range for reporting period:  Beginnin/10/2022  Ending:      Progress report due (10 Rx/or 30 days whichever is QOZZ):4/3/36    Recertification due (POC duration/ or 90 days whichever is less):      Visit # POC/Insurance Allowable Auth Needed   10 12 []Yes   []No     Latex Allergy:  [x]NO      []YES  Preferred Language for Healthcare:   [x]English       []Other:    Functional Scale:      Date assessed: 05/10/22  Test: LEFS-51  Score:    Pain level:  0/10     History of Injury: Patient is a 63 y/o female who was in the hospital in January for a week and has been weak since she came out. She c/o decreased abilty to ambulate, do steps, and stand for prolonged time. She also says it is hard to lift things. She has fallen once in the past year. She wants to be able to do the steps, walk further and do her adl's as well as improve her balance. She wall walks. She uses a cane to so the steps at home.      SUBJECTIVE:  Pt states, \" I have a hard time doing steps and lifting things \"  22  Pt states, \" Doing ok, getting around a little easier \"  22  Pt states, \"  I felt workout sore after the last rx \"  4/19/22  Pt states, \" \" Getting easier to go up and down the steps,  Still kind of hard to get up out of the chair \" \"  4/21/22  Pt states, \" Back to work, still feeling pretty good overall \"  4/27/22  Pt states, \" I was able to walk around [place] for a few hours over the weekend \"  04/29/22 Patient reports she was a little sore from last rx,overall is improving. States she is able to go up and down the steps easier. 05/02/22 Patient reports she feels stronger able to get up from the commode easier,balance is improving too. 05/05/22 Patient reports knee was a little sore past few days due to wheather. States overall she is improving. 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     OBJECTIVE:       ROM LEFT RIGHT   HIP Flex 100 100   HIP Abd 25 20   HIP Ext 5 5   HIP IR 30 25   HIP ER 40 30   Knee ext 0 0   Knee Flex 120 120   Ankle PF       Ankle DF 5 5   Ankle In       Ankle Ev       Strength  LEFT RIGHT   HIP Flexors 4-/5 all 4-/5 all   HIP Abductors       HIP Ext       Hip ER       Knee EXT (quad)       Knee Flex (HS)       Ankle DF       Ankle PF       Ankle Inv       Ankle EV                      RESTRICTIONS/PRECAUTIONS:    Exercises/Interventions:     Therapeutic Ex (54716)   Min: Reps/Resistance Notes/CUES   Nu step L3 x 6 min    Leg press 100# x 40,  40# x 30 ea    Partial sit X 30    Dead bug X 2 min    lawnmower     Box lift  resume   incline 60 x 3    clams green x 30                             Manual Intervention (01495)  Min:     Knee mobs/PROM     Tib/Fem Mobs     Patella Mobs     Ankle mobs               NMR re-education (35834)  Min:  CUES NEEDED   Sit to stand X 20  From 17\"    Step ups 6\" x 30 ea    Step tap 6\" x 2 min    Lift from floor  Cone on a 6 \" step x 10 each    Walk with ball     lunge     Side step green x 2 min    Monster walk     Tandem stand X 2 min    Dead lift     wobble X 2 min side    Table hip ext X 20     lunge     Lunge walk     slider X 30    bosu Ski x 2 min    Walk outs  Yellow 1 min each side                        Therapeutic Activity (17401)  Min:               Modalities  Min:     IFC with      CP after exercises     MH after exercises            Other Therapeutic Activities: Pt was educated on PT POC, Diagnosis, Prognosis, pathomechanics as well as frequency and duration of scheduling future physical therapy appointments. Time was also taken on this day to answer all patient questions and participation in PT. Reviewed appointment policy in detail with patient and patient verbalized understanding. Home Exercise Program: Patient was instructed in the following for HEP:     . Patient verbalized/demonstrated understanding and was issued written handout. Access Code: [email or web address]  URL: [email or web address]  Date: 04/01/2022  Prepared by: María Dumas    Exercises  Mini Squat with Counter Support - 1 x daily - 7 x weekly - 3 sets - 10 reps  Standing Hip Abduction - 1 x daily - 7 x weekly - 3 sets - 10 reps  Step Up - 1 x daily - 7 x weekly - 3 sets - 10 reps  Lateral Step Up - 1 x daily - 7 x weekly - 3 sets - 10 reps  Mini Lunge - 1 x daily - 7 x weekly - 3 sets - 10 reps      Therapeutic Exercise and NMR EXR  [] (75191) Provided verbal/tactile cueing for activities related to strengthening, flexibility, endurance, ROM for improvements in LE, proximal hip, and core control with self care, mobility, lifting, ambulation.  [] (93222) Provided verbal/tactile cueing for activities related to improving balance, coordination, kinesthetic sense, posture, motor skill, proprioception  to assist with LE, proximal hip, and core control in self care, mobility, lifting, ambulation and eccentric single leg control.      NMR and Therapeutic Activities:    [] (77340 or ) Provided verbal/tactile cueing for activities related to improving balance, coordination, kinesthetic sense, posture, motor skill, proprioception and motor activation to allow for proper function of core, proximal hip and LE with self care and ADLs and functional mobility.   [] (62194) Gait Re-education- Provided training and instruction to the patient for proper LE, core and proximal hip recruitment and positioning and eccentric body weight control with ambulation re-education including up and down stairs     Home Exercise Program:    [x] (33962) Reviewed/Progressed HEP activities related to strengthening, flexibility, endurance, ROM of core, proximal hip and LE for functional self-care, mobility, lifting and ambulation/stair navigation   [] (79600)Reviewed/Progressed HEP activities related to improving balance, coordination, kinesthetic sense, posture, motor skill, proprioception of core, proximal hip and LE for self care, mobility, lifting, and ambulation/stair navigation      Manual Treatments:  PROM / STM / Oscillations-Mobs:  G-I, II, III, IV (PA's, Inf., Post.)  [] (20471) Provided manual therapy to mobilize LE, proximal hip and/or LS spine soft tissue/joints for the purpose of modulating pain, promoting relaxation,  increasing ROM, reducing/eliminating soft tissue swelling/inflammation/restriction, improving soft tissue extensibility and allowing for proper ROM for normal function with self care, mobility, lifting and ambulation.      If [facility] Please Indicate Time In/Out  CPT Code Time in Time out                         Approval Dates:  CPT Code Units Approved Units Used  Date Updated:                     Charges:  Timed Code Treatment Minutes: 45   Total Treatment Minutes: 50      [] EVAL (LOW) 25898 (typically 20 minutes face-to-face)  [] EVAL (MOD) 66988 (typically 30 minutes face-to-face)  [] EVAL (HIGH) 48017 (typically 45 minutes face-to-face)  [] RE-EVAL     [x] JX(29936) x 2    [] Dry needle 1 or 2 Muscles (57024)  [x] NMR (02188) x1   [] Dry needle 3+ Muscles (70344)  [] Manual (25775) x [] Ultrasound (87102) x  [] TA (88402) x    [] Mech Traction (04506)  [] ES(attended) (78272)     [] ES (un) (85045):   [] Vasopump (20668) [] Ionto (06401)   [] Other:      GOALS:  Patient stated goal: \" I want to walk and function better \"  [x]? Progressing: []? Met: []? Not Met: []? Adjusted     Therapist goals for Patient:   Short Term Goals: To be achieved in: 2 weeks  1. Independent in HEP and progression per patient tolerance, in order to prevent re-injury. [x]? Progressing: []? Met: []? Not Met: []? Adjusted  2. Patient will have a decrease in pain to facilitate improvement in movement, function, and ADLs as indicated by Functional Deficits. [x]? Progressing: []? Met: []? Not Met: []? Adjusted     Long Term Goals: To be achieved in: 8 weeks  1. Disability index score of 30%% or less for the LEFS to assist with reaching prior level of function. [x]? Progressing: []? Met: []? Not Met: []? Adjusted  2. Patient will demonstrate increased AROM to wfl to allow for proper joint functioning as indicated by patients Functional Deficits. [x]? Progressing: []? Met: []? Not Met: []? Adjusted  3. Patient will demonstrate an increase in Strength to good proximal hip strength and control, within 5lb HHD in LE to allow for proper functional mobility as indicated by patients Functional Deficits. [x]? Progressing: []? Met: []? Not Met: []? Adjusted  4. Patient will return to 70% functional activities without increased symptoms or restriction. [x]? Progressing: []? Met: []? Not Met: []? Adjusted  5. \" I want to be able to do the steps and have improved balance\"  \" I also want to be able to lift heavier things \"  [x]? Progressing: []? Met: []? Not Met: []?  Adjusted            ASSESSMENT:  See eval    Treatment/Activity Tolerance:  [x] Patient tolerated treatment well [] Patient limited by fatique  [] Patient limited by pain  [] Patient limited by other medical complications  [] Other:     Overall Progression Towards Functional goals/ Treatment Progress Update:  [] Patient is progressing as expected towards functional goals listed. [] Progression is slowed due to complexities/Impairments listed. [] Progression has been slowed due to co-morbidities. [x] Plan just implemented, too soon to assess goals progression <30days   [] Goals require adjustment due to lack of progress  [] Patient is not progressing as expected and requires additional follow up with physician  [] Other    Prognosis for POC: [x] Good [] Fair  [] Poor    Patient requires continued skilled intervention: [x] Yes  [] No        PLAN: LE arom, prom  strength, proprioception, gait, balance, functional activities. Mfr, joint mobs, mods as needed, hep. Progress as tolerated . Goal is to do steps, go from sit to stand, sit up from supine, lift from floor , improved balance    [] Continue per plan of care [] Alter current plan (see comments)  [x] Plan of care initiated [] Hold pending MD visit [] Discharge    Electronically signed by: Kenia Garcia PTA    Note: If patient does not return for scheduled/recommended follow up visits, this note will serve as a discharge from care along with the most recent update on progress.

## 2022-05-12 RX ORDER — ATORVASTATIN CALCIUM 20 MG/1
20 TABLET, FILM COATED ORAL DAILY
Qty: 90 TABLET | Refills: 3 | Status: SHIPPED | OUTPATIENT
Start: 2022-05-12

## 2022-05-13 ENCOUNTER — HOSPITAL ENCOUNTER (OUTPATIENT)
Dept: PHYSICAL THERAPY | Age: 62
Setting detail: THERAPIES SERIES
Discharge: HOME | End: 2022-05-13
Payer: COMMERCIAL

## 2022-05-13 PROCEDURE — 97110 THERAPEUTIC EXERCISES: CPT

## 2022-05-13 PROCEDURE — 97112 NEUROMUSCULAR REEDUCATION: CPT

## 2022-05-13 NOTE — PROGRESS NOTES
[facility] - Outpatient Rehabilitation & Therapy  [address] [facility]. [place]  Phone: [phone]   Fax:     [phone]      Physical Therapy Treatment Note/ Progress Report:     Date:  2022    Patient Name:  Twana Cheadle    :  1960  MRN: [id number]    Pertinent Medical History:Additional Pertinent Hx: hpl, htn, anxiety, geerd, gout, tka    Medical/Treatment Diagnosis Information:  · Diagnosis: R53.1 (ICD-10-CM) - Weakness  · Treatment Diagnosis: decreased abilty to ambualte and function    Insurance/Certification information:  PT Insurance Information: halle  Physician Information:  Referring Practitioner: Dr. Paris Scherer of care signed (Y/N): routed    Date of Patient follow up with Physician:      Progress Report: []  Yes  [x]  No     Date Range for reporting period:  Beginnin2022  Ending:      Progress report due (10 Rx/or 30 days whichever is JZTL):33    Recertification due (POC duration/ or 90 days whichever is less):      Visit # POC/Insurance Allowable Auth Needed   11 12 []Yes   []No     Latex Allergy:  [x]NO      []YES  Preferred Language for Healthcare:   [x]English       []Other:    Functional Scale:      Date assessed: 05/10/22  Test: LEFS-51  Score:    Pain level:  0/10     History of Injury: Patient is a 63 y/o female who was in the hospital in January for a week and has been weak since she came out. She c/o decreased abilty to ambulate, do steps, and stand for prolonged time. She also says it is hard to lift things. She has fallen once in the past year. She wants to be able to do the steps, walk further and do her adl's as well as improve her balance. She wall walks. She uses a cane to so the steps at home.      SUBJECTIVE:  Pt states, \" I have a hard time doing steps and lifting things \"  22  Pt states, \" Doing ok, getting around a little easier \"  22  Pt states, \"  I felt workout sore after the last rx \"  4/19/22  Pt states, \" \" Getting easier to go up and down the steps,  Still kind of hard to get up out of the chair \" \"  4/21/22  Pt states, \" Back to work, still feeling pretty good overall \"  4/27/22  Pt states, \" I was able to walk around [place] for a few hours over the weekend \"  04/29/22 Patient reports she was a little sore from last rx,overall is improving. States she is able to go up and down the steps easier. 05/02/22 Patient reports she feels stronger able to get up from the commode easier,balance is improving too. 05/05/22 Patient reports knee was a little sore past few days due to wheather. States overall she is improving. 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   5/13/22  Pt states, \" I continue to get stronger '    OBJECTIVE:       ROM LEFT RIGHT   HIP Flex 100 100   HIP Abd 25 20   HIP Ext 5 5   HIP IR 30 25   HIP ER 40 30   Knee ext 0 0   Knee Flex 120 120   Ankle PF       Ankle DF 5 5   Ankle In       Ankle Ev       Strength  LEFT RIGHT   HIP Flexors 4-/5 all 4-/5 all   HIP Abductors       HIP Ext       Hip ER       Knee EXT (quad)       Knee Flex (HS)       Ankle DF       Ankle PF       Ankle Inv       Ankle EV                      RESTRICTIONS/PRECAUTIONS:    Exercises/Interventions:     Therapeutic Ex (87818)   Min: Reps/Resistance Notes/CUES   Nu step L3 x 6 min    Leg press 115# x 40,  45# x 30 ea    Partial sit X 30    Dead bug  2# X 2 min    lawnmower 5# x 30    Box lift from stool 20#x 2 min    incline 60 x 3    clams green x 30    Plank on knees X 1 min     Side plank X 1 min ea                   Manual Intervention (69286)  Min:     Knee mobs/PROM     Tib/Fem Mobs     Patella Mobs     Ankle mobs               NMR re-education (86396)  Min:  CUES NEEDED      Step ups 6\" x 30 ea    Step tap 6\" x 2 min    Lift from floor  Cone on a 6 \" step x 10 each    Walk with ball     lunge     Side step purple x 2 min    Monster walk     Tandem stand     Dead lift     wobble X 2 min side    Table hip ext X 20     lunge     Lunge walk     slider X 30    bosu Ski x 2 min    Walk outs  Yellow 1 min each side                        Therapeutic Activity (09052)  Min:               Modalities  Min:     IFC with      CP after exercises     MH after exercises            Other Therapeutic Activities: Pt was educated on PT POC, Diagnosis, Prognosis, pathomechanics as well as frequency and duration of scheduling future physical therapy appointments. Time was also taken on this day to answer all patient questions and participation in PT. Reviewed appointment policy in detail with patient and patient verbalized understanding. Home Exercise Program: Patient was instructed in the following for HEP:     . Patient verbalized/demonstrated understanding and was issued written handout. Access Code: [email or web address]  URL: [email or web address]  Date: 04/01/2022  Prepared by: Nile Antunez    Exercises  Mini Squat with Counter Support - 1 x daily - 7 x weekly - 3 sets - 10 reps  Standing Hip Abduction - 1 x daily - 7 x weekly - 3 sets - 10 reps  Step Up - 1 x daily - 7 x weekly - 3 sets - 10 reps  Lateral Step Up - 1 x daily - 7 x weekly - 3 sets - 10 reps  Mini Lunge - 1 x daily - 7 x weekly - 3 sets - 10 reps      Therapeutic Exercise and NMR EXR  [] (73504) Provided verbal/tactile cueing for activities related to strengthening, flexibility, endurance, ROM for improvements in LE, proximal hip, and core control with self care, mobility, lifting, ambulation.  [] (52520) Provided verbal/tactile cueing for activities related to improving balance, coordination, kinesthetic sense, posture, motor skill, proprioception  to assist with LE, proximal hip, and core control in self care, mobility, lifting, ambulation and eccentric single leg control.      NMR and Therapeutic Activities:    [] (86816 or 60061) Provided verbal/tactile cueing for activities related to improving balance, coordination, kinesthetic sense, posture, motor skill, proprioception and motor activation to allow for proper function of core, proximal hip and LE with self care and ADLs and functional mobility.   [] (38189) Gait Re-education- Provided training and instruction to the patient for proper LE, core and proximal hip recruitment and positioning and eccentric body weight control with ambulation re-education including up and down stairs     Home Exercise Program:    [x] (65068) Reviewed/Progressed HEP activities related to strengthening, flexibility, endurance, ROM of core, proximal hip and LE for functional self-care, mobility, lifting and ambulation/stair navigation   [] (48550)Reviewed/Progressed HEP activities related to improving balance, coordination, kinesthetic sense, posture, motor skill, proprioception of core, proximal hip and LE for self care, mobility, lifting, and ambulation/stair navigation      Manual Treatments:  PROM / STM / Oscillations-Mobs:  G-I, II, III, IV (PA's, Inf., Post.)  [] (70488) Provided manual therapy to mobilize LE, proximal hip and/or LS spine soft tissue/joints for the purpose of modulating pain, promoting relaxation,  increasing ROM, reducing/eliminating soft tissue swelling/inflammation/restriction, improving soft tissue extensibility and allowing for proper ROM for normal function with self care, mobility, lifting and ambulation.      If [facility] Please Indicate Time In/Out  CPT Code Time in Time out                         Approval Dates:  CPT Code Units Approved Units Used  Date Updated:                     Charges:  Timed Code Treatment Minutes: 45   Total Treatment Minutes: 50      [] EVAL (LOW) 21230 (typically 20 minutes face-to-face)  [] EVAL (MOD) 18602 (typically 30 minutes face-to-face)  [] EVAL (HIGH) 78436 (typically 45 minutes face-to-face)  [] RE-EVAL     [x] SW(05550) x 2    [] Dry needle 1 or 2 Muscles (12365)  [x] NMR (80721) x1   [] Dry needle 3+ Muscles (89802)  [] Manual (45617) x     [] Ultrasound (11495) x  [] TA (53771) x    [] Mech Traction (76743)  [] ES(attended) (93061)     [] ES (un) (15305):   [] Vasopump (67460) [] Ionto (83841)   [] Other:      GOALS:  Patient stated goal: \" I want to walk and function better \"  [x]? Progressing: []? Met: []? Not Met: []? Adjusted     Therapist goals for Patient:   Short Term Goals: To be achieved in: 2 weeks  1. Independent in HEP and progression per patient tolerance, in order to prevent re-injury. [x]? Progressing: []? Met: []? Not Met: []? Adjusted  2. Patient will have a decrease in pain to facilitate improvement in movement, function, and ADLs as indicated by Functional Deficits. [x]? Progressing: []? Met: []? Not Met: []? Adjusted     Long Term Goals: To be achieved in: 8 weeks  1. Disability index score of 30%% or less for the LEFS to assist with reaching prior level of function. [x]? Progressing: []? Met: []? Not Met: []? Adjusted  2. Patient will demonstrate increased AROM to wfl to allow for proper joint functioning as indicated by patients Functional Deficits. [x]? Progressing: []? Met: []? Not Met: []? Adjusted  3. Patient will demonstrate an increase in Strength to good proximal hip strength and control, within 5lb HHD in LE to allow for proper functional mobility as indicated by patients Functional Deficits. [x]? Progressing: []? Met: []? Not Met: []? Adjusted  4. Patient will return to 70% functional activities without increased symptoms or restriction. [x]? Progressing: []? Met: []? Not Met: []? Adjusted  5. \" I want to be able to do the steps and have improved balance\"  \" I also want to be able to lift heavier things \"  [x]? Progressing: []? Met: []? Not Met: []?  Adjusted            ASSESSMENT:  See eval    Treatment/Activity Tolerance:  [x] Patient tolerated treatment well [] Patient limited by fatique  [] Patient limited by pain  [] Patient limited by other medical complications  [] Other:     Overall Progression Towards Functional goals/ Treatment Progress Update:  [] Patient is progressing as expected towards functional goals listed. [] Progression is slowed due to complexities/Impairments listed. [] Progression has been slowed due to co-morbidities. [x] Plan just implemented, too soon to assess goals progression <30days   [] Goals require adjustment due to lack of progress  [] Patient is not progressing as expected and requires additional follow up with physician  [] Other    Prognosis for POC: [x] Good [] Fair  [] Poor    Patient requires continued skilled intervention: [x] Yes  [] No        PLAN: LE arom, prom  strength, proprioception, gait, balance, functional activities. Mfr, joint mobs, mods as needed, hep. Progress as tolerated . Goal is to do steps, go from sit to stand, sit up from supine, lift from floor , improved balance    [] Continue per plan of care [] Alter current plan (see comments)  [x] Plan of care initiated [] Hold pending MD visit [] Discharge    Electronically signed by: Attila Mcfadden PT    Note: If patient does not return for scheduled/recommended follow up visits, this note will serve as a discharge from care along with the most recent update on progress.

## 2022-05-14 ENCOUNTER — HOSPITAL ENCOUNTER (OUTPATIENT)
Dept: VASCULAR LAB | Age: 62
Discharge: HOME | End: 2022-05-14
Payer: COMMERCIAL

## 2022-05-14 DIAGNOSIS — I99.8 ISCHEMIA OF LOWER EXTREMITY: ICD-10-CM

## 2022-05-14 PROCEDURE — 93922 UPR/L XTREMITY ART 2 LEVELS: CPT

## 2022-05-16 RX ORDER — CARVEDILOL 3.12 MG/1
TABLET ORAL
Qty: 180 TABLET | Refills: 3 | Status: SHIPPED | OUTPATIENT
Start: 2022-05-16

## 2022-05-16 RX ORDER — SACUBITRIL AND VALSARTAN 24; 26 MG/1; MG/1
TABLET, FILM COATED ORAL
Qty: 180 TABLET | Refills: 3 | Status: SHIPPED | OUTPATIENT
Start: 2022-05-16

## 2022-05-18 ENCOUNTER — HOSPITAL ENCOUNTER (OUTPATIENT)
Dept: PHYSICAL THERAPY | Age: 62
Setting detail: THERAPIES SERIES
Discharge: HOME | End: 2022-05-18
Payer: COMMERCIAL

## 2022-05-18 PROCEDURE — 97110 THERAPEUTIC EXERCISES: CPT

## 2022-05-18 PROCEDURE — 97112 NEUROMUSCULAR REEDUCATION: CPT

## 2022-05-18 NOTE — PROGRESS NOTES
[facility] - Outpatient Rehabilitation & Therapy  [address] [facility] [address] [place]  Phone: [phone]   Fax:     [phone]      Physical Therapy Treatment Note/ Progress Report:     Date:  2022    Patient Name:  Imelda Gonzalez    :  1960  MRN: [id number]    Pertinent Medical History:Additional Pertinent Hx: hpl, htn, anxiety, geerd, gout, tka    Medical/Treatment Diagnosis Information:  · Diagnosis: R53.1 (ICD-10-CM) - Weakness  · Treatment Diagnosis: decreased abilty to ambualte and function    Insurance/Certification information:  PT Insurance Information: halle  Physician Information:  Referring Practitioner: Dr. Laney Quintero of care signed (Y/N): routed    Date of Patient follow up with Physician:      Progress Report: []  Yes  [x]  No     Date Range for reporting period:  Beginnin2022  Ending:      Progress report due (10 Rx/or 30 days whichever is MWYK):3/7/76    Recertification due (POC duration/ or 90 days whichever is less):      Visit # POC/Insurance Allowable Auth Needed   12 12 []Yes   []No     Latex Allergy:  [x]NO      []YES  Preferred Language for Healthcare:   [x]English       []Other:    Functional Scale:      Date assessed: 05/10/22  Test: LEFS-51  Score:    Pain level:  0/10     History of Injury: Patient is a 61 y/o female who was in the hospital in January for a week and has been weak since she came out. She c/o decreased abilty to ambulate, do steps, and stand for prolonged time. She also says it is hard to lift things. She has fallen once in the past year. She wants to be able to do the steps, walk further and do her adl's as well as improve her balance. She wall walks. She uses a cane to so the steps at home.      SUBJECTIVE:  Pt states, \" I have a hard time doing steps and lifting things \"  22  Pt states, \" Doing ok, getting around a little easier \"  22  Pt states, \"  I felt workout sore after the last rx \"  4/19/22  Pt states, \" \" Getting easier to go up and down the steps,  Still kind of hard to get up out of the chair \" \"  4/21/22  Pt states, \" Back to work, still feeling pretty good overall \"  4/27/22  Pt states, \" I was able to walk around [place] for a few hours over the weekend \"  04/29/22 Patient reports she was a little sore from last rx,overall is improving. States she is able to go up and down the steps easier. 05/02/22 Patient reports she feels stronger able to get up from the commode easier,balance is improving too. 05/05/22 Patient reports knee was a little sore past few days due to wheather. States overall she is improving. 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   5/13/22  Pt states, \" I continue to get stronger '  5/18/22    OBJECTIVE:       ROM LEFT RIGHT   HIP Flex 100 100   HIP Abd 25 20   HIP Ext 5 5   HIP IR 30 25   HIP ER 40 30   Knee ext 0 0   Knee Flex 120 120   Ankle PF       Ankle DF 5 5   Ankle In       Ankle Ev       Strength  LEFT RIGHT   HIP Flexors 4-/5 all 4-/5 all   HIP Abductors       HIP Ext       Hip ER       Knee EXT (quad)       Knee Flex (HS)       Ankle DF       Ankle PF       Ankle Inv       Ankle EV                      RESTRICTIONS/PRECAUTIONS:    Exercises/Interventions:     Therapeutic Ex (92514)   Min: Reps/Resistance Notes/CUES   Nu step L3 x 6 min    Leg press 115# x 40,  45# x 30 ea    Partial sit X 30    Dead bug  2# X 2 min    lawnmower 5# x 30    Box lift from stool 20#x 2 min    incline 60 x 3    clams green x 30    Plank on knees X 1 min     Side plank X 1 min ea    Bird dog Slight lift x 2 min              Manual Intervention (12526)  Min:     Knee mobs/PROM     Tib/Fem Mobs     Patella Mobs     Ankle mobs               NMR re-education (70849)  Min:  CUES NEEDED      Step ups 6\" x 30 ea    Step tap 6\" x 2 min    Lift from floor  Cone on a 6 \" step x 10 each    Walk with ball     lunge     Side step purple x 2 min    Monster walk     Tandem stand     Dead lift        Table hip ext X 20     lunge     Lunge walk     slider X 30    bosu Ski x 2 min    Walk outs  black 2min each side                        Therapeutic Activity (78880)  Min:               Modalities  Min:     IFC with      CP after exercises     MH after exercises            Other Therapeutic Activities: Pt was educated on PT POC, Diagnosis, Prognosis, pathomechanics as well as frequency and duration of scheduling future physical therapy appointments. Time was also taken on this day to answer all patient questions and participation in PT. Reviewed appointment policy in detail with patient and patient verbalized understanding. Home Exercise Program: Patient was instructed in the following for HEP:     . Patient verbalized/demonstrated understanding and was issued written handout. Access Code: [email or web address]  URL: [email or web address]  Date: 04/01/2022  Prepared by: Bedelia Bristol    Exercises  Mini Squat with Counter Support - 1 x daily - 7 x weekly - 3 sets - 10 reps  Standing Hip Abduction - 1 x daily - 7 x weekly - 3 sets - 10 reps  Step Up - 1 x daily - 7 x weekly - 3 sets - 10 reps  Lateral Step Up - 1 x daily - 7 x weekly - 3 sets - 10 reps  Mini Lunge - 1 x daily - 7 x weekly - 3 sets - 10 reps      Therapeutic Exercise and NMR EXR  [] (45751) Provided verbal/tactile cueing for activities related to strengthening, flexibility, endurance, ROM for improvements in LE, proximal hip, and core control with self care, mobility, lifting, ambulation.  [] (82955) Provided verbal/tactile cueing for activities related to improving balance, coordination, kinesthetic sense, posture, motor skill, proprioception  to assist with LE, proximal hip, and core control in self care, mobility, lifting, ambulation and eccentric single leg control.      NMR and Therapeutic Activities:    [] (82694 or 91846) Provided verbal/tactile cueing for activities related to improving balance, coordination, kinesthetic sense, posture, motor skill, proprioception and motor activation to allow for proper function of core, proximal hip and LE with self care and ADLs and functional mobility.   [] (56568) Gait Re-education- Provided training and instruction to the patient for proper LE, core and proximal hip recruitment and positioning and eccentric body weight control with ambulation re-education including up and down stairs     Home Exercise Program:    [x] (63564) Reviewed/Progressed HEP activities related to strengthening, flexibility, endurance, ROM of core, proximal hip and LE for functional self-care, mobility, lifting and ambulation/stair navigation   [] (47346)Reviewed/Progressed HEP activities related to improving balance, coordination, kinesthetic sense, posture, motor skill, proprioception of core, proximal hip and LE for self care, mobility, lifting, and ambulation/stair navigation      Manual Treatments:  PROM / STM / Oscillations-Mobs:  G-I, II, III, IV (PA's, Inf., Post.)  [] (40838) Provided manual therapy to mobilize LE, proximal hip and/or LS spine soft tissue/joints for the purpose of modulating pain, promoting relaxation,  increasing ROM, reducing/eliminating soft tissue swelling/inflammation/restriction, improving soft tissue extensibility and allowing for proper ROM for normal function with self care, mobility, lifting and ambulation.      If [facility] Please Indicate Time In/Out  CPT Code Time in Time out                         Approval Dates:  CPT Code Units Approved Units Used  Date Updated:                     Charges:  Timed Code Treatment Minutes: 45   Total Treatment Minutes: 50      [] EVAL (LOW) 80562 (typically 20 minutes face-to-face)  [] EVAL (MOD) 60983 (typically 30 minutes face-to-face)  [] EVAL (HIGH) 78996 (typically 45 minutes face-to-face)  [] RE-EVAL     [x] WQ(17382) x 2    [] Dry needle 1 or 2 Muscles (93064)  [x] NMR (67265) x1   [] Dry needle 3+ Muscles (58381)  [] Manual (93011) x     [] Ultrasound (30705) x  [] TA (61654) x    [] Mech Traction (89525)  [] ES(attended) (03374)     [] ES (un) (56318):   [] Vasopump (36577) [] Ionto (59994)   [] Other:      GOALS:  Patient stated goal: \" I want to walk and function better \"  [x]? Progressing: []? Met: []? Not Met: []? Adjusted     Therapist goals for Patient:   Short Term Goals: To be achieved in: 2 weeks  1. Independent in HEP and progression per patient tolerance, in order to prevent re-injury. [x]? Progressing: []? Met: []? Not Met: []? Adjusted  2. Patient will have a decrease in pain to facilitate improvement in movement, function, and ADLs as indicated by Functional Deficits. [x]? Progressing: []? Met: []? Not Met: []? Adjusted     Long Term Goals: To be achieved in: 8 weeks  1. Disability index score of 30%% or less for the LEFS to assist with reaching prior level of function. [x]? Progressing: []? Met: []? Not Met: []? Adjusted  2. Patient will demonstrate increased AROM to wfl to allow for proper joint functioning as indicated by patients Functional Deficits. [x]? Progressing: []? Met: []? Not Met: []? Adjusted  3. Patient will demonstrate an increase in Strength to good proximal hip strength and control, within 5lb HHD in LE to allow for proper functional mobility as indicated by patients Functional Deficits. [x]? Progressing: []? Met: []? Not Met: []? Adjusted  4. Patient will return to 70% functional activities without increased symptoms or restriction. [x]? Progressing: []? Met: []? Not Met: []? Adjusted  5. \" I want to be able to do the steps and have improved balance\"  \" I also want to be able to lift heavier things \"  [x]? Progressing: []? Met: []? Not Met: []?  Adjusted            ASSESSMENT:  See eval    Treatment/Activity Tolerance:  [x] Patient tolerated treatment well [] Patient limited by moreno  [] Patient limited by pain  [] Patient limited by other medical complications  [] Other:     Overall Progression Towards Functional goals/ Treatment Progress Update:  [] Patient is progressing as expected towards functional goals listed. [] Progression is slowed due to complexities/Impairments listed. [] Progression has been slowed due to co-morbidities. [x] Plan just implemented, too soon to assess goals progression <30days   [] Goals require adjustment due to lack of progress  [] Patient is not progressing as expected and requires additional follow up with physician  [] Other    Prognosis for POC: [x] Good [] Fair  [] Poor    Patient requires continued skilled intervention: [x] Yes  [] No        PLAN: LE arom, prom  strength, proprioception, gait, balance, functional activities. Mfr, joint mobs, mods as needed, hep. Progress as tolerated . Goal is to do steps, go from sit to stand, sit up from supine, lift from floor , improved balance    [] Continue per plan of care [] Alter current plan (see comments)  [x] Plan of care initiated [] Hold pending MD visit [] Discharge    Electronically signed by: Dayna Gordon PT    Note: If patient does not return for scheduled/recommended follow up visits, this note will serve as a discharge from care along with the most recent update on progress.

## 2022-05-23 ENCOUNTER — HOSPITAL ENCOUNTER (OUTPATIENT)
Dept: PHYSICAL THERAPY | Age: 62
Setting detail: THERAPIES SERIES
Discharge: HOME | End: 2022-05-23
Payer: COMMERCIAL

## 2022-05-23 PROCEDURE — 97112 NEUROMUSCULAR REEDUCATION: CPT

## 2022-05-23 PROCEDURE — 97110 THERAPEUTIC EXERCISES: CPT

## 2022-05-23 NOTE — PROGRESS NOTES
[facility] - Outpatient Rehabilitation & Therapy  [address] [place]  Phone: [phone]   Fax:     [phone]      Physical Therapy Treatment Note/ Progress Report:     Date:  2022    Patient Name:  Nadir Ojeda    :  1960  MRN: [id number]    Pertinent Medical History:Additional Pertinent Hx: hpl, htn, anxiety, geerd, gout, tka    Medical/Treatment Diagnosis Information:  · Diagnosis: R53.1 (ICD-10-CM) - Weakness  · Treatment Diagnosis: decreased abilty to ambualte and function    Insurance/Certification information:  PT Insurance Information: halle  Physician Information:  Referring Practitioner: Dr. Tanya Watts of care signed (Y/N): routed    Date of Patient follow up with Physician:      Progress Report: []  Yes  [x]  No     Date Range for reporting period:  Beginnin2022  Ending:      Progress report due (10 Rx/or 30 days whichever is CJEU):    Recertification due (POC duration/ or 90 days whichever is less):      Visit # POC/Insurance Allowable Auth Needed   13 12 []Yes   []No     Latex Allergy:  [x]NO      []YES  Preferred Language for Healthcare:   [x]English       []Other:    Functional Scale:      Date assessed: 05/10/22  Test: LEFS-51  Score:    Pain level:  0/10     History of Injury: Patient is a 63 y/o female who was in the hospital in January for a week and has been weak since she came out. She c/o decreased abilty to ambulate, do steps, and stand for prolonged time. She also says it is hard to lift things. She has fallen once in the past year. She wants to be able to do the steps, walk further and do her adl's as well as improve her balance. She wall walks. She uses a cane to so the steps at home.      SUBJECTIVE:  Pt states, \" I have a hard time doing steps and lifting things \"  22  Pt states, \" Doing ok, getting around a little easier \"  22  Pt states, \"  I felt workout sore after the last rx \"  4/19/22  Pt states, \" \" Getting easier to go up and down the steps,  Still kind of hard to get up out of the chair \" \"  4/21/22  Pt states, \" Back to work, still feeling pretty good overall \"  4/27/22  Pt states, \" I was able to walk around [place] for a few hours over the weekend \"  04/29/22 Patient reports she was a little sore from last rx,overall is improving. States she is able to go up and down the steps easier. 05/02/22 Patient reports she feels stronger able to get up from the commode easier,balance is improving too. 05/05/22 Patient reports knee was a little sore past few days due to wheather. States overall she is improving. 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 5/13/22  Pt states, \" I continue to get stronger '  5/18/22 05/23/22 Patient reports she is feeling stronger.     OBJECTIVE:       ROM LEFT RIGHT   HIP Flex 100 100   HIP Abd 25 20   HIP Ext 5 5   HIP IR 30 25   HIP ER 40 30   Knee ext 0 0   Knee Flex 120 120   Ankle PF       Ankle DF 5 5   Ankle In       Ankle Ev       Strength  LEFT RIGHT   HIP Flexors 4-/5 all 4-/5 all   HIP Abductors       HIP Ext       Hip ER       Knee EXT (quad)       Knee Flex (HS)       Ankle DF       Ankle PF       Ankle Inv       Ankle EV                      RESTRICTIONS/PRECAUTIONS:    Exercises/Interventions:     Therapeutic Ex (04923)   Min: Reps/Resistance Notes/CUES   Nu step L3 x 6 min    Leg press 115# x 40,  45# x 30 ea    Partial sit X 30    Dead bug  2# X 2 min    lawnmower 5# x 30    Box lift from stool  resume   incline 60 x 3    clams green x 30    Plank on knees X 1 min     Side plank X 1 min ea    Bird dog Slight lift x 2 min              Manual Intervention (05376)  Min:     Knee mobs/PROM     Tib/Fem Mobs     Patella Mobs     Ankle mobs               NMR re-education (03123)  Min:  CUES NEEDED      Step ups 6\" x 30 ea Step tap 6\" x 2 min    Lift from floor  Cone on a 6 \" step x 10 each    Walk with ball     lunge     Side step purple x 2 min    Monster walk     Tandem stand     Dead lift        Table hip ext X 20     lunge     Lunge walk     slider X 30    bosu Ski x 2 min    Walk outs  black 2min each side                        Therapeutic Activity (15786)  Min:               Modalities  Min:     IFC with      CP after exercises     MH after exercises            Other Therapeutic Activities: Pt was educated on PT POC, Diagnosis, Prognosis, pathomechanics as well as frequency and duration of scheduling future physical therapy appointments. Time was also taken on this day to answer all patient questions and participation in PT. Reviewed appointment policy in detail with patient and patient verbalized understanding. Home Exercise Program: Patient was instructed in the following for HEP:     . Patient verbalized/demonstrated understanding and was issued written handout. Access Code: [email or web address]  URL: [email or web address]  Date: 04/01/2022  Prepared by: Katelin Son    Exercises  Mini Squat with Counter Support - 1 x daily - 7 x weekly - 3 sets - 10 reps  Standing Hip Abduction - 1 x daily - 7 x weekly - 3 sets - 10 reps  Step Up - 1 x daily - 7 x weekly - 3 sets - 10 reps  Lateral Step Up - 1 x daily - 7 x weekly - 3 sets - 10 reps  Mini Lunge - 1 x daily - 7 x weekly - 3 sets - 10 reps      Therapeutic Exercise and NMR EXR  [] (98916) Provided verbal/tactile cueing for activities related to strengthening, flexibility, endurance, ROM for improvements in LE, proximal hip, and core control with self care, mobility, lifting, ambulation.  [] (12636) Provided verbal/tactile cueing for activities related to improving balance, coordination, kinesthetic sense, posture, motor skill, proprioception  to assist with LE, proximal hip, and core control in self care, mobility, lifting, ambulation and eccentric single leg control.      NMR and Therapeutic Activities:    [] (09315 or 15183) Provided verbal/tactile cueing for activities related to improving balance, coordination, kinesthetic sense, posture, motor skill, proprioception and motor activation to allow for proper function of core, proximal hip and LE with self care and ADLs and functional mobility.   [] (99665) Gait Re-education- Provided training and instruction to the patient for proper LE, core and proximal hip recruitment and positioning and eccentric body weight control with ambulation re-education including up and down stairs     Home Exercise Program:    [x] (74820) Reviewed/Progressed HEP activities related to strengthening, flexibility, endurance, ROM of core, proximal hip and LE for functional self-care, mobility, lifting and ambulation/stair navigation   [] (52353)Reviewed/Progressed HEP activities related to improving balance, coordination, kinesthetic sense, posture, motor skill, proprioception of core, proximal hip and LE for self care, mobility, lifting, and ambulation/stair navigation      Manual Treatments:  PROM / STM / Oscillations-Mobs:  G-I, II, III, IV (PA's, Inf., Post.)  [] (61905) Provided manual therapy to mobilize LE, proximal hip and/or LS spine soft tissue/joints for the purpose of modulating pain, promoting relaxation,  increasing ROM, reducing/eliminating soft tissue swelling/inflammation/restriction, improving soft tissue extensibility and allowing for proper ROM for normal function with self care, mobility, lifting and ambulation.      If [facility] Please Indicate Time In/Out  CPT Code Time in Time out                         Approval Dates:  CPT Code Units Approved Units Used  Date Updated:                     Charges:  Timed Code Treatment Minutes: 45   Total Treatment Minutes: 50      [] EVAL (LOW) 44537 (typically 20 minutes face-to-face)  [] EVAL (MOD) 07373 (typically 30 minutes face-to-face)  [] EVAL (HIGH) 68817 (typically 45 minutes face-to-face)  [] RE-EVAL     [x] TE(57904) x 2    [] Dry needle 1 or 2 Muscles (72278)  [x] NMR (71361) x1   [] Dry needle 3+ Muscles (90974)  [] Manual (56246) x     [] Ultrasound (63271) x  [] TA (12336) x    [] Mech Traction (81944)  [] ES(attended) (38794)     [] ES (un) (94009):   [] Vasopump (36404) [] Ionto (69079)   [] Other:      GOALS:  Patient stated goal: \" I want to walk and function better \"  [x]? Progressing: []? Met: []? Not Met: []? Adjusted     Therapist goals for Patient:   Short Term Goals: To be achieved in: 2 weeks  1. Independent in HEP and progression per patient tolerance, in order to prevent re-injury. [x]? Progressing: []? Met: []? Not Met: []? Adjusted  2. Patient will have a decrease in pain to facilitate improvement in movement, function, and ADLs as indicated by Functional Deficits. [x]? Progressing: []? Met: []? Not Met: []? Adjusted     Long Term Goals: To be achieved in: 8 weeks  1. Disability index score of 30%% or less for the LEFS to assist with reaching prior level of function. [x]? Progressing: []? Met: []? Not Met: []? Adjusted  2. Patient will demonstrate increased AROM to wfl to allow for proper joint functioning as indicated by patients Functional Deficits. [x]? Progressing: []? Met: []? Not Met: []? Adjusted  3. Patient will demonstrate an increase in Strength to good proximal hip strength and control, within 5lb HHD in LE to allow for proper functional mobility as indicated by patients Functional Deficits. [x]? Progressing: []? Met: []? Not Met: []? Adjusted  4. Patient will return to 70% functional activities without increased symptoms or restriction. [x]? Progressing: []? Met: []? Not Met: []? Adjusted  5. \" I want to be able to do the steps and have improved balance\"  \" I also want to be able to lift heavier things \"  [x]? Progressing: []? Met: []? Not Met: []?  Adjusted            ASSESSMENT:  See eval    Treatment/Activity Tolerance:  [x] Patient tolerated treatment well [] Patient limited by fatique  [] Patient limited by pain  [] Patient limited by other medical complications  [] Other:     Overall Progression Towards Functional goals/ Treatment Progress Update:  [] Patient is progressing as expected towards functional goals listed. [] Progression is slowed due to complexities/Impairments listed. [] Progression has been slowed due to co-morbidities. [x] Plan just implemented, too soon to assess goals progression <30days   [] Goals require adjustment due to lack of progress  [] Patient is not progressing as expected and requires additional follow up with physician  [] Other    Prognosis for POC: [x] Good [] Fair  [] Poor    Patient requires continued skilled intervention: [x] Yes  [] No        PLAN: LE arom, prom  strength, proprioception, gait, balance, functional activities. Mfr, joint mobs, mods as needed, hep. Progress as tolerated . Goal is to do steps, go from sit to stand, sit up from supine, lift from floor , improved balance    [] Continue per plan of care [] Alter current plan (see comments)  [x] Plan of care initiated [] Hold pending MD visit [] Discharge    Electronically signed by: Ric Hazel PTA    Note: If patient does not return for scheduled/recommended follow up visits, this note will serve as a discharge from care along with the most recent update on progress.

## 2022-05-25 ENCOUNTER — HOSPITAL ENCOUNTER (OUTPATIENT)
Dept: PHYSICAL THERAPY | Age: 62
Setting detail: THERAPIES SERIES
Discharge: HOME | End: 2022-05-25
Payer: COMMERCIAL

## 2022-05-25 PROCEDURE — 97110 THERAPEUTIC EXERCISES: CPT

## 2022-05-25 PROCEDURE — 97112 NEUROMUSCULAR REEDUCATION: CPT

## 2022-05-25 NOTE — PROGRESS NOTES
[facility] - Outpatient Rehabilitation & Therapy  [address] [place] [address]  Phone: [phone]   Fax:     [phone]      Physical Therapy Treatment Note/ Progress Report:     Date:  2022    Patient Name:  Cipriano Babcock    :  1960  MRN: [id number]    Pertinent Medical History:Additional Pertinent Hx: hpl, htn, anxiety, geerd, gout, tka    Medical/Treatment Diagnosis Information:  · Diagnosis: R53.1 (ICD-10-CM) - Weakness  · Treatment Diagnosis: decreased abilty to ambualte and function    Insurance/Certification information:  PT Insurance Information: halle  Physician Information:  Referring Practitioner: Dr. Kusum Arteaga of care signed (Y/N): routed    Date of Patient follow up with Physician:      Progress Report: []  Yes  [x]  No     Date Range for reporting period:  Beginnin2022  Ending:      Progress report due (10 Rx/or 30 days whichever is OIXR):73    Recertification due (POC duration/ or 90 days whichever is less):      Visit # POC/Insurance Allowable Auth Needed   14  []Yes   []No     Latex Allergy:  [x]NO      []YES  Preferred Language for Healthcare:   [x]English       []Other:    Functional Scale:      Date assessed: 05/10/22  Test: LEFS-51  Score:    Pain level:  0/10     History of Injury: Patient is a 63 y/o female who was in the hospital in January for a week and has been weak since she came out. She c/o decreased abilty to ambulate, do steps, and stand for prolonged time. She also says it is hard to lift things. She has fallen once in the past year. She wants to be able to do the steps, walk further and do her adl's as well as improve her balance. She wall walks. She uses a cane to so the steps at home.      SUBJECTIVE:  Pt states, \" I have a hard time doing steps and lifting things \"  22  Pt states, \" Doing ok, getting around a little easier \"  22  Pt states, \"  I felt workout sore after the last rx \"  4/19/22  Pt states, \" \" Getting easier to go up and down the steps,  Still kind of hard to get up out of the chair \" \"  4/21/22  Pt states, \" Back to work, still feeling pretty good overall \"  4/27/22  Pt states, \" I was able to walk around [place] for a few hours over the weekend \"  04/29/22 Patient reports she was a little sore from last rx,overall is improving. States she is able to go up and down the steps easier. 05/02/22 Patient reports she feels stronger able to get up from the commode easier,balance is improving too. 05/05/22 Patient reports knee was a little sore past few days due to wheather. States overall she is improving. 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 5/13/22  Pt states, \" I continue to get stronger '  5/18/22 05/23/22 Patient reports she is feeling stronger. 05/25/22 Patient reports her balance improving,able to go up and down the steps easier.     OBJECTIVE:       ROM LEFT RIGHT   HIP Flex 100 100   HIP Abd 25 20   HIP Ext 5 5   HIP IR 30 25   HIP ER 40 30   Knee ext 0 0   Knee Flex 120 120   Ankle PF       Ankle DF 5 5   Ankle In       Ankle Ev       Strength  LEFT RIGHT   HIP Flexors 4-/5 all 4-/5 all   HIP Abductors       HIP Ext       Hip ER       Knee EXT (quad)       Knee Flex (HS)       Ankle DF       Ankle PF       Ankle Inv       Ankle EV                      RESTRICTIONS/PRECAUTIONS:    Exercises/Interventions:     Therapeutic Ex (82982)   Min: Reps/Resistance Notes/CUES   Nu step L3 x 6 min    Leg press 115# x 40,  45# x 30 ea    Partial sit X 30    Dead bug  2# X 2 min    lawnmower 5# x 30    Box lift from stool 20#x 2 min    incline 60 x 3    clams green x 30    Plank on knees X 1 min     Side plank X 1 min ea    Bird dog Slight lift x 2 min              Manual Intervention (25047)  Min:     Knee mobs/PROM     Tib/Fem Mobs     Patella Mobs     Ankle mobs NMR re-education (92727)  Min:  CUES NEEDED      Step ups 6\" x 30 ea    Step tap 6\" x 2 min    Lift from floor  Cone on a 6 \" step x 10 each    Walk with ball     lunge     Side step purple x 2 min    Monster walk     Tandem stand     Dead lift        Table hip ext X 20     lunge     Lunge walk     slider X 30    bosu Ski x 2 min    Walk outs  black 2min each side                        Therapeutic Activity (06792)  Min:               Modalities  Min:     IFC with      CP after exercises     MH after exercises            Other Therapeutic Activities: Pt was educated on PT POC, Diagnosis, Prognosis, pathomechanics as well as frequency and duration of scheduling future physical therapy appointments. Time was also taken on this day to answer all patient questions and participation in PT. Reviewed appointment policy in detail with patient and patient verbalized understanding. Home Exercise Program: Patient was instructed in the following for HEP:     . Patient verbalized/demonstrated understanding and was issued written handout. Access Code: [email or web address]  URL: [email or web address]  Date: 04/01/2022  Prepared by: Parth Roth    Exercises  Mini Squat with Counter Support - 1 x daily - 7 x weekly - 3 sets - 10 reps  Standing Hip Abduction - 1 x daily - 7 x weekly - 3 sets - 10 reps  Step Up - 1 x daily - 7 x weekly - 3 sets - 10 reps  Lateral Step Up - 1 x daily - 7 x weekly - 3 sets - 10 reps  Mini Lunge - 1 x daily - 7 x weekly - 3 sets - 10 reps      Therapeutic Exercise and NMR EXR  [] (19288) Provided verbal/tactile cueing for activities related to strengthening, flexibility, endurance, ROM for improvements in LE, proximal hip, and core control with self care, mobility, lifting, ambulation.  [] (91308) Provided verbal/tactile cueing for activities related to improving balance, coordination, kinesthetic sense, posture, motor skill, proprioception  to assist with LE, proximal hip, and core control in self care, mobility, lifting, ambulation and eccentric single leg control. NMR and Therapeutic Activities:    [] (01380 or 23203) Provided verbal/tactile cueing for activities related to improving balance, coordination, kinesthetic sense, posture, motor skill, proprioception and motor activation to allow for proper function of core, proximal hip and LE with self care and ADLs and functional mobility.   [] (04599) Gait Re-education- Provided training and instruction to the patient for proper LE, core and proximal hip recruitment and positioning and eccentric body weight control with ambulation re-education including up and down stairs     Home Exercise Program:    [x] (45768) Reviewed/Progressed HEP activities related to strengthening, flexibility, endurance, ROM of core, proximal hip and LE for functional self-care, mobility, lifting and ambulation/stair navigation   [] (03491)Reviewed/Progressed HEP activities related to improving balance, coordination, kinesthetic sense, posture, motor skill, proprioception of core, proximal hip and LE for self care, mobility, lifting, and ambulation/stair navigation      Manual Treatments:  PROM / STM / Oscillations-Mobs:  G-I, II, III, IV (PA's, Inf., Post.)  [] (56040) Provided manual therapy to mobilize LE, proximal hip and/or LS spine soft tissue/joints for the purpose of modulating pain, promoting relaxation,  increasing ROM, reducing/eliminating soft tissue swelling/inflammation/restriction, improving soft tissue extensibility and allowing for proper ROM for normal function with self care, mobility, lifting and ambulation.      If [facility] Please Indicate Time In/Out  CPT Code Time in Time out                         Approval Dates:  CPT Code Units Approved Units Used  Date Updated:                     Charges:  Timed Code Treatment Minutes: 45   Total Treatment Minutes: 50      [] EVAL (LOW) 92793 (typically 20 minutes face-to-face)  [] EVAL (MOD) 70832 (typically 30 minutes face-to-face)  [] EVAL (HIGH) 88116 (typically 45 minutes face-to-face)  [] RE-EVAL     [x] EN(57580) x 2    [] Dry needle 1 or 2 Muscles (31621)  [x] NMR (14395) x1   [] Dry needle 3+ Muscles (78206)  [] Manual (55692) x     [] Ultrasound (68310) x  [] TA (99818) x    [] Mech Traction (29505)  [] ES(attended) (99992)     [] ES (un) (50260):   [] Vasopump (42144) [] Ionto (85811)   [] Other:      GOALS:  Patient stated goal: \" I want to walk and function better \"  [x]? Progressing: []? Met: []? Not Met: []? Adjusted     Therapist goals for Patient:   Short Term Goals: To be achieved in: 2 weeks  1. Independent in HEP and progression per patient tolerance, in order to prevent re-injury. [x]? Progressing: []? Met: []? Not Met: []? Adjusted  2. Patient will have a decrease in pain to facilitate improvement in movement, function, and ADLs as indicated by Functional Deficits. [x]? Progressing: []? Met: []? Not Met: []? Adjusted     Long Term Goals: To be achieved in: 8 weeks  1. Disability index score of 30%% or less for the LEFS to assist with reaching prior level of function. [x]? Progressing: []? Met: []? Not Met: []? Adjusted  2. Patient will demonstrate increased AROM to wfl to allow for proper joint functioning as indicated by patients Functional Deficits. [x]? Progressing: []? Met: []? Not Met: []? Adjusted  3. Patient will demonstrate an increase in Strength to good proximal hip strength and control, within 5lb HHD in LE to allow for proper functional mobility as indicated by patients Functional Deficits. [x]? Progressing: []? Met: []? Not Met: []? Adjusted  4. Patient will return to 70% functional activities without increased symptoms or restriction. [x]? Progressing: []? Met: []? Not Met: []? Adjusted  5. \" I want to be able to do the steps and have improved balance\"  \" I also want to be able to lift heavier things \"  [x]? Progressing: []? Met: []? Not Met: []?  Adjusted            ASSESSMENT:  See eval    Treatment/Activity Tolerance:  [x] Patient tolerated treatment well [] Patient limited by fatique  [] Patient limited by pain  [] Patient limited by other medical complications  [] Other:     Overall Progression Towards Functional goals/ Treatment Progress Update:  [] Patient is progressing as expected towards functional goals listed. [] Progression is slowed due to complexities/Impairments listed. [] Progression has been slowed due to co-morbidities. [x] Plan just implemented, too soon to assess goals progression <30days   [] Goals require adjustment due to lack of progress  [] Patient is not progressing as expected and requires additional follow up with physician  [] Other    Prognosis for POC: [x] Good [] Fair  [] Poor    Patient requires continued skilled intervention: [x] Yes  [] No        PLAN: LE arom, prom  strength, proprioception, gait, balance, functional activities. Mfr, joint mobs, mods as needed, hep. Progress as tolerated . Goal is to do steps, go from sit to stand, sit up from supine, lift from floor , improved balance    [] Continue per plan of care [] Alter current plan (see comments)  [x] Plan of care initiated [] Hold pending MD visit [] Discharge    Electronically signed by: Herman Villarreal PTA    Note: If patient does not return for scheduled/recommended follow up visits, this note will serve as a discharge from care along with the most recent update on progress.

## 2022-05-31 ENCOUNTER — HOSPITAL ENCOUNTER (OUTPATIENT)
Dept: PHYSICAL THERAPY | Age: 62
Setting detail: THERAPIES SERIES
Discharge: HOME | End: 2022-05-31
Payer: COMMERCIAL

## 2022-05-31 PROCEDURE — 97112 NEUROMUSCULAR REEDUCATION: CPT

## 2022-05-31 PROCEDURE — 97110 THERAPEUTIC EXERCISES: CPT

## 2022-05-31 NOTE — PROGRESS NOTES
sore after the last rx \"  4/19/22  Pt states, \" \" Getting easier to go up and down the steps,  Still kind of hard to get up out of the chair \" \"  4/21/22  Pt states, \" Back to work, still feeling pretty good overall \"  4/27/22  Pt states, \" I was able to walk around [place] for a few hours over the weekend \"  04/29/22 Patient reports she was a little sore from last rx,overall is improving. States she is able to go up and down the steps easier. 05/02/22 Patient reports she feels stronger able to get up from the commode easier,balance is improving too. 05/05/22 Patient reports knee was a little sore past few days due to wheather. States overall she is improving. 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 5/13/22  Pt states, \" I continue to get stronger '  5/18/22 05/23/22 Patient reports she is feeling stronger. 05/25/22 Patient reports her balance improving,able to go up and down the steps easier.   5/31/22  Pt states, \"     OBJECTIVE:       ROM LEFT RIGHT   HIP Flex 100 100   HIP Abd 25 20   HIP Ext 5 5   HIP IR 30 25   HIP ER 40 30   Knee ext 0 0   Knee Flex 120 120   Ankle PF       Ankle DF 5 5   Ankle In       Ankle Ev       Strength  LEFT RIGHT   HIP Flexors 4-/5 all 4-/5 all   HIP Abductors       HIP Ext       Hip ER       Knee EXT (quad)       Knee Flex (HS)       Ankle DF       Ankle PF       Ankle Inv       Ankle EV                      RESTRICTIONS/PRECAUTIONS:    Exercises/Interventions:     Therapeutic Ex (84940)   Min: Reps/Resistance Notes/CUES   Nu step L3 x 6 min    Leg press 115# x 40,  45# x 30 ea    Partial sit X 30    Dead bug  2# X 2 min    lawnmower 5# x 30    Box lift from stool 20#x 2 min    incline 60 x 3    clams green x 2 min ea    Plank on knees    Side plank    Bird dog Slight lift x 2 min              Manual Intervention (44197)  Min:     Knee mobs/PROM     Tib/Fem Mobs     Patella Mobs Ankle mobs               NMR re-education (67691)  Min:  CUES NEEDED      Step ups 6\" x 30 ea    Step tap 6\" x 2 min    Lift from floor  Cone on a 6 \" step x 10 each    Walk with ball     lunge     Side step purple x 2 min    Monster walk     Tandem stand     Dead lift        Table hip ext X 20     lunge     Lunge walk     slider X 30    bosu Ski x 2 min    Walk outs  black 2min each side                        Therapeutic Activity (76352)  Min:               Modalities  Min:     IFC with      CP after exercises     MH after exercises            Other Therapeutic Activities: Pt was educated on PT POC, Diagnosis, Prognosis, pathomechanics as well as frequency and duration of scheduling future physical therapy appointments. Time was also taken on this day to answer all patient questions and participation in PT. Reviewed appointment policy in detail with patient and patient verbalized understanding. Home Exercise Program: Patient was instructed in the following for HEP:     . Patient verbalized/demonstrated understanding and was issued written handout. Access Code: [email or web address]  URL: [email or web address]  Date: 04/01/2022  Prepared by: Jason Shelton    Exercises  Mini Squat with Counter Support - 1 x daily - 7 x weekly - 3 sets - 10 reps  Standing Hip Abduction - 1 x daily - 7 x weekly - 3 sets - 10 reps  Step Up - 1 x daily - 7 x weekly - 3 sets - 10 reps  Lateral Step Up - 1 x daily - 7 x weekly - 3 sets - 10 reps  Mini Lunge - 1 x daily - 7 x weekly - 3 sets - 10 reps      Therapeutic Exercise and NMR EXR  [] (12044) Provided verbal/tactile cueing for activities related to strengthening, flexibility, endurance, ROM for improvements in LE, proximal hip, and core control with self care, mobility, lifting, ambulation.  [] (92436) Provided verbal/tactile cueing for activities related to improving balance, coordination, kinesthetic sense, posture, motor skill, proprioception  to assist with LE, proximal hip, and core control in self care, mobility, lifting, ambulation and eccentric single leg control. NMR and Therapeutic Activities:    [] (15219 or 01537) Provided verbal/tactile cueing for activities related to improving balance, coordination, kinesthetic sense, posture, motor skill, proprioception and motor activation to allow for proper function of core, proximal hip and LE with self care and ADLs and functional mobility.   [] (42631) Gait Re-education- Provided training and instruction to the patient for proper LE, core and proximal hip recruitment and positioning and eccentric body weight control with ambulation re-education including up and down stairs     Home Exercise Program:    [x] (87305) Reviewed/Progressed HEP activities related to strengthening, flexibility, endurance, ROM of core, proximal hip and LE for functional self-care, mobility, lifting and ambulation/stair navigation   [] (69494)Reviewed/Progressed HEP activities related to improving balance, coordination, kinesthetic sense, posture, motor skill, proprioception of core, proximal hip and LE for self care, mobility, lifting, and ambulation/stair navigation      Manual Treatments:  PROM / STM / Oscillations-Mobs:  G-I, II, III, IV (PA's, Inf., Post.)  [] (91022) Provided manual therapy to mobilize LE, proximal hip and/or LS spine soft tissue/joints for the purpose of modulating pain, promoting relaxation,  increasing ROM, reducing/eliminating soft tissue swelling/inflammation/restriction, improving soft tissue extensibility and allowing for proper ROM for normal function with self care, mobility, lifting and ambulation.      If [facility] Please Indicate Time In/Out  CPT Code Time in Time out                         Approval Dates:  CPT Code Units Approved Units Used  Date Updated:                     Charges:  Timed Code Treatment Minutes: 45   Total Treatment Minutes: 50      [] EVAL (LOW) 20638 (typically 20 minutes face-to-face)  [] EVAL (MOD) 31603 (typically 30 minutes face-to-face)  [] EVAL (HIGH) 84263 (typically 45 minutes face-to-face)  [] RE-EVAL     [x] VS(77766) x 2    [] Dry needle 1 or 2 Muscles (88271)  [x] NMR (40851) x1   [] Dry needle 3+ Muscles (80092)  [] Manual (98090) x     [] Ultrasound (10829) x  [] TA (18268) x    [] Mech Traction (80145)  [] ES(attended) (34958)     [] ES (un) (57671):   [] Vasopump (12955) [] Ionto (51768)   [] Other:      GOALS:  Patient stated goal: \" I want to walk and function better \"  [x]? Progressing: []? Met: []? Not Met: []? Adjusted     Therapist goals for Patient:   Short Term Goals: To be achieved in: 2 weeks  1. Independent in HEP and progression per patient tolerance, in order to prevent re-injury. [x]? Progressing: []? Met: []? Not Met: []? Adjusted  2. Patient will have a decrease in pain to facilitate improvement in movement, function, and ADLs as indicated by Functional Deficits. [x]? Progressing: []? Met: []? Not Met: []? Adjusted     Long Term Goals: To be achieved in: 8 weeks  1. Disability index score of 30%% or less for the LEFS to assist with reaching prior level of function. [x]? Progressing: []? Met: []? Not Met: []? Adjusted  2. Patient will demonstrate increased AROM to wfl to allow for proper joint functioning as indicated by patients Functional Deficits. [x]? Progressing: []? Met: []? Not Met: []? Adjusted  3. Patient will demonstrate an increase in Strength to good proximal hip strength and control, within 5lb HHD in LE to allow for proper functional mobility as indicated by patients Functional Deficits. [x]? Progressing: []? Met: []? Not Met: []? Adjusted  4. Patient will return to 70% functional activities without increased symptoms or restriction. [x]? Progressing: []? Met: []? Not Met: []? Adjusted  5. \" I want to be able to do the steps and have improved balance\"  \" I also want to be able to lift heavier things \"  [x]? Progressing: []? Met: []? Not Met: []?  Adjusted      ASSESSMENT:  See eval    Treatment/Activity Tolerance:  [x] Patient tolerated treatment well [] Patient limited by fatique  [] Patient limited by pain  [] Patient limited by other medical complications  [] Other:     Overall Progression Towards Functional goals/ Treatment Progress Update:  [] Patient is progressing as expected towards functional goals listed. [] Progression is slowed due to complexities/Impairments listed. [] Progression has been slowed due to co-morbidities. [x] Plan just implemented, too soon to assess goals progression <30days   [] Goals require adjustment due to lack of progress  [] Patient is not progressing as expected and requires additional follow up with physician  [] Other    Prognosis for POC: [x] Good [] Fair  [] Poor    Patient requires continued skilled intervention: [x] Yes  [] No        PLAN: LE arom, prom  strength, proprioception, gait, balance, functional activities. Mfr, joint mobs, mods as needed, hep. Progress as tolerated . Goal is to do steps, go from sit to stand, sit up from supine, lift from floor , improved balance    [] Continue per plan of care [] Alter current plan (see comments)  [x] Plan of care initiated [] Hold pending MD visit [] Discharge    Electronically signed by: Katty Barbour PT    Note: If patient does not return for scheduled/recommended follow up visits, this note will serve as a discharge from care along with the most recent update on progress.

## 2022-06-02 ENCOUNTER — HOSPITAL ENCOUNTER (OUTPATIENT)
Dept: PHYSICAL THERAPY | Age: 62
Setting detail: THERAPIES SERIES
Discharge: HOME | End: 2022-06-02
Payer: COMMERCIAL

## 2022-06-02 PROCEDURE — 97112 NEUROMUSCULAR REEDUCATION: CPT

## 2022-06-02 PROCEDURE — 97110 THERAPEUTIC EXERCISES: CPT

## 2022-06-02 NOTE — PROGRESS NOTES
[facility] - Outpatient Rehabilitation & Therapy  [address] [place]  Phone: [phone]   Fax:     [phone]      Physical Therapy Treatment Note/ Progress Report:     Date:  2022    Patient Name:  Valdo Fletcher    :  1960  MRN: [id number]    Pertinent Medical History:Additional Pertinent Hx: hpl, htn, anxiety, geerd, gout, tka    Medical/Treatment Diagnosis Information:  · Diagnosis: R53.1 (ICD-10-CM) - Weakness  · Treatment Diagnosis: decreased abilty to ambualte and function    Insurance/Certification information:  PT Insurance Information: halle  Physician Information:  Referring Practitioner: Dr. Yuniel Kiser of care signed (Y/N): routed    Date of Patient follow up with Physician:      Progress Report: []  Yes  [x]  No     Date Range for reporting period:  Beginnin2022  Ending:      Progress report due (10 Rx/or 30 days whichever is HC):04    Recertification due (POC duration/ or 90 days whichever is less):      Visit # POC/Insurance Allowable Auth Needed   16  []Yes   []No     Latex Allergy:  [x]NO      []YES  Preferred Language for Healthcare:   [x]English       []Other:    Functional Scale:      Date assessed: 05/10/22  Test: LEFS-51  Score:    Pain level:  0/10     History of Injury: Patient is a 61 y/o female who was in the hospital in January for a week and has been weak since she came out. She c/o decreased abilty to ambulate, do steps, and stand for prolonged time. She also says it is hard to lift things. She has fallen once in the past year. She wants to be able to do the steps, walk further and do her adl's as well as improve her balance. She wall walks. She uses a cane to so the steps at home.      SUBJECTIVE:  Pt states, \" I have a hard time doing steps and lifting things \"  22  Pt states, \" Doing ok, getting around a little easier \"  22  Pt states, \"  I felt workout sore after the last rx \"  4/19/22  Pt states, \" \" Getting easier to go up and down the steps,  Still kind of hard to get up out of the chair \" \"  4/21/22  Pt states, \" Back to work, still feeling pretty good overall \"  4/27/22  Pt states, \" I was able to walk around [place] for a few hours over the weekend \"  04/29/22 Patient reports she was a little sore from last rx,overall is improving. States she is able to go up and down the steps easier. 05/02/22 Patient reports she feels stronger able to get up from the commode easier,balance is improving too. 05/05/22 Patient reports knee was a little sore past few days due to wheather. States overall she is improving. 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 5/13/22  Pt states, \" I continue to get stronger '  5/18/22 05/23/22 Patient reports she is feeling stronger. 05/25/22 Patient reports her balance improving,able to go up and down the steps easier.   5/31/22  Pt states, \" feeling kind of sick  Today \"  6/2/22  Pt states, \"     OBJECTIVE:       ROM LEFT RIGHT   HIP Flex 100 100   HIP Abd 25 20   HIP Ext 5 5   HIP IR 30 25   HIP ER 40 30   Knee ext 0 0   Knee Flex 120 120   Ankle PF       Ankle DF 5 5   Ankle In       Ankle Ev       Strength  LEFT RIGHT   HIP Flexors 4-/5 all 4-/5 all   HIP Abductors       HIP Ext       Hip ER       Knee EXT (quad)       Knee Flex (HS)       Ankle DF       Ankle PF       Ankle Inv       Ankle EV                      RESTRICTIONS/PRECAUTIONS:    Exercises/Interventions:     Therapeutic Ex (61649)   Min: Reps/Resistance Notes/CUES   Nu step L3 x 6 min    Leg press 115# x 40,  55# x 30 ea    Partial sit X 30    Dead bug  2# X 2 min    lawnmower 5# x 30    Box lift from stool 20#x 2 min    incline 60 x 3    clams green x 2 min ea    Plank on knees X 2 min   Side plank X 1 min ea   Bird dog Slight lift x 2 min              Manual Intervention (09045)  Min:     Knee mobs/PROM     Tib/Fem Mobs     Patella Mobs     Ankle mobs               NMR re-education (53713)  Min:  CUES NEEDED      Step ups 6\" x 30 ea    Step tap 6\" x 2 min    Lift from floor  Cone on a 6 \" step x 10 each    Walk with ball     lunge     Side step purple x 2 min    Standing abd Green x 2 min    Tandem stand     Dead lift        Table hip ext X 20     lunge     Lunge walk     slider X 30    bosu Ski x 2 min    Walk outs  black 2min each side                        Therapeutic Activity (78965)  Min:               Modalities  Min:     IFC with      CP after exercises     MH after exercises            Other Therapeutic Activities: Pt was educated on PT POC, Diagnosis, Prognosis, pathomechanics as well as frequency and duration of scheduling future physical therapy appointments. Time was also taken on this day to answer all patient questions and participation in PT. Reviewed appointment policy in detail with patient and patient verbalized understanding. Home Exercise Program: Patient was instructed in the following for HEP:     . Patient verbalized/demonstrated understanding and was issued written handout. Access Code: [email or web address]  URL: [email or web address]  Date: 04/01/2022  Prepared by: Serafin Iqbal    Exercises  Mini Squat with Counter Support - 1 x daily - 7 x weekly - 3 sets - 10 reps  Standing Hip Abduction - 1 x daily - 7 x weekly - 3 sets - 10 reps  Step Up - 1 x daily - 7 x weekly - 3 sets - 10 reps  Lateral Step Up - 1 x daily - 7 x weekly - 3 sets - 10 reps  Mini Lunge - 1 x daily - 7 x weekly - 3 sets - 10 reps      Therapeutic Exercise and NMR EXR  [] (47940) Provided verbal/tactile cueing for activities related to strengthening, flexibility, endurance, ROM for improvements in LE, proximal hip, and core control with self care, mobility, lifting, ambulation.  [] (84975) Provided verbal/tactile cueing for activities related to improving balance, coordination, kinesthetic sense, posture, motor skill, proprioception  to assist with LE, proximal hip, and core control in self care, mobility, lifting, ambulation and eccentric single leg control. NMR and Therapeutic Activities:    [] (04380 or 56842) Provided verbal/tactile cueing for activities related to improving balance, coordination, kinesthetic sense, posture, motor skill, proprioception and motor activation to allow for proper function of core, proximal hip and LE with self care and ADLs and functional mobility.   [] (20252) Gait Re-education- Provided training and instruction to the patient for proper LE, core and proximal hip recruitment and positioning and eccentric body weight control with ambulation re-education including up and down stairs     Home Exercise Program:    [x] (18626) Reviewed/Progressed HEP activities related to strengthening, flexibility, endurance, ROM of core, proximal hip and LE for functional self-care, mobility, lifting and ambulation/stair navigation   [] (53178)Reviewed/Progressed HEP activities related to improving balance, coordination, kinesthetic sense, posture, motor skill, proprioception of core, proximal hip and LE for self care, mobility, lifting, and ambulation/stair navigation      Manual Treatments:  PROM / STM / Oscillations-Mobs:  G-I, II, III, IV (PA's, Inf., Post.)  [] (25162) Provided manual therapy to mobilize LE, proximal hip and/or LS spine soft tissue/joints for the purpose of modulating pain, promoting relaxation,  increasing ROM, reducing/eliminating soft tissue swelling/inflammation/restriction, improving soft tissue extensibility and allowing for proper ROM for normal function with self care, mobility, lifting and ambulation.      If [facility] Please Indicate Time In/Out  CPT Code Time in Time out                         Approval Dates:  CPT Code Units Approved Units Used  Date Updated:                     Charges:  Timed Code Treatment Minutes: 45   Total Treatment Minutes: 50 [] EVAL (LOW) 43886 (typically 20 minutes face-to-face)  [] EVAL (MOD) 41465 (typically 30 minutes face-to-face)  [] EVAL (HIGH) 03799 (typically 45 minutes face-to-face)  [] RE-EVAL     [x] PA(78490) x 2    [] Dry needle 1 or 2 Muscles (98554)  [x] NMR (43278) x1   [] Dry needle 3+ Muscles (60652)  [] Manual (38541) x     [] Ultrasound (99577) x  [] TA (58430) x    [] Mech Traction (44354)  [] ES(attended) (42068)     [] ES (un) (71781):   [] Vasopump (08955) [] Ionto (04235)   [] Other:      GOALS:  Patient stated goal: \" I want to walk and function better \"  [x]? Progressing: []? Met: []? Not Met: []? Adjusted     Therapist goals for Patient:   Short Term Goals: To be achieved in: 2 weeks  1. Independent in HEP and progression per patient tolerance, in order to prevent re-injury. [x]? Progressing: []? Met: []? Not Met: []? Adjusted  2. Patient will have a decrease in pain to facilitate improvement in movement, function, and ADLs as indicated by Functional Deficits. [x]? Progressing: []? Met: []? Not Met: []? Adjusted     Long Term Goals: To be achieved in: 8 weeks  1. Disability index score of 30%% or less for the LEFS to assist with reaching prior level of function. [x]? Progressing: []? Met: []? Not Met: []? Adjusted  2. Patient will demonstrate increased AROM to wfl to allow for proper joint functioning as indicated by patients Functional Deficits. [x]? Progressing: []? Met: []? Not Met: []? Adjusted  3. Patient will demonstrate an increase in Strength to good proximal hip strength and control, within 5lb HHD in LE to allow for proper functional mobility as indicated by patients Functional Deficits. [x]? Progressing: []? Met: []? Not Met: []? Adjusted  4. Patient will return to 70% functional activities without increased symptoms or restriction. [x]? Progressing: []? Met: []? Not Met: []? Adjusted  5.  \" I want to be able to do the steps and have improved balance\"  \" I also want to be able to lift heavier things \"  [x]? Progressing: []? Met: []? Not Met: []? Adjusted            ASSESSMENT:  See eval    Treatment/Activity Tolerance:  [x] Patient tolerated treatment well [] Patient limited by fatique  [] Patient limited by pain  [] Patient limited by other medical complications  [] Other:     Overall Progression Towards Functional goals/ Treatment Progress Update:  [] Patient is progressing as expected towards functional goals listed. [] Progression is slowed due to complexities/Impairments listed. [] Progression has been slowed due to co-morbidities. [x] Plan just implemented, too soon to assess goals progression <30days   [] Goals require adjustment due to lack of progress  [] Patient is not progressing as expected and requires additional follow up with physician  [] Other    Prognosis for POC: [x] Good [] Fair  [] Poor    Patient requires continued skilled intervention: [x] Yes  [] No        PLAN: LE arom, prom  strength, proprioception, gait, balance, functional activities. Mfr, joint mobs, mods as needed, hep. Progress as tolerated . Goal is to do steps, go from sit to stand, sit up from supine, lift from floor , improved balance    [] Continue per plan of care [] Alter current plan (see comments)  [x] Plan of care initiated [] Hold pending MD visit [] Discharge    Electronically signed by: Radha Crum PT    Note: If patient does not return for scheduled/recommended follow up visits, this note will serve as a discharge from care along with the most recent update on progress.

## 2022-06-07 RX ORDER — TORSEMIDE 10 MG/1
10 TABLET ORAL DAILY
Qty: 90 TABLET | Refills: 3 | Status: SHIPPED | OUTPATIENT
Start: 2022-06-07

## 2022-06-07 NOTE — TELEPHONE ENCOUNTER
Last OV: 3/31/22  Next OV: 7/7/22  Last refill:2/16/22  Most recent Labs: 3/22/22  Last EKG (if needed): 2/11/22

## 2022-06-15 ENCOUNTER — OFFICE VISIT (OUTPATIENT)
Dept: FAMILY MEDICINE CLINIC | Age: 62
End: 2022-06-15
Payer: COMMERCIAL

## 2022-06-15 VITALS
WEIGHT: 125.2 LBS | OXYGEN SATURATION: 97 % | HEART RATE: 104 BPM | HEIGHT: 62 IN | DIASTOLIC BLOOD PRESSURE: 60 MMHG | RESPIRATION RATE: 16 BRPM | SYSTOLIC BLOOD PRESSURE: 110 MMHG | BODY MASS INDEX: 23.04 KG/M2

## 2022-06-15 DIAGNOSIS — E53.8 B12 DEFICIENCY: ICD-10-CM

## 2022-06-15 DIAGNOSIS — I10 PRIMARY HYPERTENSION: Primary | Chronic | ICD-10-CM

## 2022-06-15 DIAGNOSIS — R60.0 LEG EDEMA, LEFT: ICD-10-CM

## 2022-06-15 DIAGNOSIS — Z86.79 HX OF CONGESTIVE HEART FAILURE: ICD-10-CM

## 2022-06-15 DIAGNOSIS — D64.9 ANEMIA, UNSPECIFIED TYPE: ICD-10-CM

## 2022-06-15 DIAGNOSIS — R13.10 DYSPHAGIA, UNSPECIFIED TYPE: ICD-10-CM

## 2022-06-15 DIAGNOSIS — E53.8 FOLATE DEFICIENCY: ICD-10-CM

## 2022-06-15 DIAGNOSIS — M79.605 LEFT LEG PAIN: ICD-10-CM

## 2022-06-15 PROCEDURE — 99214 OFFICE O/P EST MOD 30 MIN: CPT | Performed by: INTERNAL MEDICINE

## 2022-06-15 RX ORDER — GABAPENTIN 100 MG/1
100 CAPSULE ORAL 2 TIMES DAILY
COMMUNITY
Start: 2022-05-20

## 2022-06-15 RX ORDER — HYDROXYZINE HYDROCHLORIDE 25 MG/1
TABLET, FILM COATED ORAL
Qty: 30 TABLET | Refills: 0 | Status: SHIPPED | OUTPATIENT
Start: 2022-06-15 | End: 2022-07-11

## 2022-06-15 ASSESSMENT — ENCOUNTER SYMPTOMS: SHORTNESS OF BREATH: 0

## 2022-06-15 NOTE — PROGRESS NOTES
Larisa Ayon (:  1960) is a 62 y.o. female, here for evaluation of the following chief complaint(s):  Hypertension (Patient is here for a hypertension follow up and for her weakness)    Kylie Leonard was seen today for hypertension. Diagnoses and all orders for this visit:    Primary hypertension  -     Comprehensive Metabolic Panel; Future    Dysphagia, unspecified type    Hx of congestive heart failure Dr Darlene Mays    Anemia, unspecified type  -     Iron and TIBC; Future  -     Ferritin; Future  -     CBC with Auto Differential; Future    B12 deficiency  -     Cancel: Vitamin B12 & Folate; Future    Folate deficiency  -     Cancel: Vitamin B12 & Folate; Future    Leg edema, left  -     VL DUP LOWER EXTREMITY VENOUS LEFT; Future    Left leg pain  -     VL DUP LOWER EXTREMITY VENOUS LEFT; Future    Other orders  -     hydrOXYzine HCl (ATARAX) 25 MG tablet; One a day as needed for anxiety. No alcohol.  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        Has ho htn  bp low  110/60 today  Still on entresto and coreg    No trouble swolling   Needs to still go to GI    Says she get stopped up with too much pork  Takes mucinex, xyzal and singulair      Anxiety  Only had a few episodes of anxiety      Neurologist just gave her gabapentin      Sees cardiology early July    Has been taking folate since seeing oncology  Also taking b12    She sees Zo in  2 weeks    CESIA bilat    22    Conclusions        Summary        Moderate PAD at rest with moderately depressed CESIA        Signature      ------------------------------------------------------------------    Electronically signed by Jose Kong MD (Interpreting   [phone]) on 05/15/2022 at 01:25 PM     Went to [facility] in a car over the weekend  Back  3 days ago  Both legs swollen for awhile  Left leg is more  Some pain in the left lower leg  Wearing compression hose    Never had bleeding ulcer or head bleed  Takes asa    Some swelling swelling and pain in the left leg for a yr off on on. Review of Systems   Respiratory: Negative for shortness of breath. Cardiovascular: Negative for chest pain. Objective   Physical Exam  Constitutional:       Appearance: Normal appearance. HENT:      Head: Normocephalic and atraumatic. Cardiovascular:      Rate and Rhythm: Normal rate and regular rhythm. Pulmonary:      Effort: Pulmonary effort is normal.      Breath sounds: Normal breath sounds. Neurological:      Mental Status: She is alert. Psychiatric:         Mood and Affect: Mood normal.         Behavior: Behavior normal.         Thought Content:  Thought content normal.         Judgment: Judgment normal.            Marques Pickett MD

## 2022-06-16 ENCOUNTER — HOSPITAL ENCOUNTER (OUTPATIENT)
Dept: VASCULAR LAB | Age: 62
Discharge: HOME | End: 2022-06-16
Payer: COMMERCIAL

## 2022-06-16 DIAGNOSIS — R60.0 LEG EDEMA, LEFT: ICD-10-CM

## 2022-06-16 DIAGNOSIS — M79.605 LEFT LEG PAIN: ICD-10-CM

## 2022-06-16 PROCEDURE — 93971 EXTREMITY STUDY: CPT

## 2022-06-17 DIAGNOSIS — D64.9 ANEMIA, UNSPECIFIED TYPE: ICD-10-CM

## 2022-06-17 DIAGNOSIS — I10 PRIMARY HYPERTENSION: Chronic | ICD-10-CM

## 2022-06-17 LAB
A/G RATIO: 1.3 (ref 1.1–2.2)
ALBUMIN SERPL-MCNC: 3.9 G/DL (ref 3.4–5)
ALP BLD-CCNC: 219 U/L (ref 40–129)
ALT SERPL-CCNC: 12 U/L (ref 10–40)
ANION GAP SERPL CALCULATED.3IONS-SCNC: 15 MMOL/L (ref 3–16)
AST SERPL-CCNC: 28 U/L (ref 15–37)
BASOPHILS ABSOLUTE: 0 K/UL (ref 0–0.2)
BASOPHILS RELATIVE PERCENT: 0.8 %
BILIRUB SERPL-MCNC: 0.3 MG/DL (ref 0–1)
BUN BLDV-MCNC: 6 MG/DL (ref 7–20)
CALCIUM SERPL-MCNC: 9.5 MG/DL (ref 8.3–10.6)
CHLORIDE BLD-SCNC: 99 MMOL/L (ref 99–110)
CO2: 23 MMOL/L (ref 21–32)
CREAT SERPL-MCNC: 0.6 MG/DL (ref 0.6–1.2)
EOSINOPHILS ABSOLUTE: 0 K/UL (ref 0–0.6)
EOSINOPHILS RELATIVE PERCENT: 0.7 %
FERRITIN: 417.8 NG/ML (ref 15–150)
GFR AFRICAN AMERICAN: >60
GFR NON-AFRICAN AMERICAN: >60
GLUCOSE BLD-MCNC: 82 MG/DL (ref 70–99)
HCT VFR BLD CALC: 35.9 % (ref 36–48)
HEMOGLOBIN: 12.4 G/DL (ref 12–16)
IRON SATURATION: 23 % (ref 15–50)
IRON: 55 UG/DL (ref 37–145)
LYMPHOCYTES ABSOLUTE: 1.9 K/UL (ref 1–5.1)
LYMPHOCYTES RELATIVE PERCENT: 35.5 %
MCH RBC QN AUTO: 34.1 PG (ref 26–34)
MCHC RBC AUTO-ENTMCNC: 34.7 G/DL (ref 31–36)
MCV RBC AUTO: 98.2 FL (ref 80–100)
MONOCYTES ABSOLUTE: 0.5 K/UL (ref 0–1.3)
MONOCYTES RELATIVE PERCENT: 8.7 %
NEUTROPHILS ABSOLUTE: 2.9 K/UL (ref 1.7–7.7)
NEUTROPHILS RELATIVE PERCENT: 54.3 %
PDW BLD-RTO: 14.2 % (ref 12.4–15.4)
PLATELET # BLD: 357 K/UL (ref 135–450)
PMV BLD AUTO: 7.2 FL (ref 5–10.5)
POTASSIUM SERPL-SCNC: 4.4 MMOL/L (ref 3.5–5.1)
RBC # BLD: 3.65 M/UL (ref 4–5.2)
SODIUM BLD-SCNC: 137 MMOL/L (ref 136–145)
TOTAL IRON BINDING CAPACITY: 241 UG/DL (ref 260–445)
TOTAL PROTEIN: 7 G/DL (ref 6.4–8.2)
WBC # BLD: 5.3 K/UL (ref 4–11)

## 2022-06-27 NOTE — PROGRESS NOTES
Hospitalist Progress Note      PCP: Silvano Walker MD    Date of Admission: 1/28/2022    Chief Complaint: Generalized weakness, lightheadedness, poor p.o. intake    Hospital Course:  62 y.o. female PMH GERD, smoking abuse, alcohol use with 1 to 2 glasses of wine nightly, hypertension, previous history of congestive heart failure had an echo normal EF January 2022, gout, and hyperlipidemia   presents to [facility] with progressive generalized weakness and fatigue, poor p.o. intake x 6 to 7 days. c/o dysuria saw her PCP on Monday urine was examined and noted to have a UTI. started on Bactrim. progressively continued to become more generally weak and had lightheadedness and BOONE. continued to have poor p.o. intake although she was still taking her antihypertensive medications and her diuretic. She got worse to the point where tonight she called EMS because her weakness was so bad. SBP  70s to 80s was brought to the ED. symptoms of generalized weakness, lightheadedness, poor p.o. intake, patient denies any other recent symptoms of fever, chills, syncope, chest pain, blood in urine/stool/sputum, leg swelling, rashes, abdominal pain/nausea/vomiting/diarrhea  , BUN 15, CR 1.7 lactic acid 2.2,  troponin 0 0.01. EtOH negative U tox negative TSH 1.22. WBC 8.4.  UA cloudy 6 WBC.,  No urine culture warranted. She just had E. coli in the urine January 24, 2022 which was sensitive to the Bactrim. Required Levophed. seen by nephrology given gentle IV NS hydration sodium increased to 120. Sodium levelled off with IVF. started sodium chloride tabs and a fluid restriction but NA unchanged 123.      Subjective: agitated today about having a bed alarm she needed to get up \"and do things\"  All other systems neg    Medications:  Reviewed    Infusion Medications    sodium chloride       Scheduled Medications    magnesium oxide  400 mg Oral Daily with breakfast    tiotropium  2 puff Inhalation Daily  sodium chloride  1 g Oral TID WC    thiamine mononitrate  100 mg Oral Daily    folic acid  1 mg Oral Daily    allopurinol  300 mg Oral Daily    aspirin  81 mg Oral Daily    atorvastatin  20 mg Oral Daily    budesonide-formoterol  2 puff Inhalation BID    pantoprazole  40 mg Oral QAM AC    fluticasone  2 spray Each Nostril Daily    montelukast  10 mg Oral Daily    nicotine  1 patch TransDERmal Daily    sodium chloride flush  5-40 mL IntraVENous 2 times per day     PRN Meds: ipratropium-albuterol, morphine, ALPRAZolam, sodium chloride flush, sodium chloride, acetaminophen **OR** acetaminophen, ondansetron    No intake or output data in the 24 hours ending 02/01/22 1536    Physical Exam Performed:    BP (!) 116/59   Pulse 125   Temp 98.1 °F (36.7 °C) (Oral)   Resp 16   Ht 5' 1\" (1.549 m)   Wt 126 lb 8.7 oz (57.4 kg)   SpO2 95%   BMI 23.91 kg/m²     General appearance: on RA dressed in tshirt up in chair   HEENT: Pupils equal, round, and reactive to light. Glasses anicteric   Neck: Supple, full range of motion. No JVD    Respiratory:  Normal  effort. Clear to auscultation   Cardiovascular:  S1/S2 RRRwithout murmurs, rubs or gallops. Abdomen: Soft, non-tender, non-distended with normal bowel sounds. Musculoskeletal: No clubbing, cyanosis or edema   Skin: Skin color, texture, turgor normal.  No rashes or lesions.   Neurologic:  Neurovascularly intact slight tremors not advanced   Psychiatric: Alert and oriented  Peripheral Pulses: +2 palpable, equal bilaterally       Labs:   Recent Labs     01/30/22  0445 01/31/22  0415   WBC 6.3 4.9   HGB 7.4* 7.6*   HCT 21.5* 21.6*    267     Recent Labs     01/30/22  1220 01/30/22  1611 01/31/22  0415 01/31/22  0948 02/01/22  1210   *   < > 120* 123* 128*   K 3.8  --  3.5  --  3.6   CL 87*  --  86*  --  97*   CO2 21  --  20*  --  22   BUN 7  --  7  --  8   CREATININE 0.7  --  0.7  --  0.7   CALCIUM 8.2*  --  8.0*  --  8.5    < > = values in this interval not displayed. Recent Labs     01/30/22  0445 01/31/22  0415   AST 44* 45*   ALT 18 18   BILITOT 0.7 0.7   ALKPHOS 113 119     No results for input(s): INR in the last 72 hours. No results for input(s): Cristina Hernandes in the last 72 hours. Urinalysis:      Lab Results   Component Value Date    NITRU Negative 01/28/2022    WBCUA 6 01/28/2022    RBCUA 7 01/28/2022    BLOODU Negative 01/28/2022    SPECGRAV 1.014 01/28/2022    GLUCOSEU Negative 01/28/2022       Radiology:  US RENAL COMPLETE   Final Result   No hydronephrosis. CT CHEST ABDOMEN PELVIS WO CONTRAST   Final Result   No spiculated lung mass or lymphadenopathy in the chest.      Mild emphysematous changes, with minimal dependent change though no acute   parenchymal infiltrate or reactive effusion. Resolved effusions. Acute to subacute appearing right lateral rib fractures involving the 6th and   7th rib. No pneumothorax. No acute inflammatory process seen within the abdomen or pelvis. No ileus or   obstruction. No obstructive urinary tract calculi. Tiny nonobstructive left   renal calculus. CT HEAD WO CONTRAST   Final Result   No acute intracranial hemorrhage. No wedge-shaped area of acute ischemia is   identified. There are a few patchy focal areas of white matter low attenuation which is   nonspecific, though can be seen in the setting of chronic microvascular   ischemia or demyelination. No significant cortical atrophy or intracranial mass. XR CHEST PORTABLE   Final Result   COPD, though no acute cardiopulmonary process.                  Assessment/Plan:    Active Hospital Problems    Diagnosis     Alcohol use [Z72.89]     Hypotension [I95.9]     Hx of congestive heart failure [Z86.79]     Generalized weakness [R53.1]     Hyponatremia [E87.1]     HTN (hypertension) [I10]    hypovolemic shock  ARF  Rt rib fractures  Hyponatremia  COPD--no home O2  Alcohol dependence  MSW to see for alcohol support/rehab info  Macrocytic anemia   Moderate protein calorie malnutrition        nutrition dietary supplements   Sodium plateaued hep-locked IVF started sodium chloride 1 g tab 3 times daily and fluid restrict to 1.5L/day na 123 unchanged   duoneb QID  Urine sodium 29, renal US neg   Nephrology following entresto and torsemide still on hold hyponatremia due to severely decreased solute intake in combination with diuretic use. Víctor 29-37. UOsm 263! cosyntropin stim test neg    thiamine and folic acid p.o. Still on sodium chloride tabs . Na increased to 128 today on these and  fluid restriction   dw nephrology whether to dc the tabs today vs tomorrow. Pt would like to go home tomorrow aristides bc storm coming as well   She is ambulating hallway       DVT Prophylaxis: lovenox  Diet: ADULT DIET;  Regular; 1200 ml  ADULT ORAL NUTRITION SUPPLEMENT; Lunch, Dinner; Frozen Oral Supplement  Code Status: Full Code    PT/OT Eval Status: diasbled  and 17yo granddaughter   Outside stairs uses cane bc no handrail but otherwise independent     Dispo - home  Valentino Schultz daughter  [phone]    Mc Metcalf MD O-L Flap Text: The defect edges were debeveled with a #15 scalpel blade.  Given the location of the defect, shape of the defect and the proximity to free margins an O-L flap was deemed most appropriate.  Using a sterile surgical marker, an appropriate advancement flap was drawn incorporating the defect and placing the expected incisions within the relaxed skin tension lines where possible.    The area thus outlined was incised deep to adipose tissue with a #15 scalpel blade.  The skin margins were undermined to an appropriate distance in all directions utilizing iris scissors.

## 2022-07-05 RX ORDER — MAGNESIUM OXIDE TAB 400 MG (241.3 MG ELEMENTAL MG) 400 (241.3 MG) MG
TAB ORAL
Qty: 90 TABLET | Refills: 1 | Status: SHIPPED | OUTPATIENT
Start: 2022-07-05

## 2022-07-11 RX ORDER — HYDROXYZINE HYDROCHLORIDE 25 MG/1
TABLET, FILM COATED ORAL
Qty: 30 TABLET | Refills: 0 | OUTPATIENT
Start: 2022-07-11

## 2022-07-11 RX ORDER — HYDROXYZINE HYDROCHLORIDE 25 MG/1
TABLET, FILM COATED ORAL
Qty: 30 TABLET | Refills: 3 | Status: SHIPPED | OUTPATIENT
Start: 2022-07-11

## 2022-08-01 ENCOUNTER — OFFICE VISIT (OUTPATIENT)
Dept: ENT CLINIC | Age: 62
End: 2022-08-01
Payer: COMMERCIAL

## 2022-08-01 ENCOUNTER — OFFICE VISIT (OUTPATIENT)
Dept: CARDIOLOGY CLINIC | Age: 62
End: 2022-08-01
Payer: COMMERCIAL

## 2022-08-01 VITALS
HEIGHT: 62 IN | HEART RATE: 118 BPM | DIASTOLIC BLOOD PRESSURE: 97 MMHG | BODY MASS INDEX: 22.63 KG/M2 | SYSTOLIC BLOOD PRESSURE: 163 MMHG | WEIGHT: 123 LBS

## 2022-08-01 VITALS
WEIGHT: 121 LBS | HEART RATE: 94 BPM | SYSTOLIC BLOOD PRESSURE: 138 MMHG | OXYGEN SATURATION: 98 % | DIASTOLIC BLOOD PRESSURE: 84 MMHG | BODY MASS INDEX: 22.13 KG/M2

## 2022-08-01 DIAGNOSIS — H69.83 DYSFUNCTION OF BOTH EUSTACHIAN TUBES: ICD-10-CM

## 2022-08-01 DIAGNOSIS — R22.1 NECK MASS: ICD-10-CM

## 2022-08-01 DIAGNOSIS — I50.22 CHRONIC SYSTOLIC HEART FAILURE (HCC): Primary | ICD-10-CM

## 2022-08-01 DIAGNOSIS — U07.1 COVID: ICD-10-CM

## 2022-08-01 DIAGNOSIS — H91.93 BILATERAL HEARING LOSS, UNSPECIFIED HEARING LOSS TYPE: Primary | ICD-10-CM

## 2022-08-01 DIAGNOSIS — R42 DIZZINESS: ICD-10-CM

## 2022-08-01 DIAGNOSIS — I10 PRIMARY HYPERTENSION: Chronic | ICD-10-CM

## 2022-08-01 PROCEDURE — 99204 OFFICE O/P NEW MOD 45 MIN: CPT | Performed by: OTOLARYNGOLOGY

## 2022-08-01 PROCEDURE — 99214 OFFICE O/P EST MOD 30 MIN: CPT | Performed by: NURSE PRACTITIONER

## 2022-08-01 NOTE — PROGRESS NOTES
The [facility], [address] [place] [address] [address] [facility] Hospital[facility]ning [place] [address]  [phone]    PrimaryCare Doctor:  Kaitlin Abernathy MD  Primary Cardiologist: Dr. Fermin Oates    Chief Complaint   Patient presents with    Follow-up     No cc     History of Present Illness:  Gustavo Hurtado is a 62 y.o. female with PMH SHF, HTN, seasonal allergies, TANNER (non compliant with CPAP), COPD, smoker, HLP, +ETOH abuse, RISSA/ folic acid anemia (Seen by Dr. Cyndy Castillo). Patient was admitted to [facility] 6/4/2021 with sudden onset SOB. Associated with cough, tachycardia, BLE edema, fatigue. ECHO revealed new heart failure and hypervolemic hyponatremia. ECHO indicated severe anterolateral hypokinesis ischemia and EKG with anterolateral ST changes. Underwent R & LHC > non occlusive CAD. EF improved with GDMT 55-60%. Adm [facility] 1/2022 weakness/fatigue>hyponatremia. Required levophed for hypotension. Started on NA tabs, torsemide and entresto DC'd. Adm to ED 2/11/2022 with right pl effusion with atelectasis>thoracentesis 2/23 Dr. Monique Kaufman . Seen by Dr. Fermin Oates 2/16/22- torsemide and entresto restarted. Patient presents to [facility] cardiology for follow up for heart failure. She reports that she is now having \"balance issues. \" She has been seen by ENT> suspect R/T COVID (had in June 2022). HF symptoms have been stable. Mild SOB with activity is chronic. Improves with rest.  She continues to smoke ~ 1 PPD. BLE edema has resolved. Denies CP, orthopnea, wt gain, palpitations, syncope. Not wearing CPAP. BP slightly elevated- she states it is normally low. Unclear why higher today. Review of Systems:   General: Denies fever, chills  Skin: Denies skin changes, rash, itching, lesions.   HEENT: Denies headache, vision changes, nosebleeds, sore throat, nasal drainage  RESP: Denies cough, sputum,  wheeze  CARD: Denies palpitations,  murmur  GI:Denies nausea, vomiting, heartburn, loss of appetite, change in bowels  : Denies frequency, pain, incontinence, polyuria  VASC: Denies claudication, leg cramps, clots  MUSC/SKEL: Denies pain, stiffness, arthritis  PSYCH: Denies anxiety, depression, stress  NEURO: Denies numbness, tingling, weakness,change in mood or memory  HEME: Denies abn bruising, bleeding, anemia  ENDO: Denies intolerance to heat, cold, excessive thirst or hunger, hx thyroid disease    /84   Pulse 94   Wt 121 lb (54.9 kg)   SpO2 98%   BMI 22.13 kg/m²   Wt Readings from Last 3 Encounters:   08/01/22 121 lb (54.9 kg)   08/01/22 123 lb (55.8 kg)   06/15/22 125 lb 3.2 oz (56.8 kg)       Physical Exam:  GEN: Appears frail, no acute distress  SKIN: Pink, warm, dry. Nails without clubbing. HEENT: PERRLA. Normocephalic, atraumatic. Neck supple. No adenopathy. LUNG: AP diameter normal. Diminished BS bilateral. No wheeze, rales, or ronchi. Respiratory effort normal.  HEART: S1S2 A/R. No JVD. No carotid bruit. No murmur, rub or gallop. ABD: Soft, nontender. +BS X 4 quads. No hepatomegaly. EXT: Radial and pedal pulses 2+ and symmetric. Without varicosities. No edema. MUSCSKEL: Good ROM X4 extremities. No deformity. NEURO: A/O X3. Calm and cooperative. Past Medical History:   has a past medical history of Anxiety, GERD (gastroesophageal reflux disease), Gout, Hyperlipidemia, Hypertension, and Sleep apnea. Surgical History:   has a past surgical history that includes Knee arthroscopy; Carpal tunnel release; bladder suspension; Tonsillectomy; Carpal tunnel release (7-12-10); Carpal tunnel release (Left wrist); joint replacement; sinus surgery; and Foot surgery (3/2013). Social History:   reports that she has been smoking cigarettes. She has a 17.50 pack-year smoking history. She has never used smokeless tobacco. She reports current alcohol use of about 14.0 standard drinks per week. She reports that she does not use drugs.    Family History:   Family History   Problem Relation Age of Onset Cancer Mother     Heart Disease Mother     Diabetes Mother     High Blood Pressure Mother     Heart Disease Father     Heart Disease Maternal Grandmother        HomeMedications:  Prior to Admission medications    Medication Sig Start Date End Date Taking? Authorizing Provider   hydrOXYzine HCl (ATARAX) 25 MG tablet TAKE 1 TABLET BY MOUTH EVERY DAY AS NEEDED FOR ANXIETY. NO DRIVING NO ALCOHOL 9/60/68  Yes Jordan Helm MD   MAGNESIUM-OXIDE 400 (240 Mg) MG tablet TAKE 1 TABLET BY MOUTH DAILY 7/5/22  Yes Asad Gregg MD   gabapentin (NEURONTIN) 100 MG capsule 100 mg in the morning and at bedtime. 5/20/22  Yes Historical Provider, MD   torsemide (DEMADEX) 10 MG tablet TAKE 1 TABLET BY MOUTH DAILY 6/7/22  Yes FRANCISCO J Ervin CNP   carvedilol (COREG) 3.125 MG tablet TAKE 1 TABLET BY MOUTH TWICE DAILY 5/16/22  Yes FRANCISCO J Ervin CNP   ENTRESTO 24-26 MG per tablet TAKE 1 TABLET BY MOUTH TWICE DAILY 5/16/22  Yes FRANCISCO J Ervin CNP   atorvastatin (LIPITOR) 20 MG tablet Take 1 tablet by mouth daily 5/12/22  Yes Asad Gregg MD   folic acid (FOLVITE) 1 MG tablet Take 1 mg by mouth daily   Yes Historical Provider, MD   esomeprazole ([email or web address]) 40 MG delayed release capsule TAKE 1 CAPSULE BY MOUTH DAILY 2/15/22  Yes Nolvia Montalvo MD   allopurinol (ZYLOPRIM) 300 MG tablet TAKE 1 TABLET BY MOUTH DAILY  Patient taking differently: Take 300 mg by mouth in the morning.  1/13/22  Yes Nolvia Montalvo MD   budesonide-formoterol [facility]) 160-4.5 MCG/ACT AERO Inhale 2 puffs into the lungs 2 times daily 9/20/21  Yes Jomar Keenan MD   albuterol sulfate  (90 Base) MCG/ACT inhaler Inhale 2 puffs into the lungs every 6 hours as needed for Wheezing 6/21/21  Yes FRANCISCO J Dela Cruz CNP   fluticasone (FLONASE) 50 MCG/ACT nasal spray 2 sprays by Each Nostril route daily   Yes Historical Provider, MD   Multiple Vitamins-Minerals (THERAPEUTIC MULTIVITAMIN-MINERALS) tablet Take 1 tablet by mouth daily   Yes Historical Provider, MD   levocetirizine (XYZAL) 5 MG tablet Take 5 mg by mouth daily. 6/28/10  Yes Historical Provider, MD   aspirin 81 MG EC tablet Take 81 mg by mouth daily. Yes Historical Provider, MD   montelukast (SINGULAIR) 10 MG tablet Take 10 mg by mouth daily    Yes Historical Provider, MD        Allergies:  Augmentin [amoxicillin-pot clavulanate] and Pork-derived products       LABS: Results reviewed with patient today.     CBC:   Lab Results   Component Value Date/Time    WBC 5.3 06/17/2022 12:59 PM    WBC 7.0 02/15/2022 03:15 PM    WBC 8.0 02/11/2022 08:06 PM    RBC 3.65 06/17/2022 12:59 PM    RBC 3.21 02/15/2022 03:15 PM    RBC 2.91 02/11/2022 08:06 PM    HGB 12.4 06/17/2022 12:59 PM    HGB 11.2 02/15/2022 03:15 PM    HGB 10.3 02/11/2022 08:06 PM    HCT 35.9 06/17/2022 12:59 PM    HCT 34.6 02/15/2022 03:15 PM    HCT 31.5 02/11/2022 08:06 PM    MCV 98.2 06/17/2022 12:59 PM    .0 02/15/2022 03:15 PM    .4 02/11/2022 08:06 PM    RDW 14.2 06/17/2022 12:59 PM    RDW 14.3 02/15/2022 03:15 PM    RDW 15.0 02/11/2022 08:06 PM     06/17/2022 12:59 PM     02/15/2022 03:15 PM     02/11/2022 08:06 PM     BMP:  Lab Results   Component Value Date/Time     06/17/2022 12:59 PM     03/22/2022 03:10 PM     03/04/2022 03:59 PM    K 4.4 06/17/2022 12:59 PM    K 4.6 03/22/2022 03:10 PM    K 3.9 03/04/2022 03:59 PM    K 3.5 02/23/2022 10:17 AM    K 4.3 06/04/2021 11:41 AM    CL 99 06/17/2022 12:59 PM     03/22/2022 03:10 PM    CL 98 03/04/2022 03:59 PM    CO2 23 06/17/2022 12:59 PM    CO2 24 03/22/2022 03:10 PM    CO2 26 03/04/2022 03:59 PM    PHOS 4.3 02/15/2022 03:15 PM    PHOS 5.0 06/08/2021 04:25 AM    PHOS 3.2 06/07/2021 03:42 AM    BUN 6 06/17/2022 12:59 PM    BUN 7 03/22/2022 03:10 PM    BUN 7 03/04/2022 03:59 PM    CREATININE 0.6 06/17/2022 12:59 PM    CREATININE 0.7 03/22/2022 03:10 PM    CREATININE 0.6 03/04/2022 03:59 PM low, she agrees to monitor at home for a few weeks and notfy MHI of results, will increased entresto if BP remains high). Aldosterone Antagonist: none  SGLT2 Inhibitor: consider after GDMT optimized  2gm Na diet, daily weight, 64 oz fluid restriction  Avoid NSAIDS and other nephrotoxic meds  Cardiac Rehab: Not indicated for EF>35%  ICD:Not indicated for EF>35%    2.) Non ischemic CAD: Stable, no ischemia  DAPT: Continue asa  Beta Blocker: coreg  Lipid management/high intensity statin: lipitor  Risk factor management: high blood pressure, high cholesterol, Diabetes, smoking, obesity, family hx  Lifestyle modification: Heart healthy diet, regular exercise, weight loss, smoking cessation, stress reduction     3.) Hypertension:   Goal BP <130/80. Not met, will monitor at home and report findings to MHI - increase entresto if remains high. Non pharmacologic interventions include:   -weight loss  -heart healthy low sodium and low fat diet that consist of mostly fruits, vegetables and grains (Dash diet)  -limited amount of alcohol (no more than 1 drink/day for women, 2 drinks/day for men)  -regular physical activity  -no smoking  -stress reduction     4.) TANNER: non Compliant with CPAP    mild per Sleep Study 8/2021  Added CPAP 9/2021    5.) Smoking Addiction: Continues smoking 1 PPD  Smoking cessation counseling provided > 3 min. Benefits of smoking cessation include decrease risk for lung disease, heart disease, stroke, and peripheral vascular disease. Recommendations include medications such as Chantix, Zyban or nicotine replacement products, counseling and resources such as 1-800-QUIT-NOW.     7.) COPD: Follows with Dr. Osmany Williamson 6 mos Dr. Abdulkadir Clemente or sooner if needed.     I appreciate the opportunity of cooperating in the care of this individual.    ADILIA Spring, FRANCISCO J - CNP, CNP, 8/1/2022,2:22 PM

## 2022-08-01 NOTE — PROGRESS NOTES
[place]      Patient Name: [address] [place] [address] Record Number:  [id number]  Primary Care Physician:  Benedict Mendoza MD  Date of Consultation: 8/1/2022    Chief Complaint: Ear issues        HISTORY OF PRESENT ILLNESS  Vandana Torres is a(n) 62 y.o. female who presents for evaluation of ear issues. The patient says that she got COVID about a month ago. Since that time she has felt as though her ears are a bit plugged up. She is also felt off balance and has had trouble walking. She denies true vertigo. Most of her COVID symptoms are resolved except for this and some fatigue. She is a smoker. Has not noticed any neck masses. Denies any other symptoms. She is never had surgery on her ears. No history of ear tubes. REVIEW OF SYSTEMS  As above    PHYSICAL EXAM  GENERAL: No Acute Distress, Alert and Oriented, no Hoarseness, strong voice  EYES: EOMI, Anti-icteric  HENT:   Head: Normocephalic and atraumatic. Face:  Symmetric, facial nerve intact, no sinus tenderness  Right Ear: Normal external ear, normal external auditory canal, intact tympanic membrane with normal mobility and aerated middle ear  Left Ear: Normal external ear, normal external auditory canal, intact tympanic membrane with normal mobility and aerated middle ear  Mouth/Oral Cavity:  normal lips, Uvula is midline, no mucosal lesions, no trismus  Oropharynx/Larynx:  normal oropharynx  Nose:Normal external nasal appearance. Anterior rhinoscopy shows no polyps or purulent  NECK: Superficial neck mass without overlying skin changes  a            ASSESSMENT/PLAN  1. Bilateral hearing loss, unspecified hearing loss type  Patient has some subjective hearing loss with completely normal ear exam.  I would like for her to get an audiogram as hearing loss can certainly present this way.     2. Dysfunction of both eustachian tubes  She could have some very mild eustachian tube dysfunction after getting COVID. I would like for her to use Flonase daily. She said she already had Flonase at home. 3. Dizziness  People have been getting some vague dizziness with COVID that tends to resolve. She does not describe true vertigo. We will evaluate with a audiogram.  If the dizziness persists she would probably need to see a neurologist.    4. COVID  The vague hearing loss and dizziness could be related to [place]. I would expect this to slowly get better if that is the case. 5. Neck mass  Superficial neck mass on the left is consistent with a lipoma. It is not bothering the patient. We can observe for now. I have performed a head and neck physical exam personally or was physically present during the key or critical portions of the service. This note was generated completely or in part utilizing Dragon dictation speech recognition software. Occasionally, words are mistranscribed and despite editing, the text may contain inaccuracies due to incorrect word recognition. If further clarification is needed please contact the office at [phone].

## 2022-08-01 NOTE — PATIENT INSTRUCTIONS
Take Blood Pressure at home. Goal BP <130/80. [facility] 2-4 weeks.

## 2022-08-02 ENCOUNTER — TELEPHONE (OUTPATIENT)
Dept: ENT CLINIC | Age: 62
End: 2022-08-02

## 2022-08-02 DIAGNOSIS — R42 DIZZINESS: Primary | ICD-10-CM

## 2022-08-02 DIAGNOSIS — H69.83 DYSFUNCTION OF BOTH EUSTACHIAN TUBES: ICD-10-CM

## 2022-08-02 NOTE — TELEPHONE ENCOUNTER
Pt saw Dr Kimberly Bella 8/1/22 for balance issues. He recommended she go to PT. She called to schedule appt but was told she needs a new order. Please let her know when it's been put in so she can call to schedule.

## 2022-08-08 ENCOUNTER — HOSPITAL ENCOUNTER (OUTPATIENT)
Dept: PHYSICAL THERAPY | Age: 62
Setting detail: THERAPIES SERIES
Discharge: HOME | End: 2022-08-08
Payer: COMMERCIAL

## 2022-08-08 PROCEDURE — 97112 NEUROMUSCULAR REEDUCATION: CPT

## 2022-08-08 PROCEDURE — 97161 PT EVAL LOW COMPLEX 20 MIN: CPT

## 2022-08-08 PROCEDURE — 97530 THERAPEUTIC ACTIVITIES: CPT

## 2022-08-08 NOTE — FLOWSHEET NOTE
[address] [facility] [place]. [address]  Phone: [phone]   Fax:     [phone]    Physical Therapy Treatment Note/ Progress Report:   Date:  2022    Patient Name:  Rebecca Haile    :  1960  MRN: [id number]    Pertinent Medical History: HTN, anxiety, gout, R TKR    Medical/Treatment Diagnosis Information:  Diagnosis: R42 (ICD-10-CM) - Dizziness  Treatment Diagnosis: Decreased balance , Vestibular impairment    Insurance/Certification information:  PT Insurance Information: [employer]  Physician Information:  Referring Provider (secondary): Anayeli Webb MD  Plan of care signed (Y/N): []  Yes [x]  No     Date of Patient follow up with Physician:      Progress Report: []  Yes  [x]  No     Date Range for reporting period:  Beginnin2022  Ending:     Progress report due (10 Rx/or 30 days whichever is less):  85    Recertification due (POC duration/ or 90 days whichever is less):  22    Visit # Insurance Allowable Auth required?  bomn []  Yes [x]  No     Latex Allergy:  [x]NO      []YES  Preferred Language for Healthcare:   [x]English       []other:    Functional Scale:      Date assessed: at eval  Test: FOTO - balance  Score: 51    Pain level:  0/10     History of Injury: Pt states she had COVID in the end of . Shortly after this patient notes having significant issues with her balance. Pt also states that her ears feel clogged as well. Pt states she has fallen once since onset but that was caused by her slipper falling off while going down the stairs. Pt states she uses her cane when doing stairs to get in and out of her house but otherwise she just grabs onto things as she is walking. Pt denies any episodes of dizziness or spinning. Pt also denies any blurred vision.     SUBJECTIVE:  See eval    OBJECTIVE:   LE Strength:    LEFT RIGHT    Hip flexion 4+/5 4+/5    Hip IR 5/5 5/5    Hip ER 5/5 5/5    Knee extension 4+/5 4+/5    Knee flexion 5/5 5/5    Ankle 4+/5       Postural Control Tests:  Clinical Test of Sensory Interaction for Balance (CTSIB) performed in Romberg stance  CONDITION TIME STRATEGY SWAY    Eyes open, firm surface X30 sec ankle min    Eyes closed, firm surface X30 sec ankle Min-mod    Eyes open, foam surface X30 sec ankle moderate    Eyes closed, foam surface X25 sec hip moderate     RESTRICTIONS/PRECAUTIONS:     Exercises/Interventions:     Therapeutic Exercises (27472) Min: Resistance/Reps Notes/Cues        Ankle tband Add                         Therapeutic Activities (18079) Min: 15     Pt Education X15 mins - Reviewed with patient pathology of vestibular hypofunction and its effects on balance and ADLs. Also reviewed plasticity/adaptability of the vestibular system and the role of PT. Tandem add    Advanced Gait:  *amb w/ head turns and nods  *marching  *retro gait  *amb while counting backwards from 20   Add    Add  Add  add              Neuromuscular Re-ed (58747) Min: 10     Type I viewing exercise X5 mins - pt instructed on how to perform at home    NBOS:  *with eyes closed  *with head turns / nods   X1 min  add    AirEx:   *NBOS with eyes open  *NBOS with eyes closed   *1 foot on mat / 1 foot on ground balance  *step up and down off mat    2x 1 min each   2x 1 min each   Add    add                   Manual Intervention (08347) Min:                Modalities  Min:                      Other Therapeutic Activities: Pt was educated on PT POC, Diagnosis, Prognosis, pathomechanics as well as frequency and duration of scheduling future physical therapy appointments. Time was also taken on this day to answer all patient questions and participation in PT. Reviewed appointment policy in detail with patient and patient verbalized understanding.      Home Exercise Program: Patient was instructed in the following for HEP:   Access Code: [place] LE, proximal hip and/or LS spine soft tissue/joints for the purpose of modulating pain, promoting relaxation,  increasing ROM, reducing/eliminating soft tissue swelling/inflammation/restriction, improving soft tissue extensibility and allowing for proper ROM for normal function with self care, mobility, lifting and ambulation. Charges:  Timed Code Treatment Minutes: 25   Total Treatment Minutes: 50      [x] EVAL (LOW) 73011 (typically 20 minutes face-to-face)  [] EVAL (MOD) 58178 (typically 30 minutes face-to-face)  [] EVAL (HIGH) 57649 (typically 45 minutes face-to-face)  [] RE-EVAL     [] AT(19022) x     [] Dry needle 1 or 2 Muscles (02185)  [x] NMR (95033) x     [] Dry needle 3+ Muscles (96128)  [] Manual (13488) x     [] Ultrasound (88672) x  [x] TA (61449) x     [] Mech Traction (19907)  [] ES(attended) (99752)     [] ES (un) (23383):   [] Vasopump (92202) [] Ionto (15506)   [] Other:    GOALS:  Patient stated goal: \"Be able to walk easier\"  [] Progressing: [] Met: [] Not Met: [] Adjusted     Therapist goals for Patient:  Short Term Goals: To be achieved in: 2 weeks  1. Independent in HEP and progression per patient tolerance, in order to prevent re-injury. [] Progressing: [] Met: [] Not Met: [] Adjusted  2. Patient will have a decrease in dizziness/imbalance/symptoms to by 40% to facilitate improvement in movement, function, balance and ADLs as indicated by Functional Deficits. [] Progressing: [] Met: [] Not Met: [] Adjusted     Long Term Goals: To be achieved in: at discharge  1. Increase FOTO functional outcome score from 51 to 55  to assist with reaching prior level of function. [] Progressing: [] Met: [] Not Met: [] Adjusted  2. Patient will demonstrate negative oculomotor special testing/positional testing as indicated by patients Functional Deficits. [] Progressing: [] Met: [] Not Met: [] Adjusted  3. Patient will return to functional activities including housework without LOB.    [] Progressing: [] Met: [] Not Met: [] Adjusted  4. Pt will report 40% increased ease with ambulation in the community. [] Progressing: [] Met: [] Not Met: [] Adjusted         ASSESSMENT:  See eval    Treatment/Activity Tolerance:  [x] Patient tolerated treatment well [] Patient limited by fatique  [] Patient limited by pain  [] Patient limited by other medical complications  [] Other:     Overall Progression Towards Functional goals/ Treatment Progress Update:  [] Patient is progressing as expected towards functional goals listed. [] Progression is slowed due to complexities/Impairments listed. [] Progression has been slowed due to co-morbidities. [x] Plan just implemented, too soon to assess goals progression <30days   [] Goals require adjustment due to lack of progress  [] Patient is not progressing as expected and requires additional follow up with physician  [] Other    Prognosis for POC: [x] Good [] Fair  [] Poor    Patient requires continued skilled intervention: [x] Yes  [] No      PLAN:   [] Continue per plan of care [] Alter current plan (see comments)  [x] Plan of care initiated [] Hold pending MD visit [] Discharge    Electronically signed by: German Walker PT , OMT-C,  [id number]      Note: If patient does not return for scheduled/recommended follow up visits, this note will serve as a discharge from care along with the most recent update on progress.

## 2022-08-08 NOTE — PLAN OF CARE
[facility]. [address]  Phone: [phone]   Fax:     [phone]          Physical Therapy Certification    Dear Referring Provider (secondary): Gama Almanza MD,    We had the pleasure of evaluating the following patient for physical therapy services at [facility]. A summary of our findings can be found in the initial assessment below. This includes our plan of care. If you have any questions or concerns regarding these findings, please do not hesitate to contact me at the office phone number checked above. Thank you for the referral.       Physician Signature:_______________________________Date:__________________  By signing above (or electronic signature), therapists plan is approved by physician        Patient: Baldemar Rich   : 1960   MRN: [id number]  Referring Physician: Referring Provider (secondary): Gama Almanza MD      Evaluation Date: 2022        Medical Diagnosis Information:  Diagnosis: R42 (ICD-10-CM) - Dizziness   Treatment Diagnosis: Decreased balance , Vestibular impairment                                           Insurance information: PT Insurance Information: Cign[employer]     Precautions/ Contra-indications:   Latex Allergy:  [x]NO      []YES  Preferred Language for Healthcare:   [x]English       []other:    C-SSRS Triggered by Intake questionnaire (Past 2 wk assessment ):   [x] No, Questionnaire did not trigger screening.   [] Yes, Patient intake triggered C-SSRS Screening      [] C-SSRS Screening completed  [] PCP notified via Epic     SUBJECTIVE: Patient stated complaint:  Pt states she had COVID in the end of . Shortly after this patient notes having significant issues with her balance. Pt also states that her ears feel clogged as well.   Pt states she has fallen once since onset but that was caused by her slipper falling off while going down the stairs. Pt states she uses her cane when doing stairs to get in and out of her house but otherwise she just grabs onto things as she is walking. Pt denies any episodes of dizziness or spinning. Pt also denies any blurred vision. Relevant Medical History: HTN, anxiety, gout, R TKR  Pain Scale: 0/10     Type: []Constant   []Intermittent  []Radiating []Localized []other:    Dizziness Scale: 0/10    Description of symptoms: [] Vertigo [x]  Off-balance [] Lightheadedness    Symptoms are getting:  [] Better [] Worse  [x] Same      [] Episodic    Description of Spells: [] Constant [] Spontaneous [] Motion Induced [] Induced by position changes [] Other:    Length of time spells occur: [] Seconds [] Minutes  [] Hours [] Days [] Other:     Date of onset: end of June    What increases/provokes symptoms? Prolonged walking    What decreases/eases symptoms?  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, desk/computer work    Occupation/School: works in insurance    Living Status/Prior Level of Function: Prior to this injury / incident, patient was independent with ADLs and IADLs, able to walk without LOB    OBJECTIVE:     Musculoskeletal Screen  Cervical spine complaints:   Cervical Pain: 0/10  Cervical spine ROM:  [x]  WNL [] Impaired:    LE ROM:    LE Strength:   LEFT RIGHT    Hip flexion 4+/5 4+/5    Hip IR 5/5 5/5    Hip ER 5/5 5/5    Knee extension 4+/5 4+/5    Knee flexion 5/5 5/5    Ankle 4+/5      Posture:      Somatosensory:  Light touch:  [x] Normal [] Impaired Comments:    Coordination:  Rapid alternating movements: [x] Normal [] Dysdiadokinesia   Finger to Nose:   [x] Normal [] Dysmetric  Heel to Shin:    [x] Normal [] Ataxic    Postural Control Tests:  Clinical Test of Sensory Interaction for Balance (CTSIB) performed in Romberg stance  CONDITION TIME STRATEGY SWAY    Eyes open, firm surface X30 sec  ankle min    Eyes closed, firm surface X30 sec  ankle Min-mod    Eyes open, foam surface X30 sec  ankle moderate    Eyes closed, foam surface X25 sec  hip moderate     Tandem stance:     Gait: Patient demonstrates ataxic gait pattern with increased ANUP and decreased marian. Pt tends to reach out to hold onto things as she is walking. Oculomotor/Vestibular Examination:    Spontaneous nystagmus:  [] Left  [] Right [x] Absent  Gaze-Evoked nystagmus with fixation present:   Primary [] Present [x] Absent   Right  [] Present [x] Absent     Left  [] Present [x] Absent   VOR Head Thrust Test: [] Normal [x] Abnormal  Comments: L beating nystagmus noted with head thrust to the right, R beating nystagmus with head thrust to the left  VOR Cancellation:  [x] Normal [] Abnormal Comments:   Smooth Pursuit:  [] Normal [x] Abnormal Comments: saccadic intrusions noted  Saccades:   [] Normal [x] Abnormal Comments: corrective saccades noted  Convergence:   [x] Normal [] Abnormal Comments:     Positional Testing  R Hallpike-Mattoon maneuver:    Nystagmus:  [] Yes  [x] No  [] Duration:       [] Direction:    Vertigo:  [] Yes  [x] No  [] Duration:   L Hallpike-Mattoon maneuver:   Nystagmus:  [] Yes  [x] No  [] Duration:       [] Direction:    Vertigo:  [] Yes  [x] No  [] Duration:   Supine roll head right:   Nystagmus:  [] Yes  [x] No  [] Duration:       [] Direction:    Vertigo:  [] Yes  [x] No  [] Duration:   Supine roll head left:   Nystagmus:  [] Yes  [x] No  [] Duration:       [] Direction:    Vertigo:  [] Yes  [x] No  [] Duration:     Outcome Measures  FOTO - vestibular =  51  TUG -  18.17 secs         [x] Patient history, allergies, meds reviewed. Medical chart reviewed. See intake form. Instability    [] Decreased tolerance for ADLs    [] Decreased functional strength   [x] Reduced Balance/Proprioceptive control   [] Reduced ability to hear/focus   [] Noted cervical/thoracic/GHJ joint hypomobility   [] Noted cervical/thoracic/GHJ joint hypermobility   [] Decreased cervical/UE functional ROM   [] Noted Headache pain aggravated by neck movements with/without dizziness   [] Abnormal reflexes/sensation/myotomal/dermatomal deficits   [] Decreased DCF control or ability to hold head up   [] Decreased RC/scapular/core strength and neuromuscular control     Functional Activity Limitations (from functional questionnaire and intake)   []Reduced ability to tolerate prolonged functional positions   []Reduced ability or difficulty with changes of positions or transfers between positions  []Reduced ability to transfer in/out of bed or rolling in bed   []Reduced ability or tolerance with driving, reading and/or computer work   []Reduced ability to perform lifting, reaching, carrying tasks   []Reduced ability to forward bend   [x]Reduced ability to ambulate prolonged functional periods/distances/surfaces   [x]Reduced ability to ascend/descend stairs  []Reduced ability to concentrate/focus  [x]Reduced ability to turn/pitch head rapidly  [x]Reduced ability to self-correct for losses of balance []other:       Participation Restrictions   [x]Reduced participation in self-care activities   [x]Reduced participation in home management activities   []Reduced participation in work activities   []Reduced participation in social activities   []Reduced participation in sport/recreational activities    Classification:   []Signs/symptoms consistent with BPPV (benign paroxysmal positional vertigo)      [x]Signs/symptoms consistent with bilateral peripheral vestibulopathy (i.e., vestibular neuritis, labyrinthitis, acoustic neuroma)   []Signs/symptoms consistent with central vestibulopathy  []Signs/symptoms consistent with migraine-related vestibulopathy  []Signs/symptoms consistent with Meniere's disease / post-traumatic hydrops  []Signs/symptoms consistent with perilymphatic fistula   []Signs/symptoms consistent with cervicogenic dizziness  [x]Signs/symptoms consistent with gait instability   []Signs/symptoms consistent with motion sensitivity    []signs/symptoms consistent with neck pain with mobility deficits     []signs/symptoms consistent with neck pain with movement coordinated impairments    []signs/symptoms consistent with neck pain with radiating pain    []signs/symptoms consistent with neck pain with headaches (cervicogenic)    []Signs/symptoms consistent with nerve root involvement including myotome & dermatome dysfunction   []sign/symptoms consistent with facet dysfunction of cervical and thoracic spine    []signs/symptoms consistent suggesting central cord compression/UMN syndromes   []signs/symptoms consistent with discogenic cervical pain   []signs/symptoms consistent with rib dysfunction   []signs/symptoms consistent with postural dysfunction   []signs/symptoms consistent with shoulder pathology    []signs/symptoms consistent with post-surgical status including decreased ROM, strength and function.    []signs/symptoms consistent with pathology which may benefit from Dry Needling  []signs/symptoms which may limit the use of advanced manual therapy techniques: (Elevated CV risk profile, recent trauma, intolerance to end range positions, prior TIA, visual issues, UE neurological compromise)   []other:      Prognosis/Rehab Potential:      []Excellent   [x]Good    []Fair   []Poor    Tolerance of evaluation/treatment:    []Excellent   [x]Good    []Fair   []Poor     Physical Therapy Evaluation Complexity Justification  [x] A history of present problem with:  [] no personal factors and/or comorbidities that impact the plan of care;  [x]1-2 personal factors and/or comorbidities that impact the plan of care  []3 personal factors and/or comorbidities that impact the plan of care  [x] An examination of body systems using standardized tests and measures addressing any of the following: body structures and functions (impairments), activity limitations, and/or participation restrictions;:  [] a total of 1-2 or more elements   [x] a total of 3 or more elements   [] a total of 4 or more elements   [x] A clinical presentation with:  [x] stable and/or uncomplicated characteristics   [] evolving clinical presentation with changing characteristics  [] unstable and unpredictable characteristics;   [x] Clinical decision making of [x] low, [] moderate, [] high complexity using standardized patient assessment instrument and/or measurable assessment of functional outcome. [x] EVAL (LOW) 65664 (typically 15 minutes face-to-face)  [] EVAL (MOD) 82881 (typically 30 minutes face-to-face)  [] EVAL (HIGH) 16562 (typically 45 minutes face-to-face)  [] RE-EVAL     PLAN:   Today's Treatment:    [] See flowsheet   [] Patient treated with canalith repositioning maneuver   [] Education materials provided on BPPV/Vestibular Dysfunction/Habituation   [] Precautions provided and patient to follow precautions for next 24 hours in regards to BPPV management   [x] Written home exercise instructions   [] Other:    Frequency/Duration:  2 days per week for 4-6 Weeks:  Interventions:  [x]  Therapeutic exercise including: strength training, ROM, for LEs, cervical spine & UEs  [x]  NMR activation and proprioception for BLEs, vestibular training/habituation, balance, coordination  [x]  Manual therapy as indicated via: canalith repositioning maneuvers, Dry Needling/IASTM, STM, PROM, Gr I-IV mobilizations, manipulation.    [x]  Modalities as needed that may include: thermal agents, E-stim, Biofeedback, US, iontophoresis as indicated  [x]  Gait training  [x]  Patient education on BPPV/vestibular function, balance, postural re-education, activity modification, progression of HEP.    HEP instruction: Written HEP instructions provided and reviewed. Access Code: [email or web address]  Exercises  NBOS with EC - 1 x daily - 7 x weekly - 1 sets - 2 reps - 1 min hold  Tandem Stance - 1 x daily - 7 x weekly - 1 sets - 2 reps - 1 min hold      GOALS:  Patient stated goal: \"Be able to walk easier\"  [] Progressing: [] Met: [] Not Met: [] Adjusted    Therapist goals for Patient:   Short Term Goals: To be achieved in: 2 weeks  1. Independent in HEP and progression per patient tolerance, in order to prevent re-injury. [] Progressing: [] Met: [] Not Met: [] Adjusted  2. Patient will have a decrease in dizziness/imbalance/symptoms to by 40% to facilitate improvement in movement, function, balance and ADLs as indicated by Functional Deficits. [] Progressing: [] Met: [] Not Met: [] Adjusted    Long Term Goals: To be achieved in: at discharge  1. Increase FOTO functional outcome score from 51 to 55  to assist with reaching prior level of function. [] Progressing: [] Met: [] Not Met: [] Adjusted  2. Patient will demonstrate negative oculomotor special testing/positional testing as indicated by patients Functional Deficits. [] Progressing: [] Met: [] Not Met: [] Adjusted  3. Patient will return to functional activities including housework without LOB. [] Progressing: [] Met: [] Not Met: [] Adjusted  4. Pt will report 40% increased ease with ambulation in the community.    [] Progressing: [] Met: [] Not Met: [] Adjusted     Electronically signed by:  Lalitha Vila , T-C,  [id number]

## 2022-08-12 ENCOUNTER — HOSPITAL ENCOUNTER (OUTPATIENT)
Dept: PHYSICAL THERAPY | Age: 62
Setting detail: THERAPIES SERIES
Discharge: HOME | End: 2022-08-12
Payer: COMMERCIAL

## 2022-08-12 DIAGNOSIS — H91.90 HEARING LOSS, UNSPECIFIED HEARING LOSS TYPE, UNSPECIFIED LATERALITY: Primary | ICD-10-CM

## 2022-08-12 PROCEDURE — 97112 NEUROMUSCULAR REEDUCATION: CPT

## 2022-08-12 PROCEDURE — 97110 THERAPEUTIC EXERCISES: CPT

## 2022-08-12 NOTE — FLOWSHEET NOTE
[address] [facility] [place] [address]  Phone: [phone]   Fax:     [phone]    Physical Therapy Treatment Note/ Progress Report:   Date:  2022    Patient Name:  Saeed Jara    :  1960  MRN: [id number]    Pertinent Medical History: HTN, anxiety, gout, R TKR    Medical/Treatment Diagnosis Information:  Diagnosis: R42 (ICD-10-CM) - Dizziness  Treatment Diagnosis: Decreased balance , Vestibular impairment    Insurance/Certification information:  PT Insurance Information: [employer]  Physician Information:  Referring Provider (secondary): Memo Michelle MD  Plan of care signed (Y/N): []  Yes [x]  No     Date of Patient follow up with Physician:      Progress Report: []  Yes  [x]  No     Date Range for reporting period:  Beginnin2022  Ending:     Progress report due (10 Rx/or 30 days whichever is less):  28    Recertification due (POC duration/ or 90 days whichever is less):  22    Visit # Insurance Allowable Auth required?  bomn []  Yes [x]  No     Latex Allergy:  [x]NO      []YES  Preferred Language for Healthcare:   [x]English       []other:    Functional Scale:      Date assessed: at eval  Test: FOTO - balance  Score: 51  Reassessment   Pain level:  0/10     History of Injury: Pt states she had COVID in the end of . Shortly after this patient notes having significant issues with her balance. Pt also states that her ears feel clogged as well. Pt states she has fallen once since onset but that was caused by her slipper falling off while going down the stairs. Pt states she uses her cane when doing stairs to get in and out of her house but otherwise she just grabs onto things as she is walking. Pt denies any episodes of dizziness or spinning. Pt also denies any blurred vision.     SUBJECTIVE:  Pt comes to therapy with SPC reports being disappointed she has to use it. She typically does not use one at baseline, goal is to wean off it. Despite this, however, she has been compliant using per PT rec. Pt reports fear of falling has developed. Reports was compliant with balance exs in HEP thus far.  Pt reports residual complaints from COVID, \"fatigue runny nose SOB at times and balance is bad\"     OBJECTIVE:   8/12/2022  LE Strength:    LEFT RIGHT    Hip flexion 4+/5 4+/5    Hip IR 5/5 5/5    Hip ER 5/5 5/5    Knee extension 4+/5 4+/5    Knee flexion 5/5 5/5    Ankle 4/5 johnny       8/8/2022  Postural Control Tests:  Clinical Test of Sensory Interaction for Balance (CTSIB) performed in Romberg stance  CONDITION TIME STRATEGY SWAY    Eyes open, firm surface X30 sec ankle min    Eyes closed, firm surface X30 sec ankle Min-mod    Eyes open, foam surface X30 sec ankle moderate    Eyes closed, foam surface X25 sec hip moderate     RESTRICTIONS/PRECAUTIONS:     Exercises/Interventions:     Therapeutic Exercises (59531) Min: 15 Resistance/Reps Notes/Cues   Heel raises supported  20 reps     Toe raises supported  20 reps     Ankle tband INv Jimbo 20 ea johnny  Mod resistance red TB Added to HEP daily                        Therapeutic Activities (38200) Min:        Tandem add    Advanced Gait:  *amb w/ head turns and nods  *marching  *retro gait  *amb while counting backwards from 20   Add    Add  Add  add              Neuromuscular Re-ed (63520) Min: 30  PT provided education over balance, including anatomy of balance centers vision vestibular/inner ear, musculoskeletal/proprioception, and what we are strengthening and eliminating with eyes closed activities firm vs foam activities to provide further education and rationale    Type I viewing exercise Review HEP  Denies questions verbalized understanding and indep  Reports has not attempted in standing only does in sitting educated this will be a progression in standing     NBOS: FIRM  *with eyes open   *with head turns / nods  *cross body cueing for activities related to improving balance, coordination, kinesthetic sense, posture, motor skill, proprioception and motor activation to allow for proper function of core, proximal hip and LE with self care and ADLs and functional mobility.   [] (24667) Gait Re-education- Provided training and instruction to the patient for proper LE, core and proximal hip recruitment and positioning and eccentric body weight control with ambulation re-education including up and down stairs     Home Exercise Program:    [x] (14080) Reviewed/Progressed HEP activities related to strengthening, flexibility, endurance, ROM of core, proximal hip and LE for functional self-care, mobility, lifting and ambulation/stair navigation   [x] (65787)Reviewed/Progressed HEP activities related to improving balance, coordination, kinesthetic sense, posture, motor skill, proprioception of core, proximal hip and LE for self care, mobility, lifting, and ambulation/stair navigation      Manual Treatments:  PROM / STM / Oscillations-Mobs:  G-I, II, III, IV (PA's, Inf., Post.)  [] (61385) Provided manual therapy to mobilize LE, proximal hip and/or LS spine soft tissue/joints for the purpose of modulating pain, promoting relaxation,  increasing ROM, reducing/eliminating soft tissue swelling/inflammation/restriction, improving soft tissue extensibility and allowing for proper ROM for normal function with self care, mobility, lifting and ambulation.      Charges:  Timed Code Treatment Minutes: 30 mins NMR, 15 mins TE   Total Treatment Minutes: 45      [] EVAL (LOW) 02216 (typically 20 minutes face-to-face)  [] EVAL (MOD) 29645 (typically 30 minutes face-to-face)  [] EVAL (HIGH) 93047 (typically 45 minutes face-to-face)  [] RE-EVAL     [x] IO(67683) x1     [] Dry needle 1 or 2 Muscles (89564)  [x] NMR (15479) x   2  [] Dry needle 3+ Muscles (30274)  [] Manual (02539) x     [] Ultrasound (74577) x  [] TA (17999) x     [] [facility] Traction (86867)  [] ES(attended) (34782)     [] ES (un) (50687):   [] Vasopump (43865) [] Ionto (75027)   [] Other:    GOALS:  Patient stated goal: \"Be able to walk easier\"  [] Progressing: [] Met: [] Not Met: [] Adjusted     Therapist goals for Patient:  Short Term Goals: To be achieved in: 2 weeks  1. Independent in HEP and progression per patient tolerance, in order to prevent re-injury. [] Progressing: [] Met: [] Not Met: [] Adjusted  2. Patient will have a decrease in dizziness/imbalance/symptoms to by 40% to facilitate improvement in movement, function, balance and ADLs as indicated by Functional Deficits. [] Progressing: [] Met: [] Not Met: [] Adjusted     Long Term Goals: To be achieved in: at discharge  1. Increase FOTO functional outcome score from 51 to 55  to assist with reaching prior level of function. [] Progressing: [] Met: [] Not Met: [] Adjusted  2. Patient will demonstrate negative oculomotor special testing/positional testing as indicated by patients Functional Deficits. [] Progressing: [] Met: [] Not Met: [] Adjusted  3. Patient will return to functional activities including housework without LOB. [] Progressing: [] Met: [] Not Met: [] Adjusted  4. Pt will report 40% increased ease with ambulation in the community. [] Progressing: [] Met: [] Not Met: [] Adjusted         ASSESSMENT:  Pt being seen for balance problems, complicated by hx of COVID with residual issues remaining impact tatiana for daily routines such as fatigue/SOB, hx of neuropathy in BLE, tobacco use, and anxiety. Pt tatiana progressive strengthening exs for ankles. Rec to complete daily in HEP. Pt will continue to benefit from skilled PT to address strength, balance and gait needed max functional indep and reduce fear of falling.      Treatment/Activity Tolerance:  [x] Patient tolerated treatment well [] Patient limited by fatique  [] Patient limited by pain  [] Patient limited by other medical complications  [] Other:     Overall Progression Towards Functional goals/ Treatment Progress Update:  [] Patient is progressing as expected towards functional goals listed. [] Progression is slowed due to complexities/Impairments listed. [] Progression has been slowed due to co-morbidities. [x] Plan just implemented, too soon to assess goals progression <30days   [] Goals require adjustment due to lack of progress  [] Patient is not progressing as expected and requires additional follow up with physician  [] Other    Prognosis for POC: [x] Good [] Fair  [] Poor    Patient requires continued skilled intervention: [x] Yes  [] No      PLAN:   [x] Continue per plan of care [] Alter current plan (see comments)  [x] Plan of care initiated [] Hold pending MD visit [] Discharge    Electronically signed by: Sigifredo Benítez, PT , DPT      Note: If patient does not return for scheduled/recommended follow up visits, this note will serve as a discharge from care along with the most recent update on progress.

## 2022-08-17 ENCOUNTER — APPOINTMENT (OUTPATIENT)
Dept: PHYSICAL THERAPY | Age: 62
End: 2022-08-17
Payer: COMMERCIAL

## 2022-08-19 ENCOUNTER — HOSPITAL ENCOUNTER (OUTPATIENT)
Dept: PHYSICAL THERAPY | Age: 62
Setting detail: THERAPIES SERIES
Discharge: HOME | End: 2022-08-19
Payer: COMMERCIAL

## 2022-08-19 NOTE — FLOWSHEET NOTE
[address] [facility] [address] [place], [place] [address] [address]  Phone: [phone]   Fax:     [phone]    Physical Therapy  Cancellation/No-show Note  Patient Name:  Xi Moreno  :  1960   Date:  2022  Cancelled visits to date: 1  No-shows to date: 0    Patient status for today's appointment patient:  [x]  Cancelled  []  Rescheduled appointment  []  No-show     Reason given by patient:  []  Patient ill  []  Conflicting appointment  []  No transportation    []  Conflict with work  []  No reason given  [x]  Other:     Comments:  didn't sleep well last night.      Phone call information:   []  Phone call made today to patient at _ time at number provided:      []  Patient answered, conversation as follows:    []  Patient did not answer, message left as follows:  []  Phone call not made today    Electronically signed by:  Grace Khalil, [address] [facility] , [facility],  [id number]

## 2022-08-22 ENCOUNTER — HOSPITAL ENCOUNTER (OUTPATIENT)
Dept: PHYSICAL THERAPY | Age: 62
Setting detail: THERAPIES SERIES
Discharge: HOME | End: 2022-08-22
Payer: COMMERCIAL

## 2022-08-22 PROCEDURE — 97112 NEUROMUSCULAR REEDUCATION: CPT

## 2022-08-22 PROCEDURE — 97110 THERAPEUTIC EXERCISES: CPT

## 2022-08-22 NOTE — FLOWSHEET NOTE
[address] [facility] [address] [place] [address]  Phone: [phone]   Fax:     [phone]    Physical Therapy Treatment Note/ Progress Report:   Date:  2022    Patient Name:  Gustavo Hurtado    :  1960  MRN: [id number]    Pertinent Medical History: HTN, anxiety, gout, R TKR    Medical/Treatment Diagnosis Information:  Diagnosis: R42 (ICD-10-CM) - Dizziness  Treatment Diagnosis: Decreased balance , Vestibular impairment    Insurance/Certification information:  PT Insurance Information: [employer]a  Physician Information:  Referring Provider (secondary): Tashi Ibarra MD  Plan of care signed (Y/N): []  Yes [x]  No     Date of Patient follow up with Physician:      Progress Report: []  Yes  [x]  No     Date Range for reporting period:  Beginnin2022  Ending:     Progress report due (10 Rx/or 30 days whichever is less):  30    Recertification due (POC duration/ or 90 days whichever is less):  22    Visit # Insurance Allowable Auth required? 3/8 bomn []  Yes [x]  No     Latex Allergy:  [x]NO      []YES  Preferred Language for Healthcare:   [x]English       []other:    Functional Scale:      Date assessed: at eval  Test: FOTO - balance  Score: 51  Reassessment     Pain level:  8-9/10 tailbone    History of Injury: Pt states she had COVID in the end of . Shortly after this patient notes having significant issues with her balance. Pt also states that her ears feel clogged as well. Pt states she has fallen once since onset but that was caused by her slipper falling off while going down the stairs. Pt states she uses her cane when doing stairs to get in and out of her house but otherwise she just grabs onto things as she is walking. Pt denies any episodes of dizziness or spinning. Pt also denies any blurred vision.     SUBJECTIVE:  Pt comes to therapy with SPC reports being disappointed she has to use it. She typically does not use one at baseline, goal is to wean off it. Despite this, however, she has been compliant using per PT rec. Pt reports fear of falling has developed. Reports was compliant with balance exs in HEP thus far. Pt reports residual complaints from COVID, \"fatigue runny nose SOB at times and balance is bad\"   8/22: Pt states she fell last week in home. Notes she tripped over something but was not able to recall what she fell over. Pt states she fell onto her bottom injuring her tailbone. Tailbone is starting to feel better but has not felt the need to follow up with MD about it.       OBJECTIVE:   8/12/2022  LE Strength:    LEFT RIGHT    Hip flexion 4+/5 4+/5    Hip IR 5/5 5/5    Hip ER 5/5 5/5    Knee extension 4+/5 4+/5    Knee flexion 5/5 5/5    Ankle 4/5 johnny       8/8/2022  Postural Control Tests:  Clinical Test of Sensory Interaction for Balance (CTSIB) performed in Romberg stance  CONDITION TIME STRATEGY SWAY    Eyes open, firm surface X30 sec ankle min    Eyes closed, firm surface X30 sec ankle Min-mod    Eyes open, foam surface X30 sec ankle moderate    Eyes closed, foam surface X25 sec hip moderate     RESTRICTIONS/PRECAUTIONS:     Exercises/Interventions:     Therapeutic Exercises (60771) Min: 15 Resistance/Reps Notes/Cues   Nu step L2 x5 mins    Heel raises supported  20 reps     Toe raises supported  20 reps     Ankle tband INv Jimbo Red 20x ea johnny   Added to HEP daily                        Therapeutic Activities (28457) Min: 1        Advanced Gait:  *amb w/ head turns and nods  *marching  *retro gait  *amb while counting backwards from 20   Add    Add  Add  add    Sit to stand without UEs X30 secs  8/22: completed 7 reps        Neuromuscular Re-ed (65633) Min: 30     Type I viewing exercise Review HEP     NBOS: firm surface  *with eyes open   *with head turns / nods  *cross body reaching alt UE  *eyes closed static  *eyes closed head turns/nods    Modified tandem  Same progression as above         X30 sec   X30 sec each  X30 sec L/R    X30 sec  X30 sec each       add Inc anxiety but able to complete with good balance reactions tends to have more ant leaning/corrections. Increased sway with EC but able to hold position without assistance for longer today (approx 15 secs)   AirEx:   *NBOS with eyes open  *NBOS with eyes closed   *1 foot on mat / 1 foot on ground balance  *step up and down off mat     x1 min each    x1 min each   X1 min L/R    10x L/R   Mild sway  Moderate sway  Moderate sway   Ankle circles postural sway               Manual Intervention (72684) Min:                Modalities  Min:                      Other Therapeutic Activities: Pt was educated on PT POC, Diagnosis, Prognosis, pathomechanics as well as frequency and duration of scheduling future physical therapy appointments. Time was also taken on this day to answer all patient questions and participation in PT. Reviewed appointment policy in detail with patient and patient verbalized understanding. Home Exercise Program: Patient was instructed in the following for HEP:   Access Code: [email or web address]  Exercises  NBOS with EC - 1 x daily - 7 x weekly - 1 sets - 2 reps - 1 min hold  Tandem Stance - 1 x daily - 7 x weekly - 1 sets - 2 reps - 1 min hold  Patient verbalized/demonstrated understanding and was issued written handout. Therapeutic Exercise and NMR EXR  [x] (20098) Provided verbal/tactile cueing for activities related to strengthening, flexibility, endurance, ROM for improvements in LE, proximal hip, and core control with self care, mobility, lifting, ambulation. [x] (95412) Provided verbal/tactile cueing for activities related to improving balance, coordination, kinesthetic sense, posture, motor skill, proprioception  to assist with LE, proximal hip, and core control in self care, mobility, lifting, ambulation and eccentric single leg control.  [address] and Therapeutic Activities:    [x] (11941 (94394)  [] ES(attended) (66493)     [] ES (un) (84867):   [] Vasopump (58617) [] Ionto (60510)   [] Other:    GOALS:  Patient stated goal: \"Be able to walk easier\"  [] Progressing: [] Met: [] Not Met: [] Adjusted     Therapist goals for Patient:  Short Term Goals: To be achieved in: 2 weeks  1. Independent in HEP and progression per patient tolerance, in order to prevent re-injury. [] Progressing: [] Met: [] Not Met: [] Adjusted  2. Patient will have a decrease in dizziness/imbalance/symptoms to by 40% to facilitate improvement in movement, function, balance and ADLs as indicated by Functional Deficits. [] Progressing: [] Met: [] Not Met: [] Adjusted     Long Term Goals: To be achieved in: at discharge  1. Increase FOTO functional outcome score from 51 to 55  to assist with reaching prior level of function. [] Progressing: [] Met: [] Not Met: [] Adjusted  2. Patient will demonstrate negative oculomotor special testing/positional testing as indicated by patients Functional Deficits. [] Progressing: [] Met: [] Not Met: [] Adjusted  3. Patient will return to functional activities including housework without LOB. [] Progressing: [] Met: [] Not Met: [] Adjusted  4. Pt will report 40% increased ease with ambulation in the community. [] Progressing: [] Met: [] Not Met: [] Adjusted         ASSESSMENT:  Pt being seen for balance problems, complicated by hx of COVID with residual issues impactin tatiana for daily routines such as fatigue/SOB, hx of neuropathy in BLE, tobacco use, and anxiety. Pt tatiana progressive strengthening exs for ankles. Rec to complete daily in HEP. Pt will continue to benefit from skilled PT to address strength, balance and gait needed for max functional indep and reduce fear of falling.      Treatment/Activity Tolerance:  [x] Patient tolerated treatment well [] Patient limited by fatique  [] Patient limited by pain  [] Patient limited by other medical complications  [] Other:     Overall Progression

## 2022-08-25 ENCOUNTER — HOSPITAL ENCOUNTER (OUTPATIENT)
Dept: PHYSICAL THERAPY | Age: 62
Setting detail: THERAPIES SERIES
Discharge: HOME | End: 2022-08-25
Payer: COMMERCIAL

## 2022-08-25 PROCEDURE — 97112 NEUROMUSCULAR REEDUCATION: CPT

## 2022-08-25 PROCEDURE — 97110 THERAPEUTIC EXERCISES: CPT

## 2022-08-25 NOTE — FLOWSHEET NOTE
has to use it. She typically does not use one at baseline, goal is to wean off it. Despite this, however, she has been compliant using per PT rec. Pt reports fear of falling has developed. Reports was compliant with balance exs in [facility] thus far. Pt reports residual complaints from COVID, \"fatigue runny nose SOB at times and balance is bad\"   8/22: Pt states she fell last week in home. Notes she tripped over something but was not able to recall what she fell over. Pt states she fell onto her bottom injuring her tailbone. Tailbone is starting to feel better but has not felt the need to follow up with MD about it.    8/25: Pt states she is doing well just a little tired. Tailbone continues to feel better.      OBJECTIVE:   8/12/2022  LE Strength:    LEFT RIGHT    Hip flexion 4+/5 4+/5    Hip IR 5/5 5/5    Hip ER 5/5 5/5    Knee extension 4+/5 4+/5    Knee flexion 5/5 5/5    Ankle 4/5 johnny       8/8/2022  Postural Control Tests:  Clinical Test of Sensory Interaction for Balance (CTSIB) performed in Romberg stance  CONDITION TIME STRATEGY SWAY    Eyes open, firm surface X30 sec ankle min    Eyes closed, firm surface X30 sec ankle Min-mod    Eyes open, foam surface X30 sec ankle moderate    Eyes closed, foam surface X25 sec hip moderate     RESTRICTIONS/PRECAUTIONS:     Exercises/Interventions:     Therapeutic Exercises (42702) Min: 10 Resistance/Reps Notes/Cues   Nu step L2 x5 mins    Heel raises supported  20 reps     Toe raises supported  20 reps     Ankle tband INv Jimbo Red 20x ea johnny   Added to HEP daily                        Therapeutic Activities (63691) Min: 5        Advanced Gait:  *amb w/ head turns and nods  *marching  *retro gait  *amb while counting backwards from 20   15' x2 each with SBA    15x 2 with SBA  Add  add    Sit to stand without UEs X30 secs  8/25: completed 8 reps        Neuromuscular Re-ed (22968) Min: 30     Type I viewing exercise    NBOS: firm surface  *with eyes open   *with head turns / nods  *cross body reaching alt UE  *eyes closed static  *eyes closed head turns/nods    Modified tandem:  *with eyes open  *with head turns/nods     X1 min  X1 min each  X30 sec L/R    X1 min  X1 min        X1 min L/R  X1 min each Inc anxiety but able to complete with good balance reactions tends to have more ant leaning/corrections. 8/25: Able to maintain static stance for 1 min today with mild ankle sway   AirEx:   *NBOS with eyes open  *NBOS with eyes closed   *1 foot on mat / 1 foot on ground balance  *step up and down off mat     x1 min each    x1 min each   X1 min L/R    10x L/R   Mild sway  Moderate sway  Moderate sway   Ankle circles postural sway 10x cw/ccw              Manual Intervention (06957) Min:                Modalities  Min:                      Other Therapeutic Activities: Pt was educated on PT POC, Diagnosis, Prognosis, pathomechanics as well as frequency and duration of scheduling future physical therapy appointments. Time was also taken on this day to answer all patient questions and participation in PT. Reviewed appointment policy in detail with patient and patient verbalized understanding. Home Exercise Program: Patient was instructed in the following for HEP:   Access Code: [email or web address]  Exercises  NBOS with EC - 1 x daily - 7 x weekly - 1 sets - 2 reps - 1 min hold  Tandem Stance - 1 x daily - 7 x weekly - 1 sets - 2 reps - 1 min hold  Patient verbalized/demonstrated understanding and was issued written handout. Therapeutic Exercise and NMR EXR  [x] (14262) Provided verbal/tactile cueing for activities related to strengthening, flexibility, endurance, ROM for improvements in LE, proximal hip, and core control with self care, mobility, lifting, ambulation.   [x] (22126) Provided verbal/tactile cueing for activities related to improving balance, coordination, kinesthetic sense, posture, motor skill, proprioception  to assist with LE, proximal hip, and core control in self care, needle 3+ Muscles (74402)  [] Manual (67085) x     [] Ultrasound (89155) x  [] TA (08711) x     [] Mech Traction (18069)  [] ES(attended) (68842)     [] ES (un) (51804):   [] Vasopump (42960) [] Ionto (57223)   [] Other:    GOALS:  Patient stated goal: \"Be able to walk easier\"  [] Progressing: [] Met: [] Not Met: [] Adjusted     Therapist goals for Patient:  Short Term Goals: To be achieved in: 2 weeks  1. Independent in HEP and progression per patient tolerance, in order to prevent re-injury. [] Progressing: [] Met: [] Not Met: [] Adjusted  2. Patient will have a decrease in dizziness/imbalance/symptoms to by 40% to facilitate improvement in movement, function, balance and ADLs as indicated by Functional Deficits. [] Progressing: [] Met: [] Not Met: [] Adjusted     Long Term Goals: To be achieved in: at discharge  1. Increase FOTO functional outcome score from 51 to 55  to assist with reaching prior level of function. [] Progressing: [] Met: [] Not Met: [] Adjusted  2. Patient will demonstrate negative oculomotor special testing/positional testing as indicated by patients Functional Deficits. [] Progressing: [] Met: [] Not Met: [] Adjusted  3. Patient will return to functional activities including housework without LOB. [] Progressing: [] Met: [] Not Met: [] Adjusted  4. Pt will report 40% increased ease with ambulation in the community. [] Progressing: [] Met: [] Not Met: [] Adjusted         ASSESSMENT:  Pt being seen for balance problems, complicated by hx of COVID with residual issues impactin tatiana for daily routines such as fatigue/SOB, hx of neuropathy in BLE, tobacco use, and anxiety. Pt demonstrated today improved static balance with visual field reduced and increased reps with 30 sec sit to stand. Dynamic balance is still fair. Pt will continue to benefit from skilled PT to address strength, balance and gait needed for max functional indep and reduce fear of falling.      Treatment/Activity Tolerance:  [x] Patient tolerated treatment well [] Patient limited by fatique  [] Patient limited by pain  [] Patient limited by other medical complications  [] Other:     Overall Progression Towards Functional goals/ Treatment Progress Update:  [] Patient is progressing as expected towards functional goals listed. [] Progression is slowed due to complexities/Impairments listed. [] Progression has been slowed due to co-morbidities. [x] Plan just implemented, too soon to assess goals progression <30days   [] Goals require adjustment due to lack of progress  [] Patient is not progressing as expected and requires additional follow up with physician  [] Other    Prognosis for POC: [x] Good [] Fair  [] Poor    Patient requires continued skilled intervention: [x] Yes  [] No      PLAN:   [x] Continue per plan of care [] Alter current plan (see comments)  [] Plan of care initiated [] Hold pending MD visit [] Discharge    Electronically signed by: Jong Gregg PT , OMT-C,  [id number]        Note: If patient does not return for scheduled/recommended follow up visits, this note will serve as a discharge from care along with the most recent update on progress.

## 2022-08-29 ENCOUNTER — OFFICE VISIT (OUTPATIENT)
Dept: ENT CLINIC | Age: 62
End: 2022-08-29
Payer: COMMERCIAL

## 2022-08-29 ENCOUNTER — PROCEDURE VISIT (OUTPATIENT)
Dept: AUDIOLOGY | Age: 62
End: 2022-08-29
Payer: COMMERCIAL

## 2022-08-29 ENCOUNTER — HOSPITAL ENCOUNTER (OUTPATIENT)
Dept: PHYSICAL THERAPY | Age: 62
Setting detail: THERAPIES SERIES
Discharge: HOME | End: 2022-08-29
Payer: COMMERCIAL

## 2022-08-29 VITALS
SYSTOLIC BLOOD PRESSURE: 125 MMHG | DIASTOLIC BLOOD PRESSURE: 76 MMHG | HEART RATE: 89 BPM | RESPIRATION RATE: 16 BRPM | HEIGHT: 62 IN | BODY MASS INDEX: 22.45 KG/M2 | WEIGHT: 122 LBS

## 2022-08-29 DIAGNOSIS — H90.3 SENSORINEURAL HEARING LOSS (SNHL) OF BOTH EARS: Primary | ICD-10-CM

## 2022-08-29 DIAGNOSIS — H69.83 DYSFUNCTION OF BOTH EUSTACHIAN TUBES: ICD-10-CM

## 2022-08-29 DIAGNOSIS — R42 DIZZINESS: ICD-10-CM

## 2022-08-29 PROCEDURE — 97110 THERAPEUTIC EXERCISES: CPT

## 2022-08-29 PROCEDURE — 92557 COMPREHENSIVE HEARING TEST: CPT | Performed by: AUDIOLOGIST

## 2022-08-29 PROCEDURE — 97112 NEUROMUSCULAR REEDUCATION: CPT

## 2022-08-29 PROCEDURE — 92567 TYMPANOMETRY: CPT | Performed by: AUDIOLOGIST

## 2022-08-29 PROCEDURE — 99213 OFFICE O/P EST LOW 20 MIN: CPT | Performed by: OTOLARYNGOLOGY

## 2022-08-29 NOTE — PATIENT INSTRUCTIONS
If you are interested in pursuing hearing aids, here are the next steps:    Contact your insurance and ask, Do I have a benefit for hearing aids?   If the answer is no hearing aids will be a 100% out of pocket expense  If the answer is yes, ask Can I use this benefit at [facility] ([facility])?  or Where can I use this benefit?  If [address] [place] [address] is not in-network for hearing aids, your insurance should be able to provide the appropriate contact information for an in-network provider. Call [facility] ([phone]) to schedule a Hearing Aid Evaluation   This appointment is when we will discuss hearing aid options and decide which device is most appropriate for you. Learning About Hearing Aids  What is a hearing aid? A hearing aid makes sounds louder. It can help some people with hearing problems to hear better. Hearing aids do not restore normal hearing. But they can make it easier to communicate by making sounds clearer. Digital programmable hearing aids can adjust themselves to work best where you are at any time. You also have more choices in setting them up than with analog hearing aids. There are also different styles of hearing aids. A behind-the-ear (BTE) hearing aid connects to a plastic ear mold that fits inside the outer ear. BTE hearing aids are used for all levels of hearing loss, especially very severe hearing loss. They may be better for children for safety and growth reasons. Poorly fitting BTE ear molds or a buildup of earwax may cause a whistling sound (feedback). An in-the-ear (ITE) hearing aid fits in the outer part of the ear. It can be used by people with mild to severe hearing loss. ITE hearing aids can be used with other hearing devices, such as a telecoil that improves hearing during phone calls. ITE hearing aids can be damaged by earwax and fluid draining from the ear. Their small size may be hard for some people to handle.  They are not often used in children because the case must be replaced as the child grows. An in-the-canal (ITC) hearing aid fits into the ear canal. ITC hearing aids are used by people with mild to moderate hearing loss. They are made to fit the shape and the size of your ear canal. They can be damaged by earwax and fluid draining from the ear. Their small size may be hard for some people to handle. They are not made for children. You have some options if you have a hearing problem and are thinking about getting hearing aids. You can go to your doctor or an audiologist. He or she will do a hearing test and help you decide which type and style of hearing aid may be best for you. What else should I know about hearing aids? Find out if your insurance covers hearing aids. They can be expensive. Different types of hearing aids come with different costs. Also find out about a warranty or return policy in case you are not happy with your hearing aids. Follow-up care is a key part of your treatment and safety. Be sure to make and go to all appointments, and call your doctor if you are having problems. It's also a good idea to know your test results and keep a list of the medicines you take. Where can you learn more? Go to https://YumDotspepiceweb.GlucoSentient. org and sign in to your digedu account. Enter R527 in the Linktone box to learn more about \"Learning About Hearing Aids. \"     If you do not have an account, please click on the \"Sign Up Now\" link. Current as of: October 21, 2018  Content Version: 12.1  © 9804-9377 Healthwise, Incorporated. Care instructions adapted under license by [facility] ([facility]). If you have questions about a medical condition or this instruction, always ask your healthcare professional. [place] [address] any warranty or liability for your use of this information. Hearing Loss: Care Instructions  Your Care Instructions      Hearing loss is a sudden or slow decrease in how well you hear.  It can range from mild to profound. Permanent hearing loss can occur with aging, and it can happen when you are exposed long-term to loud noise. Examples include listening to loud music, riding motorcycles, or being around other loud machines. Hearing loss can affect your work and home life. It can make you feel lonely or depressed. You may feel that you have lost your independence. But hearing aids and other devices can help you hear better and feel connected to others. Follow-up care is a key part of your treatment and safety. Be sure to make and go to all appointments, and call your doctor if you are having problems. It's also a good idea to know your test results and keep a list of the medicines you take. How can you care for yourself at home? Avoid loud noises whenever possible. This helps keep your hearing from getting worse. Always wear hearing protection around loud noises. If appropriate, wear hearing aid(s) as directed. It is recommended that hearing aids are worn during all waking hours to keep your brain active and give it access to the sounds it is missing. If you are beginning your process with hearing aid(s), schedule a \"Hearing Aid Evaluation\" with an audiologist to discuss your lifestyle, features of hearing aid technology, and styles of hearing aids available. It is recommended that you contact your insurance company to determine if you have a hearing aid benefit, as this may dictate who you can see for these services. Have hearing tests as your doctor suggests. They can show whether your hearing has changed. Your hearing aid may need to be adjusted. Use other assistive devices as needed. These may include:  Telephone amplifiers and hearing aids that can connect to a television, stereo, radio, or microphone. Devices that use lights or vibrations. These alert you to the doorbell, a ringing telephone, or a baby monitor. Television closed-captioning.  This shows the words at the bottom of the screen. Most new TVs can do this. TTY (text telephone). This lets you type messages back and forth on the telephone instead of talking or listening. These devices are also called TDD. When messages are typed on the keyboard, they are sent over the phone line to a receiving TTY. The message is shown on a monitor. Use pagers, fax machines, text, and email if it is hard for you to communicate by telephone. Try to learn a listening technique called speech-reading. It is not lip-reading. You pay attention to people's gestures, expressions, posture, and tone of voice. These clues can help you understand what a person is saying. Face the person you are talking to, and have him or her face you. Make sure the lighting is good. You need to see the other person's face clearly. Think about counseling if you need help to adjust to your hearing loss. When should you call for help? Watch closely for changes in your health, and be sure to contact your doctor if:    You think your hearing is getting worse. You have new symptoms, such as dizziness or nausea.

## 2022-08-29 NOTE — FLOWSHEET NOTE
[facility] [address]  Phone: [phone]   Fax:     [phone]    Physical Therapy Treatment Note/ Progress Report:   Date:  2022    Patient Name:  Alka Toussaint    :  1960  MRN: [id number]    Pertinent Medical History: HTN, anxiety, gout, R TKR    Medical/Treatment Diagnosis Information:  Diagnosis: R42 (ICD-10-CM) - Dizziness  Treatment Diagnosis: Decreased balance , Vestibular impairment    Insurance/Certification information:  PT Insurance Information: [employer]  Physician Information:  Referring Provider (secondary): Edwin Arellano MD  Plan of care signed (Y/N): []  Yes [x]  No     Date of Patient follow up with Physician:      Progress Report: []  Yes  [x]  No     Date Range for reporting period:  Beginnin2022  Ending:     Progress report due (10 Rx/or 30 days whichever is less):  24    Recertification due (POC duration/ or 90 days whichever is less):  22    Visit # Insurance Allowable Auth required?  bomn []  Yes [x]  No     Latex Allergy:  [x]NO      []YES  Preferred Language for Healthcare:   [x]English       []other:    Functional Scale:      Date assessed: at eval  Test: FOTO - balance  Score: 51  Reassessment     Pain level:  4/10     History of Injury: Pt states she had COVID in the end of . Shortly after this patient notes having significant issues with her balance. Pt also states that her ears feel clogged as well. Pt states she has fallen once since onset but that was caused by her slipper falling off while going down the stairs. Pt states she uses her cane when doing stairs to get in and out of her house but otherwise she just grabs onto things as she is walking. Pt denies any episodes of dizziness or spinning. Pt also denies any blurred vision.     SUBJECTIVE:  Pt comes to therapy with SPC reports being disappointed she has to use w/ head turns and nods while counting backwards from 50  *marching while counting by 2's to 50  *retro gait  *amb while counting backwards from 20   20' x2 each with SBA      20' x2 with SBA    20' x2 with HHA  20' x2 with SBA  Increased ANUP noted with advanced gait activities. Sit to stand without UEs X30 secs  8/29: completed 9 reps        Neuromuscular Re-ed (40299) Min: 25     Type I viewing exercise    NBOS: firm surface  *with eyes open   *with head turns / nods  *eyes closed static  *eyes closed head turns/nods    Modified tandem:  *with eyes open  *with head turns/nods     X1 min  X1 min each  X1 min  X1 min        X1 min L/R  X1 min each        8/25: Able to maintain static stance for 1 min today with mild ankle sway   AirEx:   *NBOS with head nods and counting by 2  *NBOS with eyes closed   *NBOS with eyes closed count backwards from 50  *step up and down off mat     x1 min each      x1 min each   1x    10x L/R   Mild sway    Moderate sway    oderate sway   Postural sway:  *ankle circles  *fwd/backward   10x cw/ccw  10x               Manual Intervention (94352) Min:                Modalities  Min:                      Other Therapeutic Activities: Pt was educated on PT POC, Diagnosis, Prognosis, pathomechanics as well as frequency and duration of scheduling future physical therapy appointments. Time was also taken on this day to answer all patient questions and participation in PT. Reviewed appointment policy in detail with patient and patient verbalized understanding. Home Exercise Program: Patient was instructed in the following for HEP:   Access Code: [email or web address]  Exercises  NBOS with EC - 1 x daily - 7 x weekly - 1 sets - 2 reps - 1 min hold  Tandem Stance - 1 x daily - 7 x weekly - 1 sets - 2 reps - 1 min hold  Patient verbalized/demonstrated understanding and was issued written handout.     Therapeutic Exercise and NMR EXR  [x] (08817) Provided verbal/tactile cueing for activities related to strengthening, flexibility, endurance, ROM for improvements in LE, proximal hip, and core control with self care, mobility, lifting, ambulation. [x] (28504) Provided verbal/tactile cueing for activities related to improving balance, coordination, kinesthetic sense, posture, motor skill, proprioception  to assist with LE, proximal hip, and core control in self care, mobility, lifting, ambulation and eccentric single leg control.  [address] and Therapeutic Activities:    [x] (42474 or 52507) Provided verbal/tactile cueing for activities related to improving balance, coordination, kinesthetic sense, posture, motor skill, proprioception and motor activation to allow for proper function of core, proximal hip and LE with self care and ADLs and functional mobility.   [] (00930) Gait Re-education- Provided training and instruction to the patient for proper LE, core and proximal hip recruitment and positioning and eccentric body weight control with ambulation re-education including up and down stairs     Home Exercise Program:    [x] (44555) Reviewed/Progressed HEP activities related to strengthening, flexibility, endurance, ROM of core, proximal hip and LE for functional self-care, mobility, lifting and ambulation/stair navigation   [x] (32839)Reviewed/Progressed HEP activities related to improving balance, coordination, kinesthetic sense, posture, motor skill, proprioception of core, proximal hip and LE for self care, mobility, lifting, and ambulation/stair navigation      Manual Treatments:  PROM / STM / Oscillations-Mobs:  G-I, II, III, IV (PA's, Inf., Post.)  [] (96960) Provided manual therapy to mobilize LE, proximal hip and/or LS spine soft tissue/joints for the purpose of modulating pain, promoting relaxation,  increasing ROM, reducing/eliminating soft tissue swelling/inflammation/restriction, improving soft tissue extensibility and allowing for proper ROM for normal function with self care, mobility, lifting and

## 2022-08-29 NOTE — PROGRESS NOTES
Lilian Gloria   1960, 65 y.o. female   [id number]       Referring Provider: Niurka Cabrales MD  Referral Type: In an order in [address] [place] [address]    Reason for Visit: Evaluation of suspected change in hearing, tinnitus, or balance. ADULT AUDIOLOGIC EVALUATION      Lilian Gloria is a 65 y.o. female seen today, 8/29/2022 , for an initial audiologic evaluation. Patient was seen by Niurka Cabrales MD following today's evaluation. AUDIOLOGIC AND OTHER PERTINENT MEDICAL HISTORY:      Andreina Molina noted aural fullness. Patient reports a clogged sensation in both ears following a COVID infection in June 2022. Kimberlee Molina denied otalgia, otorrhea, tinnitus, dizziness, imbalance, history of falls, history of significant noise exposure, history of head trauma, history of ear surgery, and family history of hearing loss. Date: 8/29/2022     IMPRESSIONS:      AU: Hearing WNL sloping to Mild SNHL, Excellent WRS, Type A tymp    Test results consistent with bilateral Sensorineural hearing loss. Hearing loss significant enough to create hearing difficulty in some listening situations. Discussed hearing loss with patient. Patient to follow medical recommendations per Niurka Cabrlaes MD .    ASSESSMENT AND FINDINGS:     Otoscopy revealed: Clear ear canals bilaterally    RIGHT EAR:  Hearing Sensitivity: Normal hearing sensitivity to Mild   Sensorineural hearing loss  Speech Recognition Threshold: 15 dB HL  Word Recognition:Excellent (100%), based on NU-6 25-word list at 55 dBHL using recorded speech stimuli. Tympanometry: Normal peak pressure and compliance, Type A tympanogram, consistent with normal middle ear function.   Acoustic Reflexes: Ipsilateral: Could not maintain seal. Contralateral: Could not maintain seal.    LEFT EAR:  Hearing Sensitivity: Normal hearing sensitivity to Mild   Sensorineural hearing loss  Speech Recognition Threshold: 15 dB HL  Word Recognition:Excellent (100%), based on NU-6 25-word list at 55

## 2022-08-29 NOTE — Clinical Note
Dr. Kimberly Bella,  Please see note from this patient's audiogram from today. Please let me know if there is anything further you need.     Parris Schulte Audiologist

## 2022-08-29 NOTE — PROGRESS NOTES
[address] [facility]  Follow up      Patient Name: [address] [place] [address] Record Number:  [id number]  Primary Care Physician:  Marlin Menendez MD  Date of Consultation: 8/29/2022    Chief Complaint: Ear issues        Interval History    Patient is following up for some ear issues. I saw her on August 1, 2022. She was complaining of pressure in her ears and inability to pop them. It seemed to occur after a COVID infection. The patient also was having dizziness has been working with physical therapy. Overall the dizziness is better. She has had an episode where she was seeing double. It seemed to get better with corrective lenses. She still feels as though the ears need to pop. REVIEW OF SYSTEMS    As above  PHYSICAL EXAM  GENERAL: No Acute Distress, Alert and Oriented, no Hoarseness, strong voice  EYES: EOMI, Anti-icteric  HENT:   Head: Normocephalic and atraumatic. Face:  Symmetric, facial nerve intact, no sinus tenderness  Right Ear: Normal external ear, normal external auditory canal, intact tympanic membrane with normal mobility and aerated middle ear  Left Ear: Normal external ear, normal external auditory canal, intact tympanic membrane with normal mobility and aerated middle ear  Mouth/Oral Cavity:  normal lips, Uvula is midline, no mucosal lesions, no trismus  Oropharynx/Larynx:  normal oropharynx  Nose:Normal external nasal appearance. NECK: Normal range of motion, no thyromegaly, trachea is midline, no lymphadenopathy, no neck masses, no crepitus        Patient audiogram today that shows normal sloping to mild sensorineural hearing loss in the highest frequencies tested. Type a tympanograms      ASSESSMENT/PLAN  1. Sensorineural hearing loss (SNHL) of both ears  This is very mild and probably secondary to early presbycusis versus some noise exposure.     2. Dysfunction of both eustachian tubes  She is complaining of a lot of inability to pop her ears, but her ear exam is completely normal and she has type a tympanograms. We could consider ear tubes, however I think the risk of cranial chronic perforation and additional hearing loss is higher than the potential benefit. Patient agrees. 3. Dizziness  Dizzy is getting better working with physical therapy. She did describe an episode of double vision. I told her she needs to follow-up with an neurologist or ophthalmologist if she is having double vision. She reportedly already has a neurologist and I told her to discuss this with him on her next follow-up visit. I have performed a head and neck physical exam personally or was physically present during the key or critical portions of the service. This note was generated completely or in part utilizing Dragon dictation speech recognition software. Occasionally, words are mistranscribed and despite editing, the text may contain inaccuracies due to incorrect word recognition. If further clarification is needed please contact the office at [phone].

## 2022-08-31 ENCOUNTER — HOSPITAL ENCOUNTER (OUTPATIENT)
Dept: PHYSICAL THERAPY | Age: 62
Setting detail: THERAPIES SERIES
Discharge: HOME | End: 2022-08-31
Payer: COMMERCIAL

## 2022-08-31 PROCEDURE — 97530 THERAPEUTIC ACTIVITIES: CPT

## 2022-08-31 PROCEDURE — 97112 NEUROMUSCULAR REEDUCATION: CPT

## 2022-08-31 NOTE — FLOWSHEET NOTE
[address] [facility] [place]  Phone: [phone]   Fax:     [phone]    Physical Therapy Treatment Note/ Progress Report:   Date:  2022    Patient Name:  Mildred Graves    :  1960  MRN: [id number]    Pertinent Medical History: HTN, anxiety, gout, R TKR    Medical/Treatment Diagnosis Information:  Diagnosis: R42 (ICD-10-CM) - Dizziness  Treatment Diagnosis: Decreased balance , Vestibular impairment    Insurance/Certification information:  PT Insurance Information: [employer]  Physician Information:  Referring Provider (secondary): Brennan Alejo MD  Plan of care signed (Y/N): []  Yes [x]  No     Date of Patient follow up with Physician:      Progress Report: []  Yes  [x]  No     Date Range for reporting period:  Beginnin2022  Ending:     Progress report due (10 Rx/or 30 days whichever is less):  79    Recertification due (POC duration/ or 90 days whichever is less):  22    Visit # Insurance Allowable Auth required?  bomn []  Yes [x]  No     Latex Allergy:  [x]NO      []YES  Preferred Language for Healthcare:   [x]English       []other:    Functional Scale:      Date assessed: at eval  Test: FOTO - balance  Score: 51  Reassessment     Pain level:  4/10     History of Injury: Pt states she had COVID in the end of . Shortly after this patient notes having significant issues with her balance. Pt also states that her ears feel clogged as well. Pt states she has fallen once since onset but that was caused by her slipper falling off while going down the stairs. Pt states she uses her cane when doing stairs to get in and out of her house but otherwise she just grabs onto things as she is walking. Pt denies any episodes of dizziness or spinning. Pt also denies any blurred vision.     SUBJECTIVE:  Pt comes to therapy with SPC reports being disappointed she has to use it. She typically does not use one at baseline, goal is to wean off it. Despite this, however, she has been compliant using per PT rec. Pt reports fear of falling has developed. Reports was compliant with balance exs in HEP thus far. Pt reports residual complaints from COVID, \"fatigue runny nose SOB at times and balance is bad\"   8/22: Pt states she fell last week in home. Notes she tripped over something but was not able to recall what she fell over. Pt states she fell onto her bottom injuring her tailbone. Tailbone is starting to feel better but has not felt the need to follow up with MD about it.    8/25: Pt states she is doing well just a little tired. Tailbone continues to feel better. 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 Pt denies any slurred speech, memory issues, or dizziness associated with vision issues. Pt notes this threw her balance off. Later in the day she had her daughter give her her glasses which helped. Overall pt feels as if her balance is improving.   8/31/22: Pt reports that she saw the doctor on Monday following therapy. Reports that her \"ears are fine\". Reports that she feels her balance is getting better, still wishes her \"ears would pop\".      OBJECTIVE:   8/12/2022  LE Strength:    LEFT RIGHT    Hip flexion 4+/5 4+/5    Hip IR 5/5 5/5    Hip ER 5/5 5/5    Knee extension 4+/5 4+/5    Knee flexion 5/5 5/5    Ankle 4/5 johnny       8/8/2022  Postural Control Tests:  Clinical Test of Sensory Interaction for Balance (CTSIB) performed in Romberg stance  CONDITION TIME STRATEGY SWAY    Eyes open, firm surface X30 sec ankle min    Eyes closed, firm surface X30 sec ankle Min-mod    Eyes open, foam surface X30 sec ankle moderate    Eyes closed, foam surface X25 sec hip moderate     RESTRICTIONS/PRECAUTIONS:     Exercises/Interventions:   Exercises in bold completed   Therapeutic Exercises (13580) Min:  Resistance/Reps Notes/Cues        Heel raises supported  20 reps     Toe raises supported  20 reps     Ankle tband INv Jimbo Red 20x ea johnny   Added to HEP daily                        Therapeutic Activities (40998) Min: 10     Nu step L5 x5 mins    Advanced Gait:  *amb w/ head turns and nods while counting backwards from 50  *marching while counting by 2's to 50  *retro gait  *amb while counting backwards from 20 by 3's   20' x2 each with SBA      20' x2 with SBA    20' x2 with HHA  20' x2 with SBA  Increased ANUP noted with advanced gait activities. Sit to stand without UEs 2x10 8/29: completed 9 reps in 30 sec   *amb sidesteps 20' x2 with SBA Moving L more difficult than moving R   Neuromuscular Re-ed (31484) Min: 30     Type I viewing exercise    NBOS: firm surface  *with eyes open   *with head turns / nods  *eyes closed static  *eyes closed head turns/nods    Modified tandem:  *with eyes open  *with head turns/nods     X1 min  X1 min each  X1 min  X1 min        X1 min L/R  X1 min each   8/31: Reports all balance activities are getting easier     8/25: Able to maintain static stance for 1 min today with mild ankle sway    Patient requires occasional taps to bar for balance support   AirEx:   *NBOS with head nods *NBOS with eyes closed   *NBOS with eyes closed count backwards from 50  *step up and down off mat     x1 min each      x1 min each   1x    10x L/R   Mild sway    Moderate sway    Moderate sway  One UE support   Postural sway:  *ankle circles  *fwd/backward   10x cw/ccw  10x               Manual Intervention (64341) Min:                Modalities  Min:                      Other Therapeutic Activities: Pt was educated on PT POC, Diagnosis, Prognosis, pathomechanics as well as frequency and duration of scheduling future physical therapy appointments. Time was also taken on this day to answer all patient questions and participation in PT. Reviewed appointment policy in detail with patient and patient verbalized understanding.      Home Exercise Program: Patient was instructed in the following for HEP:   Access Code: [email or web address]  Exercises  NBOS with EC - 1 x daily - 7 x weekly - 1 sets - 2 reps - 1 min hold  Tandem Stance - 1 x daily - 7 x weekly - 1 sets - 2 reps - 1 min hold  Patient verbalized/demonstrated understanding and was issued written handout. Therapeutic Exercise and NMR EXR  [x] (36699) Provided verbal/tactile cueing for activities related to strengthening, flexibility, endurance, ROM for improvements in LE, proximal hip, and core control with self care, mobility, lifting, ambulation. [x] (79607) Provided verbal/tactile cueing for activities related to improving balance, coordination, kinesthetic sense, posture, motor skill, proprioception  to assist with LE, proximal hip, and core control in self care, mobility, lifting, ambulation and eccentric single leg control.  [address] and Therapeutic Activities:    [x] (91835 or 62869) Provided verbal/tactile cueing for activities related to improving balance, coordination, kinesthetic sense, posture, motor skill, proprioception and motor activation to allow for proper function of core, proximal hip and LE with self care and ADLs and functional mobility.   [] (63068) Gait Re-education- Provided training and instruction to the patient for proper LE, core and proximal hip recruitment and positioning and eccentric body weight control with ambulation re-education including up and down stairs     Home Exercise Program:    [x] (01068) Reviewed/Progressed HEP activities related to strengthening, flexibility, endurance, ROM of core, proximal hip and LE for functional self-care, mobility, lifting and ambulation/stair navigation   [x] (27552)Reviewed/Progressed HEP activities related to improving balance, coordination, kinesthetic sense, posture, motor skill, proprioception of core, proximal hip and LE for self care, mobility, lifting, and ambulation/stair navigation      Manual Treatments:  PROM / STM / Oscillations-Mobs:  G-I, II, III, IV (PA's, Inf., Post.)  [] (09203) Provided manual therapy to mobilize LE, proximal hip and/or LS spine soft tissue/joints for the purpose of modulating pain, promoting relaxation,  increasing ROM, reducing/eliminating soft tissue swelling/inflammation/restriction, improving soft tissue extensibility and allowing for proper ROM for normal function with self care, mobility, lifting and ambulation. Charges:  Timed Code Treatment Minutes: 40   Total Treatment Minutes: 40      [] EVAL (LOW) 26456 (typically 20 minutes face-to-face)  [] EVAL (MOD) 53241 (typically 30 minutes face-to-face)  [] EVAL (HIGH) 16197 (typically 45 minutes face-to-face)  [] RE-EVAL     [] RU(48137) x      [] Dry needle 1 or 2 Muscles (77366)  [x] NMR (33499) x  2  [] Dry needle 3+ Muscles (44594)  [] Manual (98534) x     [] Ultrasound (11579) x  [x] TA (93561) x  1   [] Mech Traction (44510)  [] ES(attended) (19816)     [] ES (un) (45085):   [] Vasopump (36451) [] Ionto (82259)   [] Other:    GOALS:  Patient stated goal: \"Be able to walk easier\"  [] Progressing: [] Met: [] Not Met: [] Adjusted     Therapist goals for Patient:  Short Term Goals: To be achieved in: 2 weeks  1. Independent in HEP and progression per patient tolerance, in order to prevent re-injury. [] Progressing: [] Met: [] Not Met: [] Adjusted  2. Patient will have a decrease in dizziness/imbalance/symptoms to by 40% to facilitate improvement in movement, function, balance and ADLs as indicated by Functional Deficits. [] Progressing: [] Met: [] Not Met: [] Adjusted     Long Term Goals: To be achieved in: at discharge  1. Increase FOTO functional outcome score from 51 to 55  to assist with reaching prior level of function. [] Progressing: [] Met: [] Not Met: [] Adjusted  2. Patient will demonstrate negative oculomotor special testing/positional testing as indicated by patients Functional Deficits.   [] Progressing: [] Met: [] Not Met: [] by: Davion Stephens, PT, DPT      Note: If patient does not return for scheduled/recommended follow up visits, this note will serve as a discharge from care along with the most recent update on progress.

## 2022-09-01 ENCOUNTER — APPOINTMENT (OUTPATIENT)
Dept: PHYSICAL THERAPY | Age: 62
End: 2022-09-01
Payer: COMMERCIAL

## 2022-09-06 ENCOUNTER — HOSPITAL ENCOUNTER (OUTPATIENT)
Dept: PHYSICAL THERAPY | Age: 62
Setting detail: THERAPIES SERIES
Discharge: HOME | End: 2022-09-06
Payer: COMMERCIAL

## 2022-09-06 PROCEDURE — 97110 THERAPEUTIC EXERCISES: CPT

## 2022-09-06 PROCEDURE — 97112 NEUROMUSCULAR REEDUCATION: CPT

## 2022-09-06 NOTE — FLOWSHEET NOTE
[address] [facility] [place]. [place] [address]  Phone: [phone]   Fax:     [phone]    Physical Therapy Treatment Note/ Progress Report:   Date:  2022    Patient Name:  Mildred Graves    :  1960  MRN: [id number]    Pertinent Medical History: HTN, anxiety, gout, R TKR    Medical/Treatment Diagnosis Information:  Diagnosis: R42 (ICD-10-CM) - Dizziness  Treatment Diagnosis: Decreased balance , Vestibular impairment    Insurance/Certification information:  PT Insurance Information: [employer]  Physician Information:  Referring Provider (secondary): Brennan Alejo MD  Plan of care signed (Y/N): []  Yes [x]  No     Date of Patient follow up with Physician:      Progress Report: []  Yes  [x]  No     Date Range for reporting period:  Beginnin2022  Ending:     Progress report due (10 Rx/or 30 days whichever is less):  08    Recertification due (POC duration/ or 90 days whichever is less):  22    Visit # Insurance Allowable Auth required?  bomn []  Yes [x]  No     Latex Allergy:  [x]NO      []YES  Preferred Language for Healthcare:   [x]English       []other:    Functional Scale:      Date assessed: at eval  Test: FOTO - balance  Score: 51  Reassessment     Pain level:  4/10     History of Injury: Pt states she had COVID in the end of . Shortly after this patient notes having significant issues with her balance. Pt also states that her ears feel clogged as well. Pt states she has fallen once since onset but that was caused by her slipper falling off while going down the stairs. Pt states she uses her cane when doing stairs to get in and out of her house but otherwise she just grabs onto things as she is walking. Pt denies any episodes of dizziness or spinning. Pt also denies any blurred vision.     SUBJECTIVE:  Pt comes to therapy with SPC reports being disappointed she has to use it. She typically does not use one at baseline, goal is to wean off it. Despite this, however, she has been compliant using per PT rec. Pt reports fear of falling has developed. Reports was compliant with balance exs in HEP thus far. Pt reports residual complaints from COVID, \"fatigue runny nose SOB at times and balance is bad\"   8/22: Pt states she fell last week in home. Notes she tripped over something but was not able to recall what she fell over. Pt states she fell onto her bottom injuring her tailbone. Tailbone is starting to feel better but has not felt the need to follow up with MD about it.    8/25: Pt states she is doing well just a little tired. Tailbone continues to feel better. 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 Pt denies any slurred speech, memory issues, or dizziness associated with vision issues. Pt notes this threw her balance off. Later in the day she had her daughter give her her glasses which helped. Overall pt feels as if her balance is improving.   8/31/22: Pt reports that she saw the doctor on Monday following therapy. Reports that her \"ears are fine\". Reports that she feels her balance is getting better, still wishes her \"ears would pop\". 9/6: Pt reports she is a little shaky today and not feeling as good. Pt notes she was out of town this past weekend and thinks her not feeling well is due to drinking too much over the weekend. Overall feels like her balance is improving.      OBJECTIVE:   8/12/2022  LE Strength:    LEFT RIGHT    Hip flexion 4+/5 4+/5    Hip IR 5/5 5/5    Hip ER 5/5 5/5    Knee extension 4+/5 4+/5    Knee flexion 5/5 5/5    Ankle 4/5 johnny       8/8/2022  Postural Control Tests:  Clinical Test of Sensory Interaction for Balance (CTSIB) performed in Romberg stance  CONDITION TIME STRATEGY SWAY    Eyes open, firm surface X30 sec ankle min    Eyes closed, firm surface X30 sec ankle Min-mod    Eyes open, foam surface X30 sec ankle moderate    Eyes closed, foam surface X25 sec hip moderate     RESTRICTIONS/PRECAUTIONS:     Exercises/Interventions:     Therapeutic Exercises (61957) Min: 8 Resistance/Reps Notes/Cues   Nu step L3 x5 mins    Heel raises supported  20 reps     Toe raises supported  20 reps     Ankle tband INv Jimbo Red 20x ea johnny   Added to HEP daily         Leg Press Add               Therapeutic Activities (01001) Min: 5          Advanced Gait:  *amb w/ head turns and nods while counting backwards from 50  *marching while counting by 2's to 50  *retro gait  *amb while counting backwards from 20 by 3's    Increased ANUP noted with advanced gait activities. Sit to stand without UEs X30 sec  9/6: completed 8 reps in 30 sec   *amb sidesteps 20' x2 with SBA Moving L more difficult than moving R   Neuromuscular Re-ed (64552) Min: 30     Type I viewing exercise    NBOS: firm surface  *with eyes open   *with head turns / nods  *eyes closed static  *eyes closed head turns/nods    Modified tandem:  *with eyes open  *with head turns/nods     X1 min  X1 min each  X1 min  X1 min        X1 min L/R  X1 min each   8/31: Reports all balance activities are getting easier     8/25: Able to maintain static stance for 1 min today with mild ankle sway    Patient requires occasional  taps to bar for balance support   AirEx:   *NBOS with head nods and counting by 2  *NBOS with eyes closed   *NBOS with eyes closed count backwards from 50  *step up and down off mat     x1 min each      x1 min each   1x    10x L/R   Mild sway    Moderate sway    Moderate sway  One UE support   Postural sway:  *ankle circles  *fwd/backward   10x cw/ccw  10x               Manual Intervention (65069) Min:                Modalities  Min:                      Other Therapeutic Activities: Pt was educated on PT POC, Diagnosis, Prognosis, pathomechanics as well as frequency and duration of scheduling future physical therapy appointments.  Time was also taken on this day to answer all patient questions and participation in PT. Reviewed appointment policy in detail with patient and patient verbalized understanding. Home Exercise Program: Patient was instructed in the following for HEP:   Access Code: [email or web address]  Exercises  NBOS with EC - 1 x daily - 7 x weekly - 1 sets - 2 reps - 1 min hold  Tandem Stance - 1 x daily - 7 x weekly - 1 sets - 2 reps - 1 min hold  Patient verbalized/demonstrated understanding and was issued written handout. Therapeutic Exercise and NMR EXR  [x] (09508) Provided verbal/tactile cueing for activities related to strengthening, flexibility, endurance, ROM for improvements in LE, proximal hip, and core control with self care, mobility, lifting, ambulation. [x] (54843) Provided verbal/tactile cueing for activities related to improving balance, coordination, kinesthetic sense, posture, motor skill, proprioception  to assist with LE, proximal hip, and core control in self care, mobility, lifting, ambulation and eccentric single leg control.  [address] and Therapeutic Activities:    [x] (42303 or 82993) Provided verbal/tactile cueing for activities related to improving balance, coordination, kinesthetic sense, posture, motor skill, proprioception and motor activation to allow for proper function of core, proximal hip and LE with self care and ADLs and functional mobility.   [] (46808) Gait Re-education- Provided training and instruction to the patient for proper LE, core and proximal hip recruitment and positioning and eccentric body weight control with ambulation re-education including up and down stairs     Home Exercise Program:    [x] (74817) Reviewed/Progressed HEP activities related to strengthening, flexibility, endurance, ROM of core, proximal hip and LE for functional self-care, mobility, lifting and ambulation/stair navigation   [x] (37992)Reviewed/Progressed HEP activities related to improving balance, coordination, kinesthetic sense, posture, motor skill, proprioception of core, proximal hip and LE for self care, mobility, lifting, and ambulation/stair navigation      Manual Treatments:  PROM / STM / Oscillations-Mobs:  G-I, II, III, IV (PA's, Inf., Post.)  [] (77579) Provided manual therapy to mobilize LE, proximal hip and/or LS spine soft tissue/joints for the purpose of modulating pain, promoting relaxation,  increasing ROM, reducing/eliminating soft tissue swelling/inflammation/restriction, improving soft tissue extensibility and allowing for proper ROM for normal function with self care, mobility, lifting and ambulation. Charges:  Timed Code Treatment Minutes: 43   Total Treatment Minutes: 43      [] EVAL (LOW) 25036 (typically 20 minutes face-to-face)  [] EVAL (MOD) 38543 (typically 30 minutes face-to-face)  [] EVAL (HIGH) 22989 (typically 45 minutes face-to-face)  [] RE-EVAL     [x] LX(39940) x      [] Dry needle 1 or 2 Muscles (55276)  [x] NMR (80267) x  2  [] Dry needle 3+ Muscles (55171)  [] Manual (84254) x     [] Ultrasound (39660) x  [] TA (90551) x  1   [] Mech Traction (96729)  [] ES(attended) (85855)     [] ES (un) (44933):   [] Vasopump (26939) [] Ionto (85558)   [] Other:    GOALS:  Patient stated goal: \"Be able to walk easier\"  [] Progressing: [] Met: [] Not Met: [] Adjusted     Therapist goals for Patient:  Short Term Goals: To be achieved in: 2 weeks  1. Independent in HEP and progression per patient tolerance, in order to prevent re-injury. [] Progressing: [] Met: [] Not Met: [] Adjusted  2. Patient will have a decrease in dizziness/imbalance/symptoms to by 40% to facilitate improvement in movement, function, balance and ADLs as indicated by Functional Deficits. [] Progressing: [] Met: [] Not Met: [] Adjusted     Long Term Goals: To be achieved in: at discharge  1. Increase FOTO functional outcome score from 51 to 55  to assist with reaching prior level of function. [] Progressing: [] Met: [] Not Met: [] Adjusted  2. Patient will demonstrate negative oculomotor special testing/positional testing as indicated by patients Functional Deficits. [] Progressing: [] Met: [] Not Met: [] Adjusted  3. Patient will return to functional activities including housework without LOB. [] Progressing: [] Met: [] Not Met: [] Adjusted  4. Pt will report 40% increased ease with ambulation in the community. [] Progressing: [] Met: [] Not Met: [] Adjusted         ASSESSMENT: Pt more fatigued and out of breath today with exercises which may be due to potential dehydration from weekend activities. Pt with increased involuntary tremors noted with exercises today. Pt required a few rest breaks as well throughout session. Exercises were held per pt request due to fatigue. Will resume next visit as able. Pt will continue to benefit from skilled PT to address strength, balance and gait needed for max functional indep and reduce fear of falling. Treatment/Activity Tolerance:  [x] Patient tolerated treatment well [] Patient limited by fatique  [] Patient limited by pain  [] Patient limited by other medical complications  [] Other:     Overall Progression Towards Functional goals/ Treatment Progress Update:  [] Patient is progressing as expected towards functional goals listed. [] Progression is slowed due to complexities/Impairments listed. [] Progression has been slowed due to co-morbidities.   [x] Plan just implemented, too soon to assess goals progression <30days   [] Goals require adjustment due to lack of progress  [] Patient is not progressing as expected and requires additional follow up with physician  [] Other    Prognosis for POC: [x] Good [] Fair  [] Poor    Patient requires continued skilled intervention: [x] Yes  [] No      PLAN:   [x] Continue per plan of care [] Alter current plan (see comments)  [] Plan of care initiated [] Hold pending MD visit [] Discharge    Electronically signed by: Jennifer Silva PT , OMT-C, [id number]      Note: If patient does not return for scheduled/recommended follow up visits, this note will serve as a discharge from care along with the most recent update on progress.

## 2022-09-08 ENCOUNTER — HOSPITAL ENCOUNTER (OUTPATIENT)
Dept: PHYSICAL THERAPY | Age: 62
Setting detail: THERAPIES SERIES
Discharge: HOME | End: 2022-09-08
Payer: COMMERCIAL

## 2022-09-08 PROCEDURE — 97530 THERAPEUTIC ACTIVITIES: CPT

## 2022-09-08 PROCEDURE — 97112 NEUROMUSCULAR REEDUCATION: CPT

## 2022-09-08 NOTE — PROGRESS NOTES
disappointed she has to use it. She typically does not use one at baseline, goal is to wean off it. Despite this, however, she has been compliant using per PT rec. Pt reports fear of falling has developed. Reports was compliant with balance exs in HEP thus far. Pt reports residual complaints from COVID, \"fatigue runny nose SOB at times and balance is bad\"   8/22: Pt states she fell last week in home. Notes she tripped over something but was not able to recall what she fell over. Pt states she fell onto her bottom injuring her tailbone. Tailbone is starting to feel better but has not felt the need to follow up with MD about it.    8/25: Pt states she is doing well just a little tired. Tailbone continues to feel better. 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 Pt denies any slurred speech, memory issues, or dizziness associated with vision issues. Pt notes this threw her balance off. Later in the day she had her daughter give her her glasses which helped. Overall pt feels as if her balance is improving.   8/31/22: Pt reports that she saw the doctor on Monday following therapy. Reports that her \"ears are fine\". Reports that she feels her balance is getting better, still wishes her \"ears would pop\". 9/6: Pt reports she is a little shaky today and not feeling as good. Pt notes she was out of town this past weekend and thinks her not feeling well is due to drinking too much over the weekend. Overall feels like her balance is improving. 9/8: Pt states she has been doing light housework without LOB at home. Patient notes 50% improvement with ambulation in the community. Pt states she is still having some issues with going up stairs outside her home due to them not having a railing. Feeling better today than at last visit. Notes she was able to catch up on her sleep from the weekend.      OBJECTIVE:   9/8/22  LE Strength:    LEFT RIGHT    Hip flexion 5/5 5/5 sway    Moderate sway    Moderate sway  One UE support   Postural sway:  *ankle circles  *fwd/backward   10x cw/ccw  10x               Manual Intervention (25161) Min:                Modalities  Min:                      Other Therapeutic Activities: Pt was educated on PT POC, Diagnosis, Prognosis, pathomechanics as well as frequency and duration of scheduling future physical therapy appointments. Time was also taken on this day to answer all patient questions and participation in PT. Reviewed appointment policy in detail with patient and patient verbalized understanding. Home Exercise Program: Patient was instructed in the following for HEP:   Access Code: [email or web address]  Exercises  NBOS with EC - 1 x daily - 7 x weekly - 1 sets - 2 reps - 1 min hold  Tandem Stance - 1 x daily - 7 x weekly - 1 sets - 2 reps - 1 min hold  Patient verbalized/demonstrated understanding and was issued written handout. Therapeutic Exercise and NMR EXR  [x] (62287) Provided verbal/tactile cueing for activities related to strengthening, flexibility, endurance, ROM for improvements in LE, proximal hip, and core control with self care, mobility, lifting, ambulation. [x] (48950) Provided verbal/tactile cueing for activities related to improving balance, coordination, kinesthetic sense, posture, motor skill, proprioception  to assist with LE, proximal hip, and core control in self care, mobility, lifting, ambulation and eccentric single leg control.  [address] and Therapeutic Activities:    [x] (43875 or 85473) Provided verbal/tactile cueing for activities related to improving balance, coordination, kinesthetic sense, posture, motor skill, proprioception and motor activation to allow for proper function of core, proximal hip and LE with self care and ADLs and functional mobility.   [] (12374) Gait Re-education- Provided training and instruction to the patient for proper LE, core and proximal hip recruitment and positioning and eccentric body weight control with ambulation re-education including up and down stairs     Home Exercise Program:    [x] (16474) Reviewed/Progressed HEP activities related to strengthening, flexibility, endurance, ROM of core, proximal hip and LE for functional self-care, mobility, lifting and ambulation/stair navigation   [x] (99855)Reviewed/Progressed HEP activities related to improving balance, coordination, kinesthetic sense, posture, motor skill, proprioception of core, proximal hip and LE for self care, mobility, lifting, and ambulation/stair navigation      Manual Treatments:  PROM / STM / Oscillations-Mobs:  G-I, II, III, IV (PA's, Inf., Post.)  [] (68586) Provided manual therapy to mobilize LE, proximal hip and/or LS spine soft tissue/joints for the purpose of modulating pain, promoting relaxation,  increasing ROM, reducing/eliminating soft tissue swelling/inflammation/restriction, improving soft tissue extensibility and allowing for proper ROM for normal function with self care, mobility, lifting and ambulation. Charges:  Timed Code Treatment Minutes: 48   Total Treatment Minutes: 48      [] EVAL (LOW) 82590 (typically 20 minutes face-to-face)  [] EVAL (MOD) 60161 (typically 30 minutes face-to-face)  [] EVAL (HIGH) 99364 (typically 45 minutes face-to-face)  [] RE-EVAL     [] VQ(21132) x      [] Dry needle 1 or 2 Muscles (72828)  [x] NMR (38969) x  2  [] Dry needle 3+ Muscles (40925)  [] Manual (30652) x     [] Ultrasound (18312) x  [x] TA (67293) x  1   [] Mech Traction (40970)  [] ES(attended) (22533)     [] ES (un) (41212):   [] Vasopump (35830) [] Ionto (42199)   [] Other:    GOALS:  Patient stated goal: \"Be able to walk easier\"  [x] Progressing: [] Met: [] Not Met: [] Adjusted     Therapist goals for Patient:  Short Term Goals: To be achieved in: 2 weeks  1. Independent in HEP and progression per patient tolerance, in order to prevent re-injury. [] Progressing: [x] Met: [] Not Met: [] Adjusted  2.  Patient <30days   [] Goals require adjustment due to lack of progress  [] Patient is not progressing as expected and requires additional follow up with physician  [] Other    Prognosis for POC: [x] Good [] Fair  [] Poor    Patient requires continued skilled intervention: [x] Yes  [] No      PLAN:   [x] Continue per plan of care [] Alter current plan (see comments)  [] Plan of care initiated [] Hold pending MD visit [] Discharge    Electronically signed by: Krysta Quintana PT , OMT-C,  [id number]      Note: If patient does not return for scheduled/recommended follow up visits, this note will serve as a discharge from care along with the most recent update on progress.

## 2022-09-12 ENCOUNTER — HOSPITAL ENCOUNTER (OUTPATIENT)
Dept: PHYSICAL THERAPY | Age: 62
Setting detail: THERAPIES SERIES
Discharge: HOME | End: 2022-09-12
Payer: COMMERCIAL

## 2022-09-12 PROCEDURE — 97530 THERAPEUTIC ACTIVITIES: CPT

## 2022-09-12 PROCEDURE — 97112 NEUROMUSCULAR REEDUCATION: CPT

## 2022-09-12 NOTE — PLAN OF CARE
[address] [facility] [place]. [place] [address]  Phone: [phone]   Fax:     [phone]      Physical Therapy Re-Certification Plan of Care/MD UPDATE      Dear Rich Petty MD,    We had the pleasure of treating the following patient for physical therapy services at [facility]. A summary of our findings can be found in the updated assessment below. This includes our plan of care. If you have any questions or concerns regarding these findings, please do not hesitate to contact me at the office phone number checked above. Thank you for the referral.     Physician Signature:________________________________Date:__________________  By signing above (or electronic signature), therapists plan is approved by physician    Date Range Of Visits: 8/8/22 to 9/12/22  Total Visits to Date: 8  Overall Response to Treatment:   [x]Patient is responding well to treatment and improvement is noted with regards  to goals   []Patient should continue to improve in reasonable time if they continue HEP   []Patient has plateaued and is no longer responding to skilled PT intervention    []Patient is getting worse and would benefit from return to referring MD   []Patient unable to adhere to initial POC   []Other:     ASSESSMENT: Pt demonstrates slow,steady progress in PT. Pt reports 50% improvement in her gait/balance in the community. Pt is able to maintain NBOS with EC on firm surface for a minute now and minimal ankle sway. Also able to maintain NBOS with EC 30 secs longer than at eval but still demonstrates moderate postural sway. Pt will continue to benefit from skilled PT to address strength, balance and gait needed for max functional indep and reduce fear of falling. Recommendation:    [x]Continue PT 2x / wk for 3 weeks.     []Hold PT, pending MD visit            Physical Therapy Reports was compliant with balance exs in HEP thus far. Pt reports residual complaints from COVID, \"fatigue runny nose SOB at times and balance is bad\"   8/22: Pt states she fell last week in home. Notes she tripped over something but was not able to recall what she fell over. Pt states she fell onto her bottom injuring her tailbone. Tailbone is starting to feel better but has not felt the need to follow up with MD about it.    8/25: Pt states she is doing well just a little tired. Tailbone continues to feel better. 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 Pt denies any slurred speech, memory issues, or dizziness associated with vision issues. Pt notes this threw her balance off. Later in the day she had her daughter give her her glasses which helped. Overall pt feels as if her balance is improving.   8/31/22: Pt reports that she saw the doctor on Monday following therapy. Reports that her \"ears are fine\". Reports that she feels her balance is getting better, still wishes her \"ears would pop\". 9/6: Pt reports she is a little shaky today and not feeling as good. Pt notes she was out of town this past weekend and thinks her not feeling well is due to drinking too much over the weekend. Overall feels like her balance is improving. 9/8: Pt states she has been doing light housework without LOB at home. Patient notes 50% improvement with ambulation in the community. Pt states she is still having some issues with going up stairs outside her home due to them not having a railing. Feeling better today than at last visit. Notes she was able to catch up on her sleep from the weekend. 9/12: Pt states her balance and gait are doing ok. Continues with difficulty doing stairs outside home without a railing. Pt denies any c/o dizziness. Notes her balance is overall about 50% improved.      OBJECTIVE:   9/8/22  LE Strength:    LEFT RIGHT    Hip flexion 5/5 5/5 UE support   Postural sway:  *ankle circles  *fwd/backward   10x cw/ccw  10x               Manual Intervention (07973) Min:                Modalities  Min:                      Other Therapeutic Activities: Pt was educated on PT POC, Diagnosis, Prognosis, pathomechanics as well as frequency and duration of scheduling future physical therapy appointments. Time was also taken on this day to answer all patient questions and participation in PT. Reviewed appointment policy in detail with patient and patient verbalized understanding. Home Exercise Program: Patient was instructed in the following for HEP:   Access Code: [email or web address]  Exercises  NBOS with EC - 1 x daily - 7 x weekly - 1 sets - 2 reps - 1 min hold  Tandem Stance - 1 x daily - 7 x weekly - 1 sets - 2 reps - 1 min hold  Patient verbalized/demonstrated understanding and was issued written handout. Therapeutic Exercise and NMR EXR  [x] (53259) Provided verbal/tactile cueing for activities related to strengthening, flexibility, endurance, ROM for improvements in LE, proximal hip, and core control with self care, mobility, lifting, ambulation. [x] (93909) Provided verbal/tactile cueing for activities related to improving balance, coordination, kinesthetic sense, posture, motor skill, proprioception  to assist with LE, proximal hip, and core control in self care, mobility, lifting, ambulation and eccentric single leg control.  [address] and Therapeutic Activities:    [x] (74320 or 00293) Provided verbal/tactile cueing for activities related to improving balance, coordination, kinesthetic sense, posture, motor skill, proprioception and motor activation to allow for proper function of core, proximal hip and LE with self care and ADLs and functional mobility.   [] (04613) Gait Re-education- Provided training and instruction to the patient for proper LE, core and proximal hip recruitment and positioning and eccentric body weight control with ambulation re-education including up and down stairs     Home Exercise Program:    [x] (83051) Reviewed/Progressed HEP activities related to strengthening, flexibility, endurance, ROM of core, proximal hip and LE for functional self-care, mobility, lifting and ambulation/stair navigation   [x] (86826)Reviewed/Progressed HEP activities related to improving balance, coordination, kinesthetic sense, posture, motor skill, proprioception of core, proximal hip and LE for self care, mobility, lifting, and ambulation/stair navigation      Manual Treatments:  PROM / STM / Oscillations-Mobs:  G-I, II, III, IV (PA's, Inf., Post.)  [] (35805) Provided manual therapy to mobilize LE, proximal hip and/or LS spine soft tissue/joints for the purpose of modulating pain, promoting relaxation,  increasing ROM, reducing/eliminating soft tissue swelling/inflammation/restriction, improving soft tissue extensibility and allowing for proper ROM for normal function with self care, mobility, lifting and ambulation. Charges:  Timed Code Treatment Minutes: 48   Total Treatment Minutes: 48      [] EVAL (LOW) 46847 (typically 20 minutes face-to-face)  [] EVAL (MOD) 85045 (typically 30 minutes face-to-face)  [] EVAL (HIGH) 45533 (typically 45 minutes face-to-face)  [] RE-EVAL     [] GP(42723) x      [] Dry needle 1 or 2 Muscles (66303)  [x] NMR (33374) x  2  [] Dry needle 3+ Muscles (86708)  [] Manual (59649) x     [] Ultrasound (84991) x  [x] TA (11407) x  1   [] Mech Traction (08000)  [] ES(attended) (99574)     [] ES (un) (80540):   [] Vasopump (60961) [] Ionto (09751)   [] Other:    GOALS:  Patient stated goal: \"Be able to walk easier\"  [x] Progressing: [] Met: [] Not Met: [] Adjusted     Therapist goals for Patient:  Short Term Goals: To be achieved in: 2 weeks  1. Independent in HEP and progression per patient tolerance, in order to prevent re-injury. [] Progressing: [x] Met: [] Not Met: [] Adjusted  2.  Patient will have a decrease in dizziness/imbalance/symptoms to by 40% to facilitate improvement in movement, function, balance and ADLs as indicated by Functional Deficits. [] Progressing: [x] Met: [] Not Met: [] Adjusted     Long Term Goals: To be achieved in: at discharge  1. Increase FOTO functional outcome score from 51 to 55  to assist with reaching prior level of function. [] Progressing: [] Met: [x] Not Met: [] Adjusted  2. Patient will demonstrate negative oculomotor special testing/positional testing as indicated by patients Functional Deficits. [x] Progressing: [] Met: [] Not Met: [] Adjusted  3. Patient will return to functional activities including housework without LOB. [] Progressing: [x] Met: [] Not Met: [] Adjusted  4. Pt will report 40% increased ease with ambulation in the community. [] Progressing: [x] Met: [] Not Met: [] Adjusted         ASSESSMENT: Pt demonstrates slow,steady progress in PT. Pt reports 50% improvement in her gait/balance in the community. Pt is able to maintain NBOS with EC on firm surface for a minute now and minimal ankle sway. Also able to maintain NBOS with EC 30 secs longer than at eval but still demonstrates moderate postural sway. Pt will continue to benefit from skilled PT to address strength, balance and gait needed for max functional indep and reduce fear of falling. Treatment/Activity Tolerance:  [x] Patient tolerated treatment well [] Patient limited by fatique  [] Patient limited by pain  [] Patient limited by other medical complications  [] Other:     Overall Progression Towards Functional goals/ Treatment Progress Update:  [] Patient is progressing as expected towards functional goals listed. [] Progression is slowed due to complexities/Impairments listed. [] Progression has been slowed due to co-morbidities.   [x] Plan just implemented, too soon to assess goals progression <30days   [] Goals require adjustment due to lack of progress  [] Patient is not progressing as expected and requires additional follow up with physician  [] Other    Prognosis for POC: [x] Good [] Fair  [] Poor    Patient requires continued skilled intervention: [x] Yes  [] No      PLAN:   [x] Continue per plan of care [] Alter current plan (see comments)  [] Plan of care initiated [] Hold pending MD visit [] Discharge    Electronically signed by: Bhakti Robison PT , OMADRI-C,  [id number]      Note: If patient does not return for scheduled/recommended follow up visits, this note will serve as a discharge from care along with the most recent update on progress.

## 2022-09-15 ENCOUNTER — HOSPITAL ENCOUNTER (OUTPATIENT)
Dept: PHYSICAL THERAPY | Age: 62
Setting detail: THERAPIES SERIES
Discharge: HOME | End: 2022-09-15
Payer: COMMERCIAL

## 2022-09-15 PROCEDURE — 97530 THERAPEUTIC ACTIVITIES: CPT

## 2022-09-15 PROCEDURE — 97112 NEUROMUSCULAR REEDUCATION: CPT

## 2022-09-15 NOTE — FLOWSHEET NOTE
[address] [facility] [place]. [place] [address]  Phone: [phone]   Fax:     [phone]      Physical Therapy Treatment Note/ Progress Report:   Date:  9/15/2022    Patient Name:  Aleksandra Carpenter    :  1960  MRN: [id number]    Pertinent Medical History: HTN, anxiety, gout, R TKR    Medical/Treatment Diagnosis Information:  Diagnosis: R42 (ICD-10-CM) - Dizziness  Treatment Diagnosis: Decreased balance , Vestibular impairment    Insurance/Certification information:  PT Insurance Information: [employer]  Physician Information:  Referring Provider (secondary): Az Anderson MD  Plan of care signed (Y/N): [x]  Yes []  No ; PN sent  (received)    Date of Patient follow up with Physician:      Progress Report: []  Yes  [x]  No     Date Range for reporting period:  Beginnin2022  Endin22    Progress report due (10 Rx/or 30 days whichever is less):  3/3/83    Recertification due (POC duration/ or 90 days whichever is less):  22    Visit # Insurance Allowable Auth required?  bomn []  Yes [x]  No     Latex Allergy:  [x]NO      []YES  Preferred Language for Healthcare:   [x]English       []other:    Functional Scale:      Date assessed:   Test: FOTO - balance  Score: 51 - eval  Score: 47 - reassessment     Pain level:  4/10     History of Injury: Pt states she had COVID in the end of . Shortly after this patient notes having significant issues with her balance. Pt also states that her ears feel clogged as well. Pt states she has fallen once since onset but that was caused by her slipper falling off while going down the stairs. Pt states she uses her cane when doing stairs to get in and out of her house but otherwise she just grabs onto things as she is walking. Pt denies any episodes of dizziness or spinning. Pt also denies any blurred vision.     SUBJECTIVE:  Pt comes to therapy Continues with difficulty doing stairs outside home without a railing. Pt denies any c/o dizziness. Notes her balance is overall about 50% improved. 9/15: Pt reports she has been walking around her house lately with a little better balance. OBJECTIVE:   9/8/22  LE Strength:    LEFT RIGHT    Hip flexion 5/5 5/5    Hip IR 5/5 5/5    Hip ER 5/5 5/5    Knee extension 4+/5 4+/5    Knee flexion 5/5 5/5    Ankle 4+/5 johnny       9/8/22  Postural Control Tests:  Clinical Test of Sensory Interaction for Balance (CTSIB) performed in Romberg stance  CONDITION TIME STRATEGY SWAY    Eyes open, firm surface X30 sec  none    Eyes closed, firm surface X30 sec ankle Min    Eyes open, foam surface X30 sec ankle min    Eyes closed, foam surface X1 min hip moderate     RESTRICTIONS/PRECAUTIONS:     Exercises/Interventions:     Therapeutic Exercises (89742) Min: 10 Resistance/Reps Notes/Cues   Nu step L3 x5 mins    Heel raises supported  20 reps     Toe raises supported  20 reps     Ankle tband INv Jimbo  Added to HEP daily         Leg Press 60# 2x10 B    FAQ 10# 2x10 B         Therapeutic Activities (11000) Min: 10          Advanced Gait:  *amb w/ head turns and nods while counting backwards from 50  *marching while counting by 2's to 50  *amb while counting backwards from 20 by 5s   20' x2 each with HHA      21' x2 with HHA    21' x2 with HHA  Increased ANUP noted with advanced gait activities.     Sit to stand without UEs X30 sec  9/12: completed 11 reps in 30 sec   *amb sidesteps 20' x2 with SBA - add tband next visit Moving L more difficult than moving R   Step up without UEs 4\" 10x L/R with assistance of cane and occassional UE assist         Neuromuscular Re-ed (93768) Min: 30     Type I viewing exercise    NBOS: firm surface  *with eyes open   *with head turns / nods  *eyes closed static  *eyes closed head turns/nods    Modified tandem:  *with eyes open  *with head turns/nods     X1 min  X1 min        X1 min L/R  X1 min each Patient requires occasional  taps to bar for balance support   AirEx:   *NBOS with head nods and counting by 2  *NBOS with eyes closed   *NBOS with eyes closed count backwards from 50  *step up and down off mat     x1 min each      x1 min each   1x    10x L/R   Mild sway    Moderate sway    Moderate sway  One UE support   Postural sway:  *ankle circles  *fwd/backward   10x cw/ccw  10x               Manual Intervention (93035) Min:                Modalities  Min:                      Other Therapeutic Activities: Pt was educated on PT POC, Diagnosis, Prognosis, pathomechanics as well as frequency and duration of scheduling future physical therapy appointments. Time was also taken on this day to answer all patient questions and participation in PT. Reviewed appointment policy in detail with patient and patient verbalized understanding. Home Exercise Program: Patient was instructed in the following for HEP:   Access Code: [email or web address]  Exercises  NBOS with EC - 1 x daily - 7 x weekly - 1 sets - 2 reps - 1 min hold  Tandem Stance - 1 x daily - 7 x weekly - 1 sets - 2 reps - 1 min hold  Patient verbalized/demonstrated understanding and was issued written handout. Therapeutic Exercise and NMR EXR  [x] (39750) Provided verbal/tactile cueing for activities related to strengthening, flexibility, endurance, ROM for improvements in LE, proximal hip, and core control with self care, mobility, lifting, ambulation. [x] (40777) Provided verbal/tactile cueing for activities related to improving balance, coordination, kinesthetic sense, posture, motor skill, proprioception  to assist with LE, proximal hip, and core control in self care, mobility, lifting, ambulation and eccentric single leg control.  56343    NMR and Therapeutic Activities:    [x] (72042 or 19645) Provided verbal/tactile cueing for activities related to improving balance, coordination, kinesthetic sense, posture, motor skill, proprioception and motor activation to allow for proper function of core, proximal hip and LE with self care and ADLs and functional mobility.   [] (22622) Gait Re-education- Provided training and instruction to the patient for proper LE, core and proximal hip recruitment and positioning and eccentric body weight control with ambulation re-education including up and down stairs     Home Exercise Program:    [x] (16775) Reviewed/Progressed HEP activities related to strengthening, flexibility, endurance, ROM of core, proximal hip and LE for functional self-care, mobility, lifting and ambulation/stair navigation   [x] (40276)Reviewed/Progressed HEP activities related to improving balance, coordination, kinesthetic sense, posture, motor skill, proprioception of core, proximal hip and LE for self care, mobility, lifting, and ambulation/stair navigation      Manual Treatments:  PROM / STM / Oscillations-Mobs:  G-I, II, III, IV (PA's, Inf., Post.)  [] (35477) Provided manual therapy to mobilize LE, proximal hip and/or LS spine soft tissue/joints for the purpose of modulating pain, promoting relaxation,  increasing ROM, reducing/eliminating soft tissue swelling/inflammation/restriction, improving soft tissue extensibility and allowing for proper ROM for normal function with self care, mobility, lifting and ambulation.      Charges:  Timed Code Treatment Minutes: 50   Total Treatment Minutes: 50      [] EVAL (LOW) 61289 (typically 20 minutes face-to-face)  [] EVAL (MOD) 85992 (typically 30 minutes face-to-face)  [] EVAL (HIGH) 87598 (typically 45 minutes face-to-face)  [] RE-EVAL     [] ZZ(25608) x      [] Dry needle 1 or 2 Muscles (89032)  [x] NMR (20081) x  2  [] Dry needle 3+ Muscles (82445)  [] Manual (84828) x     [] Ultrasound (38442) x  [x] TA (84464) x  1   [] Mech Traction (92429)  [] ES(attended) (22176)     [] ES (un) (34823):   [] Vasopump (14030) [] Ionto (39242)   [] Other:    GOALS:  Patient stated goal: \"Be able to walk easier\"  [x] Progressing: [] Met: [] Not Met: [] Adjusted     Therapist goals for Patient:  Short Term Goals: To be achieved in: 2 weeks  1. Independent in HEP and progression per patient tolerance, in order to prevent re-injury. [] Progressing: [x] Met: [] Not Met: [] Adjusted  2. Patient will have a decrease in dizziness/imbalance/symptoms to by 40% to facilitate improvement in movement, function, balance and ADLs as indicated by Functional Deficits. [] Progressing: [x] Met: [] Not Met: [] Adjusted     Long Term Goals: To be achieved in: at discharge  1. Increase FOTO functional outcome score from 51 to 55  to assist with reaching prior level of function. [] Progressing: [] Met: [x] Not Met: [] Adjusted  2. Patient will demonstrate negative oculomotor special testing/positional testing as indicated by patients Functional Deficits. [x] Progressing: [] Met: [] Not Met: [] Adjusted  3. Patient will return to functional activities including housework without LOB. [] Progressing: [x] Met: [] Not Met: [] Adjusted  4. Pt will report 40% increased ease with ambulation in the community. [] Progressing: [x] Met: [] Not Met: [] Adjusted         ASSESSMENT: Pt with good tolerance to today's session. Pt demonstrated good stability today with balance activities involving reduced visual field. Pt still demonstrates decreased marian and increased ANUP with advanced gait activities. Pt will continue to benefit from skilled PT to address strength, balance and gait needed for max functional indep and reduce fear of falling. Treatment/Activity Tolerance:  [x] Patient tolerated treatment well [] Patient limited by fatique  [] Patient limited by pain  [] Patient limited by other medical complications  [] Other:     Overall Progression Towards Functional goals/ Treatment Progress Update:  [] Patient is progressing as expected towards functional goals listed. [] Progression is slowed due to complexities/Impairments listed.   [] Progression has been slowed due to co-morbidities. [x] Plan just implemented, too soon to assess goals progression <30days   [] Goals require adjustment due to lack of progress  [] Patient is not progressing as expected and requires additional follow up with physician  [] Other    Prognosis for POC: [x] Good [] Fair  [] Poor    Patient requires continued skilled intervention: [x] Yes  [] No      PLAN:   [x] Continue per plan of care [] Alter current plan (see comments)  [] Plan of care initiated [] Hold pending MD visit [] Discharge    Electronically signed by: Saleem Harp PT , OMT-C,  [id number]      Note: If patient does not return for scheduled/recommended follow up visits, this note will serve as a discharge from care along with the most recent update on progress.

## 2022-09-19 ENCOUNTER — HOSPITAL ENCOUNTER (OUTPATIENT)
Dept: PHYSICAL THERAPY | Age: 62
Setting detail: THERAPIES SERIES
Discharge: HOME | End: 2022-09-19
Payer: COMMERCIAL

## 2022-09-19 PROCEDURE — 97112 NEUROMUSCULAR REEDUCATION: CPT

## 2022-09-19 PROCEDURE — 97530 THERAPEUTIC ACTIVITIES: CPT

## 2022-09-19 NOTE — FLOWSHEET NOTE
[address] [facility] [address] [address]  Phone: [phone]   Fax:     [phone]      Physical Therapy Treatment Note/ Progress Report:   Date:  2022    Patient Name:  Kasi Sheffield    :  1960  MRN: [id number]    Pertinent Medical History: HTN, anxiety, gout, R TKR    Medical/Treatment Diagnosis Information:  Diagnosis: R42 (ICD-10-CM) - Dizziness  Treatment Diagnosis: Decreased balance , Vestibular impairment    Insurance/Certification information:  PT Insurance Information: [employer]  Physician Information:  Referring Provider (secondary): Rebeka Calzada MD  Plan of care signed (Y/N): [x]  Yes []  No ; PN sent  (received)    Date of Patient follow up with Physician:      Progress Report: []  Yes  [x]  No     Date Range for reporting period:  Beginnin2022  Endin22    Progress report due (10 Rx/or 30 days whichever is less):  21    Recertification due (POC duration/ or 90 days whichever is less):  22    Visit # Insurance Allowable Auth required? 10/12 bomn []  Yes [x]  No     Latex Allergy:  [x]NO      []YES  Preferred Language for Healthcare:   [x]English       []other:    Functional Scale:      Date assessed:   Test: FOTO - balance  Score: 51 - eval  Score: 47 - reassessment     Pain level:  4/10     History of Injury: Pt states she had COVID in the end of . Shortly after this patient notes having significant issues with her balance. Pt also states that her ears feel clogged as well. Pt states she has fallen once since onset but that was caused by her slipper falling off while going down the stairs. Pt states she uses her cane when doing stairs to get in and out of her house but otherwise she just grabs onto things as she is walking. Pt denies any episodes of dizziness or spinning. Pt also denies any blurred vision.     SUBJECTIVE:  Pt comes to therapy with SPC reports being disappointed she has to use it. She typically does not use one at baseline, goal is to wean off it. Despite this, however, she has been compliant using per PT rec. Pt reports fear of falling has developed. Reports was compliant with balance exs in HEP thus far. Pt reports residual complaints from COVID, \"fatigue runny nose SOB at times and balance is bad\"   8/22: Pt states she fell last week in home. Notes she tripped over something but was not able to recall what she fell over. Pt states she fell onto her bottom injuring her tailbone. Tailbone is starting to feel better but has not felt the need to follow up with MD about it.    8/25: Pt states she is doing well just a little tired. Tailbone continues to feel better. 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 Pt denies any slurred speech, memory issues, or dizziness associated with vision issues. Pt notes this threw her balance off. Later in the day she had her daughter give her her glasses which helped. Overall pt feels as if her balance is improving.   8/31/22: Pt reports that she saw the doctor on Monday following therapy. Reports that her \"ears are fine\". Reports that she feels her balance is getting better, still wishes her \"ears would pop\". 9/6: Pt reports she is a little shaky today and not feeling as good. Pt notes she was out of town this past weekend and thinks her not feeling well is due to drinking too much over the weekend. Overall feels like her balance is improving. 9/8: Pt states she has been doing light housework without LOB at home. Patient notes 50% improvement with ambulation in the community. Pt states she is still having some issues with going up stairs outside her home due to them not having a railing. Feeling better today than at last visit. Notes she was able to catch up on her sleep from the weekend. 9/12: Pt states her balance and gait are doing ok. Continues with difficulty doing stairs outside home without a railing. Pt denies any c/o dizziness. Notes her balance is overall about 50% improved. 9/15: Pt reports she has been walking around her house lately with a little better balance. 9/19: Pt without any new complaints today. OBJECTIVE:   9/8/22  LE Strength:    LEFT RIGHT    Hip flexion 5/5 5/5    Hip IR 5/5 5/5    Hip ER 5/5 5/5    Knee extension 4+/5 4+/5    Knee flexion 5/5 5/5    Ankle 4+/5 johnny       9/8/22  Postural Control Tests:  Clinical Test of Sensory Interaction for Balance (CTSIB) performed in Romberg stance  CONDITION TIME STRATEGY SWAY    Eyes open, firm surface X30 sec  none    Eyes closed, firm surface X30 sec ankle Min    Eyes open, foam surface X30 sec ankle min    Eyes closed, foam surface X1 min hip moderate     RESTRICTIONS/PRECAUTIONS:     Exercises/Interventions:     Therapeutic Exercises (96904) Min: 10 Resistance/Reps Notes/Cues   Nu step L3 x5 mins    Heel raises supported  20 reps     Toe raises supported  20 reps     Ankle tband INv Jimbo  Added to HEP daily         Leg Press 60# 2x10 B    FAQ 10# 2x10 B         Therapeutic Activities (26141) Min: 10          Advanced Gait:  *amb w/ head turns and nods while counting backwards from 50  *marching while counting by 2's to 50  *amb while counting backwards from 20 by 5s   20' x2 each with HHA      21' x2 with HHA    21' x2 with HHA  Increased ANUP noted with advanced gait activities.     Sit to stand without UEs X30 sec  9/19: completed 10 reps in 30 sec   amb sidesteps in II bars Red @ ankles x2 laps Moving L more difficult than moving R   Step up without UEs 4\" 10x L/R with occassional UE assist         Neuromuscular Re-ed (54466) Min: 30     Type I viewing exercise    NBOS: firm surface  *with eyes open   *with head turns / nods  *eyes closed static  *eyes closed head turns/nods    Modified tandem:  *with eyes open  *with head turns/nods     X1 min  X1 min        X1 min L/R  X1 min each       Patient requires occasional  taps to bar for balance support   AirEx:   *NBOS with head nods and counting by 2  *NBOS with eyes closed   *NBOS with eyes closed count backwards from 50  *step up and down off mat  *NBOS with PT perturbations     x1 min each      x1 min each   1x    10x L/R    add   Mild sway    Moderate sway    Moderate sway  One UE support   Postural sway:  *ankle circles  *fwd/backward   10x cw/ccw  10x               Manual Intervention (97466) Min:                Modalities  Min:                      Other Therapeutic Activities: Pt was educated on PT POC, Diagnosis, Prognosis, pathomechanics as well as frequency and duration of scheduling future physical therapy appointments. Time was also taken on this day to answer all patient questions and participation in PT. Reviewed appointment policy in detail with patient and patient verbalized understanding. Home Exercise Program: Patient was instructed in the following for HEP:   Access Code: [email or web address]  Exercises  NBOS with EC - 1 x daily - 7 x weekly - 1 sets - 2 reps - 1 min hold  Tandem Stance - 1 x daily - 7 x weekly - 1 sets - 2 reps - 1 min hold  Patient verbalized/demonstrated understanding and was issued written handout. Therapeutic Exercise and NMR EXR  [x] (54865) Provided verbal/tactile cueing for activities related to strengthening, flexibility, endurance, ROM for improvements in LE, proximal hip, and core control with self care, mobility, lifting, ambulation. [x] (12945) Provided verbal/tactile cueing for activities related to improving balance, coordination, kinesthetic sense, posture, motor skill, proprioception  to assist with LE, proximal hip, and core control in self care, mobility, lifting, ambulation and eccentric single leg control.  [address] and Therapeutic Activities:    [x] (18971 or 72692) Provided verbal/tactile cueing for activities related to improving balance, coordination, kinesthetic sense, posture, motor skill, proprioception and motor activation to allow for proper function of core, proximal hip and LE with self care and ADLs and functional mobility.   [] (57921) Gait Re-education- Provided training and instruction to the patient for proper LE, core and proximal hip recruitment and positioning and eccentric body weight control with ambulation re-education including up and down stairs     Home Exercise Program:    [x] (96569) Reviewed/Progressed HEP activities related to strengthening, flexibility, endurance, ROM of core, proximal hip and LE for functional self-care, mobility, lifting and ambulation/stair navigation   [x] (11239)Reviewed/Progressed HEP activities related to improving balance, coordination, kinesthetic sense, posture, motor skill, proprioception of core, proximal hip and LE for self care, mobility, lifting, and ambulation/stair navigation      Manual Treatments:  PROM / STM / Oscillations-Mobs:  G-I, II, III, IV (PA's, Inf., Post.)  [] (46122) Provided manual therapy to mobilize LE, proximal hip and/or LS spine soft tissue/joints for the purpose of modulating pain, promoting relaxation,  increasing ROM, reducing/eliminating soft tissue swelling/inflammation/restriction, improving soft tissue extensibility and allowing for proper ROM for normal function with self care, mobility, lifting and ambulation.      Charges:  Timed Code Treatment Minutes: 50   Total Treatment Minutes: 50      [] EVAL (LOW) 67789 (typically 20 minutes face-to-face)  [] EVAL (MOD) 06405 (typically 30 minutes face-to-face)  [] EVAL (HIGH) 83959 (typically 45 minutes face-to-face)  [] RE-EVAL     [] KK(47124) x      [] Dry needle 1 or 2 Muscles (10250)  [x] NMR (32382) x  2  [] Dry needle 3+ Muscles (88002)  [] Manual (85253) x     [] Ultrasound (10897) x  [x] TA (78003) x  1   [] Mech Traction (28576)  [] ES(attended) (65491)     [] ES (un) (04903):   [] Vasopump (10587) [] Ionto (44126)   [] Progression is slowed due to complexities/Impairments listed. [] Progression has been slowed due to co-morbidities. [x] Plan just implemented, too soon to assess goals progression <30days   [] Goals require adjustment due to lack of progress  [] Patient is not progressing as expected and requires additional follow up with physician  [] Other    Prognosis for POC: [x] Good [] Fair  [] Poor    Patient requires continued skilled intervention: [x] Yes  [] No      PLAN:   [x] Continue per plan of care [] Alter current plan (see comments)  [] Plan of care initiated [] Hold pending MD visit [] Discharge    Electronically signed by: Cyndi Fournier PT , OMT-C,  [id number]      Note: If patient does not return for scheduled/recommended follow up visits, this note will serve as a discharge from care along with the most recent update on progress.

## 2022-09-23 ENCOUNTER — HOSPITAL ENCOUNTER (OUTPATIENT)
Dept: PHYSICAL THERAPY | Age: 62
Setting detail: THERAPIES SERIES
Discharge: HOME | End: 2022-09-23
Payer: COMMERCIAL

## 2022-09-23 PROCEDURE — 97530 THERAPEUTIC ACTIVITIES: CPT | Performed by: PHYSICAL THERAPIST

## 2022-09-23 PROCEDURE — 97112 NEUROMUSCULAR REEDUCATION: CPT | Performed by: PHYSICAL THERAPIST

## 2022-09-23 NOTE — FLOWSHEET NOTE
[address] [facility] [address] [address]  Phone: [phone]   Fax:     [phone]      Physical Therapy Treatment Note/ Progress Report:   Date:  2022    Patient Name:  Lilian Gloria    :  1960  MRN: [id number]    Pertinent Medical History: HTN, anxiety, gout, R TKR    Medical/Treatment Diagnosis Information:  Diagnosis: R42 (ICD-10-CM) - Dizziness  Treatment Diagnosis: Decreased balance , Vestibular impairment    Insurance/Certification information:  PT Insurance Information: [employer]  Physician Information:  Referring Provider (secondary): Niurka Cabrales MD  Plan of care signed (Y/N): [x]  Yes []  No ; PN sent  (received)    Date of Patient follow up with Physician:      Progress Report: []  Yes  [x]  No     Date Range for reporting period:  Beginnin2022  Endin22    Progress report due (10 Rx/or 30 days whichever is less):      Recertification due (POC duration/ or 90 days whichever is less):  22    Visit # Insurance Allowable Auth required? 10/12 bomn []  Yes [x]  No     Latex Allergy:  [x]NO      []YES  Preferred Language for Healthcare:   [x]English       []other:    Functional Scale:      Date assessed:   Test: FOTO - balance  Score: 51 - eval  Score: 47 - reassessment     Pain level:  4/10     History of Injury: Pt states she had COVID in the end of . Shortly after this patient notes having significant issues with her balance. Pt also states that her ears feel clogged as well. Pt states she has fallen once since onset but that was caused by her slipper falling off while going down the stairs. Pt states she uses her cane when doing stairs to get in and out of her house but otherwise she just grabs onto things as she is walking. Pt denies any episodes of dizziness or spinning. Pt also denies any blurred vision.     SUBJECTIVE:  Pt comes to therapy Continues with difficulty doing stairs outside home without a railing. Pt denies any c/o dizziness. Notes her balance is overall about 50% improved. 9/15: Pt reports she has been walking around her house lately with a little better balance. 9/19: Pt without any new complaints today. 9/23/22 Pt has been using [facility] for safety. Denies any falls. She feels like she is doing better. OBJECTIVE:   9/8/22  LE Strength:    LEFT RIGHT    Hip flexion 5/5 5/5    Hip IR 5/5 5/5    Hip ER 5/5 5/5    Knee extension 4+/5 4+/5    Knee flexion 5/5 5/5    Ankle 4+/5 johnny       9/8/22  Postural Control Tests:  Clinical Test of Sensory Interaction for Balance (CTSIB) performed in Romberg stance  CONDITION TIME STRATEGY SWAY    Eyes open, firm surface X30 sec  none    Eyes closed, firm surface X30 sec ankle Min    Eyes open, foam surface X30 sec ankle min    Eyes closed, foam surface X1 min hip moderate     RESTRICTIONS/PRECAUTIONS:     Exercises/Interventions:     Therapeutic Exercises (46501) Min: 10 Resistance/Reps Notes/Cues   Nu step L3 x5 mins    Heel raises supported  20 reps     Toe raises supported  20 reps     Ankle tband INv Jimbo  Added to HEP daily         Leg Press 60# 2x10 B    FAQ 10# 2x10 B         Therapeutic Activities (70337) Min: 10          Advanced Gait:  *amb w/ head turns and nods while counting backwards from 50  *marching while counting by 2's to 50  *amb while counting backwards from 20 by 5s   20' x2 each with HHA      21' x2 with HHA    21' x2 with HHA  Increased ANUP noted with advanced gait activities.     Sit to stand without UEs X30 sec  9/19: completed 10 reps in 30 sec   amb sidesteps in II bars Red @ ankles x2 laps Moving L more difficult than moving R   Step up without UEs 4\" 10x L/R with occassional UE assist         Neuromuscular Re-ed (10184) Min: 30     Type I viewing exercise    NBOS: firm surface  *with eyes open   *with head turns / nods  *eyes closed static  *eyes closed head turns/nods    Modified tandem:  *with eyes open  *with head turns/nods     X1 min  X1 min        X1 min L/R  X1 min each       Patient requires occasional  taps to bar for balance support   AirEx:   *NBOS with head nods and counting by 2  *NBOS with eyes closed   *NBOS with eyes closed count backwards from 50  *step up and down off mat  *NBOS with PT perturbations     x1 min each      x1 min each   1x    10x L/R    X1 min   Mild sway    Min  sway    Moderate sway  One UE support   Postural sway:  *ankle circles  *fwd/backward   10x cw/ccw  10x               Manual Intervention (18765) Min:                Modalities  Min:                      Other Therapeutic Activities: Pt was educated on PT POC, Diagnosis, Prognosis, pathomechanics as well as frequency and duration of scheduling future physical therapy appointments. Time was also taken on this day to answer all patient questions and participation in PT. Reviewed appointment policy in detail with patient and patient verbalized understanding. Home Exercise Program: Patient was instructed in the following for HEP:   Access Code: [email or web address]  Exercises  NBOS with EC - 1 x daily - 7 x weekly - 1 sets - 2 reps - 1 min hold  Tandem Stance - 1 x daily - 7 x weekly - 1 sets - 2 reps - 1 min hold  Patient verbalized/demonstrated understanding and was issued written handout. Therapeutic Exercise and NMR EXR  [x] (48694) Provided verbal/tactile cueing for activities related to strengthening, flexibility, endurance, ROM for improvements in LE, proximal hip, and core control with self care, mobility, lifting, ambulation. [x] (59610) Provided verbal/tactile cueing for activities related to improving balance, coordination, kinesthetic sense, posture, motor skill, proprioception  to assist with LE, proximal hip, and core control in self care, mobility, lifting, ambulation and eccentric single leg control.  [address] and Therapeutic Activities:    [x] (48510 or 22727) Provided verbal/tactile cueing for activities related to improving balance, coordination, kinesthetic sense, posture, motor skill, proprioception and motor activation to allow for proper function of core, proximal hip and LE with self care and ADLs and functional mobility.   [] (17332) Gait Re-education- Provided training and instruction to the patient for proper LE, core and proximal hip recruitment and positioning and eccentric body weight control with ambulation re-education including up and down stairs     Home Exercise Program:    [x] (86748) Reviewed/Progressed HEP activities related to strengthening, flexibility, endurance, ROM of core, proximal hip and LE for functional self-care, mobility, lifting and ambulation/stair navigation   [x] (17356)Reviewed/Progressed HEP activities related to improving balance, coordination, kinesthetic sense, posture, motor skill, proprioception of core, proximal hip and LE for self care, mobility, lifting, and ambulation/stair navigation      Manual Treatments:  PROM / STM / Oscillations-Mobs:  G-I, II, III, IV (PA's, Inf., Post.)  [] (69973) Provided manual therapy to mobilize LE, proximal hip and/or LS spine soft tissue/joints for the purpose of modulating pain, promoting relaxation,  increasing ROM, reducing/eliminating soft tissue swelling/inflammation/restriction, improving soft tissue extensibility and allowing for proper ROM for normal function with self care, mobility, lifting and ambulation.      Charges:  Timed Code Treatment Minutes: 50   Total Treatment Minutes: 50      [] EVAL (LOW) 10646 (typically 20 minutes face-to-face)  [] EVAL (MOD) 23854 (typically 30 minutes face-to-face)  [] EVAL (HIGH) 38998 (typically 45 minutes face-to-face)  [] RE-EVAL     [] [place](98420) x      [] Dry needle 1 or 2 Muscles (39742)  [x] NMR (57259) x  2  [] Dry needle 3+ Muscles (44257)  [] Manual (97657) x     [] Ultrasound (57989) x  [x] TA (99242) x  1   [] Mech Traction (68437)  [] ES(attended) (27560)     [] ES (un) (38347):   [] Vasopump (13913) [] Ionto (62622)   [] Other:    GOALS:  Patient stated goal: \"Be able to walk easier\"  [x] Progressing: [] Met: [] Not Met: [] Adjusted     Therapist goals for Patient:  Short Term Goals: To be achieved in: 2 weeks  1. Independent in HEP and progression per patient tolerance, in order to prevent re-injury. [] Progressing: [x] Met: [] Not Met: [] Adjusted  2. Patient will have a decrease in dizziness/imbalance/symptoms to by 40% to facilitate improvement in movement, function, balance and ADLs as indicated by Functional Deficits. [] Progressing: [x] Met: [] Not Met: [] Adjusted     Long Term Goals: To be achieved in: at discharge  1. Increase FOTO functional outcome score from 51 to 55  to assist with reaching prior level of function. [] Progressing: [] Met: [x] Not Met: [] Adjusted  2. Patient will demonstrate negative oculomotor special testing/positional testing as indicated by patients Functional Deficits. [x] Progressing: [] Met: [] Not Met: [] Adjusted  3. Patient will return to functional activities including housework without LOB. [] Progressing: [x] Met: [] Not Met: [] Adjusted  4. Pt will report 40% increased ease with ambulation in the community. [] Progressing: [x] Met: [] Not Met: [] Adjusted         ASSESSMENT: pt showed improvement in dynamic movement and changing directions and using UE assistance int for LOB. Pt did get tired from program and needed a rest break to breathe due to holding her breath. Step up and over was challenging for pt and she had to focus on skills. Pt makes good judgements and stops when she felt that she was getting off balance w/ activities.     Treatment/Activity Tolerance:  [x] Patient tolerated treatment well [] Patient limited by fatique  [] Patient limited by pain  [] Patient limited by other medical complications  [] Other:     Overall Progression Towards Functional goals/ Treatment Progress Update:  [] Patient is progressing as expected towards functional goals listed. [] Progression is slowed due to complexities/Impairments listed. [] Progression has been slowed due to co-morbidities. [x] Plan just implemented, too soon to assess goals progression <30days   [] Goals require adjustment due to lack of progress  [] Patient is not progressing as expected and requires additional follow up with physician  [] Other    Prognosis for POC: [x] Good [] Fair  [] Poor    Patient requires continued skilled intervention: [x] Yes  [] No      PLAN:   [x] Continue per plan of care [] Alter current plan (see comments)  [] Plan of care initiated [] Hold pending MD visit [] Discharge    Electronically signed by: Michael House PT , OMT-C,  [id number]      Note: If patient does not return for scheduled/recommended follow up visits, this note will serve as a discharge from care along with the most recent update on progress.

## 2022-09-26 ENCOUNTER — HOSPITAL ENCOUNTER (OUTPATIENT)
Dept: PHYSICAL THERAPY | Age: 62
Setting detail: THERAPIES SERIES
Discharge: HOME | End: 2022-09-26
Payer: COMMERCIAL

## 2022-09-26 PROCEDURE — 97112 NEUROMUSCULAR REEDUCATION: CPT

## 2022-09-26 PROCEDURE — 97530 THERAPEUTIC ACTIVITIES: CPT

## 2022-09-26 NOTE — FLOWSHEET NOTE
[address] [facility] [address] [place] [address]  Phone: [phone]   Fax:     [phone]      Physical Therapy Treatment Note/ Progress Report:   Date:  2022    Patient Name:  Jed Raza    :  1960  MRN: [id number]    Pertinent Medical History: HTN, anxiety, gout, R TKR    Medical/Treatment Diagnosis Information:  Diagnosis: R42 (ICD-10-CM) - Dizziness  Treatment Diagnosis: Decreased balance , Vestibular impairment    Insurance/Certification information:  PT Insurance Information: [employer]  Physician Information:  Referring Provider (secondary): Navarro Hernandez MD  Plan of care signed (Y/N): [x]  Yes []  No ; PN sent  (received)    Date of Patient follow up with Physician:      Progress Report: []  Yes  [x]  No     Date Range for reporting period:  Beginnin2022  Endin22    Progress report due (10 Rx/or 30 days whichever is less):  94    Recertification due (POC duration/ or 90 days whichever is less):  22    Visit # Insurance Allowable Auth required?  bomn []  Yes [x]  No     Latex Allergy:  [x]NO      []YES  Preferred Language for Healthcare:   [x]English       []other:    Functional Scale:      Date assessed:   Test: FOTO - balance  Score: 51 - eval  Score: 47 - reassessment     Pain level:  410     History of Injury: Pt states she had COVID in the end of . Shortly after this patient notes having significant issues with her balance. Pt also states that her ears feel clogged as well. Pt states she has fallen once since onset but that was caused by her slipper falling off while going down the stairs. Pt states she uses her cane when doing stairs to get in and out of her house but otherwise she just grabs onto things as she is walking. Pt denies any episodes of dizziness or spinning. Pt also denies any blurred vision.     SUBJECTIVE:  Pt comes to therapy Continues with difficulty doing stairs outside home without a railing. Pt denies any c/o dizziness. Notes her balance is overall about 50% improved. 9/15: Pt reports she has been walking around her house lately with a little better balance. 9/19: Pt without any new complaints today. 9/23/22 Pt has been using [facility] for safety. Denies any falls. She feels like she is doing better. 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 Had to walk on gravel this weekend which was challenging. OBJECTIVE:   9/8/22  LE Strength:    LEFT RIGHT    Hip flexion 5/5 5/5    Hip IR 5/5 5/5    Hip ER 5/5 5/5    Knee extension 4+/5 4+/5    Knee flexion 5/5 5/5    Ankle 4+/5 johnny       9/8/22  Postural Control Tests:  Clinical Test of Sensory Interaction for Balance (CTSIB) performed in Romberg stance  CONDITION TIME STRATEGY SWAY    Eyes open, firm surface X30 sec  none    Eyes closed, firm surface X30 sec ankle Min    Eyes open, foam surface X30 sec ankle min    Eyes closed, foam surface X1 min hip moderate     RESTRICTIONS/PRECAUTIONS:     Exercises/Interventions:     Therapeutic Exercises (42421) Min: 10 Resistance/Reps Notes/Cues   Nu step L3 x5 mins    Heel raises supported  20 reps     Toe raises supported  20 reps     Ankle tband INv Jimbo  Added to HEP daily         Leg Press 60# 2x10 B    FAQ 10# 2x10 B         Therapeutic Activities (85807) Min: 10          Advanced Gait:  *amb w/ head turns and nods while counting backwards from 50  *marching while counting by 2's to 50  *amb while counting backwards from 20 by 5s   20' x2 each with HHA      21' x2 with HHA    21' x2 with HHA  Increased ANUP noted with advanced gait activities.     Sit to stand without UEs X30 sec  9/26: completed 10 reps in 30 sec   amb sidesteps in II bars Red @ ankles x2 laps Moving L more difficult than moving R   Step up without UEs 4\" 10x L/R with occassional UE assist         Neuromuscular Re-ed (55194) Min: 30     Type I viewing exercise    NBOS: firm surface  *with eyes open   *with head turns / nods  *eyes closed static  *eyes closed head turns/nods    Modified tandem:  *with eyes open  *with head turns/nods     X1 min  X1 min        X1 min L/R  X1 min each       Patient requires occasional  taps to bar for balance support   AirEx:   *NBOS with head nods and counting by 2  *NBOS with eyes closed   *NBOS with eyes closed count backwards from 50  *step up and down off mat  *NBOS with PT perturbations     x1 min each      x1 min each   1x    10x L/R    X1 min   Mild sway    Min  sway    Moderate sway  One UE support   Postural sway:  *ankle circles  *fwd/backward   10x cw/ccw  10x     Box step on mat 3x L/R          Manual Intervention (16204) Min:                Modalities  Min:                      Other Therapeutic Activities: Pt was educated on PT POC, Diagnosis, Prognosis, pathomechanics as well as frequency and duration of scheduling future physical therapy appointments. Time was also taken on this day to answer all patient questions and participation in PT. Reviewed appointment policy in detail with patient and patient verbalized understanding. Home Exercise Program: Patient was instructed in the following for HEP:   Access Code: [email or web address]  Exercises  NBOS with EC - 1 x daily - 7 x weekly - 1 sets - 2 reps - 1 min hold  Tandem Stance - 1 x daily - 7 x weekly - 1 sets - 2 reps - 1 min hold  Patient verbalized/demonstrated understanding and was issued written handout. Therapeutic Exercise and NMR EXR  [x] (69048) Provided verbal/tactile cueing for activities related to strengthening, flexibility, endurance, ROM for improvements in LE, proximal hip, and core control with self care, mobility, lifting, ambulation.   [x] (56584) Provided verbal/tactile cueing for activities related to improving balance, coordination, kinesthetic sense, posture, motor skill, proprioception  to assist with LE, proximal hip, and core control in self care, mobility, lifting, ambulation and eccentric single leg control. [address] and Therapeutic Activities:    [x] (76029 or 72804) Provided verbal/tactile cueing for activities related to improving balance, coordination, kinesthetic sense, posture, motor skill, proprioception and motor activation to allow for proper function of core, proximal hip and LE with self care and ADLs and functional mobility.   [] (01836) Gait Re-education- Provided training and instruction to the patient for proper LE, core and proximal hip recruitment and positioning and eccentric body weight control with ambulation re-education including up and down stairs     Home Exercise Program:    [x] (10724) Reviewed/Progressed HEP activities related to strengthening, flexibility, endurance, ROM of core, proximal hip and LE for functional self-care, mobility, lifting and ambulation/stair navigation   [x] (10921)Reviewed/Progressed HEP activities related to improving balance, coordination, kinesthetic sense, posture, motor skill, proprioception of core, proximal hip and LE for self care, mobility, lifting, and ambulation/stair navigation      Manual Treatments:  PROM / STM / Oscillations-Mobs:  G-I, II, III, IV (PA's, Inf., Post.)  [] (77503) Provided manual therapy to mobilize LE, proximal hip and/or LS spine soft tissue/joints for the purpose of modulating pain, promoting relaxation,  increasing ROM, reducing/eliminating soft tissue swelling/inflammation/restriction, improving soft tissue extensibility and allowing for proper ROM for normal function with self care, mobility, lifting and ambulation.      Charges:  Timed Code Treatment Minutes: 50   Total Treatment Minutes: 50      [] EVAL (LOW) 52327 (typically 20 minutes face-to-face)  [] EVAL (MOD) 58276 (typically 30 minutes face-to-face)  [] EVAL (HIGH) 28865 (typically 45 minutes face-to-face)  [] RE-EVAL     [] BM(92523) x      [] Dry needle 1 or 2 Muscles (13277)  [x] NMR (83198) x 2  [] Dry needle 3+ Muscles (33961)  [] Manual (34376) x     [] Ultrasound (37787) x  [x] TA (34499) x  1   [] Mech Traction (34970)  [] ES(attended) (89322)     [] ES (un) (20546):   [] Vasopump (64449) [] Ionto (40347)   [] Other:    GOALS:  Patient stated goal: \"Be able to walk easier\"  [x] Progressing: [] Met: [] Not Met: [] Adjusted     Therapist goals for Patient:  Short Term Goals: To be achieved in: 2 weeks  1. Independent in HEP and progression per patient tolerance, in order to prevent re-injury. [] Progressing: [x] Met: [] Not Met: [] Adjusted  2. Patient will have a decrease in dizziness/imbalance/symptoms to by 40% to facilitate improvement in movement, function, balance and ADLs as indicated by Functional Deficits. [] Progressing: [x] Met: [] Not Met: [] Adjusted     Long Term Goals: To be achieved in: at discharge  1. Increase FOTO functional outcome score from 51 to 55  to assist with reaching prior level of function. [] Progressing: [] Met: [x] Not Met: [] Adjusted  2. Patient will demonstrate negative oculomotor special testing/positional testing as indicated by patients Functional Deficits. [x] Progressing: [] Met: [] Not Met: [] Adjusted  3. Patient will return to functional activities including housework without LOB. [] Progressing: [x] Met: [] Not Met: [] Adjusted  4. Pt will report 40% increased ease with ambulation in the community. [] Progressing: [x] Met: [] Not Met: [] Adjusted         ASSESSMENT: Pt was a little more fatigued throughout session today requiring increased rest breaks which may be secondary to her increased activity over the weekend. Pt overall is demonstrating improved dynamic balance but is still challenged with variable surfaces.      Treatment/Activity Tolerance:  [x] Patient tolerated treatment well [] Patient limited by fatique  [] Patient limited by pain  [] Patient limited by other medical complications  [] Other:     Overall Progression Towards Functional goals/ Treatment Progress Update:  [] Patient is progressing as expected towards functional goals listed. [] Progression is slowed due to complexities/Impairments listed. [] Progression has been slowed due to co-morbidities. [x] Plan just implemented, too soon to assess goals progression <30days   [] Goals require adjustment due to lack of progress  [] Patient is not progressing as expected and requires additional follow up with physician  [] Other    Prognosis for POC: [x] Good [] Fair  [] Poor    Patient requires continued skilled intervention: [x] Yes  [] No      PLAN:   [x] Continue per plan of care [] Alter current plan (see comments)  [] Plan of care initiated [] Hold pending MD visit [] Discharge    Electronically signed by: oJse Harrison PT , OMT-C,  [id number]      Note: If patient does not return for scheduled/recommended follow up visits, this note will serve as a discharge from care along with the most recent update on progress.

## 2022-09-30 ENCOUNTER — HOSPITAL ENCOUNTER (OUTPATIENT)
Dept: PHYSICAL THERAPY | Age: 62
Setting detail: THERAPIES SERIES
Discharge: HOME | End: 2022-09-30
Payer: COMMERCIAL

## 2022-09-30 PROCEDURE — 97112 NEUROMUSCULAR REEDUCATION: CPT | Performed by: PHYSICAL THERAPIST

## 2022-09-30 PROCEDURE — 97530 THERAPEUTIC ACTIVITIES: CPT | Performed by: PHYSICAL THERAPIST

## 2022-09-30 NOTE — FLOWSHEET NOTE
[address] [place], [place] [address]  Phone: [phone]   Fax:     [phone]      Physical Therapy Treatment Note/ Progress Report:   Date:  2022    Patient Name:  Candi Moon    :  1960  MRN: [id number]    Pertinent Medical History: HTN, anxiety, gout, R TKR    Medical/Treatment Diagnosis Information:  Diagnosis: R42 (ICD-10-CM) - Dizziness  Treatment Diagnosis: Decreased balance , Vestibular impairment    Insurance/Certification information:  PT Insurance Information: Cigna  Physician Information:  Referring Provider (secondary): Obinna Andrade MD  Plan of care signed (Y/N): [x]  Yes []  No ; PN sent  (received)    Date of Patient follow up with Physician:      Progress Report: []  Yes  [x]  No     Date Range for reporting period:  Beginnin2022  Endin22    Progress report due (10 Rx/or 30 days whichever is less):  90    Recertification due (POC duration/ or 90 days whichever is less):  22    Visit # Insurance Allowable Auth required?  bomn []  Yes [x]  No     Latex Allergy:  [x]NO      []YES  Preferred Language for Healthcare:   [x]English       []other:    Functional Scale:      Date assessed:   Test: FOTO - balance  Score: 51 - eval  Score: 47 - reassessment     Pain level:  4/10     History of Injury: Pt states she had COVID in the end of . Shortly after this patient notes having significant issues with her balance. Pt also states that her ears feel clogged as well. Pt states she has fallen once since onset but that was caused by her slipper falling off while going down the stairs. Pt states she uses her cane when doing stairs to get in and out of her house but otherwise she just grabs onto things as she is walking. Pt denies any episodes of dizziness or spinning. Pt also denies any blurred vision.     SUBJECTIVE:  Pt comes to therapy with SPC reports being disappointed she has to use it. She typically does not use one at baseline, goal is to wean off it. Despite this, however, she has been compliant using per PT rec. Pt reports fear of falling has developed. Reports was compliant with balance exs in HEP thus far. Pt reports residual complaints from COVID, \"fatigue runny nose SOB at times and balance is bad\"   8/22: Pt states she fell last week in home. Notes she tripped over something but was not able to recall what she fell over. Pt states she fell onto her bottom injuring her tailbone. Tailbone is starting to feel better but has not felt the need to follow up with MD about it.    8/25: Pt states she is doing well just a little tired. Tailbone continues to feel better. 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 Pt denies any slurred speech, memory issues, or dizziness associated with vision issues. Pt notes this threw her balance off. Later in the day she had her daughter give her her glasses which helped. Overall pt feels as if her balance is improving.   8/31/22: Pt reports that she saw the doctor on Monday following therapy. Reports that her \"ears are fine\". Reports that she feels her balance is getting better, still wishes her \"ears would pop\". 9/6: Pt reports she is a little shaky today and not feeling as good. Pt notes she was out of town this past weekend and thinks her not feeling well is due to drinking too much over the weekend. Overall feels like her balance is improving. 9/8: Pt states she has been doing light housework without LOB at home. Patient notes 50% improvement with ambulation in the community. Pt states she is still having some issues with going up stairs outside her home due to them not having a railing. Feeling better today than at last visit. Notes she was able to catch up on her sleep from the weekend. 9/12: Pt states her balance and gait are doing ok. Continues with difficulty doing stairs outside home without a railing. Pt denies any c/o dizziness. Notes her balance is overall about 50% improved. 9/15: Pt reports she has been walking around her house lately with a little better balance. 9/19: Pt without any new complaints today. 9/23/22 Pt has been using [facility] for safety. Denies any falls. She feels like she is doing better. 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 Had to walk on gravel this weekend which was challenging. 9/30/22 Pt reporting that her ears still bother her and not able to hear out of them. She reports no falls and feel that she is getting better. OBJECTIVE:   9/8/22  LE Strength:    LEFT RIGHT    Hip flexion 5/5 5/5    Hip IR 5/5 5/5    Hip ER 5/5 5/5    Knee extension 4+/5 4+/5    Knee flexion 5/5 5/5    Ankle 4+/5 johnny       9/8/22  Postural Control Tests:  Clinical Test of Sensory Interaction for Balance (CTSIB) performed in Romberg stance  CONDITION TIME STRATEGY SWAY    Eyes open, firm surface X30 sec  none    Eyes closed, firm surface X30 sec ankle Min    Eyes open, foam surface X30 sec ankle min    Eyes closed, foam surface X1 min hip moderate     RESTRICTIONS/PRECAUTIONS:     Exercises/Interventions:     Therapeutic Exercises (86310) Min: 10 Resistance/Reps Notes/Cues   Nu step L3 x5 mins    Heel raises supported  20 reps     Toe raises supported  20 reps     Ankle tband INv Jimbo  Added to HEP daily         Leg Press 60# 2x10 B    FAQ 10# 2x10 B         Therapeutic Activities (78235) Min: 10          Advanced Gait:  *amb w/ head turns and nods while counting backwards from 50  *marching while counting by 2's to 50  *amb while counting backwards from 20 by 5s  *side stepping    20' x2 each     20' x2   20' x2  20\"x2 w/ SBA  Increased ANUP noted with advanced gait activities.     Sit to stand without UEs X30 sec  9/26: completed 10 reps in 30 sec   amb sidesteps in II bars Moving L more difficult than moving R   Step up without UEs 8\" 10x L/R with 1 UE assist         Neuromuscular Re-ed (64030) Min: 30     Type I viewing exercise    NBOS: firm surface  *with eyes open   *with head turns / nods  *eyes closed static  *eyes closed head turns/nods    Modified tandem:  *with eyes open  *with head turns/nods     X1 min  X1 min        X1 min L/R  X1 min each       Patient requires occasional  taps to bar for balance support   AirEx:   *NBOS with head nods and counting by 2  *NBOS with eyes closed   *NBOS with eyes closed count backwards from 50  *step up and down off mat w/ turns  *NBOS with PT perturbations     x1 min each      x1 min each   1x    10x L/R    X1 min   Mild sway    Min  sway    Moderate sway  One UE support   Postural sway:  *ankle circles  *fwd/backward   10x cw/ccw  10x     Box step on mat 3x L/R          Manual Intervention (46066) Min:                Modalities  Min:                      Other Therapeutic Activities: Pt was educated on PT POC, Diagnosis, Prognosis, pathomechanics as well as frequency and duration of scheduling future physical therapy appointments. Time was also taken on this day to answer all patient questions and participation in PT. Reviewed appointment policy in detail with patient and patient verbalized understanding. Home Exercise Program: Patient was instructed in the following for HEP:   Access Code: [email or web address]  Exercises  NBOS with EC - 1 x daily - 7 x weekly - 1 sets - 2 reps - 1 min hold  Tandem Stance - 1 x daily - 7 x weekly - 1 sets - 2 reps - 1 min hold  Patient verbalized/demonstrated understanding and was issued written handout. Therapeutic Exercise and NMR EXR  [x] (26551) Provided verbal/tactile cueing for activities related to strengthening, flexibility, endurance, ROM for improvements in LE, proximal hip, and core control with self care, mobility, lifting, ambulation.   [x] (60811) Provided verbal/tactile cueing for activities related to improving balance, coordination, kinesthetic sense, posture, motor skill, proprioception  to assist with LE, proximal hip, and core control in self care, mobility, lifting, ambulation and eccentric single leg control. [address] and Therapeutic Activities:    [x] (70447 or 08108) Provided verbal/tactile cueing for activities related to improving balance, coordination, kinesthetic sense, posture, motor skill, proprioception and motor activation to allow for proper function of core, proximal hip and LE with self care and ADLs and functional mobility.   [] (59888) Gait Re-education- Provided training and instruction to the patient for proper LE, core and proximal hip recruitment and positioning and eccentric body weight control with ambulation re-education including up and down stairs     Home Exercise Program:    [x] (69213) Reviewed/Progressed HEP activities related to strengthening, flexibility, endurance, ROM of core, proximal hip and LE for functional self-care, mobility, lifting and ambulation/stair navigation   [x] (19048)Reviewed/Progressed HEP activities related to improving balance, coordination, kinesthetic sense, posture, motor skill, proprioception of core, proximal hip and LE for self care, mobility, lifting, and ambulation/stair navigation      Manual Treatments:  PROM / STM / Oscillations-Mobs:  G-I, II, III, IV (PA's, Inf., Post.)  [] (69799) Provided manual therapy to mobilize LE, proximal hip and/or LS spine soft tissue/joints for the purpose of modulating pain, promoting relaxation,  increasing ROM, reducing/eliminating soft tissue swelling/inflammation/restriction, improving soft tissue extensibility and allowing for proper ROM for normal function with self care, mobility, lifting and ambulation.      Charges:  Timed Code Treatment Minutes: 50   Total Treatment Minutes: 50      [] EVAL (LOW) 59640 (typically 20 minutes face-to-face)  [] EVAL (MOD) 87572 (typically 30 minutes face-to-face)  [] EVAL (HIGH) 69062 (typically 45 minutes face-to-face)  [] RE-EVAL     [] CR(97098) x      [] Dry needle 1 or 2 Muscles (86519)  [x] NMR (89500) x  2  [] Dry needle 3+ Muscles (85099)  [] Manual (17839) x     [] Ultrasound (62055) x  [x] TA (86411) x  1   [] Mech Traction (71916)  [] ES(attended) (64746)     [] ES (un) (22 968620):   [] Vasopump (38301) [] Ionto (07083)   [] Other:    GOALS:  Patient stated goal: \"Be able to walk easier\"  [x] Progressing: [] Met: [] Not Met: [] Adjusted     Therapist goals for Patient:  Short Term Goals: To be achieved in: 2 weeks  1. Independent in HEP and progression per patient tolerance, in order to prevent re-injury. [] Progressing: [x] Met: [] Not Met: [] Adjusted  2. Patient will have a decrease in dizziness/imbalance/symptoms to by 40% to facilitate improvement in movement, function, balance and ADLs as indicated by Functional Deficits. [] Progressing: [x] Met: [] Not Met: [] Adjusted     Long Term Goals: To be achieved in: at discharge  1. Increase FOTO functional outcome score from 51 to 55  to assist with reaching prior level of function. [] Progressing: [] Met: [x] Not Met: [] Adjusted  2. Patient will demonstrate negative oculomotor special testing/positional testing as indicated by patients Functional Deficits. [x] Progressing: [] Met: [] Not Met: [] Adjusted  3. Patient will return to functional activities including housework without LOB. [] Progressing: [x] Met: [] Not Met: [] Adjusted  4. Pt will report 40% increased ease with ambulation in the community. [] Progressing: [x] Met: [] Not Met: [] Adjusted         ASSESSMENT: Pt did well w/ less assistance needed for gt and no LOB noted. No dizziness w/ program. Pt is handling program well. Reassess next week w/ possible d/c. Min rest breaks needed today.      Treatment/Activity Tolerance:  [x] Patient tolerated treatment well [] Patient limited by fatique  [] Patient limited by pain  [] Patient limited by other medical complications  [] Other:     Overall Progression Towards Functional goals/ Treatment Progress Update:  [] Patient is progressing as expected towards functional goals listed. [] Progression is slowed due to complexities/Impairments listed. [] Progression has been slowed due to co-morbidities. [x] Plan just implemented, too soon to assess goals progression <30days   [] Goals require adjustment due to lack of progress  [] Patient is not progressing as expected and requires additional follow up with physician  [] Other    Prognosis for POC: [x] Good [] Fair  [] Poor    Patient requires continued skilled intervention: [x] Yes  [] No      PLAN:   [x] Continue per plan of care [] Alter current plan (see comments)  [] Plan of care initiated [] Hold pending MD visit [] Discharge    Electronically signed by: Leopold Quirk, PT , MS, OMT-C,      Physical Therapist [place] license #[id number]  Physical Therapist [place] license #[id number]       Note: If patient does not return for scheduled/recommended follow up visits, this note will serve as a discharge from care along with the most recent update on progress.

## 2022-10-04 ENCOUNTER — HOSPITAL ENCOUNTER (OUTPATIENT)
Dept: PHYSICAL THERAPY | Age: 62
Setting detail: THERAPIES SERIES
Discharge: HOME | End: 2022-10-04
Payer: COMMERCIAL

## 2022-10-04 PROCEDURE — 97530 THERAPEUTIC ACTIVITIES: CPT | Performed by: PHYSICAL THERAPIST

## 2022-10-04 PROCEDURE — 97112 NEUROMUSCULAR REEDUCATION: CPT | Performed by: PHYSICAL THERAPIST

## 2022-10-04 NOTE — FLOWSHEET NOTE
[address] [facility] [address]  Phone: [phone]   Fax:     [phone]      Physical Therapy Treatment Note/ Progress Report:   Date:  10/4/2022    Patient Name:  Stacey Hernandez    :  1960  MRN: [id number]    Pertinent Medical History: HTN, anxiety, gout, R TKR    Medical/Treatment Diagnosis Information:  Diagnosis: R42 (ICD-10-CM) - Dizziness  Treatment Diagnosis: Decreased balance , Vestibular impairment    Insurance/Certification information:  PT Insurance Information: [employer]  Physician Information:  Referring Provider (secondary): Gissel Leger MD  Plan of care signed (Y/N): [x]  Yes []  No ; PN sent  (received)    Date of Patient follow up with Physician:      Progress Report: []  Yes  [x]  No     Date Range for reporting period:  Beginnin2022  Endin22    Progress report due (10 Rx/or 30 days whichever is less):  64    Recertification due (POC duration/ or 90 days whichever is less):  22    Visit # Insurance Allowable Auth required?  bomn []  Yes [x]  No     Latex Allergy:  [x]NO      []YES  Preferred Language for Healthcare:   [x]English       []other:    Functional Scale:      Date assessed:   Test: FOTO - balance  Score: 51 - eval  Score: 47 - reassessment     Pain level:  4/10     History of Injury: Pt states she had COVID in the end of . Shortly after this patient notes having significant issues with her balance. Pt also states that her ears feel clogged as well. Pt states she has fallen once since onset but that was caused by her slipper falling off while going down the stairs. Pt states she uses her cane when doing stairs to get in and out of her house but otherwise she just grabs onto things as she is walking. Pt denies any episodes of dizziness or spinning. Pt also denies any blurred vision.     SUBJECTIVE:  Pt comes to therapy with SPC reports being disappointed she has to use it. She typically does not use one at baseline, goal is to wean off it. Despite this, however, she has been compliant using per PT rec. Pt reports fear of falling has developed. Reports was compliant with balance exs in HEP thus far. Pt reports residual complaints from COVID, \"fatigue runny nose SOB at times and balance is bad\"   8/22: Pt states she fell last week in home. Notes she tripped over something but was not able to recall what she fell over. Pt states she fell onto her bottom injuring her tailbone. Tailbone is starting to feel better but has not felt the need to follow up with MD about it.    8/25: Pt states she is doing well just a little tired. Tailbone continues to feel better. 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 Pt denies any slurred speech, memory issues, or dizziness associated with vision issues. Pt notes this threw her balance off. Later in the day she had her daughter give her her glasses which helped. Overall pt feels as if her balance is improving.   8/31/22: Pt reports that she saw the doctor on Monday following therapy. Reports that her \"ears are fine\". Reports that she feels her balance is getting better, still wishes her \"ears would pop\". 9/6: Pt reports she is a little shaky today and not feeling as good. Pt notes she was out of town this past weekend and thinks her not feeling well is due to drinking too much over the weekend. Overall feels like her balance is improving. 9/8: Pt states she has been doing light housework without LOB at home. Patient notes 50% improvement with ambulation in the community. Pt states she is still having some issues with going up stairs outside her home due to them not having a railing. Feeling better today than at last visit. Notes she was able to catch up on her sleep from the weekend. 9/12: Pt states her balance and gait are doing ok. Continues with difficulty doing stairs outside home without a railing. Pt denies any c/o dizziness. Notes her balance is overall about 50% improved. 9/15: Pt reports she has been walking around her house lately with a little better balance. 9/19: Pt without any new complaints today. 9/23/22 Pt has been using [address] for safety. Denies any falls. She feels like she is doing better. 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 Had to walk on gravel this weekend which was challenging. 9/30/22 Pt reporting that her ears still bother her and not able to hear out of them. She reports no falls and feel that she is getting better. 10/4/22 pt has been trying to get up and move more during the day. She is not worried about falls and feels ready to be discharged from therapy.      OBJECTIVE:   9/8/22  LE Strength:    LEFT RIGHT    Hip flexion 5/5 5/5    Hip IR 5/5 5/5    Hip ER 5/5 5/5    Knee extension 4+/5 4+/5    Knee flexion 5/5 5/5    Ankle 4+/5 johnny       9/8/22  Postural Control Tests:  Clinical Test of Sensory Interaction for Balance (CTSIB) performed in Romberg stance  CONDITION TIME STRATEGY SWAY    Eyes open, firm surface X30 sec  none    Eyes closed, firm surface X30 sec ankle Min    Eyes open, foam surface X30 sec ankle min    Eyes closed, foam surface X1 min hip moderate     RESTRICTIONS/PRECAUTIONS:     Exercises/Interventions:     Therapeutic Exercises (58481) Min: 10 Resistance/Reps Notes/Cues   Nu step L3 x5 mins    Heel raises supported  20 reps     Toe raises supported  20 reps     Ankle tband INv Jimbo  Added to HEP daily         Leg Press 60# 2x10 B    FAQ 10# 2x10 B         Therapeutic Activities (58963) Min: 10          Advanced Gait:  *amb w/ head turns and nods while counting backwards from 50  *marching while counting by 2's to 50  *amb while counting backwards from 20 by 5s  *side stepping    20' x2 each     20' x2   20' x2  20\"x2 w/ SBA  Increased ANUP noted with advanced gait activities. Sit to stand without UEs X30 sec  10/4: completed 12 reps in 30 sec   amb sidesteps in II bars Step up without UEs 8\" 10x L/R with 1 UE assist         Neuromuscular Re-ed (23437) Min: 30     Type I viewing exercise    NBOS: firm surface  *with eyes open   *with head turns / nods  *eyes closed static  *eyes closed head turns/nods    Modified tandem:  *with eyes open  *with head turns/nods     X1 min  X1 min        X1 min L/R  X1 min each       Patient requires occasional  taps to bar for balance support   AirEx:   *NBOS with head nods and counting by 2  *NBOS with eyes closed   *NBOS with eyes closed count backwards from 50  *step up and down off mat w/ turns  *NBOS with PT perturbations     x1 min each      x1 min each   1x    10x L/R    X1 min   Mild sway    Min  sway    Moderate sway  One UE support   Postural sway:  *ankle circles  *fwd/backward   10x cw/ccw  10x     Box step on mat 3x L/R     Obstacle course Over step, on mat and around cones w/ SPC x2    fitter Fwd/bwd ea; side to side 30\"    Rebecca disc under ea leg Balance 30\"  Squatting x10 w/ UE support              Manual Intervention (88578) Min:                Modalities  Min:                      Other Therapeutic Activities: Pt was educated on PT POC, Diagnosis, Prognosis, pathomechanics as well as frequency and duration of scheduling future physical therapy appointments. Time was also taken on this day to answer all patient questions and participation in PT. Reviewed appointment policy in detail with patient and patient verbalized understanding. Home Exercise Program: Patient was instructed in the following for HEP:   Access Code: [email or web address]  Exercises  NBOS with EC - 1 x daily - 7 x weekly - 1 sets - 2 reps - 1 min hold  Tandem Stance - 1 x daily - 7 x weekly - 1 sets - 2 reps - 1 min hold  Patient verbalized/demonstrated understanding and was issued written handout.     Therapeutic Exercise and NMR EXR  [x] (44198) Provided verbal/tactile cueing for activities related to strengthening, flexibility, endurance, ROM for improvements in LE, proximal hip, and core control with self care, mobility, lifting, ambulation. [x] (24927) Provided verbal/tactile cueing for activities related to improving balance, coordination, kinesthetic sense, posture, motor skill, proprioception  to assist with LE, proximal hip, and core control in self care, mobility, lifting, ambulation and eccentric single leg control.  [address] and Therapeutic Activities:    [x] (53801 or 09640) Provided verbal/tactile cueing for activities related to improving balance, coordination, kinesthetic sense, posture, motor skill, proprioception and motor activation to allow for proper function of core, proximal hip and LE with self care and ADLs and functional mobility.   [] (36061) Gait Re-education- Provided training and instruction to the patient for proper LE, core and proximal hip recruitment and positioning and eccentric body weight control with ambulation re-education including up and down stairs     Home Exercise Program:    [x] (77581) Reviewed/Progressed HEP activities related to strengthening, flexibility, endurance, ROM of core, proximal hip and LE for functional self-care, mobility, lifting and ambulation/stair navigation   [x] (41006)Reviewed/Progressed HEP activities related to improving balance, coordination, kinesthetic sense, posture, motor skill, proprioception of core, proximal hip and LE for self care, mobility, lifting, and ambulation/stair navigation      Manual Treatments:  PROM / STM / Oscillations-Mobs:  G-I, II, III, IV (PA's, Inf., Post.)  [] (00099) Provided manual therapy to mobilize LE, proximal hip and/or LS spine soft tissue/joints for the purpose of modulating pain, promoting relaxation,  increasing ROM, reducing/eliminating soft tissue swelling/inflammation/restriction, improving soft tissue extensibility and allowing for proper ROM for normal function with self care, mobility, lifting and ambulation. Charges:  Timed Code Treatment Minutes: 40   Total Treatment Minutes: 40      [] EVAL (LOW) 78022 (typically 20 minutes face-to-face)  [] EVAL (MOD) 54413 (typically 30 minutes face-to-face)  [] EVAL (HIGH) 65854 (typically 45 minutes face-to-face)  [] RE-EVAL     [] AB(37821) x      [] Dry needle 1 or 2 Muscles (65764)  [x] NMR (94141) x  2  [] Dry needle 3+ Muscles (87696)  [] Manual (97355) x     [] Ultrasound (36713) x  [x] TA (07038) x  1   [] Mech Traction (12402)  [] ES(attended) (29795)     [] ES (un) (23827):   [] Vasopump (70988) [] Ionto (68645)   [] Other:    GOALS:  Patient stated goal: \"Be able to walk easier\"  [x] Progressing: [] Met: [] Not Met: [] Adjusted     Therapist goals for Patient:  Short Term Goals: To be achieved in: 2 weeks  1. Independent in HEP and progression per patient tolerance, in order to prevent re-injury. [] Progressing: [x] Met: [] Not Met: [] Adjusted  2. Patient will have a decrease in dizziness/imbalance/symptoms to by 40% to facilitate improvement in movement, function, balance and ADLs as indicated by Functional Deficits. [] Progressing: [x] Met: [] Not Met: [] Adjusted     Long Term Goals: To be achieved in: at discharge  1. Increase FOTO functional outcome score from 51 to 55  to assist with reaching prior level of function. [] Progressing: [] Met: [x] Not Met: [] Adjusted  2. Patient will demonstrate negative oculomotor special testing/positional testing as indicated by patients Functional Deficits. [x] Progressing: [] Met: [] Not Met: [] Adjusted  3. Patient will return to functional activities including housework without LOB. [] Progressing: [x] Met: [] Not Met: [] Adjusted  4. Pt will report 40% increased ease with ambulation in the community. [] Progressing: [x] Met: [] Not Met: [] Adjusted         ASSESSMENT: pt feels good about her progress and managing condition on her own.  Changed program to add components together and make them realistic of life w/ curb to grass ext w/ good tech. Pt hesitated at times and used her [facility] but did not have any LOB and need extra assistance. Pt denies having dizziness w/ any activities in clinic and anything at home recently. Treatment/Activity Tolerance:  [x] Patient tolerated treatment well [] Patient limited by fatique  [] Patient limited by pain  [] Patient limited by other medical complications  [] Other:     Overall Progression Towards Functional goals/ Treatment Progress Update:  [] Patient is progressing as expected towards functional goals listed. [] Progression is slowed due to complexities/Impairments listed. [] Progression has been slowed due to co-morbidities. [x] Plan just implemented, too soon to assess goals progression <30days   [] Goals require adjustment due to lack of progress  [] Patient is not progressing as expected and requires additional follow up with physician  [] Other    Prognosis for POC: [x] Good [] Fair  [] Poor    Patient requires continued skilled intervention: [x] Yes  [] No      PLAN: Pt will come prepare for reassessment and discharge w/ questions or anything that she needs from primary therapist. Plan d/c after next session. [x] Continue per plan of care [] Alter current plan (see comments)  [] Plan of care initiated [] Hold pending MD visit [] Discharge    Electronically signed by: Rocío Bill PT , MS, OMT-C,      Physical Therapist [place] license #[id number]  Physical Therapist [place] license #[id number]       Note: If patient does not return for scheduled/recommended follow up visits, this note will serve as a discharge from care along with the most recent update on progress.

## 2022-10-06 ENCOUNTER — HOSPITAL ENCOUNTER (OUTPATIENT)
Dept: PHYSICAL THERAPY | Age: 62
Setting detail: THERAPIES SERIES
Discharge: HOME | End: 2022-10-06
Payer: COMMERCIAL

## 2022-10-06 PROCEDURE — 97530 THERAPEUTIC ACTIVITIES: CPT

## 2022-10-06 PROCEDURE — 97112 NEUROMUSCULAR REEDUCATION: CPT

## 2022-10-06 NOTE — PLAN OF CARE
[address]. [place] [facility] [address]  Phone: [phone]   Fax:     [phone]      [address]. Donald Corral [facility] [address]  Phone: [phone]   Fax:     [phone]     Physical Therapy Discharge Summary    Dear Clive Ly MD,    We had the pleasure of treating the following patient for physical therapy services at [address] [place] [address]. A summary of our findings can be found in the discharge summary below. If you have any questions or concerns regarding these findings, please do not hesitate to contact me at the office phone number above. Thank you for the referral.     Physician Signature:________________________________Date:__________________  By signing above (or electronic signature), therapists plan is approved by physician      Overall Response to Treatment:   [x]Patient is responding well to treatment and improvement is noted with regards  to goals   [x]Patient should continue to improve in reasonable time if they continue HEP   []Patient has plateaued and is no longer responding to skilled PT intervention    []Patient is getting worse and would benefit from return to referring MD   []Patient unable to adhere to initial POC   []Other:     ASSESSMENT: Patient has met all therapy goals at this time. She reports 90% improvement in her imbalance and ambulation in the community. Pt feels good about her progress and managing condition on her own. No longer with any c/o dizziness. Pt also demonstrates improvement with postural control tests only exhibiting mild sway with EC on foam.  Pt is appropriate to continue with HEP at this time.      Date range of Visits: 8/8/22 to 10/6/22  Total Visits: 13    Plan: D/C with HEP    Physical Therapy Treatment Note/ Progress Report:   Date:  10/6/2022    Patient Name:  Candi Moon    :  1960  MRN: [id number]    Pertinent Medical History: HTN, anxiety, gout, R TKR    Medical/Treatment Diagnosis Information:  Diagnosis: R42 (ICD-10-CM) - Dizziness  Treatment Diagnosis: Decreased balance , Vestibular impairment    Insurance/Certification information:  PT Insurance Information: [employer]  Physician Information:  Referring Provider (secondary): Obinna Andrade MD  Plan of care signed (Y/N): [x]  Yes []  No ; PN sent  (received)    Date of Patient follow up with Physician:      Progress Report: [x]  Yes  []  No     Date Range for reporting period:  Beginnin2022  Ending:10/6/22    Progress report due (10 Rx/or 30 days whichever is less):  3/6/89    Recertification due (POC duration/ or 90 days whichever is less):  22    Visit # Insurance Allowable Auth required?  bomn []  Yes [x]  No     Latex Allergy:  [x]NO      []YES  Preferred Language for Healthcare:   [x]English       []other:    Functional Scale:      Date assessed:   Test: FOTO - balance  Score: 51 - eval  Score: 47 - reassessment   Score: 56 - reassessment 10/6    Pain level:  4/10     History of Injury: Pt states she had COVID in the end of . Shortly after this patient notes having significant issues with her balance. Pt also states that her ears feel clogged as well. Pt states she has fallen once since onset but that was caused by her slipper falling off while going down the stairs. Pt states she uses her cane when doing stairs to get in and out of her house but otherwise she just grabs onto things as she is walking. Pt denies any episodes of dizziness or spinning. Pt also denies any blurred vision. SUBJECTIVE:  Pt comes to therapy with SPC reports being disappointed she has to use it. She typically does not use one at baseline, goal is to wean off it. Despite this, however, she has been compliant using per PT rec. Pt reports fear of falling has developed.  Reports was compliant with balance exs in HEP thus far. Pt reports residual complaints from COVID, \"fatigue runny nose SOB at times and balance is bad\"   8/22: Pt states she fell last week in home. Notes she tripped over something but was not able to recall what she fell over. Pt states she fell onto her bottom injuring her tailbone. Tailbone is starting to feel better but has not felt the need to follow up with MD about it.    8/25: Pt states she is doing well just a little tired. Tailbone continues to feel better. 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 Pt denies any slurred speech, memory issues, or dizziness associated with vision issues. Pt notes this threw her balance off. Later in the day she had her daughter give her her glasses which helped. Overall pt feels as if her balance is improving.   8/31/22: Pt reports that she saw the doctor on Monday following therapy. Reports that her \"ears are fine\". Reports that she feels her balance is getting better, still wishes her \"ears would pop\". 9/6: Pt reports she is a little shaky today and not feeling as good. Pt notes she was out of town this past weekend and thinks her not feeling well is due to drinking too much over the weekend. Overall feels like her balance is improving. 9/8: Pt states she has been doing light housework without LOB at home. Patient notes 50% improvement with ambulation in the community. Pt states she is still having some issues with going up stairs outside her home due to them not having a railing. Feeling better today than at last visit. Notes she was able to catch up on her sleep from the weekend. 9/12: Pt states her balance and gait are doing ok. Continues with difficulty doing stairs outside home without a railing. Pt denies any c/o dizziness. Notes her balance is overall about 50% improved.    9/15: Pt reports she has been walking around her house lately with a little better balance. 9/19: Pt without any new complaints today. 9/23/22 Pt has been using [address] for safety. Denies any falls. She feels like she is doing better. 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 Had to walk on gravel this weekend which was challenging. 9/30/22 Pt reporting that her ears still bother her and not able to hear out of them. She reports no falls and feel that she is getting better. 10/4/22 pt has been trying to get up and move more during the day. She is not worried about falls and feels ready to be discharged from therapy. 10/6/22 Pt reports 90% improvement overall with imbalance and ambulation in the community. Has returned to normal housework without issues. OBJECTIVE:   10/6/22  LE Strength:    LEFT RIGHT    Hip flexion 5/5 5/5    Hip IR 5/5 5/5    Hip ER 5/5 5/5    Knee extension 4+/5 4+/5    Knee flexion 5/5 5/5    Ankle 5/5 5/5     10/6/22  Postural Control Tests:  Clinical Test of Sensory Interaction for Balance (CTSIB) performed in Romberg stance  CONDITION TIME STRATEGY SWAY    Eyes open, firm surface X1 min  none    Eyes closed, firm surface X1 min  none    Eyes open, foam surface X1 min  none    Eyes closed, foam surface X1 min ankle min     RESTRICTIONS/PRECAUTIONS:     Exercises/Interventions:     Therapeutic Exercises (21286) Min: 10 Resistance/Reps Notes/Cues   Nu step L3 x5 mins    Heel raises supported  20 reps     Toe raises supported  20 reps          Leg Press 60# 2x10 B    FAQ 10# 2x10 B         Therapeutic Activities (20954) Min: 10          Advanced Gait:  *amb w/ head turns and nods while counting backwards from 50  *marching while counting by 2's to 50  *amb while counting backwards from 20 by 5s  *side stepping    20' x2 each     20' x2   20' x2  20\"x2 w/ SBA  Increased ANUP noted with advanced gait activities.     Sit to stand without UEs X30 sec  10/6: completed 12 reps in 30 sec   amb sidesteps in II bars Step up without UEs 8\" 10x L/R with 1 UE assist         Neuromuscular Re-ed (72378) Min: 30     Type I viewing exercise    NBOS: firm surface  *with eyes open   *with head turns / nods  *eyes closed static  *eyes closed head turns/nods    Modified tandem:  *with eyes open  *with head turns/nods     X1 min  X1 min        X1 min L/R  X1 min each       Patient requires occasional  taps to bar for balance support   AirEx:   *NBOS with head nods and counting by 2  *NBOS with eyes closed   *NBOS with eyes closed count backwards from 50  *step up and down off mat w/ turns  *NBOS with PT perturbations     x1 min each      x1 min each   1x    10x L/R    X1 min   Mild sway    Min  sway    Moderate sway  One UE support   Postural sway:  *ankle circles  *fwd/backward   10x cw/ccw  10x     Box step on mat 3x L/R     Obstacle course    fitter    Rebecca disc under ea leg Balance 30\"  Squatting x10 w/ UE support              Manual Intervention (17497) Min:                Modalities  Min:                      Other Therapeutic Activities: Pt was educated on PT POC, Diagnosis, Prognosis, pathomechanics as well as frequency and duration of scheduling future physical therapy appointments. Time was also taken on this day to answer all patient questions and participation in PT. Reviewed appointment policy in detail with patient and patient verbalized understanding. Home Exercise Program: Patient was instructed in the following for HEP:   Access Code: [email or web address]  Exercises  NBOS with EC - 1 x daily - 7 x weekly - 1 sets - 2 reps - 1 min hold  Tandem Stance - 1 x daily - 7 x weekly - 1 sets - 2 reps - 1 min hold  Patient verbalized/demonstrated understanding and was issued written handout. Therapeutic Exercise and NMR EXR  [x] (33741) Provided verbal/tactile cueing for activities related to strengthening, flexibility, endurance, ROM for improvements in LE, proximal hip, and core control with self care, mobility, lifting, ambulation.   [x] (53565) Provided verbal/tactile cueing for activities related to improving balance, coordination, kinesthetic sense, posture, motor skill, proprioception  to assist with LE, proximal hip, and core control in self care, mobility, lifting, ambulation and eccentric single leg control. [address] and Therapeutic Activities:    [x] (81437 or 71784) Provided verbal/tactile cueing for activities related to improving balance, coordination, kinesthetic sense, posture, motor skill, proprioception and motor activation to allow for proper function of core, proximal hip and LE with self care and ADLs and functional mobility.   [] (30638) Gait Re-education- Provided training and instruction to the patient for proper LE, core and proximal hip recruitment and positioning and eccentric body weight control with ambulation re-education including up and down stairs     Home Exercise Program:    [x] (05454) Reviewed/Progressed HEP activities related to strengthening, flexibility, endurance, ROM of core, proximal hip and LE for functional self-care, mobility, lifting and ambulation/stair navigation   [x] (18245)Reviewed/Progressed HEP activities related to improving balance, coordination, kinesthetic sense, posture, motor skill, proprioception of core, proximal hip and LE for self care, mobility, lifting, and ambulation/stair navigation      Manual Treatments:  PROM / STM / Oscillations-Mobs:  G-I, II, III, IV (PA's, Inf., Post.)  [] (36180) Provided manual therapy to mobilize LE, proximal hip and/or LS spine soft tissue/joints for the purpose of modulating pain, promoting relaxation,  increasing ROM, reducing/eliminating soft tissue swelling/inflammation/restriction, improving soft tissue extensibility and allowing for proper ROM for normal function with self care, mobility, lifting and ambulation.      Charges:  Timed Code Treatment Minutes: 50   Total Treatment Minutes: 50      [] EVAL (LOW) 46267 (typically 20 minutes face-to-face)  [] EVAL (MOD) 77672 (typically 30 minutes face-to-face)  [] EVAL (HIGH) 14319 (typically 45 minutes face-to-face)  [] RE-EVAL     [] ZL(60061) x      [] Dry needle 1 or 2 Muscles (30367)  [x] NMR (38978) x  2  [] Dry needle 3+ Muscles (37411)  [] Manual (31135) x     [] Ultrasound (78540) x  [x] TA (10905) x  1   [] Mech Traction (73864)  [] ES(attended) (77892)     [] ES (un) (45827):   [] Vasopump (08689) [] Ionto (10874)   [] Other:    GOALS:  Patient stated goal: \"Be able to walk easier\"  [] Progressing: [x] Met: [] Not Met: [] Adjusted     Therapist goals for Patient:  Short Term Goals: To be achieved in: 2 weeks  1. Independent in HEP and progression per patient tolerance, in order to prevent re-injury. [] Progressing: [x] Met: [] Not Met: [] Adjusted  2. Patient will have a decrease in dizziness/imbalance/symptoms to by 40% to facilitate improvement in movement, function, balance and ADLs as indicated by Functional Deficits. [] Progressing: [x] Met: [] Not Met: [] Adjusted     Long Term Goals: To be achieved in: at discharge  1. Increase FOTO functional outcome score from 51 to 55  to assist with reaching prior level of function. [] Progressing: [x] Met: [] Not Met: [] Adjusted  2. Patient will demonstrate negative oculomotor special testing/positional testing as indicated by patients Functional Deficits. [] Progressing: [x] Met: [] Not Met: [] Adjusted  3. Patient will return to functional activities including housework without LOB. [] Progressing: [x] Met: [] Not Met: [] Adjusted  4. Pt will report 40% increased ease with ambulation in the community. [] Progressing: [x] Met: [] Not Met: [] Adjusted         ASSESSMENT: Patient has met all therapy goals at this time. She reports 90% improvement in her imbalance and ambulation in the community. Pt feels good about her progress and managing condition on her own. No longer with any c/o dizziness.   Pt also demonstrates improvement with postural control tests only exhibiting mild sway with EC on foam.  Pt is appropriate to continue with HEP at this time. Treatment/Activity Tolerance:  [x] Patient tolerated treatment well [] Patient limited by fatique  [] Patient limited by pain  [] Patient limited by other medical complications  [] Other:     Overall Progression Towards Functional goals/ Treatment Progress Update:  [] Patient is progressing as expected towards functional goals listed. [] Progression is slowed due to complexities/Impairments listed. [] Progression has been slowed due to co-morbidities. [x] Plan just implemented, too soon to assess goals progression <30days   [] Goals require adjustment due to lack of progress  [] Patient is not progressing as expected and requires additional follow up with physician  [] Other    Prognosis for POC: [x] Good [] Fair  [] Poor    Patient requires continued skilled intervention: [x] Yes  [] No      PLAN: D/C with HEP  [] Continue per plan of care [] Alter current plan (see comments)  [] Plan of care initiated [] Hold pending MD visit [x] Discharge    Electronically signed by: Tito Almeida PT , OMT-C,  [id number]        Note: If patient does not return for scheduled/recommended follow up visits, this note will serve as a discharge from care along with the most recent update on progress.

## 2022-10-21 ENCOUNTER — NURSE ONLY (OUTPATIENT)
Dept: FAMILY MEDICINE CLINIC | Age: 62
End: 2022-10-21
Payer: COMMERCIAL

## 2022-10-21 PROCEDURE — 90471 IMMUNIZATION ADMIN: CPT | Performed by: INTERNAL MEDICINE

## 2022-10-21 PROCEDURE — 90674 CCIIV4 VAC NO PRSV 0.5 ML IM: CPT | Performed by: INTERNAL MEDICINE

## 2022-11-15 ENCOUNTER — TELEPHONE (OUTPATIENT)
Dept: FAMILY MEDICINE CLINIC | Age: 62
End: 2022-11-15

## 2022-11-15 NOTE — TELEPHONE ENCOUNTER
Pt called in broke her hip in [facility]  on 11/5/2022  Pt had surgery done while there pt needs a HFU she is not released yet pt wants to do a vv she stated she will be in a wheelchair   Pt is not sure when she will be released I told her to call then we can schedule     Pt is wanting PT and some other home care services since she cannot move a whole lot

## 2022-11-15 NOTE — TELEPHONE ENCOUNTER
Would the patient be able to do a [address] Follow up?      The patient is not discharged yet, but once she is the patient is wanting PT and home health

## 2022-11-15 NOTE — TELEPHONE ENCOUNTER
Tell her I will really need to see her in person. I am sorry. She also will want to see orthopedics here.   We can recommend  [facility]

## 2022-11-15 NOTE — TELEPHONE ENCOUNTER
Attempted to schedule. Patient stated she can't walk right now until she gets PT    Should a HFU VV be scheduled or wait till she can come in?      Please Advise

## 2022-11-16 NOTE — TELEPHONE ENCOUNTER
Pt called in still in [facility] [place] hoping to fly home tomorrow but not for sure     I advised the pt to call when she gets home   Pt wants to know can she do a vv?

## 2022-11-17 NOTE — TELEPHONE ENCOUNTER
Called patient she is waiting for her insurance company to book her flight back home.  She is going to the ER at [facility] once she gets back home

## 2022-11-17 NOTE — TELEPHONE ENCOUNTER
Pls tell Jose Carlos Rubio I would love to help her but  I think she will have to go to the ER at [facility] and sort things out. I have no control if she is admitted or not . If the orthopedic surgeon in [facility] knows an ortho at [facility] maybe he/she could call them. Sound like she will need to go to rehab. Make sure she has all the records and CD disc of her xrays,cat scans etc.  If I can help in any other way let me know but I think it would be best if an ortho doctor in [facility] could talk to the ortho doctor here.

## 2022-11-17 NOTE — TELEPHONE ENCOUNTER
Pt calling. Stated that she is trying to get a date she can fly back to [facility]. She would like to know if Dr. Yecenia Fischer could get her into [facility] when she gets here. She had her surgery in [facility] but she can not put any weight on her right leg. She would not be able to go home on her own. Her orthopaedic is affiliated with [facility] so she would like to go there.     Please Advise  Pt can be reached at [phone]

## 2022-11-24 LAB — ANTIBODY: NORMAL

## 2022-12-12 ENCOUNTER — COMMUNITY OUTREACH (OUTPATIENT)
Dept: FAMILY MEDICINE CLINIC | Age: 62
End: 2022-12-12

## 2022-12-12 NOTE — PROGRESS NOTES
Patient's HM shows they are overdue for HIV and HEP C Screenings, Mammogram, Colorectal Screening and Cervical Cancer Screening. Care Everywhere and  files searched.  updated with 2022 HIV and Hep C results and 2016 Colonoscopy.

## 2022-12-28 ENCOUNTER — TELEPHONE (OUTPATIENT)
Dept: FAMILY MEDICINE CLINIC | Age: 62
End: 2022-12-28

## 2022-12-28 NOTE — TELEPHONE ENCOUNTER
I have not seen since June. I can't ok home orders.   She needs an appt for orders  I have to sign off that she has been seen by me  Help her to get scheduled with myself or [facility]

## 2022-12-29 NOTE — TELEPHONE ENCOUNTER
I am not sure if we can do a VV for home health. It is also best to evaluate in person. Can she call her ins co and ask for a home doctor /provider to see her ? Maybe she needs help with transportation to our office? If that does not work out I can do VV if ok with medicare rules for signing home health.

## 2022-12-29 NOTE — TELEPHONE ENCOUNTER
Please call and schedule the patient, needs a follow up for Dr. Jg Lin to sign skilled nursing orders

## 2022-12-29 NOTE — TELEPHONE ENCOUNTER
Attempted to schedule. Patient said she needs to call back. She cannot get here right now due to her having a broken hip.     ROHAN

## 2022-12-30 NOTE — TELEPHONE ENCOUNTER
Attempted to schedule    Patient refused the appointment & stated that [facility] is coming today and she believes they signed the home care orders    ROHAN

## 2022-12-30 NOTE — TELEPHONE ENCOUNTER
Patient is unable to come in due to a broken hip    I am unaware of the medicare rules for home health

## 2023-01-05 NOTE — TELEPHONE ENCOUNTER
Pls let Ferdinand [place] [employer] know that I want to help but medicare rules are very strict. Confirm with her that Krista Israel is signing the home health orders.   Will be happy to see her back in the office once she is able to come in